# Patient Record
Sex: FEMALE | Race: WHITE | Employment: OTHER | URBAN - METROPOLITAN AREA
[De-identification: names, ages, dates, MRNs, and addresses within clinical notes are randomized per-mention and may not be internally consistent; named-entity substitution may affect disease eponyms.]

---

## 2017-01-10 ENCOUNTER — OFFICE VISIT (OUTPATIENT)
Dept: INTERNAL MEDICINE CLINIC | Age: 77
End: 2017-01-10

## 2017-01-10 VITALS
HEART RATE: 76 BPM | DIASTOLIC BLOOD PRESSURE: 82 MMHG | RESPIRATION RATE: 16 BRPM | SYSTOLIC BLOOD PRESSURE: 162 MMHG | HEIGHT: 62 IN | BODY MASS INDEX: 29.66 KG/M2 | WEIGHT: 161.2 LBS | TEMPERATURE: 98.1 F

## 2017-01-10 DIAGNOSIS — R10.32 LEFT LOWER QUADRANT PAIN: ICD-10-CM

## 2017-01-10 DIAGNOSIS — H60.502 ACUTE OTITIS EXTERNA OF LEFT EAR, UNSPECIFIED TYPE: ICD-10-CM

## 2017-01-10 DIAGNOSIS — M50.90 CERVICAL DISC DISEASE: ICD-10-CM

## 2017-01-10 DIAGNOSIS — I82.5Z3 CHRONIC DEEP VEIN THROMBOSIS (DVT) OF DISTAL VEIN OF BOTH LOWER EXTREMITIES (HCC): ICD-10-CM

## 2017-01-10 DIAGNOSIS — M48.061 LUMBAR SPINAL STENOSIS: Primary | ICD-10-CM

## 2017-01-10 DIAGNOSIS — I10 ESSENTIAL HYPERTENSION: ICD-10-CM

## 2017-01-10 DIAGNOSIS — R07.9 CHEST PAIN, UNSPECIFIED TYPE: ICD-10-CM

## 2017-01-10 DIAGNOSIS — E55.9 VITAMIN D DEFICIENCY: ICD-10-CM

## 2017-01-10 DIAGNOSIS — F32.A ANXIETY AND DEPRESSION: ICD-10-CM

## 2017-01-10 DIAGNOSIS — F41.9 ANXIETY AND DEPRESSION: ICD-10-CM

## 2017-01-10 DIAGNOSIS — E78.00 HYPERCHOLESTEREMIA: ICD-10-CM

## 2017-01-10 PROCEDURE — 99214 OFFICE O/P EST MOD 30 MIN: CPT | Performed by: INTERNAL MEDICINE

## 2017-01-10 RX ORDER — OFLOXACIN 3 MG/ML
10 SOLUTION AURICULAR (OTIC) DAILY
Qty: 1 BOTTLE | Refills: 0 | Status: SHIPPED | OUTPATIENT
Start: 2017-01-10 | End: 2017-01-17

## 2017-01-10 RX ORDER — GABAPENTIN 600 MG/1
600 TABLET ORAL 3 TIMES DAILY
COMMUNITY
End: 2017-05-12 | Stop reason: SDUPTHER

## 2017-01-10 RX ORDER — BIOTIN 2500 MCG
1 CAPSULE ORAL 2 TIMES DAILY
COMMUNITY
End: 2019-11-18 | Stop reason: ALTCHOICE

## 2017-01-10 RX ORDER — CHOLECALCIFEROL (VITAMIN D3) 1250 MCG
CAPSULE ORAL DAILY
COMMUNITY
End: 2018-02-01

## 2017-01-10 RX ORDER — LISINOPRIL 10 MG/1
10 TABLET ORAL DAILY
Qty: 30 TABLET | Refills: 3 | Status: SHIPPED | OUTPATIENT
Start: 2017-01-10 | End: 2017-04-12 | Stop reason: SDUPTHER

## 2017-01-10 RX ORDER — OXYBUTYNIN CHLORIDE 10 MG/1
10 TABLET, EXTENDED RELEASE ORAL 2 TIMES DAILY
COMMUNITY
End: 2017-10-09 | Stop reason: SDUPTHER

## 2017-01-10 RX ORDER — HYDROCODONE BITARTRATE AND ACETAMINOPHEN 5; 325 MG/1; MG/1
1 TABLET ORAL EVERY 6 HOURS PRN
COMMUNITY
End: 2017-04-11 | Stop reason: SDUPTHER

## 2017-01-10 RX ORDER — MELOXICAM 15 MG/1
15 TABLET ORAL DAILY
COMMUNITY
End: 2017-10-09 | Stop reason: SDUPTHER

## 2017-01-10 RX ORDER — ZINC OXIDE 13 %
1 CREAM (GRAM) TOPICAL DAILY
COMMUNITY
End: 2019-11-18 | Stop reason: ALTCHOICE

## 2017-01-10 ASSESSMENT — ENCOUNTER SYMPTOMS
CONSTIPATION: 0
BACK PAIN: 1
SORE THROAT: 0
VOMITING: 0
BLOOD IN STOOL: 0
ABDOMINAL PAIN: 1
SINUS PRESSURE: 0
DIARRHEA: 0
NAUSEA: 0

## 2017-01-17 ENCOUNTER — TELEPHONE (OUTPATIENT)
Dept: INTERNAL MEDICINE CLINIC | Age: 77
End: 2017-01-17

## 2017-02-06 ENCOUNTER — TELEPHONE (OUTPATIENT)
Dept: INTERNAL MEDICINE CLINIC | Age: 77
End: 2017-02-06

## 2017-02-07 ENCOUNTER — TELEPHONE (OUTPATIENT)
Dept: INTERNAL MEDICINE CLINIC | Age: 77
End: 2017-02-07

## 2017-02-08 ENCOUNTER — TELEPHONE (OUTPATIENT)
Dept: INTERNAL MEDICINE CLINIC | Age: 77
End: 2017-02-08

## 2017-02-20 ENCOUNTER — TELEPHONE (OUTPATIENT)
Dept: INTERNAL MEDICINE CLINIC | Age: 77
End: 2017-02-20

## 2017-02-21 ENCOUNTER — TELEPHONE (OUTPATIENT)
Dept: INTERNAL MEDICINE CLINIC | Age: 77
End: 2017-02-21

## 2017-03-03 ENCOUNTER — OFFICE VISIT (OUTPATIENT)
Dept: ENT CLINIC | Age: 77
End: 2017-03-03

## 2017-03-03 VITALS
SYSTOLIC BLOOD PRESSURE: 123 MMHG | DIASTOLIC BLOOD PRESSURE: 64 MMHG | BODY MASS INDEX: 29.04 KG/M2 | TEMPERATURE: 98.6 F | HEIGHT: 62 IN | WEIGHT: 157.8 LBS | HEART RATE: 56 BPM

## 2017-03-03 DIAGNOSIS — H90.2 EXTERNAL EAR CONDUCTIVE HEARING LOSS: ICD-10-CM

## 2017-03-03 DIAGNOSIS — H61.21 IMPACTED CERUMEN OF RIGHT EAR: ICD-10-CM

## 2017-03-03 DIAGNOSIS — H93.8X1 FULLNESS IN EAR, RIGHT: ICD-10-CM

## 2017-03-03 DIAGNOSIS — M26.623 BILATERAL TEMPOROMANDIBULAR JOINT PAIN: ICD-10-CM

## 2017-03-03 DIAGNOSIS — H92.03 REFERRED OTALGIA OF BOTH EARS: Primary | ICD-10-CM

## 2017-03-03 PROCEDURE — 99214 OFFICE O/P EST MOD 30 MIN: CPT | Performed by: OTOLARYNGOLOGY

## 2017-03-09 ENCOUNTER — TELEPHONE (OUTPATIENT)
Dept: INTERNAL MEDICINE CLINIC | Age: 77
End: 2017-03-09

## 2017-04-11 ENCOUNTER — OFFICE VISIT (OUTPATIENT)
Dept: INTERNAL MEDICINE CLINIC | Age: 77
End: 2017-04-11

## 2017-04-11 VITALS
DIASTOLIC BLOOD PRESSURE: 66 MMHG | RESPIRATION RATE: 16 BRPM | HEART RATE: 56 BPM | WEIGHT: 157 LBS | BODY MASS INDEX: 28.72 KG/M2 | TEMPERATURE: 98.1 F | SYSTOLIC BLOOD PRESSURE: 138 MMHG

## 2017-04-11 DIAGNOSIS — E78.00 HYPERCHOLESTEREMIA: ICD-10-CM

## 2017-04-11 DIAGNOSIS — M48.062 LUMBAR STENOSIS WITH NEUROGENIC CLAUDICATION: Primary | ICD-10-CM

## 2017-04-11 DIAGNOSIS — F32.9 REACTIVE DEPRESSION: ICD-10-CM

## 2017-04-11 DIAGNOSIS — M48.061 LUMBAR STENOSIS: ICD-10-CM

## 2017-04-11 DIAGNOSIS — I82.5Y3 CHRONIC DEEP VEIN THROMBOSIS (DVT) OF PROXIMAL VEIN OF BOTH LOWER EXTREMITIES (HCC): ICD-10-CM

## 2017-04-11 DIAGNOSIS — I10 ESSENTIAL HYPERTENSION: ICD-10-CM

## 2017-04-11 DIAGNOSIS — H60.93 OTITIS EXTERNA OF BOTH EARS, UNSPECIFIED CHRONICITY, UNSPECIFIED TYPE: ICD-10-CM

## 2017-04-11 PROBLEM — I82.4Y3 DEEP VEIN THROMBOSIS (DVT) OF PROXIMAL VEIN OF BOTH LOWER EXTREMITIES (HCC): Status: ACTIVE | Noted: 2017-04-11

## 2017-04-11 PROCEDURE — 99214 OFFICE O/P EST MOD 30 MIN: CPT | Performed by: INTERNAL MEDICINE

## 2017-04-11 RX ORDER — HYDROCODONE BITARTRATE AND ACETAMINOPHEN 5; 325 MG/1; MG/1
1 TABLET ORAL EVERY 6 HOURS PRN
Qty: 120 TABLET | Refills: 0 | Status: ON HOLD | OUTPATIENT
Start: 2017-04-11 | End: 2018-02-08 | Stop reason: HOSPADM

## 2017-04-11 RX ORDER — OFLOXACIN 3 MG/ML
5 SOLUTION AURICULAR (OTIC) 2 TIMES DAILY
Qty: 10 ML | Refills: 0 | Status: SHIPPED | OUTPATIENT
Start: 2017-04-11 | End: 2017-04-21

## 2017-04-11 RX ORDER — HYDROCHLOROTHIAZIDE 12.5 MG/1
CAPSULE, GELATIN COATED ORAL
COMMUNITY
Start: 2017-03-07 | End: 2017-04-12 | Stop reason: SDUPTHER

## 2017-04-11 RX ORDER — LIDOCAINE 50 MG/G
OINTMENT TOPICAL
Qty: 50 G | Refills: 1 | Status: SHIPPED | OUTPATIENT
Start: 2017-04-11 | End: 2017-07-28 | Stop reason: SDUPTHER

## 2017-04-11 ASSESSMENT — ENCOUNTER SYMPTOMS
NAUSEA: 0
BACK PAIN: 1
ABDOMINAL PAIN: 0
VOMITING: 0
CONSTIPATION: 0
SORE THROAT: 0
DIARRHEA: 0
SINUS PRESSURE: 0
BLOOD IN STOOL: 0

## 2017-04-12 DIAGNOSIS — I10 ESSENTIAL HYPERTENSION: ICD-10-CM

## 2017-04-12 DIAGNOSIS — M48.062 LUMBAR STENOSIS WITH NEUROGENIC CLAUDICATION: ICD-10-CM

## 2017-04-12 RX ORDER — LISINOPRIL 10 MG/1
10 TABLET ORAL DAILY
Qty: 90 TABLET | Refills: 1 | Status: SHIPPED | OUTPATIENT
Start: 2017-04-12 | End: 2017-05-17 | Stop reason: SDUPTHER

## 2017-04-12 RX ORDER — HYDROCHLOROTHIAZIDE 12.5 MG/1
12.5 CAPSULE, GELATIN COATED ORAL EVERY MORNING
Qty: 90 CAPSULE | Refills: 1 | Status: SHIPPED | OUTPATIENT
Start: 2017-04-12 | End: 2017-09-13 | Stop reason: DRUGHIGH

## 2017-05-12 RX ORDER — GABAPENTIN 600 MG/1
600 TABLET ORAL 3 TIMES DAILY
Qty: 270 TABLET | Refills: 1 | Status: SHIPPED | OUTPATIENT
Start: 2017-05-12 | End: 2018-02-02 | Stop reason: SDUPTHER

## 2017-05-17 DIAGNOSIS — I10 ESSENTIAL HYPERTENSION: ICD-10-CM

## 2017-05-17 RX ORDER — LISINOPRIL 10 MG/1
TABLET ORAL
Qty: 90 TABLET | Refills: 1 | Status: SHIPPED | OUTPATIENT
Start: 2017-05-17 | End: 2017-06-13 | Stop reason: DRUGHIGH

## 2017-06-13 ENCOUNTER — OFFICE VISIT (OUTPATIENT)
Dept: INTERNAL MEDICINE CLINIC | Age: 77
End: 2017-06-13

## 2017-06-13 VITALS
RESPIRATION RATE: 16 BRPM | DIASTOLIC BLOOD PRESSURE: 82 MMHG | TEMPERATURE: 98 F | HEART RATE: 64 BPM | BODY MASS INDEX: 28.24 KG/M2 | WEIGHT: 154.4 LBS | SYSTOLIC BLOOD PRESSURE: 142 MMHG

## 2017-06-13 DIAGNOSIS — F32.9 REACTIVE DEPRESSION: ICD-10-CM

## 2017-06-13 DIAGNOSIS — M50.90 CERVICAL DISC DISEASE: ICD-10-CM

## 2017-06-13 DIAGNOSIS — E78.00 HYPERCHOLESTEREMIA: ICD-10-CM

## 2017-06-13 DIAGNOSIS — I10 ESSENTIAL HYPERTENSION: ICD-10-CM

## 2017-06-13 DIAGNOSIS — Z23 NEED FOR PROPHYLACTIC VACCINATION WITH STREPTOCOCCUS PNEUMONIAE (PNEUMOCOCCUS) AND INFLUENZA VACCINES: ICD-10-CM

## 2017-06-13 DIAGNOSIS — I82.5Y3 CHRONIC DEEP VEIN THROMBOSIS (DVT) OF PROXIMAL VEIN OF BOTH LOWER EXTREMITIES (HCC): Primary | ICD-10-CM

## 2017-06-13 DIAGNOSIS — M48.061 LUMBAR STENOSIS: ICD-10-CM

## 2017-06-13 PROCEDURE — 90670 PCV13 VACCINE IM: CPT | Performed by: INTERNAL MEDICINE

## 2017-06-13 PROCEDURE — G0009 ADMIN PNEUMOCOCCAL VACCINE: HCPCS | Performed by: INTERNAL MEDICINE

## 2017-06-13 PROCEDURE — 99214 OFFICE O/P EST MOD 30 MIN: CPT | Performed by: INTERNAL MEDICINE

## 2017-06-13 RX ORDER — LISINOPRIL 20 MG/1
20 TABLET ORAL DAILY
Qty: 90 TABLET | Refills: 1 | Status: SHIPPED | OUTPATIENT
Start: 2017-06-13 | End: 2017-09-13 | Stop reason: DRUGHIGH

## 2017-06-13 RX ORDER — WARFARIN SODIUM 5 MG/1
TABLET ORAL
Qty: 30 TABLET | Refills: 3
Start: 2017-06-13 | End: 2017-06-15 | Stop reason: SDUPTHER

## 2017-06-13 ASSESSMENT — ENCOUNTER SYMPTOMS
ABDOMINAL PAIN: 0
CONSTIPATION: 0
COUGH: 0
BACK PAIN: 1
DIARRHEA: 0
VOMITING: 0
NAUSEA: 0
BLOOD IN STOOL: 0
SHORTNESS OF BREATH: 0

## 2017-06-14 ENCOUNTER — TELEPHONE (OUTPATIENT)
Dept: INTERNAL MEDICINE CLINIC | Age: 77
End: 2017-06-14

## 2017-06-14 DIAGNOSIS — I82.5Y3 CHRONIC DEEP VEIN THROMBOSIS (DVT) OF PROXIMAL VEIN OF BOTH LOWER EXTREMITIES (HCC): ICD-10-CM

## 2017-06-14 LAB
ALBUMIN SERPL-MCNC: 3.8 G/DL (ref 3.5–5)
ALP BLD-CCNC: 59 IU/L (ref 35–135)
ALT SERPL-CCNC: 14 IU/L (ref 10–60)
AST SERPL-CCNC: 21 IU/L (ref 10–40)
BILIRUB SERPL-MCNC: 0.5 MG/DL (ref 0–1.2)
BUN BLDV-MCNC: 23 MG/DL (ref 8–26)
CALCIUM SERPL-MCNC: 9.8 MG/DL (ref 8.5–10.5)
CHLORIDE BLD-SCNC: 108 MEQ/L (ref 101–111)
CHOLESTEROL, TOTAL: 175 MG/DL
CHOLESTEROL/HDL RATIO: 2.6 (ref 1.9–4.2)
CO2: 29 MEQ/L (ref 24–36)
CREAT SERPL-MCNC: 0.6 MG/DL (ref 0.44–1.03)
GFR AFRICAN AMERICAN: >60 ML/MIN/1.73 SQ METER
GFR NON-AFRICAN AMERICAN: >60 ML/MIN/1.73 SQ METER
GLUCOSE BLD-MCNC: 92 MG/DL (ref 70–99)
HCT VFR BLD CALC: 37 % (ref 36–46)
HDLC SERPL-MCNC: 67 MG/DL
HEMOGLOBIN: 12.2 G/DL (ref 12–15.2)
INR BLD: 3 (ref 0.8–1.2)
LDL CHOLESTEROL CALCULATED: 94 MG/DL
LDL/HDL RATIO: 1.4 (ref 1–4)
MCH RBC QN AUTO: 31 PG (ref 27–33)
MCHC RBC AUTO-ENTMCNC: 33 G/DL (ref 32–36)
MCV RBC AUTO: 95 FL (ref 82–97)
PDW BLD-RTO: 15.3 %
PLATELET # BLD: 178 THOU/MCL (ref 140–375)
POTASSIUM SERPL-SCNC: 3.8 MEQ/L (ref 3.6–5.1)
PROTHROMBIN TIME: 31.3 SECONDS (ref 11.7–14.2)
RBC # BLD: 3.89 MIL/MCL (ref 3.8–5.2)
SODIUM BLD-SCNC: 143 MEQ/L (ref 135–145)
TOTAL PROTEIN: 6.9 G/DL (ref 6–8)
TRIGL SERPL-MCNC: 70 MG/DL
WBC # BLD: 4.5 THOU/MCL (ref 3.6–10.5)

## 2017-06-15 ENCOUNTER — TELEPHONE (OUTPATIENT)
Dept: INTERNAL MEDICINE CLINIC | Age: 77
End: 2017-06-15

## 2017-06-15 RX ORDER — WARFARIN SODIUM 5 MG/1
TABLET ORAL
Qty: 180 TABLET | Refills: 1 | Status: SHIPPED | OUTPATIENT
Start: 2017-06-15 | End: 2017-12-11 | Stop reason: DRUGHIGH

## 2017-07-26 LAB
INR BLD: 3.5 (ref 0.8–1.2)
PROTHROMBIN TIME: 35.8 SECONDS (ref 11.7–14.2)

## 2017-07-28 DIAGNOSIS — M48.062 LUMBAR STENOSIS WITH NEUROGENIC CLAUDICATION: ICD-10-CM

## 2017-07-31 ENCOUNTER — TELEPHONE (OUTPATIENT)
Dept: INTERNAL MEDICINE CLINIC | Age: 77
End: 2017-07-31

## 2017-07-31 DIAGNOSIS — M48.062 LUMBAR STENOSIS WITH NEUROGENIC CLAUDICATION: ICD-10-CM

## 2017-07-31 RX ORDER — LIDOCAINE 50 MG/G
OINTMENT TOPICAL
Qty: 35.44 G | Refills: 1 | Status: SHIPPED | OUTPATIENT
Start: 2017-07-31 | End: 2018-04-03 | Stop reason: SDUPTHER

## 2017-07-31 RX ORDER — LIDOCAINE 50 MG/G
OINTMENT TOPICAL
Qty: 50 G | Refills: 1 | Status: SHIPPED | OUTPATIENT
Start: 2017-07-31 | End: 2017-07-31 | Stop reason: SDUPTHER

## 2017-08-16 ENCOUNTER — TELEPHONE (OUTPATIENT)
Dept: INTERNAL MEDICINE CLINIC | Age: 77
End: 2017-08-16

## 2017-08-18 ENCOUNTER — TELEPHONE (OUTPATIENT)
Dept: INTERNAL MEDICINE CLINIC | Age: 77
End: 2017-08-18

## 2017-08-21 RX ORDER — SIMVASTATIN 40 MG
40 TABLET ORAL NIGHTLY
Qty: 90 TABLET | Refills: 1 | Status: SHIPPED | OUTPATIENT
Start: 2017-08-21 | End: 2017-12-13 | Stop reason: SDUPTHER

## 2017-08-21 RX ORDER — PANTOPRAZOLE SODIUM 40 MG/1
40 TABLET, DELAYED RELEASE ORAL DAILY
Qty: 90 TABLET | Refills: 1 | Status: SHIPPED | OUTPATIENT
Start: 2017-08-21 | End: 2018-01-22 | Stop reason: SDUPTHER

## 2017-08-24 DIAGNOSIS — I82.5Y3 CHRONIC DEEP VEIN THROMBOSIS (DVT) OF PROXIMAL VEIN OF BOTH LOWER EXTREMITIES (HCC): Primary | ICD-10-CM

## 2017-09-13 ENCOUNTER — TELEPHONE (OUTPATIENT)
Dept: INTERNAL MEDICINE CLINIC | Age: 77
End: 2017-09-13

## 2017-09-13 ENCOUNTER — OFFICE VISIT (OUTPATIENT)
Dept: INTERNAL MEDICINE CLINIC | Age: 77
End: 2017-09-13

## 2017-09-13 VITALS
HEART RATE: 76 BPM | WEIGHT: 156.4 LBS | SYSTOLIC BLOOD PRESSURE: 152 MMHG | TEMPERATURE: 98 F | BODY MASS INDEX: 28.61 KG/M2 | RESPIRATION RATE: 16 BRPM | DIASTOLIC BLOOD PRESSURE: 78 MMHG

## 2017-09-13 DIAGNOSIS — I82.5Y3 CHRONIC DEEP VEIN THROMBOSIS (DVT) OF PROXIMAL VEIN OF BOTH LOWER EXTREMITIES (HCC): ICD-10-CM

## 2017-09-13 DIAGNOSIS — J01.00 SUBACUTE MAXILLARY SINUSITIS: ICD-10-CM

## 2017-09-13 DIAGNOSIS — F33.41 RECURRENT MAJOR DEPRESSIVE DISORDER, IN PARTIAL REMISSION (HCC): ICD-10-CM

## 2017-09-13 DIAGNOSIS — M48.061 LUMBAR STENOSIS: ICD-10-CM

## 2017-09-13 DIAGNOSIS — I10 ESSENTIAL HYPERTENSION: Primary | ICD-10-CM

## 2017-09-13 DIAGNOSIS — R42 VERTIGO: ICD-10-CM

## 2017-09-13 LAB
INR BLD: 2.1 (ref 0.8–1.2)
PROTHROMBIN TIME: 23.6 SECONDS (ref 11.7–14.2)

## 2017-09-13 PROCEDURE — 99214 OFFICE O/P EST MOD 30 MIN: CPT | Performed by: INTERNAL MEDICINE

## 2017-09-13 RX ORDER — FLUTICASONE PROPIONATE 50 MCG
1 SPRAY, SUSPENSION (ML) NASAL DAILY
Qty: 1 BOTTLE | Refills: 3 | Status: SHIPPED | OUTPATIENT
Start: 2017-09-13 | End: 2018-01-22 | Stop reason: ALTCHOICE

## 2017-09-13 RX ORDER — HYDROCHLOROTHIAZIDE 25 MG/1
25 TABLET ORAL DAILY
Qty: 90 TABLET | Refills: 1 | Status: SHIPPED | OUTPATIENT
Start: 2017-09-13 | End: 2018-01-22 | Stop reason: SDUPTHER

## 2017-09-13 RX ORDER — AMOXICILLIN 875 MG/1
875 TABLET, COATED ORAL 2 TIMES DAILY
Qty: 20 TABLET | Refills: 0 | Status: SHIPPED | OUTPATIENT
Start: 2017-09-13 | End: 2017-12-13

## 2017-09-13 RX ORDER — LISINOPRIL 40 MG/1
40 TABLET ORAL DAILY
Qty: 90 TABLET | Refills: 1 | Status: SHIPPED | OUTPATIENT
Start: 2017-09-13 | End: 2017-12-13 | Stop reason: SDUPTHER

## 2017-09-15 ASSESSMENT — ENCOUNTER SYMPTOMS
SHORTNESS OF BREATH: 1
DIARRHEA: 0
ABDOMINAL PAIN: 0
SORE THROAT: 1
COUGH: 1
BACK PAIN: 1
SINUS PRESSURE: 1
CONSTIPATION: 0
VOMITING: 0
RHINORRHEA: 1

## 2017-10-09 ENCOUNTER — TELEPHONE (OUTPATIENT)
Dept: INTERNAL MEDICINE CLINIC | Age: 77
End: 2017-10-09

## 2017-10-09 RX ORDER — OXYBUTYNIN CHLORIDE 10 MG/1
10 TABLET, EXTENDED RELEASE ORAL 2 TIMES DAILY
Qty: 180 TABLET | Refills: 1 | Status: SHIPPED | OUTPATIENT
Start: 2017-10-09 | End: 2017-12-04 | Stop reason: DRUGHIGH

## 2017-10-09 RX ORDER — MELOXICAM 15 MG/1
15 TABLET ORAL DAILY
Qty: 90 TABLET | Refills: 1 | Status: SHIPPED | OUTPATIENT
Start: 2017-10-09 | End: 2017-12-13 | Stop reason: SDUPTHER

## 2017-10-09 RX ORDER — SERTRALINE HYDROCHLORIDE 100 MG/1
100 TABLET, FILM COATED ORAL 2 TIMES DAILY
Qty: 180 TABLET | Refills: 1 | Status: SHIPPED | OUTPATIENT
Start: 2017-10-09 | End: 2018-01-22 | Stop reason: SDUPTHER

## 2017-10-09 NOTE — TELEPHONE ENCOUNTER
Pt needs a rx on meloxicam (MOBIC) 15 MG tablet to be sent to [facility] ([phone]).

## 2017-12-04 ENCOUNTER — TELEPHONE (OUTPATIENT)
Dept: INTERNAL MEDICINE CLINIC | Age: 77
End: 2017-12-04

## 2017-12-04 RX ORDER — OXYBUTYNIN CHLORIDE 5 MG/1
10 TABLET, EXTENDED RELEASE ORAL 2 TIMES DAILY
Qty: 360 TABLET | Refills: 1 | Status: SHIPPED | OUTPATIENT
Start: 2017-12-04 | End: 2017-12-11 | Stop reason: CLARIF

## 2017-12-07 ENCOUNTER — TELEPHONE (OUTPATIENT)
Dept: INTERNAL MEDICINE CLINIC | Age: 77
End: 2017-12-07

## 2017-12-07 DIAGNOSIS — I82.5Y3 CHRONIC DEEP VEIN THROMBOSIS (DVT) OF PROXIMAL VEIN OF BOTH LOWER EXTREMITIES (HCC): ICD-10-CM

## 2017-12-07 NOTE — TELEPHONE ENCOUNTER
[facility] pharmacy calling b/c the pt's insurance won't cover the oxybutynin (DITROPAN-XL) 5 MG extended release tablet two tablets twice daily unless there is a PA. The pt's insurance will cover either one 5 MG or one 10 MG both twice a daily. [facility] wants to know what Dr Belén Allen wants to do?

## 2017-12-07 NOTE — TELEPHONE ENCOUNTER
So what they are saying is that they will cover one pill of each daily ? We could try one of each and see if it goes through or we could change to mybetriq once a day and see if this helps?

## 2017-12-07 NOTE — TELEPHONE ENCOUNTER
Spoke with the pharmacy and patient has always got Ditropan XL 10 mg bid cost was $80 for 90 day supply. Insurance will cover Ditropan XL 5 or 10 mg one tablet BID but not for 2 tablets BID(4)    Mybetriq 25 mg would be $47 for a 30 day supply. Spoke with the patient she will just continue on the Ditropan XL 10 mg one bid. Patient informed of new directions for warfrin.  Per Dr Nicolas Paris take 5 mg on Tue, Thurs and 7.5 mg on Mon, Wed, Fri, Sat & Sun. Updated on med list.

## 2017-12-11 RX ORDER — WARFARIN SODIUM 5 MG/1
TABLET ORAL
Qty: 180 TABLET | Refills: 1 | Status: SHIPPED
Start: 2017-12-11 | End: 2018-01-22 | Stop reason: SDUPTHER

## 2017-12-11 RX ORDER — OXYBUTYNIN CHLORIDE 10 MG/1
10 TABLET, EXTENDED RELEASE ORAL 2 TIMES DAILY
Qty: 180 TABLET | Refills: 1 | Status: SHIPPED | OUTPATIENT
Start: 2017-12-11 | End: 2018-01-22 | Stop reason: SDUPTHER

## 2017-12-13 ENCOUNTER — OFFICE VISIT (OUTPATIENT)
Dept: INTERNAL MEDICINE CLINIC | Age: 77
End: 2017-12-13

## 2017-12-13 ENCOUNTER — TELEPHONE (OUTPATIENT)
Dept: INTERNAL MEDICINE CLINIC | Age: 77
End: 2017-12-13

## 2017-12-13 VITALS
SYSTOLIC BLOOD PRESSURE: 80 MMHG | BODY MASS INDEX: 28.82 KG/M2 | OXYGEN SATURATION: 94 % | HEIGHT: 62 IN | HEART RATE: 75 BPM | DIASTOLIC BLOOD PRESSURE: 60 MMHG | TEMPERATURE: 98 F | WEIGHT: 156.6 LBS

## 2017-12-13 DIAGNOSIS — M81.0 AGE-RELATED OSTEOPOROSIS WITHOUT CURRENT PATHOLOGICAL FRACTURE: ICD-10-CM

## 2017-12-13 DIAGNOSIS — R63.4 WEIGHT LOSS: ICD-10-CM

## 2017-12-13 DIAGNOSIS — I10 ESSENTIAL HYPERTENSION: ICD-10-CM

## 2017-12-13 DIAGNOSIS — Z91.81 AT HIGH RISK FOR FALLS: ICD-10-CM

## 2017-12-13 DIAGNOSIS — R10.10 PAIN OF UPPER ABDOMEN: ICD-10-CM

## 2017-12-13 DIAGNOSIS — E78.00 HYPERCHOLESTEREMIA: ICD-10-CM

## 2017-12-13 DIAGNOSIS — M48.062 SPINAL STENOSIS OF LUMBAR REGION WITH NEUROGENIC CLAUDICATION: Primary | ICD-10-CM

## 2017-12-13 DIAGNOSIS — I82.5Y3 CHRONIC DEEP VEIN THROMBOSIS (DVT) OF PROXIMAL VEIN OF BOTH LOWER EXTREMITIES (HCC): ICD-10-CM

## 2017-12-13 LAB
A/G RATIO: 1.3 (ref 1.1–2.2)
ALBUMIN SERPL-MCNC: 4.1 G/DL (ref 3.4–5)
ALP BLD-CCNC: 61 U/L (ref 40–129)
ALT SERPL-CCNC: 12 U/L (ref 10–40)
AMYLASE: 39 U/L (ref 25–115)
ANION GAP SERPL CALCULATED.3IONS-SCNC: 14 MMOL/L (ref 3–16)
AST SERPL-CCNC: 18 U/L (ref 15–37)
BASOPHILS ABSOLUTE: 0.1 K/UL (ref 0–0.2)
BASOPHILS RELATIVE PERCENT: 1 %
BILIRUB SERPL-MCNC: 0.3 MG/DL (ref 0–1)
BUN BLDV-MCNC: 32 MG/DL (ref 7–20)
CALCIUM SERPL-MCNC: 10.1 MG/DL (ref 8.3–10.6)
CHLORIDE BLD-SCNC: 101 MMOL/L (ref 99–110)
CO2: 32 MMOL/L (ref 21–32)
CREAT SERPL-MCNC: 1 MG/DL (ref 0.6–1.2)
EOSINOPHILS ABSOLUTE: 0.1 K/UL (ref 0–0.6)
EOSINOPHILS RELATIVE PERCENT: 2.7 %
GFR AFRICAN AMERICAN: >60
GFR NON-AFRICAN AMERICAN: 54
GLOBULIN: 3.2 G/DL
GLUCOSE BLD-MCNC: 93 MG/DL (ref 70–99)
HCT VFR BLD CALC: 40.8 % (ref 36–48)
HEMOGLOBIN: 13.6 G/DL (ref 12–16)
LIPASE: 21 U/L (ref 13–60)
LYMPHOCYTES ABSOLUTE: 1.6 K/UL (ref 1–5.1)
LYMPHOCYTES RELATIVE PERCENT: 29 %
MCH RBC QN AUTO: 32.2 PG (ref 26–34)
MCHC RBC AUTO-ENTMCNC: 33.3 G/DL (ref 31–36)
MCV RBC AUTO: 97 FL (ref 80–100)
MONOCYTES ABSOLUTE: 0.7 K/UL (ref 0–1.3)
MONOCYTES RELATIVE PERCENT: 11.9 %
NEUTROPHILS ABSOLUTE: 3.1 K/UL (ref 1.7–7.7)
NEUTROPHILS RELATIVE PERCENT: 55.4 %
PDW BLD-RTO: 14.9 % (ref 12.4–15.4)
PLATELET # BLD: 160 K/UL (ref 135–450)
PMV BLD AUTO: 10.9 FL (ref 5–10.5)
POTASSIUM SERPL-SCNC: 4 MMOL/L (ref 3.5–5.1)
RBC # BLD: 4.2 M/UL (ref 4–5.2)
SODIUM BLD-SCNC: 147 MMOL/L (ref 136–145)
TOTAL PROTEIN: 7.3 G/DL (ref 6.4–8.2)
WBC # BLD: 5.5 K/UL (ref 4–11)

## 2017-12-13 PROCEDURE — G8484 FLU IMMUNIZE NO ADMIN: HCPCS | Performed by: INTERNAL MEDICINE

## 2017-12-13 PROCEDURE — 1123F ACP DISCUSS/DSCN MKR DOCD: CPT | Performed by: INTERNAL MEDICINE

## 2017-12-13 PROCEDURE — 4040F PNEUMOC VAC/ADMIN/RCVD: CPT | Performed by: INTERNAL MEDICINE

## 2017-12-13 PROCEDURE — 4005F PHARM THX FOR OP RXD: CPT | Performed by: INTERNAL MEDICINE

## 2017-12-13 PROCEDURE — G8400 PT W/DXA NO RESULTS DOC: HCPCS | Performed by: INTERNAL MEDICINE

## 2017-12-13 PROCEDURE — 3017F COLORECTAL CA SCREEN DOC REV: CPT | Performed by: INTERNAL MEDICINE

## 2017-12-13 PROCEDURE — 99214 OFFICE O/P EST MOD 30 MIN: CPT | Performed by: INTERNAL MEDICINE

## 2017-12-13 PROCEDURE — 1036F TOBACCO NON-USER: CPT | Performed by: INTERNAL MEDICINE

## 2017-12-13 PROCEDURE — 36415 COLL VENOUS BLD VENIPUNCTURE: CPT | Performed by: INTERNAL MEDICINE

## 2017-12-13 PROCEDURE — G8427 DOCREV CUR MEDS BY ELIG CLIN: HCPCS | Performed by: INTERNAL MEDICINE

## 2017-12-13 PROCEDURE — G8417 CALC BMI ABV UP PARAM F/U: HCPCS | Performed by: INTERNAL MEDICINE

## 2017-12-13 PROCEDURE — 1090F PRES/ABSN URINE INCON ASSESS: CPT | Performed by: INTERNAL MEDICINE

## 2017-12-13 RX ORDER — LISINOPRIL 20 MG/1
20 TABLET ORAL DAILY
Qty: 90 TABLET | Refills: 1 | Status: SHIPPED | OUTPATIENT
Start: 2017-12-13 | End: 2018-01-22 | Stop reason: SDUPTHER

## 2017-12-13 RX ORDER — SIMVASTATIN 40 MG
40 TABLET ORAL NIGHTLY
Qty: 90 TABLET | Refills: 1 | Status: SHIPPED | OUTPATIENT
Start: 2017-12-13 | End: 2018-01-22 | Stop reason: SDUPTHER

## 2017-12-13 RX ORDER — LISINOPRIL 40 MG/1
20 TABLET ORAL DAILY
Qty: 90 TABLET | Refills: 1
Start: 2017-12-13 | End: 2018-01-22 | Stop reason: DRUGHIGH

## 2017-12-13 RX ORDER — MELOXICAM 15 MG/1
15 TABLET ORAL DAILY
Qty: 90 TABLET | Refills: 1 | Status: SHIPPED | OUTPATIENT
Start: 2017-12-13 | End: 2018-01-22 | Stop reason: SDUPTHER

## 2017-12-13 RX ORDER — AZITHROMYCIN 250 MG/1
TABLET, FILM COATED ORAL
Qty: 1 PACKET | Refills: 0 | Status: SHIPPED | OUTPATIENT
Start: 2017-12-13 | End: 2017-12-23

## 2017-12-13 ASSESSMENT — ENCOUNTER SYMPTOMS
NAUSEA: 1
BACK PAIN: 1
SHORTNESS OF BREATH: 0
COUGH: 0
ABDOMINAL PAIN: 1
VOMITING: 1

## 2017-12-13 NOTE — PATIENT INSTRUCTIONS
Please call your pharmacy if you need any refills of your medication(s). Please call our office at [phone] if you don't hear from us about your test results. Bring an accurate list of your medications with you at every appointment to ensure that we have the correct information.     Our office hours are: Monday - Friday 8:30 am- 5 pm    Phone lines turn on at 8:30 am

## 2017-12-13 NOTE — PROGRESS NOTES
180 tablet 1    hydrochlorothiazide (HYDRODIURIL) 25 MG tablet Take 1 tablet by mouth daily 90 tablet 1    fluticasone (FLONASE) 50 MCG/ACT nasal spray 1 spray by Nasal route daily 1 Bottle 3    pantoprazole (PROTONIX) 40 MG tablet Take 1 tablet by mouth daily 90 tablet 1    lidocaine (XYLOCAINE) 5 % ointment Apply topically every 6 hours as needed to affected area 35.44 g 1    gabapentin (NEURONTIN) 600 MG tablet Take 1 tablet by mouth 3 times daily 270 tablet 1    diclofenac sodium 1 % GEL Apply 2 g topically 2 times daily 1 Tube 3    HYDROcodone-acetaminophen (NORCO) 5-325 MG per tablet Take 1 tablet by mouth every 6 hours as needed for Pain . 120 tablet 0    Misc. Devices MISC George panty hose  Open toe Petite plus beige  2 pairs 2 each 1    Probiotic Product (PROBIOTIC DAILY) CAPS Take by mouth daily      Biotin 2500 MCG CAPS Take by mouth daily      Cholecalciferol (VITAMIN D3) 38023 UNITS CAPS Take by mouth daily      Coenzyme Q10 (COQ10) 100 MG CAPS Take  by mouth.  calcium carbonate (OSCAL) 500 MG TABS tablet Take 500 mg by mouth daily. No current facility-administered medications for this visit. Social History     Social History    Marital status:      Spouse name: N/A    Number of children: N/A    Years of education: N/A     Occupational History    Not on file.      Social History Main Topics    Smoking status: Never Smoker    Smokeless tobacco: Never Used    Alcohol use No    Drug use: No    Sexual activity: Not on file     Other Topics Concern    Not on file     Social History Narrative    No narrative on file      Past Medical History:   Diagnosis Date    Arthritis     Cancer ([facility] [address]     squamous cell on nose     Chronic back pain     Depression     H/O blood clots     Hyperlipidemia     Hypertension     Lumbar stenosis with neurogenic claudication     Osteoarthritis of neck      Past Surgical History:   Procedure Laterality Date     SECTION      EMBOLECTOMY      HYSTERECTOMY      LITHOTRIPSY      MOHS SURGERY      nose for squamous cell carcinoma    OTHER SURGICAL HISTORY      blood clots- 1963       Review of Systems   Constitutional: Positive for activity change, appetite change, fatigue and unexpected weight change (Has lost weight in the last year). HENT: Negative for nosebleeds. Eyes: Negative for visual disturbance. Respiratory: Negative for cough and shortness of breath. Cardiovascular: Positive for leg swelling (Has been doing well with her DESI hose). Negative for chest pain and palpitations. Gastrointestinal: Positive for abdominal pain, nausea and vomiting. Genitourinary: Negative for dysuria and frequency (ok with her Ditropan). Vaginal numbness   Musculoskeletal: Positive for arthralgias (Hands), back pain and gait problem. Falls   Neurological: Positive for light-headedness and numbness. Negative for dizziness, syncope, weakness and headaches. Paresthesias down left leg   Psychiatric/Behavioral: Positive for decreased concentration and dysphoric mood (Doesn't want to change her medicine at this time). Negative for suicidal ideas. OBJECTIVE:  BP 80/60 (Site: Right Arm, Position: Standing)   Pulse 75   Temp 98 °F (36.7 °C) (Oral)   Ht 5' 2\" (1.575 m)   Wt 156 lb 9.6 oz (71 kg)   SpO2 94%   BMI 28.64 kg/m²      Body mass index is 28.64 kg/m². Physical Exam   Constitutional: She is oriented to person, place, and time. She appears well-developed and well-nourished. She appears distressed. Frail and unsteady   HENT:   Head: Normocephalic and atraumatic. Eyes: Conjunctivae are normal.   Neck: Neck supple. No JVD present. No thyromegaly present. Cardiovascular: Normal rate, regular rhythm, normal heart sounds and intact distal pulses. Exam reveals no gallop and no friction rub. No murmur heard.   Pulmonary/Chest: Effort normal and breath sounds normal. She exhibits no

## 2017-12-18 ENCOUNTER — TELEPHONE (OUTPATIENT)
Dept: INTERNAL MEDICINE CLINIC | Age: 77
End: 2017-12-18

## 2017-12-18 DIAGNOSIS — M48.062 SPINAL STENOSIS OF LUMBAR REGION WITH NEUROGENIC CLAUDICATION: Primary | ICD-10-CM

## 2017-12-22 ENCOUNTER — TELEPHONE (OUTPATIENT)
Dept: INTERNAL MEDICINE CLINIC | Age: 77
End: 2017-12-22

## 2017-12-22 ENCOUNTER — HOSPITAL ENCOUNTER (OUTPATIENT)
Dept: MAMMOGRAPHY | Age: 77
Discharge: HOME | End: 2017-12-22
Admitting: INTERNAL MEDICINE

## 2017-12-22 DIAGNOSIS — R94.4 DECREASED GFR: Primary | ICD-10-CM

## 2017-12-22 DIAGNOSIS — M81.0 AGE-RELATED OSTEOPOROSIS WITHOUT CURRENT PATHOLOGICAL FRACTURE: ICD-10-CM

## 2017-12-26 ENCOUNTER — HOSPITAL ENCOUNTER (OUTPATIENT)
Dept: CT IMAGING | Age: 77
Discharge: HOME | End: 2017-12-26
Attending: INTERNAL MEDICINE | Admitting: INTERNAL MEDICINE

## 2017-12-26 ENCOUNTER — HOSPITAL ENCOUNTER (OUTPATIENT)
Dept: MRI IMAGING | Age: 77
Discharge: HOME | End: 2017-12-26
Attending: INTERNAL MEDICINE | Admitting: INTERNAL MEDICINE

## 2017-12-26 DIAGNOSIS — R10.10 PAIN OF UPPER ABDOMEN: ICD-10-CM

## 2017-12-26 DIAGNOSIS — R63.4 WEIGHT LOSS: ICD-10-CM

## 2017-12-26 DIAGNOSIS — M48.062 SPINAL STENOSIS OF LUMBAR REGION WITH NEUROGENIC CLAUDICATION: ICD-10-CM

## 2017-12-26 DIAGNOSIS — R94.4 DECREASED GFR: ICD-10-CM

## 2017-12-26 LAB
CREAT SERPL-MCNC: 0.7 MG/DL (ref 0.6–1.2)
GFR AFRICAN AMERICAN: >60
GFR NON-AFRICAN AMERICAN: >60

## 2017-12-28 ENCOUNTER — TELEPHONE (OUTPATIENT)
Dept: ORTHOPEDIC SURGERY | Age: 77
End: 2017-12-28

## 2017-12-28 ENCOUNTER — OFFICE VISIT (OUTPATIENT)
Dept: ORTHOPEDIC SURGERY | Age: 77
End: 2017-12-28

## 2017-12-28 VITALS
HEART RATE: 75 BPM | HEIGHT: 62 IN | WEIGHT: 156.53 LBS | SYSTOLIC BLOOD PRESSURE: 110 MMHG | BODY MASS INDEX: 28.8 KG/M2 | DIASTOLIC BLOOD PRESSURE: 65 MMHG

## 2017-12-28 DIAGNOSIS — R26.81 GAIT INSTABILITY: ICD-10-CM

## 2017-12-28 DIAGNOSIS — M48.062 SPINAL STENOSIS OF LUMBAR REGION WITH NEUROGENIC CLAUDICATION: Primary | ICD-10-CM

## 2017-12-28 PROCEDURE — 99203 OFFICE O/P NEW LOW 30 MIN: CPT | Performed by: PHYSICIAN ASSISTANT

## 2017-12-28 PROCEDURE — 4040F PNEUMOC VAC/ADMIN/RCVD: CPT | Performed by: PHYSICIAN ASSISTANT

## 2017-12-28 PROCEDURE — 3017F COLORECTAL CA SCREEN DOC REV: CPT | Performed by: PHYSICIAN ASSISTANT

## 2017-12-28 PROCEDURE — G8427 DOCREV CUR MEDS BY ELIG CLIN: HCPCS | Performed by: PHYSICIAN ASSISTANT

## 2017-12-28 PROCEDURE — 1090F PRES/ABSN URINE INCON ASSESS: CPT | Performed by: PHYSICIAN ASSISTANT

## 2017-12-28 PROCEDURE — G8417 CALC BMI ABV UP PARAM F/U: HCPCS | Performed by: PHYSICIAN ASSISTANT

## 2017-12-28 PROCEDURE — G8484 FLU IMMUNIZE NO ADMIN: HCPCS | Performed by: PHYSICIAN ASSISTANT

## 2017-12-28 NOTE — PROGRESS NOTES
History of present illness:   Ms. Lizabeth Wynn  is a pleasant 78 y.o. female with a PMH of osteoarthritis, HTN, depression, cancer and history of blood clots, kindly referred by Dr. Olivia Tan for consultation regarding her LBP and bilateral leg pain. She states her pain began insidiously about 5 years ago. Her pain has steadily increased since then. She rates her back pain 8/10 and leg pain 8/10. She describes the pain as aching and burning. Her back pain is more severe and constant than her leg pain. The leg pain radiates down the lateral aspect of her leg to her left foot. She notes intermittent numbness and tingling in her legs. She notes weakness of her legs. She denies bowel incontinence but does note occasional urinary incontinence if she is unable to make it to the restroom in time and intermittent vaginal numbness (only lasting a few minutes at a time). These symptoms have been present for several months. She states she can sit for a maximum of 30 minutes and stand for a maximum of 30 minutes. Her only relief is with lying down. The pain moderately disrupts her sleep. She also notes new balance changes and has started using a cane. She has tried epidural injections, chiropractic care and physical therapy without relief. She takes Mobic. Past medical history:  Her past medical history has been reviewed and is significant for osteoarthritis, HTN, depression, cancer and history of blood clots. Past surgical history:  Her past surgical history has been reviewed and is non-contributory to her present illness. Her medications and allergies were reviewed. Social history:  Her social history has been reviewed and she has never smoked. Review of symptoms:  Patient's review of symptoms was reviewed and is significant for back pain and negative for recent weight loss, fatigue, chills, visual disturbances, blood in stool or urine, recent infection, chest pain, or shortness of breath.  All other ROS were negative. Physical examination:  Ms. Shaggy Norman most recent vitals: Body mass index is 28.62 kg/m². General exam:  She is well-developed and well-nourished, is in obvious pain and alert and oriented to person, place, and time. She demonstrates appropriate mood and affect. Her skin is warm and dry. Her gait is slow but normal. She has decreased ability to tandem walk. Back:  She stands with slight lumbar flexion. Her lumbar flexion, extension and lateral bending are moderately reduced with pain. She has mild tenderness over her lumbar spine without obvious muscle spasm. The skin over her lumbar spine is normal without a surgical scar. Lower extremities:  She has 5/5 motor strength of bilateral lower extremities. She has a negative straight leg raise, bilaterally. Deep tendon reflexes at knees and achilles are 2+. Sensation is intact to light touch L3 to S1 bilaterally. She has no clonus. Hip range of motion painless. Abdomen:  Non-tender and non-distended. Abdomen is not obese. No rash. Imaging:  I reviewed MRI images of her lumbar spine from 12/26/17. They note degenerative scoliosis with grade 1 spondylolisthesis at L45 with severe central stenosis. Assessment:  Lumbar stenosis  Gait instability    Plan:  We discussed treatment options. With her balance disruption and occasional incontinence, I recommend we obtain an MRI of her thoracic spine. She will follow up with Dr. Emma Martin to discuss surgical options for her lumbar stenosis.      Danielle Sherman PA-C

## 2018-01-02 DIAGNOSIS — K44.9 HIATAL HERNIA: Primary | ICD-10-CM

## 2018-01-03 DIAGNOSIS — K44.9 HIATAL HERNIA: Primary | ICD-10-CM

## 2018-01-03 DIAGNOSIS — R11.0 NAUSEA: ICD-10-CM

## 2018-01-04 ENCOUNTER — TELEPHONE (OUTPATIENT)
Dept: INTERNAL MEDICINE CLINIC | Age: 78
End: 2018-01-04

## 2018-01-04 DIAGNOSIS — R11.0 NAUSEA: ICD-10-CM

## 2018-01-04 DIAGNOSIS — K44.9 HIATAL HERNIA: Primary | ICD-10-CM

## 2018-01-05 ENCOUNTER — HOSPITAL ENCOUNTER (OUTPATIENT)
Dept: MRI IMAGING | Age: 78
Discharge: HOME | End: 2018-01-05
Admitting: PHYSICIAN ASSISTANT

## 2018-01-05 DIAGNOSIS — M48.062 SPINAL STENOSIS OF LUMBAR REGION WITH NEUROGENIC CLAUDICATION: ICD-10-CM

## 2018-01-05 DIAGNOSIS — R26.81 GAIT INSTABILITY: ICD-10-CM

## 2018-01-09 ENCOUNTER — OFFICE VISIT (OUTPATIENT)
Dept: ORTHOPEDIC SURGERY | Age: 78
End: 2018-01-09

## 2018-01-09 VITALS
WEIGHT: 156.53 LBS | BODY MASS INDEX: 28.8 KG/M2 | HEART RATE: 72 BPM | DIASTOLIC BLOOD PRESSURE: 71 MMHG | SYSTOLIC BLOOD PRESSURE: 133 MMHG | HEIGHT: 62 IN

## 2018-01-09 DIAGNOSIS — M48.062 LUMBAR STENOSIS WITH NEUROGENIC CLAUDICATION: Primary | ICD-10-CM

## 2018-01-09 PROCEDURE — G8427 DOCREV CUR MEDS BY ELIG CLIN: HCPCS | Performed by: ORTHOPAEDIC SURGERY

## 2018-01-09 PROCEDURE — G8417 CALC BMI ABV UP PARAM F/U: HCPCS | Performed by: ORTHOPAEDIC SURGERY

## 2018-01-09 PROCEDURE — 4040F PNEUMOC VAC/ADMIN/RCVD: CPT | Performed by: ORTHOPAEDIC SURGERY

## 2018-01-09 PROCEDURE — G8484 FLU IMMUNIZE NO ADMIN: HCPCS | Performed by: ORTHOPAEDIC SURGERY

## 2018-01-09 PROCEDURE — 99213 OFFICE O/P EST LOW 20 MIN: CPT | Performed by: ORTHOPAEDIC SURGERY

## 2018-01-09 PROCEDURE — 1123F ACP DISCUSS/DSCN MKR DOCD: CPT | Performed by: ORTHOPAEDIC SURGERY

## 2018-01-09 PROCEDURE — G8399 PT W/DXA RESULTS DOCUMENT: HCPCS | Performed by: ORTHOPAEDIC SURGERY

## 2018-01-09 PROCEDURE — 1036F TOBACCO NON-USER: CPT | Performed by: ORTHOPAEDIC SURGERY

## 2018-01-09 PROCEDURE — 1090F PRES/ABSN URINE INCON ASSESS: CPT | Performed by: ORTHOPAEDIC SURGERY

## 2018-01-09 PROCEDURE — 3017F COLORECTAL CA SCREEN DOC REV: CPT | Performed by: ORTHOPAEDIC SURGERY

## 2018-01-09 NOTE — PROGRESS NOTES
spondylolisthesis at that level and moderate stenosis L3-L4. MRI of her thoracic spine shows no evidence of significant cord or nerve compression    Impression:  1. Lumbar stenosis with neurogenic claudication       We discussed treatment options going observation, additional injections, microlumbar decompression and decompression with fusion. We discussed the risks, benefits, and alternatives to surgery including the risks of nerve or vessel injury, paralysis, spinal blood clot, spinal fluid leak, death, infection, need for additional spinal surgery, failure of surgery to alleviate her symptoms and worse symptoms following surgery. She understands these risks and wishes to proceed with surgery. Her questions were answered. Bret A. Wendel Crigler, M.D.  Esmer Cornejo: Mikael Brown MD

## 2018-01-10 ENCOUNTER — INITIAL CONSULT (OUTPATIENT)
Dept: GASTROENTEROLOGY | Age: 78
End: 2018-01-10

## 2018-01-10 VITALS
SYSTOLIC BLOOD PRESSURE: 120 MMHG | BODY MASS INDEX: 30.63 KG/M2 | WEIGHT: 156 LBS | HEIGHT: 60 IN | DIASTOLIC BLOOD PRESSURE: 80 MMHG

## 2018-01-10 DIAGNOSIS — K44.9 HIATAL HERNIA: Primary | ICD-10-CM

## 2018-01-10 DIAGNOSIS — R12 HEARTBURN: ICD-10-CM

## 2018-01-10 PROCEDURE — 99204 OFFICE O/P NEW MOD 45 MIN: CPT | Performed by: INTERNAL MEDICINE

## 2018-01-10 NOTE — LETTER
[address]  [place], [facility]  Madhuri Watson [phone]   [phone]    01/10/18  Patient: Fercho Schulte   MR Number: [id number]   YOB: 1940   Date of Visit: 1/10/18     Dear Dr. Enrique Mcallister MD    Thank you for the request for consultation for Fercho Schulte to me for the evaluation of   Chief Complaint   Patient presents with   Mook Salas Doctor     NP- previous Gianna pt, hiatal hernia, stomah pain   . Below are the relevant portions of my assessment and plan of care. FINAL DIAGNOSIS/Assessment  1. Hiatal hernia  FL UGI    Jannie Madison MD   2. Heartburn  FL UGI    Jannie Madison MD       VISIT ORDERS/Plan  Orders Placed This Encounter   Procedures    FL UGI     Standing Status:   Future     Standing Expiration Date:   1/10/2019    Jannie Madison MD     Referral Priority:   Routine     Referral Type:   Consult for Advice and Opinion     Referral Reason:   Specialty Services Required     Referred to Provider:   Alexandre Mcghee MD     Requested Specialty:   General Surgery     Number of Visits Requested:   1       If you have questions, please do not hesitate to call me. I look forward to following Fercho Schulte along with you.     Sincerely,        Asa Lindsay 21 1/10/18 2:22 PM

## 2018-01-10 NOTE — PATIENT INSTRUCTIONS
treatments of GERD are designed to prevent one or all of these symptoms from occurring. Hiatus hernia - The diaphragm is a large flat muscle at the base of the lungs that contracts and relaxes as a person breathes in and out. The esophagus passes through an opening in the diaphragm called the diaphragmatic hiatus before it joins with the stomach. Normally, the diaphragm contracts, which improves the strength of the LES, especially during bending, coughing, or straining. If there is a weakening in the diaphragm muscle at the hiatus, the stomach may be able to partially slip through the diaphragm into the chest, forming a sliding hiatus hernia. The presence of a hiatus hernia makes acid reflux more likely. A hiatus hernia is more common in people over age 48. Obesity and pregnancy are also contributing factors. The exact cause is unknown but may be related to the loosening of the tissues around the diaphragm that occurs with advancing age. There is no way to prevent a hiatus hernia. ACID REFLUX SYMPTOMS  People who experience heartburn at least two to three times a week may have gastroesophageal reflux disease, or GERD. The most common symptom of GERD, heartburn, is estimated to affect 10 million adults in the [place] on a daily basis. Heartburn is experienced as a burning sensation in the center of the chest, which sometimes spreads to the throat; there also may be an acid taste in the throat.  Less common symptoms include:  Stomach pain (pain in the upper abdomen)   Non-burning chest pain   Difficulty swallowing (called dysphagia), or food getting stuck   Painful swallowing (called odynophagia)   Persistent laryngitis/hoarseness   Persistent sore throat   Chronic cough, new onset asthma, or asthma only at night   Regurgitation of foods/fluids; taste of acid in the throat   Sense of a lump in the throat   Worsening dental disease   Recurrent pneumonia   Chronic sinusitis   Waking up with a choking very effective. Examples of antacids include Tums®, Maalox®, and Mylanta®. Histamine antagonists - The histamine antagonists reduce production of acid in the stomach. However, they are somewhat less effective than proton pump inhibitors  Examples of histamine antagonists available in the [facility] include ranitidine (Zantac®), famotidine (Pepcid®), cimetidine (Tagamet®), and nizatidine (Axid®). These medications are usually taken by mouth once or twice per day. Cimetidine, ranitidine, and famotidine are available in prescription and non-prescription strengths. Lifestyle changes - Changes to the diet or lifestyle have been recommended for many years, although their effectiveness has not been extensively evaluated in well-designed clinical trials. A review of the literature concluded that weight loss and elevating the head of your bed may be helpful, but other dietary changes were not found helpful in all patients [1]. Thus, these recommendations may be helpful in some, but not all people with mild symptoms of acid reflux. For people with mild acid reflux, these treatments can be tried before seeking medical attention. However, anyone with more serious symptoms should speak to their healthcare provider before using any treatment. Weight loss - Losing weight may help people who are overweight to reduce acid reflux. In addition, weight loss has a number of other health benefits, including a decreased risk of type 2 diabetes and heart disease. Raise the head of the bed six to eight inches - Although most people only have heartburn for the two- to three-hour period after meals, some wake up at night with heartburn. People with nighttime heartburn can elevate the head of their bed, which raises the head and shoulders higher than the stomach, allowing gravity to prevent acid from refluxing. ? ? Raising the head of the bed can be done with blocks of wood under the legs of the bed or a foam wedge under the mattress. dexlansoprazole (Elba Pert), pantoprazole (Protonix®), and rabeprazole (AcipHex®), which are stronger and more effective than the H2 antagonists. Once the optimal dose and type of PPI is found, you will probably be kept on the PPI for approximately eight weeks. Depending upon your symptoms after eight weeks, the medication dose may be decreased or discontinued. If symptoms return within three months, long-term treatment is usually recommended. If symptoms do not return within three months, treatment may be needed only intermittently. The goal of treatment for GERD is to take the lowest possible dose of medication that controls symptoms and prevents complications. Proton pump inhibitors are safe, although they may be expensive, especially if taken for a long period of time. Long-term risks of PPIs may include an increased risk of gut infections, such as Clostridium (C. diff), or reduced absorption of nutrients. In general, these risks are small to nonexistent. However, even a small risk emphasizes the need to take the lowest possible dose for the shortest possible time. If symptoms are not controlled - If your symptoms of gastroesophageal reflux disease are not adequately controlled with one PPI, one or more of the following may be recommended: An alternate PPI may be prescribed or the dose of the PPI may be increased   The PPI may be given twice per day instead of once   Further testing may be recommended to confirm the diagnosis and/or determine if another problem is causing symptoms   Surgical treatment may be considered  Surgical treatment - Prior to the development of the potent acid-reducing medications described above, surgery was used for severe cases of GERD that did not resolve with medical treatment. Because of the effectiveness of medical therapy, the role of surgery has become more complex.  In general, anti-reflux surgery involves repairing the hiatus hernia and strengthening the lower esophageal

## 2018-01-10 NOTE — PROGRESS NOTES
[place] [address] ,  [address] [facility]  Phone: [phone]   KTF:[phone]    CHIEF COMPLAINT     Chief Complaint   Patient presents with   Mitzy Ramires Doctor     NP- previous Gianna pt, hiatal hernia, stomah pain       HPI     Thank you Bruna Oglesby MD for asking me to see Stevenson Meehan in consultation. She is a  [2] White [1] 78 y.o. Runell Sprinkles female seen with her  who presents with the following GI complaints:  . Stevenson Meehan  Complains of worsening post prandial abdominal pain despite protonix 40mg daily. She has not tried it bid. She rarely vomits. She denies anette heartburn. She can often only tolerated a few bites of food. A recent CT shows most of her stomach in her chest.  This was not new and was noted on an EGD in 2015. She is on chronic coumadin for h/o multiple blood clots in the past and frequently is bridged for procedures. She is due to have back surgery soon. Last Encounter Reviewed:   Pertinent PMH, FH, SH is reviewed below.   Last EGD: 2015 large hiatal hernia  Last Colonoscopy: 4/9/2009 5mm sigmoid adenoma, 2015 normal    No components found for: HGBA1C  BP Readings from Last 3 Encounters:   01/10/18 120/80   01/09/18 133/71   12/28/17 110/65     Health Maintenance   Topic Date Due    DTaP/Tdap/Td vaccine (1 - Tdap) 12/05/1961    Colon cancer screen colonoscopy  12/05/1992    Zostavax vaccine  12/05/2002    Pneumococcal low/med risk (2 of 2 - PPSV23) 10/25/2018    Potassium monitoring  12/13/2018    Creatinine monitoring  12/26/2018    Lipid screen  06/14/2022    DEXA (modify frequency per FRAX score)  Completed    Flu vaccine  Completed       No components found for: [facility]     PAST MEDICAL HISTORY     Past Medical History:   Diagnosis Date    Arthritis     Cancer ([address]     squamous cell on nose     Chronic back pain     Depression     H/O blood clots     Hyperlipidemia     Hypertension     extended release tablet Take 1 tablet by mouth 2 times daily 180 tablet 1    sertraline (ZOLOFT) 100 MG tablet Take 1 tablet by mouth 2 times daily 180 tablet 1    hydrochlorothiazide (HYDRODIURIL) 25 MG tablet Take 1 tablet by mouth daily 90 tablet 1    fluticasone (FLONASE) 50 MCG/ACT nasal spray 1 spray by Nasal route daily 1 Bottle 3    pantoprazole (PROTONIX) 40 MG tablet Take 1 tablet by mouth daily 90 tablet 1    lidocaine (XYLOCAINE) 5 % ointment Apply topically every 6 hours as needed to affected area 35.44 g 1    gabapentin (NEURONTIN) 600 MG tablet Take 1 tablet by mouth 3 times daily 270 tablet 1    diclofenac sodium 1 % GEL Apply 2 g topically 2 times daily 1 Tube 3    HYDROcodone-acetaminophen (NORCO) 5-325 MG per tablet Take 1 tablet by mouth every 6 hours as needed for Pain . 120 tablet 0    Misc. Devices MISC George panty hose  Open toe Petite plus beige  2 pairs 2 each 1    Probiotic Product (PROBIOTIC DAILY) CAPS Take by mouth daily      Biotin 2500 MCG CAPS Take by mouth daily      Cholecalciferol (VITAMIN D3) 13937 UNITS CAPS Take by mouth daily      Coenzyme Q10 (COQ10) 100 MG CAPS Take  by mouth.  calcium carbonate (OSCAL) 500 MG TABS tablet Take 500 mg by mouth daily. ALLERGIES     Allergies   Allergen Reactions    Imitrex [Sumatriptan] Anaphylaxis     IMMUNIZATIONS     Immunization History   Administered Date(s) Administered    Influenza, High Dose 10/25/2017    Pneumococcal 13-valent Conjugate (Svetlana Gina) 06/13/2017, 10/25/2017     REVIEW OF SYSTEMS   See HPI for further details and pertinent postiives. Negative for the following:  Constitutional: Negative for weight change. Negative for appetite change and fatigue. HENT: Negative for nosebleeds, sore throat, mouth sores, and voice change. Respiratory: Negative for cough, choking and chest tightness. Cardiovascular: Negative for chest pain   Gastrointestinal: See HPI  Musculoskeletal: Negative for arthralgias.

## 2018-01-19 ENCOUNTER — TELEPHONE (OUTPATIENT)
Dept: INTERNAL MEDICINE CLINIC | Age: 78
End: 2018-01-19

## 2018-01-22 ENCOUNTER — OFFICE VISIT (OUTPATIENT)
Dept: INTERNAL MEDICINE CLINIC | Age: 78
End: 2018-01-22

## 2018-01-22 VITALS
BODY MASS INDEX: 30.02 KG/M2 | TEMPERATURE: 98.1 F | SYSTOLIC BLOOD PRESSURE: 102 MMHG | HEART RATE: 66 BPM | HEIGHT: 61 IN | DIASTOLIC BLOOD PRESSURE: 56 MMHG | RESPIRATION RATE: 16 BRPM | WEIGHT: 159 LBS

## 2018-01-22 DIAGNOSIS — F33.42 RECURRENT MAJOR DEPRESSIVE DISORDER, IN FULL REMISSION (HCC): ICD-10-CM

## 2018-01-22 DIAGNOSIS — I82.5Y3 CHRONIC DEEP VEIN THROMBOSIS (DVT) OF PROXIMAL VEIN OF BOTH LOWER EXTREMITIES (HCC): ICD-10-CM

## 2018-01-22 DIAGNOSIS — N39.46 MIXED STRESS AND URGE URINARY INCONTINENCE: ICD-10-CM

## 2018-01-22 DIAGNOSIS — K44.9 LARGE HIATAL HERNIA: ICD-10-CM

## 2018-01-22 DIAGNOSIS — E78.00 HYPERCHOLESTEREMIA: ICD-10-CM

## 2018-01-22 DIAGNOSIS — M48.062 SPINAL STENOSIS OF LUMBAR REGION WITH NEUROGENIC CLAUDICATION: ICD-10-CM

## 2018-01-22 DIAGNOSIS — Z01.818 PRE-OP EXAMINATION: Primary | ICD-10-CM

## 2018-01-22 DIAGNOSIS — I10 ESSENTIAL HYPERTENSION: ICD-10-CM

## 2018-01-22 PROCEDURE — G8484 FLU IMMUNIZE NO ADMIN: HCPCS | Performed by: INTERNAL MEDICINE

## 2018-01-22 PROCEDURE — 1090F PRES/ABSN URINE INCON ASSESS: CPT | Performed by: INTERNAL MEDICINE

## 2018-01-22 PROCEDURE — 93000 ELECTROCARDIOGRAM COMPLETE: CPT | Performed by: INTERNAL MEDICINE

## 2018-01-22 PROCEDURE — G8417 CALC BMI ABV UP PARAM F/U: HCPCS | Performed by: INTERNAL MEDICINE

## 2018-01-22 PROCEDURE — 99213 OFFICE O/P EST LOW 20 MIN: CPT | Performed by: INTERNAL MEDICINE

## 2018-01-22 PROCEDURE — 3017F COLORECTAL CA SCREEN DOC REV: CPT | Performed by: INTERNAL MEDICINE

## 2018-01-22 PROCEDURE — 4040F PNEUMOC VAC/ADMIN/RCVD: CPT | Performed by: INTERNAL MEDICINE

## 2018-01-22 PROCEDURE — G8427 DOCREV CUR MEDS BY ELIG CLIN: HCPCS | Performed by: INTERNAL MEDICINE

## 2018-01-22 PROCEDURE — 0509F URINE INCON PLAN DOCD: CPT | Performed by: INTERNAL MEDICINE

## 2018-01-22 RX ORDER — MELOXICAM 15 MG/1
15 TABLET ORAL DAILY
Qty: 90 TABLET | Refills: 1 | Status: SHIPPED | OUTPATIENT
Start: 2018-01-22 | End: 2018-02-20 | Stop reason: SDUPTHER

## 2018-01-22 RX ORDER — LISINOPRIL 20 MG/1
20 TABLET ORAL DAILY
Qty: 90 TABLET | Refills: 1 | Status: SHIPPED | OUTPATIENT
Start: 2018-01-22 | End: 2018-02-20 | Stop reason: SDUPTHER

## 2018-01-22 RX ORDER — SIMVASTATIN 40 MG
40 TABLET ORAL NIGHTLY
Qty: 90 TABLET | Refills: 1 | Status: SHIPPED | OUTPATIENT
Start: 2018-01-22 | End: 2018-02-21 | Stop reason: SDUPTHER

## 2018-01-22 RX ORDER — SERTRALINE HYDROCHLORIDE 100 MG/1
100 TABLET, FILM COATED ORAL 2 TIMES DAILY
Qty: 180 TABLET | Refills: 1 | Status: SHIPPED | OUTPATIENT
Start: 2018-01-22 | End: 2018-02-21 | Stop reason: SDUPTHER

## 2018-01-22 RX ORDER — PANTOPRAZOLE SODIUM 40 MG/1
40 TABLET, DELAYED RELEASE ORAL 2 TIMES DAILY
Qty: 180 TABLET | Refills: 1 | Status: SHIPPED | OUTPATIENT
Start: 2018-01-22 | End: 2018-02-20 | Stop reason: SDUPTHER

## 2018-01-22 RX ORDER — HYDROCHLOROTHIAZIDE 25 MG/1
25 TABLET ORAL DAILY
Qty: 90 TABLET | Refills: 1 | Status: SHIPPED | OUTPATIENT
Start: 2018-01-22 | End: 2018-02-22 | Stop reason: SDUPTHER

## 2018-01-22 RX ORDER — WARFARIN SODIUM 5 MG/1
TABLET ORAL
Qty: 180 TABLET | Refills: 1 | Status: SHIPPED | OUTPATIENT
Start: 2018-01-22 | End: 2018-02-20 | Stop reason: SDUPTHER

## 2018-01-22 RX ORDER — OXYBUTYNIN CHLORIDE 10 MG/1
10 TABLET, EXTENDED RELEASE ORAL 2 TIMES DAILY
Qty: 180 TABLET | Refills: 1 | Status: SHIPPED | OUTPATIENT
Start: 2018-01-22 | End: 2018-02-20 | Stop reason: SDUPTHER

## 2018-01-22 ASSESSMENT — ENCOUNTER SYMPTOMS
BLOOD IN STOOL: 0
COUGH: 0
NAUSEA: 1
CHEST TIGHTNESS: 0
VOMITING: 1
CONSTIPATION: 0
DIARRHEA: 0
APNEA: 0
ABDOMINAL PAIN: 1
SHORTNESS OF BREATH: 0
BACK PAIN: 1

## 2018-01-22 NOTE — PROGRESS NOTES
SUBJECTIVE:  Karlene Mars is a 78 y.o. female being evaluated for:    Chief Complaint   Patient presents with    Pre-op Exam     Patient here for a Pre OP Exam and to get Clearence for Surgery. Patient having Back Surgery under General Anesthesia by Dr Annie Massey @ [place] [facility] on 2/8/18. Fax # [phone]       HPI   Patient with low back pain and bilateral leg pain  Pain worsening over the past 5 years or so. Pain runs down her left leg  Weakness and more trouble walking. Having vaginal numbness and balance issues  for the last couple of months Pain increasing and decreasing mobility. MRI with severe spinal stenosis at L4/L5 and moderate stenosis at L3/L4. Has been treated with PT, epidurals and chiropractic care in the past and symptoms continue to progress. Has seen Dr Ruth Pena and is scheduled for surgical repair on 2/8    Allergies   Allergen Reactions    Imitrex [Sumatriptan] Anaphylaxis     Current Outpatient Prescriptions   Medication Sig Dispense Refill    meloxicam (MOBIC) 15 MG tablet Take 1 tablet by mouth daily 90 tablet 1    oxybutynin (DITROPAN-XL) 10 MG extended release tablet Take 1 tablet by mouth 2 times daily 180 tablet 1    lisinopril (PRINIVIL;ZESTRIL) 20 MG tablet Take 1 tablet by mouth daily Please cancel refill for 40 mg and replace with this lower dose.  90 tablet 1    pantoprazole (PROTONIX) 40 MG tablet Take 1 tablet by mouth 2 times daily 180 tablet 1    warfarin (COUMADIN) 5 MG tablet Take 7.5 mg on M, W, F, S, S and 5 mg on T,  tablet 1    simvastatin (ZOCOR) 40 MG tablet Take 1 tablet by mouth nightly 90 tablet 1    sertraline (ZOLOFT) 100 MG tablet Take 1 tablet by mouth 2 times daily 180 tablet 1    hydrochlorothiazide (HYDRODIURIL) 25 MG tablet Take 1 tablet by mouth daily 90 tablet 1    lidocaine (XYLOCAINE) 5 % ointment Apply topically every 6 hours as needed to affected area 35.44 g 1    gabapentin (NEURONTIN) 600 MG tablet Take 1 tablet by mouth 3 times daily 270 tablet 1    diclofenac sodium 1 % GEL Apply 2 g topically 2 times daily 1 Tube 3    HYDROcodone-acetaminophen (NORCO) 5-325 MG per tablet Take 1 tablet by mouth every 6 hours as needed for Pain . 120 tablet 0    Misc. Devices MISC George panty hose  Open toe Petite plus beige  2 pairs 2 each 1    Probiotic Product (PROBIOTIC DAILY) CAPS Take by mouth daily      Biotin 2500 MCG CAPS Take by mouth daily      Cholecalciferol (VITAMIN D3) 76351 UNITS CAPS Take by mouth daily      Coenzyme Q10 (COQ10) 100 MG CAPS Take  by mouth.  calcium carbonate (OSCAL) 500 MG TABS tablet Take 500 mg by mouth daily. No current facility-administered medications for this visit. Social History     Social History    Marital status:      Spouse name: N/A    Number of children: N/A    Years of education: N/A     Occupational History    Not on file. Social History Main Topics    Smoking status: Never Smoker    Smokeless tobacco: Never Used    Alcohol use No    Drug use: No    Sexual activity: Not on file     Other Topics Concern    Not on file     Social History Narrative    No narrative on file      Past Medical History:   Diagnosis Date    Arthritis     Cancer ([facility] [address]     squamous cell on nose     Chronic back pain     Depression     H/O blood clots     Hyperlipidemia     Hypertension     Lumbar stenosis with neurogenic claudication     Osteoarthritis of neck      Past Surgical History:   Procedure Laterality Date     SECTION      EMBOLECTOMY      HYSTERECTOMY      LITHOTRIPSY      MOHS SURGERY      nose for squamous cell carcinoma    OTHER SURGICAL HISTORY      blood clots- 1963       Review of Systems   Constitutional: Positive for activity change. Negative for chills and fever. No problems with anesthesia in patient or family  No known exposure to contagious or infectious diseases   HENT: Negative for congestion and nosebleeds.     Eyes: Negative for

## 2018-01-22 NOTE — PATIENT INSTRUCTIONS
Please call your pharmacy if you need any refills of your medication(s). Please call our office at [phone] if you don't hear from us about your test results. Bring an accurate list of your medications with you at every appointment to ensure that we have the correct information.     Our office hours are: Monday - Friday 8:30 am- 5 pm    Phone lines turn on at 8:30 am

## 2018-01-24 NOTE — LETTER
[address] [facility]  Surgery Scheduling Form:      DEMOGRAPHICS:                                                                                                             Patient Name:  Stevenson Meehan  Patient :  1940   Patient SS#:      Patient Phone:  [phone] (home) [phone] (work)    Patient Address:  [place] [address]    Insurance:  Payor: Mat Terry / Plan: Emory Many / Product Type: *No Product type* /   Insurance ID [id number]     VITALS AND ALLERGIES                                                                                                Height:  5'2\"   Weight: 71 kg   Allergies: Allergies   Allergen Reactions    Imitrex [Sumatriptan] Anaphylaxis       DIAGNOSIS & PROCEDURE:                                                                                           Diagnosis: Lumbar stenosis with neurogenic claudication M48.062   Operation: Microlumbar Laminectomy L4-5 possible L3-4  [facility]  Surgeon:  Heather Olvera MD    SCHEDULING INFORMATION:                                                                                        Requested Date:  18    OR Time:  2:30 pm           Patient Arrival Time:  12:30 pm  OR Time Required:  120  Minutes  Anesthesia:  General  Equipment:  Kate Alvarado table, Lige Bears. Standard C-Arm:  Yes  Status: admit  PAT Required:   Yes                             Heather Olvera MD 18 12:56 PM  BILLING INFORMATION:                                                                                                   Procedure:       CPT Code Modifier            Pre-certification information:

## 2018-01-29 ENCOUNTER — HOSPITAL ENCOUNTER (OUTPATIENT)
Dept: GENERAL RADIOLOGY | Age: 78
Discharge: HOME | End: 2018-01-29

## 2018-01-29 LAB
ANION GAP SERPL CALCULATED.3IONS-SCNC: 11 MMOL/L (ref 3–16)
BUN BLDV-MCNC: 25 MG/DL (ref 7–20)
CALCIUM SERPL-MCNC: 9.8 MG/DL (ref 8.3–10.6)
CHLORIDE BLD-SCNC: 103 MMOL/L (ref 99–110)
CO2: 33 MMOL/L (ref 21–32)
CREAT SERPL-MCNC: 0.6 MG/DL (ref 0.6–1.2)
GFR AFRICAN AMERICAN: >60
GFR NON-AFRICAN AMERICAN: >60
GLUCOSE BLD-MCNC: 94 MG/DL (ref 70–99)
HCT VFR BLD CALC: 38.5 % (ref 36–48)
HEMOGLOBIN: 12.8 G/DL (ref 12–16)
INR BLD: 1.98 (ref 0.85–1.15)
MCH RBC QN AUTO: 32.2 PG (ref 26–34)
MCHC RBC AUTO-ENTMCNC: 33.3 G/DL (ref 31–36)
MCV RBC AUTO: 96.6 FL (ref 80–100)
PDW BLD-RTO: 14.6 % (ref 12.4–15.4)
PLATELET # BLD: 162 K/UL (ref 135–450)
PMV BLD AUTO: 10.3 FL (ref 5–10.5)
POTASSIUM SERPL-SCNC: 4.2 MMOL/L (ref 3.5–5.1)
PROTHROMBIN TIME: 22.4 SEC (ref 9.6–13)
RBC # BLD: 3.99 M/UL (ref 4–5.2)
SODIUM BLD-SCNC: 147 MMOL/L (ref 136–145)
WBC # BLD: 4.7 K/UL (ref 4–11)

## 2018-01-31 ENCOUNTER — TELEPHONE (OUTPATIENT)
Dept: INTERNAL MEDICINE CLINIC | Age: 78
End: 2018-01-31

## 2018-02-03 RX ORDER — GABAPENTIN 600 MG/1
600 TABLET ORAL 3 TIMES DAILY
Qty: 270 TABLET | Refills: 0 | Status: SHIPPED | OUTPATIENT
Start: 2018-02-03 | End: 2018-02-20 | Stop reason: SDUPTHER

## 2018-02-06 ENCOUNTER — TELEPHONE (OUTPATIENT)
Dept: ORTHOPEDIC SURGERY | Age: 78
End: 2018-02-06

## 2018-02-06 NOTE — TELEPHONE ENCOUNTER
DOS   2/8/18  CPT CODES  03592   DX CODES   M48.062  OP SX  AUTHORIZATION   [id number]  VALID  23 HOUR HOLD  PER  WEBSITE  MICROLUMBAR LAMINECTOMY  L4 - L5  POSSIBLE L3 - L4  [facility]   DR. BELLA  INSURANCE:  [employer] MEDICARE COMPLETE

## 2018-02-08 PROBLEM — M43.10 DEGENERATIVE SPONDYLOLISTHESIS: Status: ACTIVE | Noted: 2018-02-08

## 2018-02-15 ENCOUNTER — TELEPHONE (OUTPATIENT)
Dept: ORTHOPEDIC SURGERY | Age: 78
End: 2018-02-15

## 2018-02-20 ENCOUNTER — OFFICE VISIT (OUTPATIENT)
Dept: ORTHOPEDIC SURGERY | Age: 78
End: 2018-02-20

## 2018-02-20 VITALS
HEIGHT: 61 IN | SYSTOLIC BLOOD PRESSURE: 122 MMHG | HEART RATE: 101 BPM | WEIGHT: 158.95 LBS | DIASTOLIC BLOOD PRESSURE: 81 MMHG | BODY MASS INDEX: 30.01 KG/M2

## 2018-02-20 DIAGNOSIS — I10 ESSENTIAL HYPERTENSION: ICD-10-CM

## 2018-02-20 DIAGNOSIS — M48.062 SPINAL STENOSIS OF LUMBAR REGION WITH NEUROGENIC CLAUDICATION: ICD-10-CM

## 2018-02-20 DIAGNOSIS — M48.062 SPINAL STENOSIS OF LUMBAR REGION WITH NEUROGENIC CLAUDICATION: Primary | ICD-10-CM

## 2018-02-20 DIAGNOSIS — I82.5Y3 CHRONIC DEEP VEIN THROMBOSIS (DVT) OF PROXIMAL VEIN OF BOTH LOWER EXTREMITIES (HCC): ICD-10-CM

## 2018-02-20 DIAGNOSIS — K44.9 LARGE HIATAL HERNIA: ICD-10-CM

## 2018-02-20 DIAGNOSIS — N39.46 MIXED STRESS AND URGE URINARY INCONTINENCE: ICD-10-CM

## 2018-02-20 PROCEDURE — 99024 POSTOP FOLLOW-UP VISIT: CPT | Performed by: ORTHOPAEDIC SURGERY

## 2018-02-20 RX ORDER — MELOXICAM 15 MG/1
15 TABLET ORAL DAILY
Qty: 90 TABLET | Refills: 1 | Status: SHIPPED | OUTPATIENT
Start: 2018-02-20 | End: 2018-07-02 | Stop reason: SDUPTHER

## 2018-02-20 RX ORDER — WARFARIN SODIUM 5 MG/1
TABLET ORAL
Qty: 180 TABLET | Refills: 1 | Status: SHIPPED | OUTPATIENT
Start: 2018-02-20 | End: 2018-05-02 | Stop reason: SDUPTHER

## 2018-02-20 RX ORDER — GABAPENTIN 600 MG/1
600 TABLET ORAL 3 TIMES DAILY
Qty: 270 TABLET | Refills: 1 | Status: SHIPPED | OUTPATIENT
Start: 2018-02-20 | End: 2018-07-02 | Stop reason: SDUPTHER

## 2018-02-20 RX ORDER — OXYBUTYNIN CHLORIDE 10 MG/1
10 TABLET, EXTENDED RELEASE ORAL 2 TIMES DAILY
Qty: 180 TABLET | Refills: 1 | Status: SHIPPED | OUTPATIENT
Start: 2018-02-20 | End: 2018-07-02 | Stop reason: SDUPTHER

## 2018-02-20 RX ORDER — LISINOPRIL 20 MG/1
20 TABLET ORAL DAILY
Qty: 90 TABLET | Refills: 1 | Status: SHIPPED | OUTPATIENT
Start: 2018-02-20 | End: 2018-07-02 | Stop reason: SDUPTHER

## 2018-02-20 RX ORDER — PANTOPRAZOLE SODIUM 40 MG/1
40 TABLET, DELAYED RELEASE ORAL 2 TIMES DAILY
Qty: 180 TABLET | Refills: 1 | Status: SHIPPED | OUTPATIENT
Start: 2018-02-20 | End: 2018-07-02 | Stop reason: SDUPTHER

## 2018-02-21 DIAGNOSIS — E78.00 HYPERCHOLESTEREMIA: ICD-10-CM

## 2018-02-21 DIAGNOSIS — F33.42 RECURRENT MAJOR DEPRESSIVE DISORDER, IN FULL REMISSION (HCC): ICD-10-CM

## 2018-02-22 ENCOUNTER — OFFICE VISIT (OUTPATIENT)
Dept: INTERNAL MEDICINE CLINIC | Age: 78
End: 2018-02-22

## 2018-02-22 VITALS
SYSTOLIC BLOOD PRESSURE: 122 MMHG | HEIGHT: 61 IN | DIASTOLIC BLOOD PRESSURE: 80 MMHG | BODY MASS INDEX: 30.58 KG/M2 | OXYGEN SATURATION: 98 % | TEMPERATURE: 97.9 F | HEART RATE: 62 BPM | WEIGHT: 162 LBS

## 2018-02-22 DIAGNOSIS — R19.7 DIARRHEA, UNSPECIFIED TYPE: Primary | ICD-10-CM

## 2018-02-22 DIAGNOSIS — Z12.11 COLON CANCER SCREENING: ICD-10-CM

## 2018-02-22 DIAGNOSIS — I82.593 CHRONIC DEEP VEIN THROMBOSIS (DVT) OF OTHER VEIN OF BOTH LOWER EXTREMITIES (HCC): ICD-10-CM

## 2018-02-22 DIAGNOSIS — I10 ESSENTIAL HYPERTENSION: ICD-10-CM

## 2018-02-22 LAB
HCT VFR BLD CALC: 33.8 % (ref 36–48)
HEMOGLOBIN: 11.7 G/DL (ref 12–16)
INR BLD: 2 (ref 0.85–1.15)
MCH RBC QN AUTO: 33.2 PG (ref 26–34)
MCHC RBC AUTO-ENTMCNC: 34.7 G/DL (ref 31–36)
MCV RBC AUTO: 95.7 FL (ref 80–100)
PDW BLD-RTO: 13.7 % (ref 12.4–15.4)
PLATELET # BLD: 184 K/UL (ref 135–450)
PMV BLD AUTO: 11 FL (ref 5–10.5)
PROTHROMBIN TIME: 22.6 SEC (ref 9.6–13)
RBC # BLD: 3.53 M/UL (ref 4–5.2)
WBC # BLD: 4.7 K/UL (ref 4–11)

## 2018-02-22 PROCEDURE — 1090F PRES/ABSN URINE INCON ASSESS: CPT | Performed by: INTERNAL MEDICINE

## 2018-02-22 PROCEDURE — G8484 FLU IMMUNIZE NO ADMIN: HCPCS | Performed by: INTERNAL MEDICINE

## 2018-02-22 PROCEDURE — G8399 PT W/DXA RESULTS DOCUMENT: HCPCS | Performed by: INTERNAL MEDICINE

## 2018-02-22 PROCEDURE — 36415 COLL VENOUS BLD VENIPUNCTURE: CPT | Performed by: INTERNAL MEDICINE

## 2018-02-22 PROCEDURE — 99214 OFFICE O/P EST MOD 30 MIN: CPT | Performed by: INTERNAL MEDICINE

## 2018-02-22 PROCEDURE — G8427 DOCREV CUR MEDS BY ELIG CLIN: HCPCS | Performed by: INTERNAL MEDICINE

## 2018-02-22 PROCEDURE — 4040F PNEUMOC VAC/ADMIN/RCVD: CPT | Performed by: INTERNAL MEDICINE

## 2018-02-22 PROCEDURE — G8417 CALC BMI ABV UP PARAM F/U: HCPCS | Performed by: INTERNAL MEDICINE

## 2018-02-22 PROCEDURE — 1036F TOBACCO NON-USER: CPT | Performed by: INTERNAL MEDICINE

## 2018-02-22 PROCEDURE — 3017F COLORECTAL CA SCREEN DOC REV: CPT | Performed by: INTERNAL MEDICINE

## 2018-02-22 PROCEDURE — 1123F ACP DISCUSS/DSCN MKR DOCD: CPT | Performed by: INTERNAL MEDICINE

## 2018-02-22 RX ORDER — SIMVASTATIN 40 MG
40 TABLET ORAL NIGHTLY
Qty: 90 TABLET | Refills: 1 | Status: SHIPPED | OUTPATIENT
Start: 2018-02-22 | End: 2018-07-02 | Stop reason: SDUPTHER

## 2018-02-22 RX ORDER — HYDROCHLOROTHIAZIDE 25 MG/1
25 TABLET ORAL DAILY
Qty: 90 TABLET | Refills: 1 | Status: SHIPPED | OUTPATIENT
Start: 2018-02-22 | End: 2018-07-02 | Stop reason: SDUPTHER

## 2018-02-22 RX ORDER — SERTRALINE HYDROCHLORIDE 100 MG/1
100 TABLET, FILM COATED ORAL 2 TIMES DAILY
Qty: 180 TABLET | Refills: 1 | Status: SHIPPED | OUTPATIENT
Start: 2018-02-22 | End: 2018-07-02 | Stop reason: SDUPTHER

## 2018-02-22 ASSESSMENT — PATIENT HEALTH QUESTIONNAIRE - PHQ9
2. FEELING DOWN, DEPRESSED OR HOPELESS: 1
SUM OF ALL RESPONSES TO PHQ QUESTIONS 1-9: 2
SUM OF ALL RESPONSES TO PHQ9 QUESTIONS 1 & 2: 2
1. LITTLE INTEREST OR PLEASURE IN DOING THINGS: 1

## 2018-02-23 ASSESSMENT — ENCOUNTER SYMPTOMS
NAUSEA: 0
DIARRHEA: 1
SHORTNESS OF BREATH: 0
ABDOMINAL PAIN: 0
COUGH: 0
BACK PAIN: 0
VOMITING: 0
BLOOD IN STOOL: 0

## 2018-04-03 DIAGNOSIS — M48.062 LUMBAR STENOSIS WITH NEUROGENIC CLAUDICATION: ICD-10-CM

## 2018-04-03 RX ORDER — LIDOCAINE 50 MG/G
OINTMENT TOPICAL
Qty: 50 G | Refills: 1 | Status: SHIPPED | OUTPATIENT
Start: 2018-04-03 | End: 2018-08-27 | Stop reason: ALTCHOICE

## 2018-04-18 ENCOUNTER — TELEPHONE (OUTPATIENT)
Dept: INTERNAL MEDICINE CLINIC | Age: 78
End: 2018-04-18

## 2018-04-21 LAB — PARATHYROID HORMONE INTACT: 17.2 PG/ML (ref 12–88)

## 2018-04-23 ENCOUNTER — TELEPHONE (OUTPATIENT)
Dept: INTERNAL MEDICINE CLINIC | Age: 78
End: 2018-04-23

## 2018-04-23 DIAGNOSIS — N39.42 URINARY INCONTINENCE WITHOUT SENSORY AWARENESS: ICD-10-CM

## 2018-04-23 DIAGNOSIS — Z51.81 MEDICATION MONITORING ENCOUNTER: ICD-10-CM

## 2018-04-23 DIAGNOSIS — I82.5Y3 CHRONIC DEEP VEIN THROMBOSIS (DVT) OF PROXIMAL VEIN OF BOTH LOWER EXTREMITIES (HCC): Primary | ICD-10-CM

## 2018-04-23 DIAGNOSIS — M48.062 SPINAL STENOSIS OF LUMBAR REGION WITH NEUROGENIC CLAUDICATION: ICD-10-CM

## 2018-04-23 DIAGNOSIS — M50.90 CERVICAL DISC DISEASE: ICD-10-CM

## 2018-05-02 DIAGNOSIS — I82.5Y3 CHRONIC DEEP VEIN THROMBOSIS (DVT) OF PROXIMAL VEIN OF BOTH LOWER EXTREMITIES (HCC): ICD-10-CM

## 2018-05-02 RX ORDER — WARFARIN SODIUM 5 MG/1
TABLET ORAL
Qty: 180 TABLET | Refills: 1
Start: 2018-05-02 | End: 2018-07-02 | Stop reason: SDUPTHER

## 2018-05-03 ENCOUNTER — TELEPHONE (OUTPATIENT)
Dept: ORTHOPEDIC SURGERY | Age: 78
End: 2018-05-03

## 2018-05-09 ENCOUNTER — OFFICE VISIT (OUTPATIENT)
Dept: ORTHOPEDIC SURGERY | Age: 78
End: 2018-05-09

## 2018-05-09 VITALS
HEART RATE: 75 BPM | WEIGHT: 162.04 LBS | BODY MASS INDEX: 30.59 KG/M2 | SYSTOLIC BLOOD PRESSURE: 110 MMHG | HEIGHT: 61 IN | DIASTOLIC BLOOD PRESSURE: 65 MMHG

## 2018-05-09 DIAGNOSIS — M48.062 LUMBAR STENOSIS WITH NEUROGENIC CLAUDICATION: Primary | ICD-10-CM

## 2018-05-09 PROCEDURE — 99024 POSTOP FOLLOW-UP VISIT: CPT | Performed by: ORTHOPAEDIC SURGERY

## 2018-06-01 ENCOUNTER — TELEPHONE (OUTPATIENT)
Dept: INTERNAL MEDICINE CLINIC | Age: 78
End: 2018-06-01

## 2018-06-20 ENCOUNTER — TELEPHONE (OUTPATIENT)
Dept: INTERNAL MEDICINE CLINIC | Age: 78
End: 2018-06-20

## 2018-06-20 DIAGNOSIS — I82.5Y3 CHRONIC DEEP VEIN THROMBOSIS (DVT) OF PROXIMAL VEIN OF BOTH LOWER EXTREMITIES (HCC): Primary | ICD-10-CM

## 2018-06-27 ENCOUNTER — OFFICE VISIT (OUTPATIENT)
Dept: INTERNAL MEDICINE CLINIC | Age: 78
End: 2018-06-27

## 2018-06-27 VITALS
WEIGHT: 162 LBS | SYSTOLIC BLOOD PRESSURE: 130 MMHG | BODY MASS INDEX: 31.8 KG/M2 | TEMPERATURE: 98.4 F | HEIGHT: 60 IN | DIASTOLIC BLOOD PRESSURE: 80 MMHG

## 2018-06-27 DIAGNOSIS — M79.604 PAIN IN BOTH LOWER EXTREMITIES: ICD-10-CM

## 2018-06-27 DIAGNOSIS — R01.1 SYSTOLIC MURMUR: ICD-10-CM

## 2018-06-27 DIAGNOSIS — M54.41 MIDLINE LOW BACK PAIN WITH BILATERAL SCIATICA, UNSPECIFIED CHRONICITY: Primary | ICD-10-CM

## 2018-06-27 DIAGNOSIS — M79.605 PAIN IN BOTH LOWER EXTREMITIES: ICD-10-CM

## 2018-06-27 DIAGNOSIS — M51.9 LUMBAR DISC DISEASE: ICD-10-CM

## 2018-06-27 DIAGNOSIS — I82.593 CHRONIC DEEP VEIN THROMBOSIS (DVT) OF OTHER VEIN OF BOTH LOWER EXTREMITIES (HCC): ICD-10-CM

## 2018-06-27 DIAGNOSIS — M54.42 MIDLINE LOW BACK PAIN WITH BILATERAL SCIATICA, UNSPECIFIED CHRONICITY: Primary | ICD-10-CM

## 2018-06-27 LAB
BILIRUBIN, POC: NORMAL
BLOOD URINE, POC: NORMAL
CLARITY, POC: CLEAR
COLOR, POC: YELLOW
GLUCOSE URINE, POC: NORMAL
KETONES, POC: NORMAL
LEUKOCYTE EST, POC: NORMAL
NITRITE, POC: NORMAL
PH, POC: 7.5
PROTEIN, POC: NORMAL
SPECIFIC GRAVITY, POC: 1
UROBILINOGEN, POC: 0.2

## 2018-06-27 PROCEDURE — 4040F PNEUMOC VAC/ADMIN/RCVD: CPT | Performed by: INTERNAL MEDICINE

## 2018-06-27 PROCEDURE — G8399 PT W/DXA RESULTS DOCUMENT: HCPCS | Performed by: INTERNAL MEDICINE

## 2018-06-27 PROCEDURE — 1090F PRES/ABSN URINE INCON ASSESS: CPT | Performed by: INTERNAL MEDICINE

## 2018-06-27 PROCEDURE — 1123F ACP DISCUSS/DSCN MKR DOCD: CPT | Performed by: INTERNAL MEDICINE

## 2018-06-27 PROCEDURE — 3017F COLORECTAL CA SCREEN DOC REV: CPT | Performed by: INTERNAL MEDICINE

## 2018-06-27 PROCEDURE — 81002 URINALYSIS NONAUTO W/O SCOPE: CPT | Performed by: INTERNAL MEDICINE

## 2018-06-27 PROCEDURE — G8427 DOCREV CUR MEDS BY ELIG CLIN: HCPCS | Performed by: INTERNAL MEDICINE

## 2018-06-27 PROCEDURE — G8417 CALC BMI ABV UP PARAM F/U: HCPCS | Performed by: INTERNAL MEDICINE

## 2018-06-27 PROCEDURE — 1036F TOBACCO NON-USER: CPT | Performed by: INTERNAL MEDICINE

## 2018-06-27 PROCEDURE — 99214 OFFICE O/P EST MOD 30 MIN: CPT | Performed by: INTERNAL MEDICINE

## 2018-06-27 RX ORDER — HYDROCODONE BITARTRATE AND ACETAMINOPHEN 5; 325 MG/1; MG/1
1 TABLET ORAL EVERY 8 HOURS PRN
Qty: 21 TABLET | Refills: 0 | Status: SHIPPED | OUTPATIENT
Start: 2018-06-27 | End: 2018-07-04

## 2018-06-27 RX ORDER — HYDROCODONE BITARTRATE AND ACETAMINOPHEN 5; 325 MG/1; MG/1
1 TABLET ORAL EVERY 6 HOURS PRN
COMMUNITY
End: 2018-06-27 | Stop reason: DRUGHIGH

## 2018-06-27 NOTE — PROGRESS NOTES
SUBJECTIVE:  Kim Ziegler is a 78 y.o. female being evaluated for:    Chief Complaint   Patient presents with    Leg Pain     both legs are painful    Back Pain     whos is good for physical therapy    Heart Problem     possible heart murmur       HPI   Back is hurting again really bad  Saw Dr Sarah Yeung and said it was arthritis  Swelling in left thigh  HUrts to walk and put stockings on Pain is nearly the same as before surgery. Nurse from insurance company heard murmur  Patient with sob, middle chest pain, palpitations occasionally Fatigued and falling asleep again     Allergies   Allergen Reactions    Imitrex [Sumatriptan] Anaphylaxis    Adhesive Tape Other (See Comments)     No longer allergy     Current Outpatient Prescriptions   Medication Sig Dispense Refill    Misc. Devices MISC Compression stockings/open toe pantyhose with compression 40/50 2 each 2    Calcium Carb-Cholecalciferol (CALCIUM 600/VITAMIN D3 PO) Take by mouth      Misc. Devices MISC George panty hose  Open toe Petite plus beige  2 pairs 2 each 1    Probiotic Product (PROBIOTIC DAILY) CAPS Take 1 capsule by mouth daily       Biotin 2500 MCG CAPS Take 1 capsule by mouth 2 times daily       Coenzyme Q10 (COQ10) 100 MG CAPS Take  by mouth.  calcium carbonate (OSCAL) 500 MG TABS tablet Take 500 mg by mouth daily.  sertraline (ZOLOFT) 100 MG tablet TAKE 1 TABLET BY MOUTH TWO  TIMES DAILY 180 tablet 1    lisinopril (PRINIVIL;ZESTRIL) 20 MG tablet TAKE 1 TABLET BY MOUTH  DAILY 90 tablet 1    gabapentin (NEURONTIN) 600 MG tablet Take 0.5 tablets by mouth 3 times daily for 180 days. . 135 tablet 1    hydrochlorothiazide (HYDRODIURIL) 25 MG tablet TAKE 1 TABLET BY MOUTH  DAILY 90 tablet 1    oxybutynin (DITROPAN-XL) 10 MG extended release tablet TAKE 1 TABLET BY MOUTH TWO  TIMES DAILY 180 tablet 1    meloxicam (MOBIC) 15 MG tablet TAKE 1 TABLET BY MOUTH  DAILY 90 tablet 1    warfarin (COUMADIN) 5 MG tablet 1 and 1/2 tablet daily 135 tablet 1    pantoprazole (PROTONIX) 40 MG tablet TAKE 1 TABLET BY MOUTH TWO  TIMES DAILY 180 tablet 1    simvastatin (ZOCOR) 40 MG tablet Take 1 tablet by mouth nightly 90 tablet 1    lidocaine (XYLOCAINE) 5 % ointment APPLY TOPICALLY EVERY 6 HOURS AS NEEDED TO AFFECTED AREA 50 g 1    docusate sodium (COLACE) 100 MG capsule Take 1 capsule by mouth 2 times daily as needed for Constipation 30 capsule 1    diclofenac sodium 1 % GEL Apply 2 g topically 2 times daily 1 Tube 3     No current facility-administered medications for this visit. Social History     Social History    Marital status:      Spouse name: N/A    Number of children: N/A    Years of education: N/A     Occupational History    Not on file. Social History Main Topics    Smoking status: Never Smoker    Smokeless tobacco: Never Used    Alcohol use No    Drug use: No    Sexual activity: Not on file     Other Topics Concern    Not on file     Social History Narrative    No narrative on file      Past Medical History:   Diagnosis Date    Arthritis     Cancer ([address]     squamous cell on nose     Chronic back pain     Depression     H/O blood clots     Hx of blood clots     Hyperlipidemia     Hypertension     Lumbar stenosis with neurogenic claudication     Osteoarthritis of neck      Past Surgical History:   Procedure Laterality Date     SECTION      x1    EMBOLECTOMY      HYSTERECTOMY      LAMINECTOMY  2018    MICROLUMBAR LAMINECTOMY L4-5    LITHOTRIPSY      MOHS SURGERY      nose for squamous cell carcinoma    OTHER SURGICAL HISTORY      blood clots- 1963       Review of Systems   Constitutional: Positive for fatigue. Negative for unexpected weight change. HENT: Negative for nosebleeds. Respiratory: Positive for shortness of breath. Negative for cough. Cardiovascular: Positive for chest pain, palpitations and leg swelling.    Gastrointestinal: Negative for abdominal pain, diarrhea, nausea and vomiting. Genitourinary: Negative for dysuria. Musculoskeletal: Positive for back pain and gait problem. Neurological: Negative for dizziness, light-headedness and headaches. Psychiatric/Behavioral: Positive for dysphoric mood and sleep disturbance. Negative for suicidal ideas. OBJECTIVE:  /80 (Site: Left Arm, Position: Sitting, Cuff Size: Medium Adult)   Temp 98.4 °F (36.9 °C) (Oral)   Ht 5' (1.524 m)   Wt 162 lb (73.5 kg)   BMI 31.64 kg/m²      Body mass index is 31.64 kg/m². Physical Exam   Constitutional: She is oriented to person, place, and time. She appears well-developed and well-nourished. She appears distressed. HENT:   Head: Normocephalic and atraumatic. Mouth/Throat: Oropharynx is clear and moist.   Eyes: Conjunctivae are normal.   Neck: Neck supple. No JVD present. No thyromegaly present. Cardiovascular: Normal rate and regular rhythm. Exam reveals no gallop. Murmur (new pansystolic murmur) heard. Pulmonary/Chest: Effort normal and breath sounds normal. She has no wheezes. She has no rales. She exhibits no tenderness. Abdominal: Soft. She exhibits no distension. There is no tenderness. No hepatosplenomegaly   Musculoskeletal: She exhibits edema (Swelling and left anterior thigh. Chronic venous stasis with DESI hose) and tenderness (Low back. ). Lymphadenopathy:     She has no cervical adenopathy. Neurological: She is alert and oriented to person, place, and time. Coordination abnormal.   Skin: Skin is warm and dry. ASSESSMENT/PLAN:    Quinton Dugan was seen today for leg pain, back pain and heart problem. Diagnoses and all orders for this visit:    Midline low back pain with bilateral sciatica, unspecified chronicity reviewed [place]  -     HYDROcodone-acetaminophen (LORCET) 5-325 MG per tablet; Take 1 tablet by mouth every 8 hours as needed for Pain for up to 7 days. Intended supply: 3 days. Take lowest dose possible to manage pain.   -     POCT

## 2018-06-27 NOTE — PATIENT INSTRUCTIONS
Please call your pharmacy if you need any refills of your medication(s). Please call our office at [phone] if you don't hear from us about your test results. Bring an accurate list of your medications with you at every appointment to ensure that we have the correct information.     Our office hours are: Monday - Friday 8:30 am- 5 pm    Phone lines turn on at 8:30 am

## 2018-07-02 DIAGNOSIS — K44.9 LARGE HIATAL HERNIA: ICD-10-CM

## 2018-07-02 DIAGNOSIS — F33.42 RECURRENT MAJOR DEPRESSIVE DISORDER, IN FULL REMISSION (HCC): ICD-10-CM

## 2018-07-02 DIAGNOSIS — N39.46 MIXED STRESS AND URGE URINARY INCONTINENCE: ICD-10-CM

## 2018-07-02 DIAGNOSIS — I10 ESSENTIAL HYPERTENSION: ICD-10-CM

## 2018-07-02 DIAGNOSIS — I82.5Y3 CHRONIC DEEP VEIN THROMBOSIS (DVT) OF PROXIMAL VEIN OF BOTH LOWER EXTREMITIES (HCC): ICD-10-CM

## 2018-07-02 DIAGNOSIS — E78.00 HYPERCHOLESTEREMIA: ICD-10-CM

## 2018-07-02 DIAGNOSIS — M48.062 SPINAL STENOSIS OF LUMBAR REGION WITH NEUROGENIC CLAUDICATION: ICD-10-CM

## 2018-07-02 RX ORDER — MELOXICAM 15 MG/1
15 TABLET ORAL DAILY
Qty: 90 TABLET | Refills: 1 | Status: SHIPPED | OUTPATIENT
Start: 2018-07-02 | End: 2019-03-14 | Stop reason: SDUPTHER

## 2018-07-02 RX ORDER — PANTOPRAZOLE SODIUM 40 MG/1
TABLET, DELAYED RELEASE ORAL
Qty: 180 TABLET | Refills: 1 | Status: SHIPPED | OUTPATIENT
Start: 2018-07-02 | End: 2019-03-14 | Stop reason: SDUPTHER

## 2018-07-02 RX ORDER — OXYBUTYNIN CHLORIDE 10 MG/1
TABLET, EXTENDED RELEASE ORAL
Qty: 180 TABLET | Refills: 1 | Status: SHIPPED | OUTPATIENT
Start: 2018-07-02 | End: 2019-03-14 | Stop reason: SDUPTHER

## 2018-07-02 RX ORDER — HYDROCHLOROTHIAZIDE 25 MG/1
25 TABLET ORAL DAILY
Qty: 90 TABLET | Refills: 1 | Status: SHIPPED | OUTPATIENT
Start: 2018-07-02 | End: 2019-03-14 | Stop reason: SDUPTHER

## 2018-07-02 RX ORDER — SERTRALINE HYDROCHLORIDE 100 MG/1
TABLET, FILM COATED ORAL
Qty: 180 TABLET | Refills: 1 | Status: SHIPPED | OUTPATIENT
Start: 2018-07-02 | End: 2019-03-14 | Stop reason: SDUPTHER

## 2018-07-02 RX ORDER — GABAPENTIN 600 MG/1
300 TABLET ORAL 3 TIMES DAILY
Qty: 135 TABLET | Refills: 1 | Status: SHIPPED | OUTPATIENT
Start: 2018-07-02 | End: 2019-08-14

## 2018-07-02 RX ORDER — WARFARIN SODIUM 5 MG/1
TABLET ORAL
Qty: 135 TABLET | Refills: 1 | Status: SHIPPED | OUTPATIENT
Start: 2018-07-02 | End: 2018-10-25 | Stop reason: DRUGHIGH

## 2018-07-02 RX ORDER — SIMVASTATIN 40 MG
40 TABLET ORAL NIGHTLY
Qty: 90 TABLET | Refills: 1 | Status: SHIPPED | OUTPATIENT
Start: 2018-07-02 | End: 2019-03-14 | Stop reason: SDUPTHER

## 2018-07-02 RX ORDER — LISINOPRIL 20 MG/1
20 TABLET ORAL DAILY
Qty: 90 TABLET | Refills: 1 | Status: SHIPPED | OUTPATIENT
Start: 2018-07-02 | End: 2019-03-14 | Stop reason: SDUPTHER

## 2018-07-06 ASSESSMENT — ENCOUNTER SYMPTOMS
COUGH: 0
SHORTNESS OF BREATH: 1
NAUSEA: 0
ABDOMINAL PAIN: 0
BACK PAIN: 1
VOMITING: 0
DIARRHEA: 0

## 2018-07-10 ENCOUNTER — TELEPHONE (OUTPATIENT)
Dept: INTERNAL MEDICINE CLINIC | Age: 78
End: 2018-07-10

## 2018-07-16 ENCOUNTER — OFFICE VISIT (OUTPATIENT)
Dept: ENT CLINIC | Age: 78
End: 2018-07-16

## 2018-07-16 VITALS
HEART RATE: 51 BPM | SYSTOLIC BLOOD PRESSURE: 132 MMHG | DIASTOLIC BLOOD PRESSURE: 80 MMHG | BODY MASS INDEX: 28.81 KG/M2 | WEIGHT: 152.6 LBS | HEIGHT: 61 IN

## 2018-07-16 DIAGNOSIS — H61.23 BILATERAL IMPACTED CERUMEN: ICD-10-CM

## 2018-07-16 DIAGNOSIS — H90.2 EXTERNAL EAR CONDUCTIVE HEARING LOSS: ICD-10-CM

## 2018-07-16 LAB
INR BLD: 2.1 (ref 0.8–1.2)
PROTHROMBIN TIME: 24.3 SECONDS (ref 11.7–14.2)

## 2018-07-16 PROCEDURE — 99999 PR OFFICE/OUTPT VISIT,PROCEDURE ONLY: CPT | Performed by: OTOLARYNGOLOGY

## 2018-07-16 PROCEDURE — 69210 REMOVE IMPACTED EAR WAX UNI: CPT | Performed by: OTOLARYNGOLOGY

## 2018-07-19 ENCOUNTER — TELEPHONE (OUTPATIENT)
Dept: INTERNAL MEDICINE CLINIC | Age: 78
End: 2018-07-19

## 2018-07-19 DIAGNOSIS — M48.061 SPINAL STENOSIS OF LUMBAR REGION, UNSPECIFIED WHETHER NEUROGENIC CLAUDICATION PRESENT: Primary | ICD-10-CM

## 2018-07-25 ENCOUNTER — TELEPHONE (OUTPATIENT)
Dept: INTERNAL MEDICINE CLINIC | Age: 78
End: 2018-07-25

## 2018-08-08 DIAGNOSIS — R01.1 MURMUR, CARDIAC: Primary | ICD-10-CM

## 2018-08-21 LAB
INR BLD: 2.8 (ref 0.8–1.2)
PROTHROMBIN TIME: 29.6 SECONDS (ref 11.7–14.2)

## 2018-08-27 ENCOUNTER — OFFICE VISIT (OUTPATIENT)
Dept: INTERNAL MEDICINE CLINIC | Age: 78
End: 2018-08-27

## 2018-08-27 ENCOUNTER — TELEPHONE (OUTPATIENT)
Dept: INTERNAL MEDICINE CLINIC | Age: 78
End: 2018-08-27

## 2018-08-27 VITALS
SYSTOLIC BLOOD PRESSURE: 112 MMHG | WEIGHT: 151.4 LBS | HEART RATE: 67 BPM | RESPIRATION RATE: 16 BRPM | BODY MASS INDEX: 28.42 KG/M2 | DIASTOLIC BLOOD PRESSURE: 62 MMHG | TEMPERATURE: 97.9 F

## 2018-08-27 DIAGNOSIS — M48.061 SPINAL STENOSIS OF LUMBAR REGION, UNSPECIFIED WHETHER NEUROGENIC CLAUDICATION PRESENT: Primary | ICD-10-CM

## 2018-08-27 DIAGNOSIS — R01.1 MURMUR, CARDIAC: ICD-10-CM

## 2018-08-27 DIAGNOSIS — I82.5Y3 CHRONIC DEEP VEIN THROMBOSIS (DVT) OF PROXIMAL VEIN OF BOTH LOWER EXTREMITIES (HCC): ICD-10-CM

## 2018-08-27 DIAGNOSIS — I10 ESSENTIAL HYPERTENSION: ICD-10-CM

## 2018-08-27 PROCEDURE — 3017F COLORECTAL CA SCREEN DOC REV: CPT | Performed by: INTERNAL MEDICINE

## 2018-08-27 PROCEDURE — 1123F ACP DISCUSS/DSCN MKR DOCD: CPT | Performed by: INTERNAL MEDICINE

## 2018-08-27 PROCEDURE — G8399 PT W/DXA RESULTS DOCUMENT: HCPCS | Performed by: INTERNAL MEDICINE

## 2018-08-27 PROCEDURE — G8427 DOCREV CUR MEDS BY ELIG CLIN: HCPCS | Performed by: INTERNAL MEDICINE

## 2018-08-27 PROCEDURE — 4040F PNEUMOC VAC/ADMIN/RCVD: CPT | Performed by: INTERNAL MEDICINE

## 2018-08-27 PROCEDURE — 1090F PRES/ABSN URINE INCON ASSESS: CPT | Performed by: INTERNAL MEDICINE

## 2018-08-27 PROCEDURE — 1101F PT FALLS ASSESS-DOCD LE1/YR: CPT | Performed by: INTERNAL MEDICINE

## 2018-08-27 PROCEDURE — 1036F TOBACCO NON-USER: CPT | Performed by: INTERNAL MEDICINE

## 2018-08-27 PROCEDURE — G8417 CALC BMI ABV UP PARAM F/U: HCPCS | Performed by: INTERNAL MEDICINE

## 2018-08-27 PROCEDURE — 99214 OFFICE O/P EST MOD 30 MIN: CPT | Performed by: INTERNAL MEDICINE

## 2018-08-27 NOTE — TELEPHONE ENCOUNTER
Rosaura diaz/ Dr Muñiz's office calling to get approval to stop the pt's coumadin. Pt is going to have a procedure and she'll need to stop the coumadin 5 days prior.

## 2018-08-27 NOTE — PATIENT INSTRUCTIONS
Please call your pharmacy if you need any refills of your medication(s). Please call our office at [phone] if you don't hear from us about your test results. Bring an accurate list of your medications with you at every appointment to ensure that we have the correct information.     Our office hours are: Monday - Friday 8:30 am- 5 pm    Phone lines turn on at 8:30 am

## 2018-08-27 NOTE — TELEPHONE ENCOUNTER
okay to stop coumadin for 5 for procedure.  As long as it's safe would stop the evening of procedure and have a pro time checked in 7-10 days after resuming her Coumadin

## 2018-08-27 NOTE — PROGRESS NOTES
nightly 90 tablet 1    Misc. Devices MISC Compression stockings/open toe pantyhose with compression 40/50 2 each 2    Calcium Carb-Cholecalciferol (CALCIUM 600/VITAMIN D3 PO) Take by mouth      Misc. Devices MISC George panty hose  Open toe Petite plus beige  2 pairs 2 each 1    Probiotic Product (PROBIOTIC DAILY) CAPS Take 1 capsule by mouth daily       Biotin 2500 MCG CAPS Take 1 capsule by mouth 2 times daily       Coenzyme Q10 (COQ10) 100 MG CAPS Take  by mouth. No current facility-administered medications for this visit. Social History     Social History    Marital status:      Spouse name: N/A    Number of children: N/A    Years of education: N/A     Occupational History    Not on file. Social History Main Topics    Smoking status: Never Smoker    Smokeless tobacco: Never Used    Alcohol use No    Drug use: No    Sexual activity: Not on file     Other Topics Concern    Not on file     Social History Narrative    No narrative on file      Past Medical History:   Diagnosis Date    Arthritis     Cancer ([address]     squamous cell on nose     Chronic back pain     Depression     H/O blood clots     Hx of blood clots     Hyperlipidemia     Hypertension     Lumbar stenosis with neurogenic claudication     Osteoarthritis of neck      Past Surgical History:   Procedure Laterality Date     SECTION      x1    EMBOLECTOMY      HYSTERECTOMY      LAMINECTOMY  2018    MICROLUMBAR LAMINECTOMY L4-5    LITHOTRIPSY      MOHS SURGERY      nose for squamous cell carcinoma    OTHER SURGICAL HISTORY      blood clots- 1963       Review of Systems   Constitutional: Positive for fatigue. Negative for unexpected weight change. HENT: Negative for nosebleeds. Respiratory: Positive for shortness of breath. Negative for cough. Cardiovascular: Positive for chest pain. Negative for palpitations and leg swelling.    Gastrointestinal: Negative for abdominal pain, diarrhea, nausea and vomiting. Genitourinary: Negative for dysuria and vaginal bleeding. Vaginal numbness    Musculoskeletal: Positive for back pain. Negative for gait problem. Neurological: Negative for dizziness, light-headedness and headaches. Paresthesias down both legs   Psychiatric/Behavioral: Positive for dysphoric mood. Negative for suicidal ideas. OBJECTIVE:  /62 (Site: Right Arm, Position: Sitting, Cuff Size: Large Adult)   Pulse 67   Temp 97.9 °F (36.6 °C) (Oral)   Resp 16   Wt 151 lb 6.4 oz (68.7 kg)   BMI 28.42 kg/m²      Body mass index is 28.42 kg/m². Physical Exam   Constitutional: She is oriented to person, place, and time. She appears well-developed and well-nourished. No distress. HENT:   Head: Normocephalic and atraumatic. Eyes: Conjunctivae are normal.   Neck: Neck supple. No JVD present. No thyromegaly present. Cardiovascular: Normal rate and regular rhythm. Exam reveals no gallop. Murmur (systolic ) heard. Pulmonary/Chest: Effort normal and breath sounds normal. She has no wheezes. She has no rales. She exhibits no tenderness. Abdominal: Soft. She exhibits no distension. There is no tenderness. No hepatosplenomegaly   Musculoskeletal: She exhibits tenderness (Low back. ). She exhibits no edema. Lymphadenopathy:     She has no cervical adenopathy. Neurological: She is alert and oriented to person, place, and time. Coordination abnormal.   Skin: Skin is warm and dry. ASSESSMENT/PLAN:    Alicia Jarrett was seen today for hypertension and referral - general.    Diagnoses and all orders for this visit:    Spinal stenosis of lumbar region, unspecified whether neurogenic claudication present  Seeing Dr Tasha Alonso hypertension controlled     Murmur, cardiac thickened mitral valves. Mild aortic regurg and calcification.  Will recheck echo every year to     Chronic deep vein thrombosis (DVT) of proximal vein of both lower extremities ([address] on chronic coumadin and wears compression hose         No orders of the defined types were placed in this encounter. Return in about 4 months (around 12/27/2018). Patient Instructions   Please call your pharmacy if you need any refills of your medication(s). Please call our office at [phone] if you don't hear from us about your test results. Bring an accurate list of your medications with you at every appointment to ensure that we have the correct information.     Our office hours are: Monday - Friday 8:30 am- 5 pm    Phone lines turn on at 8:30 am

## 2018-08-28 ENCOUNTER — TELEPHONE (OUTPATIENT)
Dept: INTERNAL MEDICINE CLINIC | Age: 78
End: 2018-08-28

## 2018-08-28 DIAGNOSIS — I82.5Y3 CHRONIC DEEP VEIN THROMBOSIS (DVT) OF PROXIMAL VEIN OF BOTH LOWER EXTREMITIES (HCC): Primary | ICD-10-CM

## 2018-08-28 DIAGNOSIS — Z51.81 MEDICATION MONITORING ENCOUNTER: ICD-10-CM

## 2018-08-28 NOTE — TELEPHONE ENCOUNTER
Pt calling to let Dr. Carmita Pelayo know she will be having a procedure on her back on 8/4/18 by Dr. Grzegorz Cee. She must stop coumadin on 8/29/18 and have INR done 7 days after surgery. She also states she can not take any pain medication the night before.

## 2018-08-28 NOTE — TELEPHONE ENCOUNTER
Spoke with Ottoniel Núñez informed ok to hold coumadin 5 days prior to surgery and if no bleeding problems following surgery patient can resume her coumadin that evening and recheck pt/inr in 7-10 days after resuming coumadin.

## 2018-09-02 ASSESSMENT — ENCOUNTER SYMPTOMS
COUGH: 0
SHORTNESS OF BREATH: 1
BACK PAIN: 1
NAUSEA: 0
VOMITING: 0
DIARRHEA: 0
ABDOMINAL PAIN: 0

## 2018-09-11 LAB
INR BLD: 1.7 (ref 0.8–1.2)
PROTHROMBIN TIME: 20.6 SECONDS (ref 11.7–14.2)

## 2018-09-12 ENCOUNTER — TELEPHONE (OUTPATIENT)
Dept: INTERNAL MEDICINE CLINIC | Age: 78
End: 2018-09-12

## 2018-09-12 DIAGNOSIS — Z51.81 ENCOUNTER FOR THERAPEUTIC DRUG MONITORING: ICD-10-CM

## 2018-09-12 DIAGNOSIS — I82.5Y3 CHRONIC DEEP VEIN THROMBOSIS (DVT) OF PROXIMAL VEIN OF BOTH LOWER EXTREMITIES (HCC): Primary | ICD-10-CM

## 2018-09-28 LAB
INR BLD: 2.7 (ref 0.8–1.2)
PROTHROMBIN TIME: 29.2 SECONDS (ref 11.7–14.2)

## 2018-10-17 ENCOUNTER — TELEPHONE (OUTPATIENT)
Dept: INTERNAL MEDICINE CLINIC | Age: 78
End: 2018-10-17

## 2018-10-17 DIAGNOSIS — I10 ESSENTIAL HYPERTENSION: ICD-10-CM

## 2018-10-17 DIAGNOSIS — K92.1 BLACK TARRY STOOLS: Primary | ICD-10-CM

## 2018-10-17 DIAGNOSIS — I82.5Y3 CHRONIC DEEP VEIN THROMBOSIS (DVT) OF PROXIMAL VEIN OF BOTH LOWER EXTREMITIES (HCC): ICD-10-CM

## 2018-10-17 NOTE — TELEPHONE ENCOUNTER
I would have her on whether to her renal and a CBC. Have them guaiac her stools. I would also send cultures for general culture and C. difficile.

## 2018-10-24 LAB
ALBUMIN SERPL-MCNC: 3.9 G/DL (ref 3.5–5)
ANION GAP SERPL CALCULATED.3IONS-SCNC: 8 MMOL/L (ref 6–18)
BUN BLDV-MCNC: 29 MG/DL (ref 8–26)
CALCIUM SERPL-MCNC: 9.7 MG/DL (ref 8.5–10.5)
CHLORIDE BLD-SCNC: 101 MEQ/L (ref 101–111)
CO2: 30 MMOL/L (ref 24–36)
CREAT SERPL-MCNC: 0.84 MG/DL (ref 0.44–1.03)
GFR AFRICAN AMERICAN: 77 ML/MIN/1.73 M2
GFR NON-AFRICAN AMERICAN: 67 ML/MIN/1.73 M2
GLUCOSE BLD-MCNC: 90 MG/DL (ref 70–99)
HCT VFR BLD CALC: 35.7 % (ref 36–46)
HEMOGLOBIN: 11.8 G/DL (ref 12–15.2)
INR BLD: 2.1 (ref 0.8–1.2)
MCH RBC QN AUTO: 32.2 PG (ref 27–33)
MCHC RBC AUTO-ENTMCNC: 33.2 G/DL (ref 32–36)
MCV RBC AUTO: 97.2 FL (ref 82–97)
PDW BLD-RTO: 14.8 %
PHOSPHORUS: 3.1 MG/DL (ref 2.5–4.5)
PLATELET # BLD: 192 THOU/MCL (ref 140–375)
PMV BLD AUTO: 10.2 FL (ref 7.4–11.5)
POTASSIUM SERPL-SCNC: 3.8 MEQ/L (ref 3.6–5.1)
PROTHROMBIN TIME: 24 SECONDS (ref 11.7–14.2)
RBC # BLD: 3.67 MIL/MCL (ref 3.8–5.2)
SODIUM BLD-SCNC: 139 MEQ/L (ref 135–145)
WBC # BLD: 5.2 THOU/MCL (ref 3.6–10.5)

## 2018-10-25 DIAGNOSIS — I82.5Y3 CHRONIC DEEP VEIN THROMBOSIS (DVT) OF PROXIMAL VEIN OF BOTH LOWER EXTREMITIES (HCC): ICD-10-CM

## 2018-10-25 RX ORDER — WARFARIN SODIUM 5 MG/1
TABLET ORAL
Qty: 135 TABLET | Refills: 1 | Status: SHIPPED
Start: 2018-10-25 | End: 2019-05-17 | Stop reason: SDUPTHER

## 2018-11-12 ENCOUNTER — TELEPHONE (OUTPATIENT)
Dept: INTERNAL MEDICINE CLINIC | Age: 78
End: 2018-11-12

## 2018-11-21 PROBLEM — M47.816 LUMBAR SPONDYLOSIS: Chronic | Status: ACTIVE | Noted: 2018-11-21

## 2018-11-21 PROBLEM — M47.816 LUMBAR SPONDYLOSIS: Status: ACTIVE | Noted: 2018-11-21

## 2018-11-26 ENCOUNTER — TELEPHONE (OUTPATIENT)
Dept: INTERNAL MEDICINE CLINIC | Age: 78
End: 2018-11-26

## 2018-11-26 RX ORDER — ONDANSETRON 4 MG/1
4 TABLET, ORALLY DISINTEGRATING ORAL 3 TIMES DAILY PRN
Qty: 21 TABLET | Refills: 0 | Status: SHIPPED | OUTPATIENT
Start: 2018-11-26 | End: 2018-12-12 | Stop reason: ALTCHOICE

## 2018-11-27 ENCOUNTER — OFFICE VISIT (OUTPATIENT)
Dept: FAMILY MEDICINE CLINIC | Age: 78
End: 2018-11-27
Payer: MEDICARE

## 2018-11-27 ENCOUNTER — TELEPHONE (OUTPATIENT)
Dept: FAMILY MEDICINE CLINIC | Age: 78
End: 2018-11-27

## 2018-11-27 VITALS
DIASTOLIC BLOOD PRESSURE: 84 MMHG | SYSTOLIC BLOOD PRESSURE: 136 MMHG | WEIGHT: 142 LBS | HEART RATE: 76 BPM | RESPIRATION RATE: 18 BRPM | BODY MASS INDEX: 26.83 KG/M2 | TEMPERATURE: 98.4 F

## 2018-11-27 DIAGNOSIS — R31.9 HEMATURIA, UNSPECIFIED TYPE: ICD-10-CM

## 2018-11-27 DIAGNOSIS — Z79.01 ANTICOAGULATED ON COUMADIN: ICD-10-CM

## 2018-11-27 DIAGNOSIS — R68.89 FLU-LIKE SYMPTOMS: ICD-10-CM

## 2018-11-27 DIAGNOSIS — R82.90 FOUL SMELLING URINE: ICD-10-CM

## 2018-11-27 DIAGNOSIS — R21 RASH AND NONSPECIFIC SKIN ERUPTION: ICD-10-CM

## 2018-11-27 DIAGNOSIS — R19.4 RECENT CHANGE IN FREQUENCY OF BOWEL MOVEMENTS: ICD-10-CM

## 2018-11-27 DIAGNOSIS — G89.29 CHRONIC BILATERAL LOW BACK PAIN, WITH SCIATICA PRESENCE UNSPECIFIED: ICD-10-CM

## 2018-11-27 DIAGNOSIS — M54.5 CHRONIC BILATERAL LOW BACK PAIN, WITH SCIATICA PRESENCE UNSPECIFIED: ICD-10-CM

## 2018-11-27 DIAGNOSIS — R11.0 NAUSEA: ICD-10-CM

## 2018-11-27 LAB
A/G RATIO: 1.6 (ref 1.1–2.2)
ALBUMIN SERPL-MCNC: 4.4 G/DL (ref 3.4–5)
ALP BLD-CCNC: 70 U/L (ref 40–129)
ALT SERPL-CCNC: 11 U/L (ref 10–40)
ANION GAP SERPL CALCULATED.3IONS-SCNC: 14 MMOL/L (ref 3–16)
AST SERPL-CCNC: 17 U/L (ref 15–37)
BASOPHILS ABSOLUTE: 0.1 K/UL (ref 0–0.2)
BASOPHILS RELATIVE PERCENT: 1 %
BILIRUB SERPL-MCNC: 0.3 MG/DL (ref 0–1)
BILIRUBIN, POC: ABNORMAL
BLOOD URINE, POC: ABNORMAL
BUN BLDV-MCNC: 16 MG/DL (ref 7–20)
CALCIUM SERPL-MCNC: 9.7 MG/DL (ref 8.3–10.6)
CHLORIDE BLD-SCNC: 101 MMOL/L (ref 99–110)
CLARITY, POC: CLEAR
CO2: 29 MMOL/L (ref 21–32)
COLOR, POC: YELLOW
CREAT SERPL-MCNC: 0.5 MG/DL (ref 0.6–1.2)
EOSINOPHILS ABSOLUTE: 0.1 K/UL (ref 0–0.6)
EOSINOPHILS RELATIVE PERCENT: 1.5 %
GFR AFRICAN AMERICAN: >60
GFR NON-AFRICAN AMERICAN: >60
GLOBULIN: 2.7 G/DL
GLUCOSE BLD-MCNC: 89 MG/DL (ref 70–99)
GLUCOSE URINE, POC: ABNORMAL
HCT VFR BLD CALC: 38.9 % (ref 36–48)
HEMOGLOBIN: 13 G/DL (ref 12–16)
INFLUENZA A ANTIBODY: NORMAL
INFLUENZA B ANTIBODY: NORMAL
INR BLD: 5.77 (ref 0.86–1.14)
KETONES, POC: ABNORMAL
LEUKOCYTE EST, POC: ABNORMAL
LIPASE: 15 U/L (ref 13–60)
LYMPHOCYTES ABSOLUTE: 1.1 K/UL (ref 1–5.1)
LYMPHOCYTES RELATIVE PERCENT: 20.9 %
MCH RBC QN AUTO: 33 PG (ref 26–34)
MCHC RBC AUTO-ENTMCNC: 33.6 G/DL (ref 31–36)
MCV RBC AUTO: 98.2 FL (ref 80–100)
MONOCYTES ABSOLUTE: 0.7 K/UL (ref 0–1.3)
MONOCYTES RELATIVE PERCENT: 13.5 %
NEUTROPHILS ABSOLUTE: 3.4 K/UL (ref 1.7–7.7)
NEUTROPHILS RELATIVE PERCENT: 63.1 %
NITRITE, POC: ABNORMAL
PDW BLD-RTO: 13.2 % (ref 12.4–15.4)
PH, POC: 5.5
PLATELET # BLD: 153 K/UL (ref 135–450)
PMV BLD AUTO: 11 FL (ref 5–10.5)
POTASSIUM SERPL-SCNC: 3.3 MMOL/L (ref 3.5–5.1)
PROTEIN, POC: 30
PROTHROMBIN TIME: 65.8 SEC (ref 9.8–13)
RBC # BLD: 3.96 M/UL (ref 4–5.2)
SODIUM BLD-SCNC: 144 MMOL/L (ref 136–145)
SPECIFIC GRAVITY, POC: 1.02
TOTAL PROTEIN: 7.1 G/DL (ref 6.4–8.2)
UROBILINOGEN, POC: 0.2
WBC # BLD: 5.4 K/UL (ref 4–11)

## 2018-11-27 PROCEDURE — 1123F ACP DISCUSS/DSCN MKR DOCD: CPT | Performed by: NURSE PRACTITIONER

## 2018-11-27 PROCEDURE — 99214 OFFICE O/P EST MOD 30 MIN: CPT | Performed by: NURSE PRACTITIONER

## 2018-11-27 PROCEDURE — 4040F PNEUMOC VAC/ADMIN/RCVD: CPT | Performed by: NURSE PRACTITIONER

## 2018-11-27 PROCEDURE — 1101F PT FALLS ASSESS-DOCD LE1/YR: CPT | Performed by: NURSE PRACTITIONER

## 2018-11-27 PROCEDURE — 87804 INFLUENZA ASSAY W/OPTIC: CPT | Performed by: NURSE PRACTITIONER

## 2018-11-27 PROCEDURE — G8399 PT W/DXA RESULTS DOCUMENT: HCPCS | Performed by: NURSE PRACTITIONER

## 2018-11-27 PROCEDURE — 1090F PRES/ABSN URINE INCON ASSESS: CPT | Performed by: NURSE PRACTITIONER

## 2018-11-27 PROCEDURE — G8484 FLU IMMUNIZE NO ADMIN: HCPCS | Performed by: NURSE PRACTITIONER

## 2018-11-27 PROCEDURE — G8427 DOCREV CUR MEDS BY ELIG CLIN: HCPCS | Performed by: NURSE PRACTITIONER

## 2018-11-27 PROCEDURE — G8417 CALC BMI ABV UP PARAM F/U: HCPCS | Performed by: NURSE PRACTITIONER

## 2018-11-27 PROCEDURE — 81002 URINALYSIS NONAUTO W/O SCOPE: CPT | Performed by: NURSE PRACTITIONER

## 2018-11-27 PROCEDURE — 3017F COLORECTAL CA SCREEN DOC REV: CPT | Performed by: NURSE PRACTITIONER

## 2018-11-27 PROCEDURE — 1036F TOBACCO NON-USER: CPT | Performed by: NURSE PRACTITIONER

## 2018-11-27 NOTE — PROGRESS NOTES
1    gabapentin (NEURONTIN) 600 MG tablet, Take 0.5 tablets by mouth 3 times daily for 180 days. . (Patient taking differently: Take 600 mg by mouth 3 times daily. Carla Eng ), Disp: 135 tablet, Rfl: 1    hydrochlorothiazide (HYDRODIURIL) 25 MG tablet, TAKE 1 TABLET BY MOUTH  DAILY, Disp: 90 tablet, Rfl: 1    oxybutynin (DITROPAN-XL) 10 MG extended release tablet, TAKE 1 TABLET BY MOUTH TWO  TIMES DAILY, Disp: 180 tablet, Rfl: 1    meloxicam (MOBIC) 15 MG tablet, TAKE 1 TABLET BY MOUTH  DAILY, Disp: 90 tablet, Rfl: 1    simvastatin (ZOCOR) 40 MG tablet, Take 1 tablet by mouth nightly, Disp: 90 tablet, Rfl: 1    Misc. Devices MISC, Compression stockings/open toe pantyhose with compression 40/50, Disp: 2 each, Rfl: 2    Calcium Carb-Cholecalciferol (CALCIUM 600/VITAMIN D3 PO), Take by mouth, Disp: , Rfl:     Misc. Devices MISC, George panty hose  Open toe Petite plus beige  2 pairs, Disp: 2 each, Rfl: 1    Biotin 2500 MCG CAPS, Take 1 capsule by mouth 2 times daily , Disp: , Rfl:     Coenzyme Q10 (COQ10) 100 MG CAPS, Take  by mouth., Disp: , Rfl:     Review of Systems   Constitutional: Negative for fever. Respiratory: Negative for cough, chest tightness and shortness of breath. Cardiovascular: Negative for chest pain and leg swelling. Gastrointestinal: Positive for constipation (?), nausea and vomiting (once- 4 days ago, resolved). Negative for abdominal pain and blood in stool. Genitourinary: Negative for difficulty urinating, dysuria, flank pain, frequency, hematuria and urgency. Musculoskeletal: Positive for back pain. Skin: Positive for rash. Neurological: Negative for dizziness and headaches. Physical Exam   Constitutional: She is oriented to person, place, and time. She appears well-developed and well-nourished. No distress. Eyes: No scleral icterus. Neck: Neck supple. No JVD present. No thyromegaly present. Cardiovascular: Normal rate and regular rhythm.     Murmur

## 2018-11-28 ENCOUNTER — HOSPITAL ENCOUNTER (OUTPATIENT)
Dept: GENERAL RADIOLOGY | Age: 78
Discharge: HOME | End: 2018-11-28
Payer: MEDICARE

## 2018-11-28 DIAGNOSIS — R11.0 NAUSEA: ICD-10-CM

## 2018-11-28 DIAGNOSIS — Z79.01 WARFARIN ANTICOAGULATION: Primary | ICD-10-CM

## 2018-11-28 PROCEDURE — 74019 RADEX ABDOMEN 2 VIEWS: CPT

## 2018-11-28 RX ORDER — POTASSIUM CHLORIDE 750 MG/1
20 TABLET, EXTENDED RELEASE ORAL DAILY
Qty: 4 TABLET | Refills: 0 | Status: SHIPPED | OUTPATIENT
Start: 2018-11-28 | End: 2018-12-12 | Stop reason: ALTCHOICE

## 2018-11-28 ASSESSMENT — ENCOUNTER SYMPTOMS
CHEST TIGHTNESS: 0
SHORTNESS OF BREATH: 0
ABDOMINAL PAIN: 0
BLOOD IN STOOL: 0
BACK PAIN: 1
NAUSEA: 1
COUGH: 0
CONSTIPATION: 1
VOMITING: 1

## 2018-11-29 DIAGNOSIS — Z79.01 WARFARIN ANTICOAGULATION: ICD-10-CM

## 2018-11-29 LAB
INR BLD: 2.96 (ref 0.86–1.14)
PROTHROMBIN TIME: 33.7 SEC (ref 9.8–13)
URINE CULTURE, ROUTINE: NORMAL

## 2018-11-30 ENCOUNTER — TELEPHONE (OUTPATIENT)
Dept: FAMILY MEDICINE CLINIC | Age: 78
End: 2018-11-30

## 2018-11-30 NOTE — TELEPHONE ENCOUNTER
Spoke to pt, has been taking 10mg on Monday and other days 7.5mg. Suspect her INR level went high due to not eating when fell sick, eating ok now. Instructed to take daily 7.5mg and recheck INR next Friday.

## 2018-12-10 ENCOUNTER — TELEPHONE (OUTPATIENT)
Dept: FAMILY MEDICINE CLINIC | Age: 78
End: 2018-12-10

## 2018-12-10 ENCOUNTER — PATIENT MESSAGE (OUTPATIENT)
Dept: FAMILY MEDICINE CLINIC | Age: 78
End: 2018-12-10

## 2018-12-10 ENCOUNTER — TELEPHONE (OUTPATIENT)
Dept: INTERNAL MEDICINE CLINIC | Age: 78
End: 2018-12-10

## 2018-12-10 LAB
INR BLD: 3.6 (ref 0.8–1.2)
PROTHROMBIN TIME: 36.2 SECONDS (ref 11.7–14.2)

## 2018-12-10 NOTE — TELEPHONE ENCOUNTER
From: Faina Gonzalez  To: LIZZY Mathis - CNP  Sent: 12/10/2018 1:26 PM EST  Subject: Visit Follow-Up Question    I want to switch to you as my pcp since I felt comfortable with you and you are only 16 minutes from my house. I've told Dr. Kaylie Pickett this. She is an hour each way. I need a standing order for my protime to be placed at  our office. Can you set that up? In the meantime I'll see of Dr. Kaylie Pickett will cover it until you do.   Thank you

## 2018-12-12 ENCOUNTER — OFFICE VISIT (OUTPATIENT)
Dept: FAMILY MEDICINE CLINIC | Age: 78
End: 2018-12-12
Payer: MEDICARE

## 2018-12-12 VITALS
DIASTOLIC BLOOD PRESSURE: 78 MMHG | BODY MASS INDEX: 27.96 KG/M2 | SYSTOLIC BLOOD PRESSURE: 120 MMHG | HEART RATE: 63 BPM | OXYGEN SATURATION: 97 % | WEIGHT: 148 LBS

## 2018-12-12 DIAGNOSIS — F32.9 REACTIVE DEPRESSION: ICD-10-CM

## 2018-12-12 DIAGNOSIS — R26.89 BALANCE PROBLEM: ICD-10-CM

## 2018-12-12 DIAGNOSIS — I82.5Y3 CHRONIC DEEP VEIN THROMBOSIS (DVT) OF PROXIMAL VEIN OF BOTH LOWER EXTREMITIES (HCC): ICD-10-CM

## 2018-12-12 DIAGNOSIS — I10 ESSENTIAL HYPERTENSION: ICD-10-CM

## 2018-12-12 DIAGNOSIS — M43.10 DEGENERATIVE SPONDYLOLISTHESIS: ICD-10-CM

## 2018-12-12 DIAGNOSIS — H61.21 IMPACTED CERUMEN OF RIGHT EAR: ICD-10-CM

## 2018-12-12 DIAGNOSIS — E78.00 HYPERCHOLESTEREMIA: ICD-10-CM

## 2018-12-12 DIAGNOSIS — Z76.89 ENCOUNTER TO ESTABLISH CARE: Primary | ICD-10-CM

## 2018-12-12 DIAGNOSIS — Z00.00 HEALTHCARE MAINTENANCE: ICD-10-CM

## 2018-12-12 DIAGNOSIS — M48.061 SPINAL STENOSIS OF LUMBAR REGION WITHOUT NEUROGENIC CLAUDICATION: ICD-10-CM

## 2018-12-12 PROCEDURE — 1036F TOBACCO NON-USER: CPT | Performed by: FAMILY MEDICINE

## 2018-12-12 PROCEDURE — G8417 CALC BMI ABV UP PARAM F/U: HCPCS | Performed by: FAMILY MEDICINE

## 2018-12-12 PROCEDURE — 90471 IMMUNIZATION ADMIN: CPT | Performed by: FAMILY MEDICINE

## 2018-12-12 PROCEDURE — G0009 ADMIN PNEUMOCOCCAL VACCINE: HCPCS | Performed by: FAMILY MEDICINE

## 2018-12-12 PROCEDURE — G8484 FLU IMMUNIZE NO ADMIN: HCPCS | Performed by: FAMILY MEDICINE

## 2018-12-12 PROCEDURE — 4040F PNEUMOC VAC/ADMIN/RCVD: CPT | Performed by: FAMILY MEDICINE

## 2018-12-12 PROCEDURE — 1123F ACP DISCUSS/DSCN MKR DOCD: CPT | Performed by: FAMILY MEDICINE

## 2018-12-12 PROCEDURE — G8427 DOCREV CUR MEDS BY ELIG CLIN: HCPCS | Performed by: FAMILY MEDICINE

## 2018-12-12 PROCEDURE — 1090F PRES/ABSN URINE INCON ASSESS: CPT | Performed by: FAMILY MEDICINE

## 2018-12-12 PROCEDURE — 90715 TDAP VACCINE 7 YRS/> IM: CPT | Performed by: FAMILY MEDICINE

## 2018-12-12 PROCEDURE — 99215 OFFICE O/P EST HI 40 MIN: CPT | Performed by: FAMILY MEDICINE

## 2018-12-12 PROCEDURE — 1101F PT FALLS ASSESS-DOCD LE1/YR: CPT | Performed by: FAMILY MEDICINE

## 2018-12-12 PROCEDURE — 90732 PPSV23 VACC 2 YRS+ SUBQ/IM: CPT | Performed by: FAMILY MEDICINE

## 2018-12-12 PROCEDURE — G8399 PT W/DXA RESULTS DOCUMENT: HCPCS | Performed by: FAMILY MEDICINE

## 2018-12-17 DIAGNOSIS — I82.5Y3 CHRONIC DEEP VEIN THROMBOSIS (DVT) OF PROXIMAL VEIN OF BOTH LOWER EXTREMITIES (HCC): ICD-10-CM

## 2018-12-17 PROBLEM — Z00.00 HEALTHCARE MAINTENANCE: Status: ACTIVE | Noted: 2018-12-17

## 2018-12-17 PROBLEM — R26.89 BALANCE PROBLEM: Status: ACTIVE | Noted: 2018-12-17

## 2018-12-17 PROBLEM — H61.21 IMPACTED CERUMEN OF RIGHT EAR: Status: ACTIVE | Noted: 2018-12-17

## 2018-12-17 LAB
INR BLD: 2.35 (ref 0.86–1.14)
PROTHROMBIN TIME: 26.8 SEC (ref 9.8–13)

## 2018-12-17 ASSESSMENT — ENCOUNTER SYMPTOMS
NAUSEA: 0
BACK PAIN: 0
CONSTIPATION: 0
SHORTNESS OF BREATH: 0
ABDOMINAL PAIN: 0
BLOOD IN STOOL: 0
DIARRHEA: 0
COUGH: 0
VOMITING: 0
ANAL BLEEDING: 0
COLOR CHANGE: 0
TROUBLE SWALLOWING: 0

## 2019-01-10 ENCOUNTER — ANTI-COAG VISIT (OUTPATIENT)
Dept: FAMILY MEDICINE CLINIC | Age: 79
End: 2019-01-10

## 2019-01-10 ENCOUNTER — TELEPHONE (OUTPATIENT)
Dept: FAMILY MEDICINE CLINIC | Age: 79
End: 2019-01-10

## 2019-01-10 DIAGNOSIS — I82.5Y3 CHRONIC DEEP VEIN THROMBOSIS (DVT) OF PROXIMAL VEIN OF BOTH LOWER EXTREMITIES (HCC): Primary | ICD-10-CM

## 2019-01-10 DIAGNOSIS — I82.5Y3 CHRONIC DEEP VEIN THROMBOSIS (DVT) OF PROXIMAL VEIN OF BOTH LOWER EXTREMITIES (HCC): ICD-10-CM

## 2019-01-10 LAB
INR BLD: 3.63 (ref 0.86–1.14)
PROTHROMBIN TIME: 41.4 SEC (ref 9.8–13)

## 2019-01-16 ENCOUNTER — TELEPHONE (OUTPATIENT)
Dept: FAMILY MEDICINE CLINIC | Age: 79
End: 2019-01-16

## 2019-01-16 PROBLEM — Z00.00 HEALTHCARE MAINTENANCE: Status: RESOLVED | Noted: 2018-12-17 | Resolved: 2019-01-16

## 2019-01-17 DIAGNOSIS — Z79.01 ANTICOAGULATED ON COUMADIN: Primary | ICD-10-CM

## 2019-01-18 DIAGNOSIS — Z79.01 ANTICOAGULATED ON COUMADIN: ICD-10-CM

## 2019-01-18 LAB
INR BLD: 3.49 (ref 0.86–1.14)
PROTHROMBIN TIME: 39.8 SEC (ref 9.8–13)

## 2019-01-18 PROCEDURE — 36415 COLL VENOUS BLD VENIPUNCTURE: CPT | Performed by: FAMILY MEDICINE

## 2019-01-21 ENCOUNTER — ANTI-COAG VISIT (OUTPATIENT)
Dept: FAMILY MEDICINE CLINIC | Age: 79
End: 2019-01-21

## 2019-01-29 ENCOUNTER — ANTI-COAG VISIT (OUTPATIENT)
Dept: FAMILY MEDICINE CLINIC | Age: 79
End: 2019-01-29

## 2019-01-29 DIAGNOSIS — I82.5Y3 CHRONIC DEEP VEIN THROMBOSIS (DVT) OF PROXIMAL VEIN OF BOTH LOWER EXTREMITIES (HCC): Primary | ICD-10-CM

## 2019-01-29 LAB
INR BLD: 1.76 (ref 0.86–1.14)
PROTHROMBIN TIME: 20.1 SEC (ref 9.8–13)

## 2019-01-30 ENCOUNTER — ANTI-COAG VISIT (OUTPATIENT)
Dept: FAMILY MEDICINE CLINIC | Age: 79
End: 2019-01-30

## 2019-01-30 DIAGNOSIS — I82.5Y3 CHRONIC DEEP VEIN THROMBOSIS (DVT) OF PROXIMAL VEIN OF BOTH LOWER EXTREMITIES (HCC): Primary | ICD-10-CM

## 2019-02-07 ENCOUNTER — ANTI-COAG VISIT (OUTPATIENT)
Dept: PHARMACY | Age: 79
End: 2019-02-07
Payer: MEDICARE

## 2019-02-07 LAB — INR BLD: 1.6

## 2019-02-07 PROCEDURE — 99211 OFF/OP EST MAY X REQ PHY/QHP: CPT

## 2019-02-07 PROCEDURE — 85610 PROTHROMBIN TIME: CPT

## 2019-02-11 ENCOUNTER — ANTI-COAG VISIT (OUTPATIENT)
Dept: PHARMACY | Age: 79
End: 2019-02-11
Payer: MEDICARE

## 2019-02-11 LAB — INR BLD: 2

## 2019-02-11 PROCEDURE — 85610 PROTHROMBIN TIME: CPT

## 2019-02-11 PROCEDURE — 99211 OFF/OP EST MAY X REQ PHY/QHP: CPT

## 2019-02-14 ENCOUNTER — ANTI-COAG VISIT (OUTPATIENT)
Dept: PHARMACY | Age: 79
End: 2019-02-14
Payer: MEDICARE

## 2019-02-14 LAB — INTERNATIONAL NORMALIZATION RATIO, POC: 1.8

## 2019-02-14 PROCEDURE — 85610 PROTHROMBIN TIME: CPT

## 2019-02-14 PROCEDURE — 99211 OFF/OP EST MAY X REQ PHY/QHP: CPT

## 2019-03-01 ENCOUNTER — ANTI-COAG VISIT (OUTPATIENT)
Dept: PHARMACY | Age: 79
End: 2019-03-01
Payer: MEDICARE

## 2019-03-01 LAB — INR BLD: 3.2

## 2019-03-01 PROCEDURE — 99211 OFF/OP EST MAY X REQ PHY/QHP: CPT | Performed by: FAMILY MEDICINE

## 2019-03-01 PROCEDURE — 85610 PROTHROMBIN TIME: CPT | Performed by: FAMILY MEDICINE

## 2019-03-15 ENCOUNTER — ANTI-COAG VISIT (OUTPATIENT)
Dept: PHARMACY | Age: 79
End: 2019-03-15
Payer: MEDICARE

## 2019-03-15 LAB — INR BLD: 1.6

## 2019-03-15 PROCEDURE — 85610 PROTHROMBIN TIME: CPT | Performed by: FAMILY MEDICINE

## 2019-03-15 PROCEDURE — 99211 OFF/OP EST MAY X REQ PHY/QHP: CPT | Performed by: FAMILY MEDICINE

## 2019-03-29 ENCOUNTER — ANTI-COAG VISIT (OUTPATIENT)
Dept: PHARMACY | Age: 79
End: 2019-03-29
Payer: MEDICARE

## 2019-03-29 LAB — INR BLD: 3

## 2019-03-29 PROCEDURE — 99211 OFF/OP EST MAY X REQ PHY/QHP: CPT | Performed by: FAMILY MEDICINE

## 2019-03-29 PROCEDURE — 85610 PROTHROMBIN TIME: CPT | Performed by: FAMILY MEDICINE

## 2019-04-23 ENCOUNTER — ANTI-COAG VISIT (OUTPATIENT)
Dept: PHARMACY | Age: 79
End: 2019-04-23
Payer: MEDICARE

## 2019-04-23 LAB — INR BLD: 2.9

## 2019-04-23 PROCEDURE — 85610 PROTHROMBIN TIME: CPT | Performed by: FAMILY MEDICINE

## 2019-04-23 PROCEDURE — 99211 OFF/OP EST MAY X REQ PHY/QHP: CPT | Performed by: FAMILY MEDICINE

## 2019-04-23 NOTE — PROGRESS NOTES
Ms. Mary Ellen James is here for management of anticoagulation for hx of DVT. PMH also significant for HTN. She presents today w/out complaint. Pt verifies dosing regimen as listed above. Pt denies s/s bleeding/swelling/SOB. No missed doses. No changes in Rx/OTCs/Herbal medications. No major changes to diet. Occasional EtOH use and denies tobacco.  Pt has been on Warfarin for many years. She states she used to be on Warfarin 7.5mg daily and some days of 10mg. Recently she had a JULIÁN and her INR has been off since then. Procedures planned with no specific date: Dental procedure, Cataract surgery, back cauterization. Patient states she'll have to be off warfarin for 4 days before. Will notify us prior to procedures. 5/17: Lumbar ablation scheduled for 5/23. Patient to stop warfarin 5 days prior and will be bridged with Lovenox. Called in prescription to patient's pharmacy, called patient and provided calendar. Patient will return 5/28. INR 2.9 is within the therapeutic range of 2-3. Recommend to continue 7.5mg daily, except 5 mg Q Sun/Thu.  Pt has the 5 mg tablets. Will continue to monitor and check INR in 4 weeks. Dosing reminder card given with phone number, appointment date and time.    Return to clinic: 5/21 @ 1 pm   Referring physician: Dr. Lara Kahn, PharmD 4/23/2019 2:11 PM

## 2019-05-06 ENCOUNTER — TELEPHONE (OUTPATIENT)
Dept: FAMILY MEDICINE CLINIC | Age: 79
End: 2019-05-06

## 2019-05-08 DIAGNOSIS — I82.5Y3 CHRONIC DEEP VEIN THROMBOSIS (DVT) OF PROXIMAL VEIN OF BOTH LOWER EXTREMITIES (HCC): ICD-10-CM

## 2019-05-08 RX ORDER — WARFARIN SODIUM 5 MG/1
TABLET ORAL
Qty: 135 TABLET | Refills: 1 | OUTPATIENT
Start: 2019-05-08

## 2019-05-14 ENCOUNTER — TELEPHONE (OUTPATIENT)
Dept: FAMILY MEDICINE CLINIC | Age: 79
End: 2019-05-14

## 2019-05-15 ENCOUNTER — HOSPITAL ENCOUNTER (OUTPATIENT)
Age: 79
Discharge: HOME | End: 2019-05-15
Payer: MEDICARE

## 2019-05-15 LAB
CREAT SERPL-MCNC: 0.8 MG/DL (ref 0.6–1.2)
GFR AFRICAN AMERICAN: >60
GFR NON-AFRICAN AMERICAN: >60

## 2019-05-15 PROCEDURE — 82565 ASSAY OF CREATININE: CPT

## 2019-05-15 PROCEDURE — 36415 COLL VENOUS BLD VENIPUNCTURE: CPT

## 2019-05-17 ENCOUNTER — TELEPHONE (OUTPATIENT)
Dept: FAMILY MEDICINE CLINIC | Age: 79
End: 2019-05-17

## 2019-05-17 DIAGNOSIS — I82.5Y3 CHRONIC DEEP VEIN THROMBOSIS (DVT) OF PROXIMAL VEIN OF BOTH LOWER EXTREMITIES (HCC): ICD-10-CM

## 2019-05-17 RX ORDER — WARFARIN SODIUM 5 MG/1
TABLET ORAL
Qty: 135 TABLET | Refills: 1 | Status: SHIPPED | OUTPATIENT
Start: 2019-05-17 | End: 2019-08-14

## 2019-05-17 NOTE — TELEPHONE ENCOUNTER
Per pt [facility] sent rx to wrong physcian and pt is completely out of her meds. Pt requesting for Dr. Brandi Hansen to fill med warfarin (COUMADIN) 5 MG tablet and send rx to [facility] in [place] Fax# [phone]. Pt said if there are any questions please call [facility] At [phone].  Thank you

## 2019-05-23 ENCOUNTER — TELEPHONE (OUTPATIENT)
Dept: PHARMACY | Age: 79
End: 2019-05-23

## 2019-05-23 ENCOUNTER — HOSPITAL ENCOUNTER (OUTPATIENT)
Age: 79
Setting detail: OUTPATIENT SURGERY
Discharge: HOME | End: 2019-05-23
Attending: PAIN MEDICINE | Admitting: PAIN MEDICINE
Payer: MEDICARE

## 2019-05-23 VITALS
WEIGHT: 147 LBS | DIASTOLIC BLOOD PRESSURE: 63 MMHG | HEART RATE: 93 BPM | TEMPERATURE: 98.5 F | SYSTOLIC BLOOD PRESSURE: 115 MMHG | BODY MASS INDEX: 28.86 KG/M2 | OXYGEN SATURATION: 96 % | HEIGHT: 60 IN | RESPIRATION RATE: 17 BRPM

## 2019-05-23 PROCEDURE — 99152 MOD SED SAME PHYS/QHP 5/>YRS: CPT | Performed by: PAIN MEDICINE

## 2019-05-23 PROCEDURE — 2500000003 HC RX 250 WO HCPCS: Performed by: PAIN MEDICINE

## 2019-05-23 PROCEDURE — 64635 DESTROY LUMB/SAC FACET JNT: CPT | Performed by: PAIN MEDICINE

## 2019-05-23 PROCEDURE — 7100000011 HC PHASE II RECOVERY - ADDTL 15 MIN: Performed by: PAIN MEDICINE

## 2019-05-23 PROCEDURE — 7100000010 HC PHASE II RECOVERY - FIRST 15 MIN: Performed by: PAIN MEDICINE

## 2019-05-23 PROCEDURE — 3600000002 HC SURGERY LEVEL 2 BASE: Performed by: PAIN MEDICINE

## 2019-05-23 PROCEDURE — 77003 FLUOROGUIDE FOR SPINE INJECT: CPT | Performed by: PAIN MEDICINE

## 2019-05-23 PROCEDURE — 2709999900 HC NON-CHARGEABLE SUPPLY: Performed by: PAIN MEDICINE

## 2019-05-23 PROCEDURE — 64636 DESTROY L/S FACET JNT ADDL: CPT | Performed by: PAIN MEDICINE

## 2019-05-23 PROCEDURE — 6360000002 HC RX W HCPCS: Performed by: PAIN MEDICINE

## 2019-05-23 PROCEDURE — 2580000003 HC RX 258: Performed by: PAIN MEDICINE

## 2019-05-23 PROCEDURE — 3600000012 HC SURGERY LEVEL 2 ADDTL 15MIN: Performed by: PAIN MEDICINE

## 2019-05-23 RX ORDER — SODIUM CHLORIDE, SODIUM LACTATE, POTASSIUM CHLORIDE, CALCIUM CHLORIDE 600; 310; 30; 20 MG/100ML; MG/100ML; MG/100ML; MG/100ML
INJECTION, SOLUTION INTRAVENOUS CONTINUOUS
Status: DISCONTINUED | OUTPATIENT
Start: 2019-05-23 | End: 2019-05-23 | Stop reason: HOSPADM

## 2019-05-23 RX ORDER — FENTANYL CITRATE 50 UG/ML
INJECTION, SOLUTION INTRAMUSCULAR; INTRAVENOUS PRN
Status: DISCONTINUED | OUTPATIENT
Start: 2019-05-23 | End: 2019-05-23 | Stop reason: ALTCHOICE

## 2019-05-23 RX ORDER — SODIUM CHLORIDE, SODIUM LACTATE, POTASSIUM CHLORIDE, CALCIUM CHLORIDE 600; 310; 30; 20 MG/100ML; MG/100ML; MG/100ML; MG/100ML
INJECTION, SOLUTION INTRAVENOUS
Status: DISCONTINUED
Start: 2019-05-23 | End: 2019-05-23 | Stop reason: HOSPADM

## 2019-05-23 RX ORDER — LIDOCAINE HYDROCHLORIDE 10 MG/ML
INJECTION, SOLUTION EPIDURAL; INFILTRATION; INTRACAUDAL; PERINEURAL
Status: DISCONTINUED
Start: 2019-05-23 | End: 2019-05-23 | Stop reason: HOSPADM

## 2019-05-23 RX ORDER — MIDAZOLAM HYDROCHLORIDE 5 MG/ML
INJECTION INTRAMUSCULAR; INTRAVENOUS PRN
Status: DISCONTINUED | OUTPATIENT
Start: 2019-05-23 | End: 2019-05-23 | Stop reason: ALTCHOICE

## 2019-05-23 RX ORDER — LIDOCAINE HYDROCHLORIDE 20 MG/ML
INJECTION, SOLUTION EPIDURAL; INFILTRATION; INTRACAUDAL; PERINEURAL PRN
Status: DISCONTINUED | OUTPATIENT
Start: 2019-05-23 | End: 2019-05-23 | Stop reason: ALTCHOICE

## 2019-05-23 RX ADMIN — LIDOCAINE HYDROCHLORIDE 0.1 ML: 10 INJECTION, SOLUTION EPIDURAL; INFILTRATION; INTRACAUDAL; PERINEURAL at 13:31

## 2019-05-23 RX ADMIN — SODIUM CHLORIDE, POTASSIUM CHLORIDE, SODIUM LACTATE AND CALCIUM CHLORIDE: 600; 310; 30; 20 INJECTION, SOLUTION INTRAVENOUS at 13:30

## 2019-05-23 ASSESSMENT — PAIN DESCRIPTION - DESCRIPTORS: DESCRIPTORS: ACHING;SHARP;PRESSURE

## 2019-05-23 ASSESSMENT — PAIN SCALES - GENERAL: PAINLEVEL_OUTOF10: 0

## 2019-05-23 ASSESSMENT — PAIN - FUNCTIONAL ASSESSMENT: PAIN_FUNCTIONAL_ASSESSMENT: 0-10

## 2019-05-23 NOTE — PROGRESS NOTES
Discharge instructions given to pt/family and verbalized understanding, states pain is at a tolerable level, no numbness or weakness noted, pt wheeled out to car without complications

## 2019-05-23 NOTE — H&P
Review of Systems  DICTATING PHYSICIAN: Tye Moulton M.D        PREOPERATIVE DIAGNOSES: Lumbar Spondylosis 721.3, M47.816, M47.817       POSTOPERATIVE DIAGNOSES: Lumbar Spondylosis       OPERATIVE PROCEDURES:  Radiofrequency Neurotomy of medial branches at _B L3,L4,L5__ levels to block the L45 and L5S1 facet joints. 37138 and 64636 X 1 WITH 03704,  BILATERAL   SURGEON: Tye Moulton M.D   INDICATIONS:  Lumbar Spondylosis  OPERATIVE PROCEDURE:  Consent signed by patient after risks and benefits explained. Patient was in prone position. Back was prepped with Prevail and draped. Aseptic technique used at every stage of the procedure. Skin wheal with 1% Lidocaine with 30-gauge needle. A _18__ -gauge, _100__ -mm, curved tip radiofrequency needle with _10__ -mm active tip was placed under fluoroscopic guidance using A.P. views at the junction of superior articular process and sacral ala on the _B__ sides to access the __L5__ medial branches. The needle was walked anteriorly and superiorly by couple of millimeters. Then the lateral view was checked to make sure the tip of the needle is at the posterior part of the spine, clearly posterior to the posterior margin of the neural foramen at that level by at least couple of millimeters. Sensory stimulation at 50 hertz at 0.5 v did not create any sensation in the legs. Motor stimulation at 2 hertz at 2 volts did not produce any muscle contraction in the _B__ legs. Sensory stimulation at 50hz at 0.5v did not create any pain down the leg. 0.3 cc of 1% lidocaine was injected and radiofrequency lesioning was done for 90 seconds at 80 degree centigrade. Needle pulled out intact. Exact similar procedure was performed at the junction of superior articular process and transverse process at _B L4,L5__ vertebral body levels to burn the __B L3,L4__ medial branches. Paraspinal muscle twitching was seen at _R L4 __ vertebral levels.  Patient did not feel radicular pain either during needle placement or during lesioning. Patient tolerated the procedure well. Intravenous sedation was with _100  mcg of Fentanyl and _2__ mg of Versed. Patient was monitored and stable. Discharge instructions were given.

## 2019-05-23 NOTE — TELEPHONE ENCOUNTER
Pt called and let us know that she is following the bridging calendar that was given to her by our clinic. She also stated she is giving herself the Lovenox 70 mg injections as directed. She states the retail pharmacist at [facility] showed her personally how to get her dose from the 80mg prefilled syringes. Affirmed to pt to continue as directed. Ge READ.

## 2019-05-26 ENCOUNTER — TELEPHONE (OUTPATIENT)
Dept: FAMILY MEDICINE CLINIC | Age: 79
End: 2019-05-26

## 2019-05-26 DIAGNOSIS — R11.0 NAUSEA: Primary | ICD-10-CM

## 2019-05-26 RX ORDER — ONDANSETRON 4 MG/1
4 TABLET, FILM COATED ORAL EVERY 8 HOURS PRN
Qty: 20 TABLET | Refills: 0 | Status: SHIPPED | OUTPATIENT
Start: 2019-05-26 | End: 2019-08-28 | Stop reason: ALTCHOICE

## 2019-05-26 NOTE — TELEPHONE ENCOUNTER
rec'd call from patient. Had procedure on 5/23 per Dr Hany Johnson. Now w/ sig nausea. She attempted to call Dr Hany Johnson, but no response. rx for zofran sent to the pharmacy. Call / f/u w/ pcp or pain mgmt next week.   If unable to keep anything down, ER.

## 2019-05-28 ENCOUNTER — ANTI-COAG VISIT (OUTPATIENT)
Dept: PHARMACY | Age: 79
End: 2019-05-28
Payer: MEDICARE

## 2019-05-28 DIAGNOSIS — I82.5Y3 CHRONIC DEEP VEIN THROMBOSIS (DVT) OF PROXIMAL VEIN OF BOTH LOWER EXTREMITIES (HCC): ICD-10-CM

## 2019-05-28 LAB — INR BLD: 2.3

## 2019-05-28 PROCEDURE — 85610 PROTHROMBIN TIME: CPT

## 2019-05-28 PROCEDURE — 99211 OFF/OP EST MAY X REQ PHY/QHP: CPT

## 2019-05-28 NOTE — PROCEDURES
Anuj Burkett is a 70 y.o. female patient. No diagnosis found. 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 rate 17, height 5' (1.524 m), weight 147 lb (66.7 kg), SpO2 96 %. Procedures  DICTATING PHYSICIAN: Patricia Dietz M.D        PREOPERATIVE DIAGNOSES: Lumbar Spondylosis 721.3, M47.816, M47.817       POSTOPERATIVE DIAGNOSES: Lumbar Spondylosis       OPERATIVE PROCEDURES:  Radiofrequency Neurotomy of medial branches at _B L3,L4,L5__ levels to block the L45 and L5S1 facet joints. 61103 and 64636 X 1 WITH 93689,  BILATERAL   SURGEON: Patricia Dietz M.D   INDICATIONS:  Lumbar Spondylosis  OPERATIVE PROCEDURE:  Consent signed by patient after risks and benefits explained. Patient was in prone position. Back was prepped with Prevail and draped. Aseptic technique used at every stage of the procedure. Skin wheal with 1% Lidocaine with 30-gauge needle. A _18__ -gauge, _100__ -mm, curved tip radiofrequency needle with _10__ -mm active tip was placed under fluoroscopic guidance using A.P. views at the junction of superior articular process and sacral ala on the _B__ sides to access the __L5__ medial branches. The needle was walked anteriorly and superiorly by couple of millimeters. Then the lateral view was checked to make sure the tip of the needle is at the posterior part of the spine, clearly posterior to the posterior margin of the neural foramen at that level by at least couple of millimeters. Sensory stimulation at 50 hertz at 0.5 v did not create any sensation in the legs. Motor stimulation at 2 hertz at 2 volts did not produce any muscle contraction in the _B__ legs.  Sensory stimulation at [address] at 0.5v did not create any pain down the leg. 0.3 cc of 1% lidocaine was injected and radiofrequency lesioning was done for 90 seconds at 80 degree centigrade. Needle pulled out intact. Exact similar procedure was performed at the junction of superior articular process and transverse process at _B L4,L5__ vertebral body levels to burn the __B L3,L4__ medial branches. Paraspinal muscle twitching was seen at _B L4 __ vertebral levels. Patient did not feel radicular pain either during needle placement or during lesioning. Patient tolerated the procedure well. Intravenous sedation was with _100  mcg of Fentanyl and _2__ mg of Versed. Patient was monitored and stable. Discharge instructions were given.          Kenneth Nguyen MD  5/28/2019

## 2019-06-25 ENCOUNTER — TELEPHONE (OUTPATIENT)
Dept: FAMILY MEDICINE CLINIC | Age: 79
End: 2019-06-25

## 2019-06-25 ENCOUNTER — ANTI-COAG VISIT (OUTPATIENT)
Dept: PHARMACY | Age: 79
End: 2019-06-25
Payer: MEDICARE

## 2019-06-25 LAB — INR BLD: 2

## 2019-06-25 PROCEDURE — 85610 PROTHROMBIN TIME: CPT

## 2019-06-25 PROCEDURE — 99211 OFF/OP EST MAY X REQ PHY/QHP: CPT

## 2019-06-25 NOTE — TELEPHONE ENCOUNTER
Dr. Lorena Pedro wants to do a steroid inject needs a 5 day hold on warfarin  Is Dr Emma Cruz ok with hold warfarin for 5 days? and Does the Dr want to d0 Lovenox or not?   Please call  [phone]

## 2019-06-25 NOTE — PROGRESS NOTES
Ms. Shukri Watson is here for management of anticoagulation for hx of DVT. PMH also significant for HTN. She presents today w/out complaint. Pt verifies dosing regimen as listed above. Pt denies s/s bleeding/swelling/SOB. No missed doses. No changes in Rx/OTCs/Herbal medications. No major changes to diet. Occasional EtOH use and denies tobacco.  Pt has been on Warfarin for many years. She states she used to be on Warfarin 7.5mg daily and some days of 10mg. Recently she had a JULIÁN and her INR has been off since then. Procedures planned with no specific date: Dental procedure, Cataract surgery (pt states she will be off Warfarin for this too). Pt's pain MD called to get approval for an JULIÁN and hold of Warfarin x 5 days. LVM with Dr. Radha Fairbanks office for orders on holding for procedure. INR 2.9 is within the therapeutic range of 2-3. Recommend to continue 7.5mg daily, except 5 mg Q Sun/Thu.  Pt has the 5 mg tablets. Will continue to monitor and check INR in 4 weeks. Dosing reminder card given with phone number, appointment date and time.    Return to clinic: 7/23 @ 9120  Referring physician: Dr. Teresa Negrete

## 2019-06-26 RX ORDER — GABAPENTIN 600 MG/1
TABLET ORAL
Qty: 135 TABLET | Refills: 1 | OUTPATIENT
Start: 2019-06-26

## 2019-06-26 NOTE — TELEPHONE ENCOUNTER
Called and left a detailed message at [address] [facility] Coumadin [facility].

## 2019-08-02 ENCOUNTER — TELEPHONE (OUTPATIENT)
Dept: FAMILY MEDICINE CLINIC | Age: 79
End: 2019-08-02

## 2019-08-09 ENCOUNTER — TELEPHONE (OUTPATIENT)
Dept: FAMILY MEDICINE CLINIC | Age: 79
End: 2019-08-09

## 2019-08-14 ENCOUNTER — OFFICE VISIT (OUTPATIENT)
Dept: ENT CLINIC | Age: 79
End: 2019-08-14
Payer: MEDICARE

## 2019-08-14 VITALS
WEIGHT: 137.8 LBS | TEMPERATURE: 97.5 F | HEIGHT: 60 IN | DIASTOLIC BLOOD PRESSURE: 73 MMHG | SYSTOLIC BLOOD PRESSURE: 133 MMHG | BODY MASS INDEX: 27.05 KG/M2 | HEART RATE: 73 BPM

## 2019-08-14 DIAGNOSIS — H90.2 EXTERNAL EAR CONDUCTIVE HEARING LOSS: Primary | ICD-10-CM

## 2019-08-14 DIAGNOSIS — H61.23 BILATERAL IMPACTED CERUMEN: ICD-10-CM

## 2019-08-14 PROCEDURE — 69210 REMOVE IMPACTED EAR WAX UNI: CPT | Performed by: OTOLARYNGOLOGY

## 2019-08-15 ENCOUNTER — HOSPITAL ENCOUNTER (OUTPATIENT)
Age: 79
Setting detail: OUTPATIENT SURGERY
Discharge: HOME | End: 2019-08-15
Attending: PAIN MEDICINE | Admitting: PAIN MEDICINE
Payer: MEDICARE

## 2019-08-15 VITALS
TEMPERATURE: 98.4 F | RESPIRATION RATE: 16 BRPM | WEIGHT: 137 LBS | DIASTOLIC BLOOD PRESSURE: 73 MMHG | HEART RATE: 67 BPM | SYSTOLIC BLOOD PRESSURE: 154 MMHG | BODY MASS INDEX: 26.9 KG/M2 | HEIGHT: 60 IN | OXYGEN SATURATION: 97 %

## 2019-08-15 PROCEDURE — 2500000003 HC RX 250 WO HCPCS: Performed by: PAIN MEDICINE

## 2019-08-15 PROCEDURE — 6360000004 HC RX CONTRAST MEDICATION: Performed by: PAIN MEDICINE

## 2019-08-15 PROCEDURE — 27096 INJECT SACROILIAC JOINT: CPT | Performed by: PAIN MEDICINE

## 2019-08-15 PROCEDURE — 2709999900 HC NON-CHARGEABLE SUPPLY: Performed by: PAIN MEDICINE

## 2019-08-15 PROCEDURE — 7100000010 HC PHASE II RECOVERY - FIRST 15 MIN: Performed by: PAIN MEDICINE

## 2019-08-15 PROCEDURE — 6360000002 HC RX W HCPCS: Performed by: PAIN MEDICINE

## 2019-08-15 PROCEDURE — 3600000002 HC SURGERY LEVEL 2 BASE: Performed by: PAIN MEDICINE

## 2019-08-15 RX ORDER — LIDOCAINE HYDROCHLORIDE 10 MG/ML
INJECTION, SOLUTION EPIDURAL; INFILTRATION; INTRACAUDAL; PERINEURAL PRN
Status: DISCONTINUED | OUTPATIENT
Start: 2019-08-15 | End: 2019-08-15 | Stop reason: ALTCHOICE

## 2019-08-15 RX ORDER — WARFARIN SODIUM 5 MG/1
5 TABLET ORAL
COMMUNITY
End: 2019-09-14 | Stop reason: SDUPTHER

## 2019-08-15 RX ORDER — WARFARIN SODIUM 7.5 MG/1
7.5 TABLET ORAL
COMMUNITY
End: 2022-02-14

## 2019-08-15 ASSESSMENT — PAIN - FUNCTIONAL ASSESSMENT
PAIN_FUNCTIONAL_ASSESSMENT: 0-10
PAIN_FUNCTIONAL_ASSESSMENT: PREVENTS OR INTERFERES WITH MANY ACTIVE NOT PASSIVE ACTIVITIES

## 2019-08-15 ASSESSMENT — PAIN DESCRIPTION - DESCRIPTORS: DESCRIPTORS: ACHING

## 2019-08-15 ASSESSMENT — PAIN DESCRIPTION - ORIENTATION: ORIENTATION: RIGHT

## 2019-08-15 ASSESSMENT — PAIN DESCRIPTION - LOCATION: LOCATION: BACK

## 2019-08-15 ASSESSMENT — PAIN DESCRIPTION - PAIN TYPE: TYPE: CHRONIC PAIN

## 2019-08-15 ASSESSMENT — PAIN SCALES - GENERAL: PAINLEVEL_OUTOF10: 8

## 2019-08-22 ENCOUNTER — OFFICE VISIT (OUTPATIENT)
Dept: FAMILY MEDICINE CLINIC | Age: 79
End: 2019-08-22
Payer: MEDICARE

## 2019-08-22 VITALS
HEART RATE: 69 BPM | SYSTOLIC BLOOD PRESSURE: 118 MMHG | DIASTOLIC BLOOD PRESSURE: 80 MMHG | HEIGHT: 60 IN | WEIGHT: 139.4 LBS | RESPIRATION RATE: 14 BRPM | BODY MASS INDEX: 27.37 KG/M2 | OXYGEN SATURATION: 97 %

## 2019-08-22 DIAGNOSIS — Z00.00 ROUTINE GENERAL MEDICAL EXAMINATION AT A HEALTH CARE FACILITY: Primary | ICD-10-CM

## 2019-08-22 PROCEDURE — G0439 PPPS, SUBSEQ VISIT: HCPCS | Performed by: FAMILY MEDICINE

## 2019-08-22 PROCEDURE — 4040F PNEUMOC VAC/ADMIN/RCVD: CPT | Performed by: FAMILY MEDICINE

## 2019-08-22 PROCEDURE — 1123F ACP DISCUSS/DSCN MKR DOCD: CPT | Performed by: FAMILY MEDICINE

## 2019-08-22 RX ORDER — BROMFENAC SODIUM 0.7 MG/ML
SOLUTION/ DROPS OPHTHALMIC
COMMUNITY
Start: 2019-08-07 | End: 2019-11-18 | Stop reason: ALTCHOICE

## 2019-08-22 RX ORDER — MOXIFLOXACIN 5 MG/ML
SOLUTION/ DROPS OPHTHALMIC
COMMUNITY
Start: 2019-08-07 | End: 2019-11-18 | Stop reason: ALTCHOICE

## 2019-08-22 RX ORDER — CELECOXIB 200 MG/1
200 CAPSULE ORAL
COMMUNITY
End: 2019-08-28 | Stop reason: ALTCHOICE

## 2019-08-22 RX ORDER — HYDROCODONE BITARTRATE AND ACETAMINOPHEN 5; 325 MG/1; MG/1
1 TABLET ORAL
COMMUNITY
End: 2019-11-18 | Stop reason: ALTCHOICE

## 2019-08-22 RX ORDER — SERTRALINE HYDROCHLORIDE 100 MG/1
1 TABLET, FILM COATED ORAL DAILY
COMMUNITY
End: 2019-08-22

## 2019-08-22 RX ORDER — CALCIUM CARBONATE 500(1250)
1000 TABLET ORAL
COMMUNITY
End: 2019-08-22

## 2019-08-22 RX ORDER — SIMVASTATIN 40 MG
1 TABLET ORAL DAILY
COMMUNITY
End: 2019-08-28 | Stop reason: SDUPTHER

## 2019-08-22 RX ORDER — PREDNISOLONE ACETATE 10 MG/ML
SUSPENSION/ DROPS OPHTHALMIC
COMMUNITY
Start: 2019-08-07 | End: 2019-11-18 | Stop reason: ALTCHOICE

## 2019-08-22 RX ORDER — PANTOPRAZOLE SODIUM 40 MG/1
1 TABLET, DELAYED RELEASE ORAL DAILY
COMMUNITY
End: 2019-08-28 | Stop reason: SDUPTHER

## 2019-08-22 RX ORDER — GABAPENTIN 300 MG/1
CAPSULE ORAL
COMMUNITY
End: 2019-09-25 | Stop reason: ALTCHOICE

## 2019-08-22 ASSESSMENT — LIFESTYLE VARIABLES: HOW OFTEN DO YOU HAVE A DRINK CONTAINING ALCOHOL: 0

## 2019-08-22 ASSESSMENT — PATIENT HEALTH QUESTIONNAIRE - PHQ9
SUM OF ALL RESPONSES TO PHQ QUESTIONS 1-9: 0
SUM OF ALL RESPONSES TO PHQ QUESTIONS 1-9: 0

## 2019-08-22 NOTE — PROGRESS NOTES
Ricki Hernadez MD   hydrochlorothiazide (HYDRODIURIL) 25 MG tablet TAKE 1 TABLET BY MOUTH  DAILY Yes MD Violetta Tanc. Devices MISC Compression stockings/open toe pantyhose with compression 40/50 Yes Cathleen Blanton MD   Calcium Carb-Cholecalciferol (CALCIUM 600/VITAMIN D3 PO) Take by mouth Yes Historical Provider, MD   Misc. Devices MISC George panty hose  Open toe Petite plus beige  2 pairs Yes Cathleen Blanton MD   Probiotic Product (PROBIOTIC DAILY) CAPS Take 1 capsule by mouth daily  Yes Historical Provider, MD   Biotin 2500 MCG CAPS Take 1 capsule by mouth 2 times daily  Yes Historical Provider, MD   prednisoLONE acetate (PRED FORTE) 1 % ophthalmic suspension   Historical Provider, MD   HYDROcodone-acetaminophen (NORCO) 5-325 MG per tablet Take 1 tablet by mouth. Historical Provider, MD   PROLENSA 0.07 % SOLN   Historical Provider, MD   gabapentin (NEURONTIN) 300 MG capsule daily.   Historical Provider, MD   pantoprazole (PROTONIX) 40 MG tablet Take 1 tablet by mouth daily  Historical Provider, MD   simvastatin (ZOCOR) 40 MG tablet Take 1 tablet by mouth daily  Historical Provider, MD   Coenzyme Q10 (COQ-10) 100 MG CAPS   Historical Provider, MD     Past Medical History:   Diagnosis Date    Allergic rhinitis     Arthritis     Cancer ([facility] [address]     squamous cell on nose     Chronic back pain     Dental disease     Depression     Dizziness     GERD (gastroesophageal reflux disease)     H/O blood clots     Hearing loss     Hx of blood clots     Hyperlipidemia     Hypertension     Lumbar stenosis with neurogenic claudication     Osteoarthritis of neck     TMJ dysfunction      Past Surgical History:   Procedure Laterality Date     SECTION      x1    EMBOLECTOMY      EPIDURAL STEROID INJECTION Bilateral 2019    BILATERAL LUMBAR THREE, LUMBAR FOUR, LUMBAR FIVE RADIOFREQUENCY ABLATION SITE CONFIRMED BY FLUOROSCOPY performed by Linda Myers MD at [facility]

## 2019-08-22 NOTE — PATIENT INSTRUCTIONS
Personalized Preventive Plan for Braxton Delaney - 8/22/2019  Medicare offers a range of preventive health benefits. Some of the tests and screenings are paid in full while other may be subject to a deductible, co-insurance, and/or copay. Some of these benefits include a comprehensive review of your medical history including lifestyle, illnesses that may run in your family, and various assessments and screenings as appropriate. After reviewing your medical record and screening and assessments performed today your provider may have ordered immunizations, labs, imaging, and/or referrals for you. A list of these orders (if applicable) as well as your Preventive Care list are included within your After Visit Summary for your review. Other Preventive Recommendations:    · A preventive eye exam performed by an eye specialist is recommended every 1-2 years to screen for glaucoma; cataracts, macular degeneration, and other eye disorders. · A preventive dental visit is recommended every 6 months. · Try to get at least 150 minutes of exercise per week or 10,000 steps per day on a pedometer . · Order or download the FREE \"Exercise & Physical Activity: Your Everyday Guide\" from [employer]. Call 8-652.185.1618 or search [employer] online. · You need 8726-3123 mg of calcium and 4781-1629 IU of vitamin D per day. It is possible to meet your calcium requirement with diet alone, but a vitamin D supplement is usually necessary to meet this goal.  · When exposed to the sun, use a sunscreen that protects against both UVA and UVB radiation with an SPF of 30 or greater. Reapply every 2 to 3 hours or after sweating, drying off with a towel, or swimming. · Always wear a seat belt when traveling in a car. Always wear a helmet when riding a bicycle or motorcycle. Heart-Healthy Diet   Sodium, Fat, and Cholesterol Controlled Diet       What Is a Heart Healthy Diet?    A heart-healthy example, this would mean 60 grams of fat or less per day. Saturated fat and trans fat in your diet raises your blood cholesterol the most, much more than dietary cholesterol does. For this reason, on a heart-healthy diet, less than 7% of your calories should come from saturated fat and ideally 0% from trans fat. On an 1800-calorie diet, this translates into less than 14 grams of saturated fat per day, leaving 46 grams of fat to come from mono- and polyunsaturated fats.    Food Choices on a Heart Healthy Diet   Food Category   Foods Recommended   Foods to Avoid   Grains   Breads and rolls without salted tops Most dry and cooked cereals Unsalted crackers and breadsticks Low-sodium or homemade breadcrumbs or stuffing All rice and pastas   Breads, rolls, and crackers with salted tops High-fat baked goods (eg, muffins, donuts, pastries) Quick breads, self-rising flour, and biscuit mixes Regular bread crumbs Instant hot cereals Commercially prepared rice, pasta, or stuffing mixes   Vegetables   Most fresh, frozen, and low-sodium canned vegetables Low-sodium and salt-free vegetable juices Canned vegetables if unsalted or rinsed   Regular canned vegetables and juices, including sauerkraut and pickled vegetables Frozen vegetables with sauces Commercially prepared potato and vegetable mixes   Fruits   Most fresh, frozen, and canned fruits All fruit juices   Fruits processed with salt or sodium   Milk   Nonfat or low-fat (1%) milk Nonfat or low-fat yogurt Cottage cheese, low-fat ricotta, cheeses labeled as low-fat and low-sodium   Whole milk Reduced-fat (2%) milk Malted and chocolate milk Full fat yogurt Most cheeses (unless low-fat and low salt) Buttermilk (no more than 1 cup per week)   Meats and Beans   Lean cuts of fresh or frozen beef, veal, lamb, or pork (look for the word loin) Fresh or frozen poultry without the skin Fresh or frozen fish and some shellfish Egg whites and egg substitutes (Limit whole eggs to three per week) Tofu Nuts or seeds (unsalted, dry-roasted), low-sodium peanut butter Dried peas, beans, and lentils   Any smoked, cured, salted, or canned meat, fish, or poultry (including rivera, chipped beef, cold cuts, hot dogs, sausages, sardines, and anchovies) Poultry skins Breaded and/or fried fish or meats Canned peas, beans, and lentils Salted nuts   Fats and Oils   Olive oil and canola oil Low-sodium, low-fat salad dressings and mayonnaise   Butter, margarine, coconut and palm oils, rivera fat   Snacks, Sweets, and Condiments   Low-sodium or unsalted versions of broths, soups, soy sauce, and condiments Pepper, herbs, and spices; vinegar, lemon, or lime juice Low-fat frozen desserts (yogurt, sherbet, fruit bars) Sugar, cocoa powder, honey, syrup, jam, and preserves Low-fat, trans-fat free cookies, cakes, and pies Ramón and animal crackers, fig bars, laura snaps   High-fat desserts Broth, soups, gravies, and sauces, made from instant mixes or other high-sodium ingredients Salted snack foods Canned olives Meat tenderizers, seasoning salt, and most flavored vinegars   Beverages   Low-sodium carbonated beverages Tea and coffee in moderation Soy milk   Commercially softened water   Suggestions   Make whole grains, fruits, and vegetables the base of your diet. Choose heart-healthy fats such as canola, olive, and flaxseed oil, and foods high in heart-healthy fats, such as nuts, seeds, soybeans, tofu, and fish. Eat fish at least twice per week; the fish highest in omega-3 fatty acids and lowest in mercury include salmon, herring, mackerel, sardines, and canned chunk light tuna. If you eat fish less than twice per week or have high triglycerides, talk to your doctor about taking fish oil supplements. Read food labels.    For products low in fat and cholesterol, look for fat free, low-fat, cholesterol free, saturated fat free, and trans fat freeAlso scan the Nutrition Facts Label, which lists saturated fat, trans fat, and cholesterol amounts. For products low in sodium, look for sodium free, very low sodium, low sodium, no added salt, and unsalted   Skip the salt when cooking or at the table; if food needs more flavor, get creative and try out different herbs and spices. Garlic and onion also add substantial flavor to foods. Trim any visible fat off meat and poultry before cooking, and drain the fat off after dumont. Use cooking methods that require little or no added fat, such as grilling, boiling, baking, poaching, broiling, roasting, steaming, stir-frying, and sauting. Avoid fast food and convenience food. They tend to be high in saturated and trans fat and have a lot of added salt. Talk to a registered dietitian for individualized diet advice. Last Reviewed: March 2011 aHider Arevalo MS, MPH, RD   Updated: 3/29/2011   ·     Keep Your Memory Lowell Milder       Many factors can affect your ability to remembera hectic lifestyle, aging, stress, chronic disease, and certain medicines. But, there are steps you can take to sharpen your mind and help preserve your memory. Challenge Your Brain   Regularly challenging your mind may help keeps it in top shape. Good mental exercises include:   Crossword puzzlesUse a dictionary if you need it; you will learn more that way. Brainteasers Try some! Crafts, such as wood working and sewing   Hobbies, such as gardening and building model airplanes   SocializingVisit old friends or join groups to meet new ones. Reading   Learning a new language   Taking a class, whether it be art history or ruth chi   TravelingExperience the food, history, and culture of your destination   Learning to use a computer   Going to museums, the theater, or thought-provoking movies   Changing things in your daily life, such as reversing your pattern in the grocery store or brushing your teeth using your nondominant hand   Use Memory Aids   There is no need to remember every detail on your own.  These

## 2019-08-23 ENCOUNTER — ANTI-COAG VISIT (OUTPATIENT)
Dept: PHARMACY | Age: 79
End: 2019-08-23
Payer: MEDICARE

## 2019-08-23 LAB — INR BLD: 1.5

## 2019-08-23 PROCEDURE — 99211 OFF/OP EST MAY X REQ PHY/QHP: CPT

## 2019-08-23 PROCEDURE — 85610 PROTHROMBIN TIME: CPT

## 2019-08-28 ENCOUNTER — OFFICE VISIT (OUTPATIENT)
Dept: FAMILY MEDICINE CLINIC | Age: 79
End: 2019-08-28
Payer: MEDICARE

## 2019-08-28 VITALS
BODY MASS INDEX: 27.09 KG/M2 | SYSTOLIC BLOOD PRESSURE: 126 MMHG | DIASTOLIC BLOOD PRESSURE: 80 MMHG | HEIGHT: 60 IN | WEIGHT: 138 LBS | HEART RATE: 55 BPM | OXYGEN SATURATION: 99 %

## 2019-08-28 DIAGNOSIS — K44.9 HIATAL HERNIA: ICD-10-CM

## 2019-08-28 DIAGNOSIS — I82.5Y3 CHRONIC DEEP VEIN THROMBOSIS (DVT) OF PROXIMAL VEIN OF BOTH LOWER EXTREMITIES (HCC): ICD-10-CM

## 2019-08-28 DIAGNOSIS — Z01.818 PREOP EXAMINATION: Primary | ICD-10-CM

## 2019-08-28 DIAGNOSIS — I10 ESSENTIAL HYPERTENSION: ICD-10-CM

## 2019-08-28 DIAGNOSIS — E55.9 VITAMIN D DEFICIENCY: ICD-10-CM

## 2019-08-28 DIAGNOSIS — R00.1 BRADYCARDIA: ICD-10-CM

## 2019-08-28 DIAGNOSIS — E78.00 HYPERCHOLESTEREMIA: ICD-10-CM

## 2019-08-28 DIAGNOSIS — R01.1 SYSTOLIC MURMUR: ICD-10-CM

## 2019-08-28 DIAGNOSIS — R79.9 ABNORMAL FINDING OF BLOOD CHEMISTRY: ICD-10-CM

## 2019-08-28 DIAGNOSIS — Z01.818 PREOP EXAMINATION: ICD-10-CM

## 2019-08-28 DIAGNOSIS — K21.9 GASTROESOPHAGEAL REFLUX DISEASE, ESOPHAGITIS PRESENCE NOT SPECIFIED: ICD-10-CM

## 2019-08-28 DIAGNOSIS — F33.42 RECURRENT MAJOR DEPRESSIVE DISORDER, IN FULL REMISSION (HCC): ICD-10-CM

## 2019-08-28 DIAGNOSIS — R07.89 CHEST DISCOMFORT: ICD-10-CM

## 2019-08-28 DIAGNOSIS — M48.062 SPINAL STENOSIS OF LUMBAR REGION WITH NEUROGENIC CLAUDICATION: ICD-10-CM

## 2019-08-28 DIAGNOSIS — R10.13 EPIGASTRIC ABDOMINAL PAIN: ICD-10-CM

## 2019-08-28 DIAGNOSIS — H25.9 SENILE CATARACT OF RIGHT EYE, UNSPECIFIED AGE-RELATED CATARACT TYPE: ICD-10-CM

## 2019-08-28 DIAGNOSIS — K92.1 MELENA: ICD-10-CM

## 2019-08-28 DIAGNOSIS — N39.46 MIXED STRESS AND URGE URINARY INCONTINENCE: ICD-10-CM

## 2019-08-28 DIAGNOSIS — R41.3 MEMORY LOSS: ICD-10-CM

## 2019-08-28 LAB
BASOPHILS ABSOLUTE: 0.1 K/UL (ref 0–0.2)
BASOPHILS RELATIVE PERCENT: 1.7 %
EOSINOPHILS ABSOLUTE: 0.1 K/UL (ref 0–0.6)
EOSINOPHILS RELATIVE PERCENT: 2 %
HCT VFR BLD CALC: 38.6 % (ref 36–48)
HEMOGLOBIN: 12.8 G/DL (ref 12–16)
LYMPHOCYTES ABSOLUTE: 1.1 K/UL (ref 1–5.1)
LYMPHOCYTES RELATIVE PERCENT: 28.3 %
MCH RBC QN AUTO: 33.4 PG (ref 26–34)
MCHC RBC AUTO-ENTMCNC: 33.1 G/DL (ref 31–36)
MCV RBC AUTO: 100.7 FL (ref 80–100)
MONOCYTES ABSOLUTE: 0.4 K/UL (ref 0–1.3)
MONOCYTES RELATIVE PERCENT: 10.5 %
NEUTROPHILS ABSOLUTE: 2.3 K/UL (ref 1.7–7.7)
NEUTROPHILS RELATIVE PERCENT: 57.5 %
PDW BLD-RTO: 14.1 % (ref 12.4–15.4)
PLATELET # BLD: 167 K/UL (ref 135–450)
PMV BLD AUTO: 10.6 FL (ref 5–10.5)
RBC # BLD: 3.83 M/UL (ref 4–5.2)
WBC # BLD: 4 K/UL (ref 4–11)

## 2019-08-28 PROCEDURE — 0509F URINE INCON PLAN DOCD: CPT | Performed by: FAMILY MEDICINE

## 2019-08-28 PROCEDURE — G8417 CALC BMI ABV UP PARAM F/U: HCPCS | Performed by: FAMILY MEDICINE

## 2019-08-28 PROCEDURE — 1090F PRES/ABSN URINE INCON ASSESS: CPT | Performed by: FAMILY MEDICINE

## 2019-08-28 PROCEDURE — G8399 PT W/DXA RESULTS DOCUMENT: HCPCS | Performed by: FAMILY MEDICINE

## 2019-08-28 PROCEDURE — 1123F ACP DISCUSS/DSCN MKR DOCD: CPT | Performed by: FAMILY MEDICINE

## 2019-08-28 PROCEDURE — G8427 DOCREV CUR MEDS BY ELIG CLIN: HCPCS | Performed by: FAMILY MEDICINE

## 2019-08-28 PROCEDURE — 4040F PNEUMOC VAC/ADMIN/RCVD: CPT | Performed by: FAMILY MEDICINE

## 2019-08-28 PROCEDURE — 99212 OFFICE O/P EST SF 10 MIN: CPT | Performed by: FAMILY MEDICINE

## 2019-08-28 PROCEDURE — 93000 ELECTROCARDIOGRAM COMPLETE: CPT | Performed by: FAMILY MEDICINE

## 2019-08-28 PROCEDURE — 1036F TOBACCO NON-USER: CPT | Performed by: FAMILY MEDICINE

## 2019-08-28 RX ORDER — MELOXICAM 15 MG/1
15 TABLET ORAL DAILY
Qty: 90 TABLET | Refills: 1 | Status: SHIPPED | OUTPATIENT
Start: 2019-08-28 | End: 2019-09-12 | Stop reason: SDUPTHER

## 2019-08-28 RX ORDER — OXYBUTYNIN CHLORIDE 10 MG/1
TABLET, EXTENDED RELEASE ORAL
Qty: 180 TABLET | Refills: 1 | Status: SHIPPED | OUTPATIENT
Start: 2019-08-28 | End: 2019-09-12 | Stop reason: SDUPTHER

## 2019-08-28 RX ORDER — SIMVASTATIN 40 MG
40 TABLET ORAL NIGHTLY
Qty: 90 TABLET | Refills: 1 | Status: SHIPPED | OUTPATIENT
Start: 2019-08-28 | End: 2019-09-12 | Stop reason: SDUPTHER

## 2019-08-28 RX ORDER — SERTRALINE HYDROCHLORIDE 100 MG/1
TABLET, FILM COATED ORAL
Qty: 180 TABLET | Refills: 1 | Status: SHIPPED | OUTPATIENT
Start: 2019-08-28 | End: 2019-09-12 | Stop reason: SDUPTHER

## 2019-08-28 RX ORDER — LISINOPRIL AND HYDROCHLOROTHIAZIDE 25; 20 MG/1; MG/1
1 TABLET ORAL DAILY
Qty: 90 TABLET | Refills: 1 | Status: SHIPPED | OUTPATIENT
Start: 2019-08-28 | End: 2019-09-12 | Stop reason: SDUPTHER

## 2019-08-28 RX ORDER — PANTOPRAZOLE SODIUM 40 MG/1
40 TABLET, DELAYED RELEASE ORAL DAILY
Qty: 90 TABLET | Refills: 1 | Status: SHIPPED | OUTPATIENT
Start: 2019-08-28 | End: 2019-09-12 | Stop reason: SDUPTHER

## 2019-08-28 NOTE — PROGRESS NOTES
EMBOLECTOMY      EPIDURAL STEROID INJECTION Bilateral 5/23/2019    BILATERAL LUMBAR THREE, LUMBAR FOUR, LUMBAR FIVE RADIOFREQUENCY ABLATION SITE CONFIRMED BY FLUOROSCOPY [facility]    RIGHT SACROILIAC JOINT INJECTION SITE CONFIRMED BY FLUOROSCOPY performed by Faby Trotter MD at [address] [place] [address], TOTAL ABDOMINAL      LAMINECTOMY  02/08/2018    MICROLUMBAR LAMINECTOMY L4-5    LITHOTRIPSY      MOHS SURGERY      nose for squamous cell carcinoma    OTHER SURGICAL HISTORY      blood clots- 1963       Social History     Tobacco History     Smoking Status  Never Smoker    Smokeless Tobacco Use  Never Used          Alcohol History     Alcohol Use Status  No          Drug Use     Drug Use Status  No          Sexual Activity     Sexually Active  Not Asked                Family History   Problem Relation Age of Onset    Cancer Other     Hypertension Other     Seizures Other     Heart Disease Mother     Other Father         murdered     Colon Cancer Brother     Cancer Maternal Aunt         breast    Colon Cancer Maternal Grandmother        Current Outpatient Medications   Medication Sig Dispense Refill    lisinopril-hydrochlorothiazide (PRINZIDE;ZESTORETIC) 20-25 MG per tablet Take 1 tablet by mouth daily 90 tablet 1    sertraline (ZOLOFT) 100 MG tablet TAKE 1 TABLET BY MOUTH TWO  TIMES DAILY 180 tablet 1    meloxicam (MOBIC) 15 MG tablet Take 1 tablet by mouth daily 90 tablet 1    oxybutynin (DITROPAN-XL) 10 MG extended release tablet TAKE 1 TABLET BY MOUTH TWO  TIMES DAILY 180 tablet 1    simvastatin (ZOCOR) 40 MG tablet Take 1 tablet by mouth nightly 90 tablet 1    pantoprazole (PROTONIX) 40 MG tablet Take 1 tablet by mouth daily 90 tablet 1    HYDROcodone-acetaminophen (NORCO) 5-325 MG per tablet Take 1 tablet by mouth.  gabapentin (NEURONTIN) 300 MG capsule daily.       7/19/19:   Multilevel mild to moderate spondylosis with mild canal stenosis at L4-L5, L5-S1 and moderate    left foraminal stenosis at L4. Given her memory loss, we may consider decreasing this medication at her next visit. - oxybutynin (DITROPAN-XL) 10 MG extended release tablet; TAKE 1 TABLET BY MOUTH TWO  TIMES DAILY  Dispense: 180 tablet; Refill: 1    16. Hypercholesteremia  - simvastatin (ZOCOR) 40 MG tablet; Take 1 tablet by mouth nightly  Dispense: 90 tablet; Refill: 1  - Lipid Panel; Future  - CARYN; Future  - Vitamin D 25 Hydroxy; Future    17. Vitamin D deficiency   - Vitamin D 25 Hydroxy; Future      I have spent 60 minutes of face to face time with the patient with more than 50% spent counseling and coordinating care for diagnoses and plans listed above. While assessing care for this patient, I have reviewed all pertinent lab work/imaging/ specialist notes and care in reference to those problems addressed above in detail. Appropriate medical decision making was based on this. Please note that portions of this note may have been completed with a voice recognition program. Efforts were made to edit the dictations but occasionally words are mis-transcribed. Pt is at an not an acceptable risk for any planned procedure and I would defer this procedure until she has had further work up with Cardiology and Gastroenterology. This was discussed with Chepe Schmidt in detail today. She is in agreement with this plan.          Please call with any questions  Reese Huff MD/MS

## 2019-08-28 NOTE — PATIENT INSTRUCTIONS
click on the \"Sign Up Now\" link. Current as of: July 17, 2018  Content Version: 12.1  © 5607-2984 [employer]. Care instructions adapted under license by [facility] ([facility]). If you have questions about a medical condition or this instruction, always ask your healthcare professional. [address] any warranty or liability for your use of this information.

## 2019-08-29 LAB
A/G RATIO: 1.5 (ref 1.1–2.2)
ALBUMIN SERPL-MCNC: 4.2 G/DL (ref 3.4–5)
ALP BLD-CCNC: 68 U/L (ref 40–129)
ALT SERPL-CCNC: 18 U/L (ref 10–40)
ANION GAP SERPL CALCULATED.3IONS-SCNC: 12 MMOL/L (ref 3–16)
AST SERPL-CCNC: 20 U/L (ref 15–37)
BILIRUB SERPL-MCNC: 0.3 MG/DL (ref 0–1)
BUN BLDV-MCNC: 27 MG/DL (ref 7–20)
CALCIUM SERPL-MCNC: 10.1 MG/DL (ref 8.3–10.6)
CHLORIDE BLD-SCNC: 104 MMOL/L (ref 99–110)
CHOLESTEROL, TOTAL: 177 MG/DL (ref 0–199)
CO2: 29 MMOL/L (ref 21–32)
CREAT SERPL-MCNC: 0.8 MG/DL (ref 0.6–1.2)
ESTIMATED AVERAGE GLUCOSE: 116.9 MG/DL
FERRITIN: 36.2 NG/ML (ref 15–150)
FOLATE: >20 NG/ML (ref 4.78–24.2)
GFR AFRICAN AMERICAN: >60
GFR NON-AFRICAN AMERICAN: >60
GLOBULIN: 2.8 G/DL
GLUCOSE BLD-MCNC: 88 MG/DL (ref 70–99)
HBA1C MFR BLD: 5.7 %
HDLC SERPL-MCNC: 87 MG/DL (ref 40–60)
IRON SATURATION: 33 % (ref 15–50)
IRON: 126 UG/DL (ref 37–145)
LDL CHOLESTEROL CALCULATED: 77 MG/DL
POTASSIUM SERPL-SCNC: 4.4 MMOL/L (ref 3.5–5.1)
SODIUM BLD-SCNC: 145 MMOL/L (ref 136–145)
TOTAL IRON BINDING CAPACITY: 385 UG/DL (ref 260–445)
TOTAL PROTEIN: 7 G/DL (ref 6.4–8.2)
TRIGL SERPL-MCNC: 63 MG/DL (ref 0–150)
TSH REFLEX: 1.14 UIU/ML (ref 0.27–4.2)
VITAMIN B-12: 1202 PG/ML (ref 211–911)
VITAMIN D 25-HYDROXY: 55.8 NG/ML
VLDLC SERPL CALC-MCNC: 13 MG/DL

## 2019-08-30 ENCOUNTER — ANTI-COAG VISIT (OUTPATIENT)
Dept: PHARMACY | Age: 79
End: 2019-08-30
Payer: MEDICARE

## 2019-08-30 DIAGNOSIS — I82.5Y3 CHRONIC DEEP VEIN THROMBOSIS (DVT) OF PROXIMAL VEIN OF BOTH LOWER EXTREMITIES (HCC): ICD-10-CM

## 2019-08-30 LAB
ANA INTERPRETATION: ABNORMAL
ANA PATTERN: ABNORMAL
ANA TITER: ABNORMAL
ANTI-NUCLEAR ANTIBODY (ANA): POSITIVE
INR BLD: 2

## 2019-08-30 PROCEDURE — 85610 PROTHROMBIN TIME: CPT | Performed by: FAMILY MEDICINE

## 2019-08-30 PROCEDURE — 99211 OFF/OP EST MAY X REQ PHY/QHP: CPT | Performed by: FAMILY MEDICINE

## 2019-09-03 ENCOUNTER — HOSPITAL ENCOUNTER (OUTPATIENT)
Dept: NON INVASIVE DIAGNOSTICS | Age: 79
Discharge: HOME | End: 2019-09-03
Payer: MEDICARE

## 2019-09-03 DIAGNOSIS — R01.1 SYSTOLIC MURMUR: ICD-10-CM

## 2019-09-03 DIAGNOSIS — R07.89 CHEST DISCOMFORT: ICD-10-CM

## 2019-09-03 LAB
LV EF: 55 %
LVEF MODALITY: NORMAL

## 2019-09-03 PROCEDURE — 93306 TTE W/DOPPLER COMPLETE: CPT

## 2019-09-06 ENCOUNTER — TELEPHONE (OUTPATIENT)
Dept: CARDIOLOGY CLINIC | Age: 79
End: 2019-09-06

## 2019-09-06 DIAGNOSIS — I51.89 CARDIAC MASS: Primary | ICD-10-CM

## 2019-09-06 NOTE — TELEPHONE ENCOUNTER
----- Message from Clare Kolb MD sent at 9/6/2019  8:05 AM EDT -----  Pt is scheduled to see me in office, let her know I would like to get some testing before OV. Lets order esr, crp, blood cultures x2. Let her know she will likely need LUIS for cardiac mass and we can discuss that at [address] She will also need cardiac MRI and to facilitate things, lets go ahead and order that now. Thank you.

## 2019-09-11 ENCOUNTER — TELEPHONE (OUTPATIENT)
Dept: FAMILY MEDICINE CLINIC | Age: 79
End: 2019-09-11

## 2019-09-11 DIAGNOSIS — N39.46 MIXED STRESS AND URGE URINARY INCONTINENCE: ICD-10-CM

## 2019-09-11 DIAGNOSIS — R10.13 EPIGASTRIC ABDOMINAL PAIN: ICD-10-CM

## 2019-09-11 DIAGNOSIS — F33.42 RECURRENT MAJOR DEPRESSIVE DISORDER, IN FULL REMISSION (HCC): ICD-10-CM

## 2019-09-11 DIAGNOSIS — M48.062 SPINAL STENOSIS OF LUMBAR REGION WITH NEUROGENIC CLAUDICATION: ICD-10-CM

## 2019-09-11 DIAGNOSIS — K21.9 GASTROESOPHAGEAL REFLUX DISEASE, ESOPHAGITIS PRESENCE NOT SPECIFIED: ICD-10-CM

## 2019-09-11 DIAGNOSIS — I10 ESSENTIAL HYPERTENSION: ICD-10-CM

## 2019-09-11 DIAGNOSIS — E78.00 HYPERCHOLESTEREMIA: ICD-10-CM

## 2019-09-11 DIAGNOSIS — M48.061 SPINAL STENOSIS, LUMBAR REGION, WITHOUT NEUROGENIC CLAUDICATION: ICD-10-CM

## 2019-09-11 RX ORDER — GABAPENTIN 300 MG/1
CAPSULE ORAL
Qty: 90 CAPSULE | Status: CANCELLED | OUTPATIENT
Start: 2019-09-11

## 2019-09-11 RX ORDER — OXYCODONE HYDROCHLORIDE AND ACETAMINOPHEN 5; 325 MG/1; MG/1
1 TABLET ORAL DAILY
Qty: 30 TABLET | Refills: 0 | Status: CANCELLED | OUTPATIENT
Start: 2019-09-11 | End: 2019-10-11

## 2019-09-12 RX ORDER — SERTRALINE HYDROCHLORIDE 100 MG/1
TABLET, FILM COATED ORAL
Qty: 180 TABLET | Refills: 1 | Status: SHIPPED | OUTPATIENT
Start: 2019-09-12 | End: 2020-02-10

## 2019-09-12 RX ORDER — OXYBUTYNIN CHLORIDE 10 MG/1
TABLET, EXTENDED RELEASE ORAL
Qty: 180 TABLET | Refills: 1 | Status: SHIPPED | OUTPATIENT
Start: 2019-09-12 | End: 2019-10-10 | Stop reason: ALTCHOICE

## 2019-09-12 RX ORDER — GABAPENTIN 300 MG/1
600 CAPSULE ORAL 2 TIMES DAILY
Qty: 120 CAPSULE | Refills: 2 | Status: SHIPPED | OUTPATIENT
Start: 2019-09-12 | End: 2019-09-14 | Stop reason: SDUPTHER

## 2019-09-12 RX ORDER — SIMVASTATIN 40 MG
40 TABLET ORAL NIGHTLY
Qty: 90 TABLET | Refills: 1 | Status: SHIPPED | OUTPATIENT
Start: 2019-09-12 | End: 2020-02-10

## 2019-09-12 RX ORDER — MELOXICAM 15 MG/1
15 TABLET ORAL DAILY
Qty: 90 TABLET | Refills: 1 | Status: SHIPPED | OUTPATIENT
Start: 2019-09-12 | End: 2019-10-10 | Stop reason: ALTCHOICE

## 2019-09-12 RX ORDER — LISINOPRIL AND HYDROCHLOROTHIAZIDE 25; 20 MG/1; MG/1
1 TABLET ORAL DAILY
Qty: 90 TABLET | Refills: 1 | Status: SHIPPED | OUTPATIENT
Start: 2019-09-12 | End: 2019-11-18

## 2019-09-12 RX ORDER — PANTOPRAZOLE SODIUM 40 MG/1
40 TABLET, DELAYED RELEASE ORAL DAILY
Qty: 90 TABLET | Refills: 1 | Status: SHIPPED | OUTPATIENT
Start: 2019-09-12 | End: 2020-02-10

## 2019-09-13 ENCOUNTER — ANTI-COAG VISIT (OUTPATIENT)
Dept: PHARMACY | Age: 79
End: 2019-09-13
Payer: MEDICARE

## 2019-09-13 DIAGNOSIS — I82.5Y3 CHRONIC DEEP VEIN THROMBOSIS (DVT) OF PROXIMAL VEIN OF BOTH LOWER EXTREMITIES (HCC): ICD-10-CM

## 2019-09-13 LAB — INTERNATIONAL NORMALIZATION RATIO, POC: 1.4

## 2019-09-13 PROCEDURE — 99211 OFF/OP EST MAY X REQ PHY/QHP: CPT | Performed by: PHARMACIST

## 2019-09-13 PROCEDURE — 85610 PROTHROMBIN TIME: CPT | Performed by: PHARMACIST

## 2019-09-13 NOTE — PROGRESS NOTES
MsAmie Cantrell is here for management of anticoagulation for hx of DVT. PMH also significant for HTN. She presents today w/out complaint. Pt verifies dosing regimen as listed above. Pt denies s/s bleeding/swelling/SOB. No missed doses. No changes in Rx/OTCs/Herbal medications. No major changes to diet. Occasional EtOH use and denies tobacco.  Pt has been on Warfarin for many years. She states she used to be on Warfarin 7.5mg daily and some days of 10mg. Recently she had a JULIÁN and her INR has been off since then. Procedures planned with no specific date: Dental procedure, Cataract surgery (pt states she will not be off Warfarin for this too). Pt has been eating a lot of veggies recently. Denies any missed doses or medication changes. INR 1.4 is below the therapeutic range of 2-3  Recommend to take 12.5 mg tonight, then increase dose to 7.5mg daily, except 5 mg Q Sun. Pt has the 5 mg tablets. Will continue to monitor and check INR in 2 weeks. Dosing reminder card given with phone number, appointment date and time.    Return to clinic: 9/27 @ 1 pm   Referring physician: Dr. Jess Murphy, PharmD 1:13 PM 9/13/19

## 2019-09-16 ENCOUNTER — INITIAL CONSULT (OUTPATIENT)
Dept: GASTROENTEROLOGY | Age: 79
End: 2019-09-16
Payer: MEDICARE

## 2019-09-16 ENCOUNTER — HOSPITAL ENCOUNTER (OUTPATIENT)
Age: 79
Discharge: HOME | End: 2019-09-16
Payer: MEDICARE

## 2019-09-16 ENCOUNTER — HOSPITAL ENCOUNTER (OUTPATIENT)
Dept: GENERAL RADIOLOGY | Age: 79
Discharge: HOME | End: 2019-09-16
Payer: MEDICARE

## 2019-09-16 VITALS
SYSTOLIC BLOOD PRESSURE: 124 MMHG | WEIGHT: 133 LBS | HEIGHT: 60 IN | DIASTOLIC BLOOD PRESSURE: 68 MMHG | BODY MASS INDEX: 26.11 KG/M2

## 2019-09-16 DIAGNOSIS — R10.84 GENERALIZED ABDOMINAL PAIN: ICD-10-CM

## 2019-09-16 DIAGNOSIS — K59.00 CONSTIPATION, UNSPECIFIED CONSTIPATION TYPE: ICD-10-CM

## 2019-09-16 DIAGNOSIS — K59.00 CONSTIPATION, UNSPECIFIED CONSTIPATION TYPE: Primary | ICD-10-CM

## 2019-09-16 DIAGNOSIS — K44.9 HIATAL HERNIA: ICD-10-CM

## 2019-09-16 PROCEDURE — G8417 CALC BMI ABV UP PARAM F/U: HCPCS | Performed by: INTERNAL MEDICINE

## 2019-09-16 PROCEDURE — 1090F PRES/ABSN URINE INCON ASSESS: CPT | Performed by: INTERNAL MEDICINE

## 2019-09-16 PROCEDURE — G8427 DOCREV CUR MEDS BY ELIG CLIN: HCPCS | Performed by: INTERNAL MEDICINE

## 2019-09-16 PROCEDURE — G8399 PT W/DXA RESULTS DOCUMENT: HCPCS | Performed by: INTERNAL MEDICINE

## 2019-09-16 PROCEDURE — 1036F TOBACCO NON-USER: CPT | Performed by: INTERNAL MEDICINE

## 2019-09-16 PROCEDURE — 4040F PNEUMOC VAC/ADMIN/RCVD: CPT | Performed by: INTERNAL MEDICINE

## 2019-09-16 PROCEDURE — 74018 RADEX ABDOMEN 1 VIEW: CPT

## 2019-09-16 PROCEDURE — 1123F ACP DISCUSS/DSCN MKR DOCD: CPT | Performed by: INTERNAL MEDICINE

## 2019-09-16 PROCEDURE — 99214 OFFICE O/P EST MOD 30 MIN: CPT | Performed by: INTERNAL MEDICINE

## 2019-09-16 RX ORDER — GABAPENTIN 300 MG/1
CAPSULE ORAL
Qty: 360 CAPSULE | Refills: 1 | Status: SHIPPED | OUTPATIENT
Start: 2019-09-16 | End: 2019-10-10 | Stop reason: ALTCHOICE

## 2019-09-16 RX ORDER — WARFARIN SODIUM 5 MG/1
TABLET ORAL
Qty: 124 TABLET | Refills: 0 | Status: SHIPPED | OUTPATIENT
Start: 2019-09-16 | End: 2019-12-10 | Stop reason: SDUPTHER

## 2019-09-16 NOTE — PROGRESS NOTES
Physically abused: Not on file     Forced sexual activity: Not on file   Other Topics Concern    Not on file   Social History Narrative    Not on file     SURGICAL HISTORY     Past Surgical History:   Procedure Laterality Date     SECTION      x1   [address] Bilateral 2019    BILATERAL LUMBAR THREE, LUMBAR FOUR, LUMBAR FIVE RADIOFREQUENCY ABLATION SITE CONFIRMED BY FLUOROSCOPY [facility]    RIGHT SACROILIAC JOINT INJECTION SITE CONFIRMED BY FLUOROSCOPY performed by Shari Thomson MD at [address] [place] [address], TOTAL ABDOMINAL      LAMINECTOMY  2018    MICROLUMBAR LAMINECTOMY L4-5    LITHOTRIPSY      MOHS SURGERY      nose for squamous cell carcinoma    OTHER SURGICAL HISTORY      blood clots- 1963     CURRENT MEDICATIONS   (This list may include medications prescribed during this encounter as epic can not insert only the list prior to this encounter.)  Current Outpatient Rx   Medication Sig Dispense Refill    warfarin (COUMADIN) 5 MG tablet TAKE 1 AND 1/2 TABLETS BY  MOUTH DAILY EXCEPT ON   AND THURSDAY TAKE 1  TABLET OR AS DIRECTED BY  THE COUMADIN CLINIC 124 tablet 0    gabapentin (NEURONTIN) 300 MG capsule TAKE 2 CAPSULES BY MOUTH 2  TIMES DAILY 360 capsule 1    lisinopril-hydrochlorothiazide (PRINZIDE;ZESTORETIC) 20-25 MG per tablet Take 1 tablet by mouth daily 90 tablet 1    meloxicam (MOBIC) 15 MG tablet Take 1 tablet by mouth daily 90 tablet 1    pantoprazole (PROTONIX) 40 MG tablet Take 1 tablet by mouth daily 90 tablet 1    sertraline (ZOLOFT) 100 MG tablet TAKE 1 TABLET BY MOUTH TWO  TIMES DAILY 180 tablet 1    oxybutynin (DITROPAN-XL) 10 MG extended release tablet TAKE 1 TABLET BY MOUTH TWO  TIMES DAILY 180 tablet 1    simvastatin (ZOCOR) 40 MG tablet Take 1 tablet by mouth nightly 90 tablet 1   

## 2019-09-18 ENCOUNTER — TELEPHONE (OUTPATIENT)
Dept: FAMILY MEDICINE CLINIC | Age: 79
End: 2019-09-18

## 2019-09-19 ENCOUNTER — TELEPHONE (OUTPATIENT)
Dept: FAMILY MEDICINE CLINIC | Age: 79
End: 2019-09-19

## 2019-09-20 ENCOUNTER — TELEPHONE (OUTPATIENT)
Dept: FAMILY MEDICINE CLINIC | Age: 79
End: 2019-09-20

## 2019-09-20 DIAGNOSIS — I51.89 CARDIAC MASS: Primary | ICD-10-CM

## 2019-09-23 ENCOUNTER — HOSPITAL ENCOUNTER (OUTPATIENT)
Dept: MRI IMAGING | Age: 79
Discharge: HOME | End: 2019-09-23
Payer: MEDICARE

## 2019-09-23 DIAGNOSIS — I51.89 CARDIAC MASS: ICD-10-CM

## 2019-09-23 PROCEDURE — 6360000004 HC RX CONTRAST MEDICATION: Performed by: INTERNAL MEDICINE

## 2019-09-23 PROCEDURE — 75561 CARDIAC MRI FOR MORPH W/DYE: CPT

## 2019-09-23 PROCEDURE — A9579 GAD-BASE MR CONTRAST NOS,1ML: HCPCS | Performed by: INTERNAL MEDICINE

## 2019-09-23 RX ADMIN — GADOTERIDOL 15 ML: 279.3 INJECTION, SOLUTION INTRAVENOUS at 14:30

## 2019-09-24 LAB
LV EF: 62 %
LVEF MODALITY: NORMAL

## 2019-09-24 NOTE — PROGRESS NOTES
[facility]   CARDIAC EVALUATION NOTE  [phone]      PCP:  Tomy Deleon MD    Reason for Consultation/Chief Complaint: New patient - chest pain and shortness of breath. Subjective   History of Present Illness:  Marco Garcia is a 70 y.o. patient who presents referred by Dr. Davon Malin for bradycardia, chest discomfort and systolic murmur. Echo 9/3/19 demonstrates EF 55%, prominent echogenic mass on atrial side of anterior tricuspid valve leaflet. Moderate-severe calcification of posterior mitral annulus, bubble study fails to show evidence of shunting. Frequent ectopy during exam.  Today she reports she feels pressure in her chest and SOB. She states this has been occurring x 3 months, everyday and worse with exertion. She states she has been on coumadin x 56 years for blood clots in her legs. She states she has never had any cardiac issues that she is aware of. Past Medical History:   has a past medical history of Allergic rhinitis, Arthritis, Cancer ([address] Chronic back pain, Dental disease, Depression, Dizziness, GERD (gastroesophageal reflux disease), H/O blood clots, Hearing loss, Hx of blood clots, Hyperlipidemia, Hypertension, Lumbar stenosis with neurogenic claudication, Osteoarthritis of neck, and TMJ dysfunction. Surgical History:   has a past surgical history that includes  section; Hysterectomy; Lithotripsy; other surgical history; Mohs surgery; embolectomy; laminectomy (2018); epidural steroid injection (Bilateral, 2019); Hysterectomy, total abdominal; and epidural steroid injection (Right, 8/15/2019). Social History:   reports that she has never smoked. She has never used smokeless tobacco. She reports that she does not drink alcohol or use drugs.      Family History:  family history includes Cancer in her maternal aunt and another family member; Neftali Blood in her brother and maternal grandmother; Heart Disease and pig valve in her mother; by the above signed scribe in my presence. It is both accurate and complete to my knowledge. I agree with the details independently gathered by the clinical support staff and the scribed note accurately describes my personal service to the patient.

## 2019-09-25 ENCOUNTER — OFFICE VISIT (OUTPATIENT)
Dept: FAMILY MEDICINE CLINIC | Age: 79
End: 2019-09-25
Payer: MEDICARE

## 2019-09-25 ENCOUNTER — OFFICE VISIT (OUTPATIENT)
Dept: CARDIOLOGY CLINIC | Age: 79
End: 2019-09-25
Payer: MEDICARE

## 2019-09-25 ENCOUNTER — TELEPHONE (OUTPATIENT)
Dept: CARDIOLOGY | Age: 79
End: 2019-09-25

## 2019-09-25 VITALS
OXYGEN SATURATION: 95 % | HEART RATE: 84 BPM | BODY MASS INDEX: 26.11 KG/M2 | SYSTOLIC BLOOD PRESSURE: 110 MMHG | HEIGHT: 60 IN | WEIGHT: 133 LBS | DIASTOLIC BLOOD PRESSURE: 64 MMHG

## 2019-09-25 VITALS — HEART RATE: 56 BPM | DIASTOLIC BLOOD PRESSURE: 80 MMHG | SYSTOLIC BLOOD PRESSURE: 130 MMHG | OXYGEN SATURATION: 96 %

## 2019-09-25 DIAGNOSIS — R06.02 SOB (SHORTNESS OF BREATH): ICD-10-CM

## 2019-09-25 DIAGNOSIS — R07.89 OTHER CHEST PAIN: ICD-10-CM

## 2019-09-25 DIAGNOSIS — R41.3 MEMORY LOSS: Primary | ICD-10-CM

## 2019-09-25 DIAGNOSIS — I10 ESSENTIAL HYPERTENSION: ICD-10-CM

## 2019-09-25 DIAGNOSIS — I82.5Y3 CHRONIC DEEP VEIN THROMBOSIS (DVT) OF PROXIMAL VEIN OF BOTH LOWER EXTREMITIES (HCC): Primary | ICD-10-CM

## 2019-09-25 PROCEDURE — 1090F PRES/ABSN URINE INCON ASSESS: CPT | Performed by: FAMILY MEDICINE

## 2019-09-25 PROCEDURE — G8428 CUR MEDS NOT DOCUMENT: HCPCS | Performed by: FAMILY MEDICINE

## 2019-09-25 PROCEDURE — G8399 PT W/DXA RESULTS DOCUMENT: HCPCS | Performed by: INTERNAL MEDICINE

## 2019-09-25 PROCEDURE — 1036F TOBACCO NON-USER: CPT | Performed by: FAMILY MEDICINE

## 2019-09-25 PROCEDURE — 1123F ACP DISCUSS/DSCN MKR DOCD: CPT | Performed by: FAMILY MEDICINE

## 2019-09-25 PROCEDURE — 4040F PNEUMOC VAC/ADMIN/RCVD: CPT | Performed by: FAMILY MEDICINE

## 2019-09-25 PROCEDURE — G8399 PT W/DXA RESULTS DOCUMENT: HCPCS | Performed by: FAMILY MEDICINE

## 2019-09-25 PROCEDURE — G8417 CALC BMI ABV UP PARAM F/U: HCPCS | Performed by: FAMILY MEDICINE

## 2019-09-25 PROCEDURE — 4040F PNEUMOC VAC/ADMIN/RCVD: CPT | Performed by: INTERNAL MEDICINE

## 2019-09-25 PROCEDURE — 1036F TOBACCO NON-USER: CPT | Performed by: INTERNAL MEDICINE

## 2019-09-25 PROCEDURE — 1090F PRES/ABSN URINE INCON ASSESS: CPT | Performed by: INTERNAL MEDICINE

## 2019-09-25 PROCEDURE — 1123F ACP DISCUSS/DSCN MKR DOCD: CPT | Performed by: INTERNAL MEDICINE

## 2019-09-25 PROCEDURE — 99214 OFFICE O/P EST MOD 30 MIN: CPT | Performed by: FAMILY MEDICINE

## 2019-09-25 PROCEDURE — 99204 OFFICE O/P NEW MOD 45 MIN: CPT | Performed by: INTERNAL MEDICINE

## 2019-09-25 PROCEDURE — G8417 CALC BMI ABV UP PARAM F/U: HCPCS | Performed by: INTERNAL MEDICINE

## 2019-09-25 PROCEDURE — G8427 DOCREV CUR MEDS BY ELIG CLIN: HCPCS | Performed by: INTERNAL MEDICINE

## 2019-09-25 NOTE — PROGRESS NOTES
Subjective:       Rochelle Gee is a 70 y.o. female here for evaluation of memory problems. She is accompanied by spouse and daughter. Primary caregiver is patient. Patient lives with their spouse. The family and the patient identify problems with changes in short and long term memory. Family and patient report problems with memory, mainly. Family and patient are concerned about  Forgetfulness and driving, however, they are not concerned about wandering, cooking and inability to maintain adequate nutrition. Medication administration: patient self medicates. \"the blind leading the blind at your house\"   If she has a list or something to follow, then she's fine   Gets sleepy and will sometimes cross the kiet while driving     Dr. Lofton President: next visit in December  Needs Depends     Her  attributes all of this to old age and states \"she's fine. \"  He states on multiple occasions that he can't drive and that he needs her to drive him. She's been focused on his health over the years and now that things are settled down, her stress level and responsibilities have decreased, and as such this has caused her to be more forgetful. Patient's medications, allergies, past medical, surgical, social and family histories were reviewed and updated as appropriate. Review of Systems  A comprehensive review of systems was negative. Objective:      /80 (Site: Right Upper Arm, Position: Sitting, Cuff Size: Medium Adult)   Pulse 56   SpO2 96%   General appearance: alert, appears stated age and cooperative  Lungs: clear to auscultation bilaterally  Heart: regular rate and rhythm, S1, S2 normal, no murmur, click, rub or gallop  Neurologic: Grossly normal    Mini Mental State Examination (MM SE): 26      Assessment:      Mild MCI     Plan:      All questions answered fully. MRI brain. Follow up with me in 3 months.     Neurology consult pending imaging or further discussion with Charlette Clark    I have spent 25 minutes of face to face time with the patient with more than 50% spent counseling and coordinating care for diagnoses and plans listed above.

## 2019-09-25 NOTE — TELEPHONE ENCOUNTER
Pt was seen in office today,  I would like to add one more order, lets do a two week event monitor for SUSANA campos, Thank you.

## 2019-09-26 ENCOUNTER — TELEPHONE (OUTPATIENT)
Dept: CARDIOLOGY CLINIC | Age: 79
End: 2019-09-26

## 2019-09-26 DIAGNOSIS — I45.10 RBBB: Primary | ICD-10-CM

## 2019-09-27 ENCOUNTER — ANTI-COAG VISIT (OUTPATIENT)
Dept: PHARMACY | Age: 79
End: 2019-09-27
Payer: MEDICARE

## 2019-09-27 LAB — INTERNATIONAL NORMALIZATION RATIO, POC: 2.3

## 2019-09-27 PROCEDURE — 85610 PROTHROMBIN TIME: CPT

## 2019-09-27 PROCEDURE — 99211 OFF/OP EST MAY X REQ PHY/QHP: CPT

## 2019-09-30 ENCOUNTER — TELEPHONE (OUTPATIENT)
Dept: FAMILY MEDICINE CLINIC | Age: 79
End: 2019-09-30

## 2019-09-30 DIAGNOSIS — R93.89 IMAGING ABNORMALITIES: Primary | ICD-10-CM

## 2019-10-03 ENCOUNTER — TELEPHONE (OUTPATIENT)
Dept: FAMILY MEDICINE CLINIC | Age: 79
End: 2019-10-03

## 2019-10-03 ENCOUNTER — HOSPITAL ENCOUNTER (OUTPATIENT)
Dept: MRI IMAGING | Age: 79
Discharge: HOME | End: 2019-10-03
Payer: MEDICARE

## 2019-10-03 ENCOUNTER — HOSPITAL ENCOUNTER (OUTPATIENT)
Age: 79
Discharge: HOME | End: 2019-10-03
Payer: MEDICARE

## 2019-10-03 DIAGNOSIS — R41.3 MEMORY LOSS: ICD-10-CM

## 2019-10-03 PROCEDURE — 70553 MRI BRAIN STEM W/O & W/DYE: CPT

## 2019-10-03 PROCEDURE — A9579 GAD-BASE MR CONTRAST NOS,1ML: HCPCS | Performed by: FAMILY MEDICINE

## 2019-10-03 PROCEDURE — 6360000004 HC RX CONTRAST MEDICATION: Performed by: FAMILY MEDICINE

## 2019-10-03 RX ADMIN — GADOTERIDOL 11 ML: 279.3 INJECTION, SOLUTION INTRAVENOUS at 11:44

## 2019-10-10 ENCOUNTER — OFFICE VISIT (OUTPATIENT)
Dept: FAMILY MEDICINE CLINIC | Age: 79
End: 2019-10-10
Payer: MEDICARE

## 2019-10-10 VITALS
HEART RATE: 52 BPM | DIASTOLIC BLOOD PRESSURE: 80 MMHG | OXYGEN SATURATION: 96 % | BODY MASS INDEX: 26.95 KG/M2 | SYSTOLIC BLOOD PRESSURE: 120 MMHG | WEIGHT: 138 LBS

## 2019-10-10 DIAGNOSIS — G31.84 MCI (MILD COGNITIVE IMPAIRMENT) WITH MEMORY LOSS: Primary | ICD-10-CM

## 2019-10-10 DIAGNOSIS — I63.81 MULTIPLE LACUNAR INFARCTS (HCC): ICD-10-CM

## 2019-10-10 PROCEDURE — 1090F PRES/ABSN URINE INCON ASSESS: CPT | Performed by: FAMILY MEDICINE

## 2019-10-10 PROCEDURE — G8427 DOCREV CUR MEDS BY ELIG CLIN: HCPCS | Performed by: FAMILY MEDICINE

## 2019-10-10 PROCEDURE — G8417 CALC BMI ABV UP PARAM F/U: HCPCS | Performed by: FAMILY MEDICINE

## 2019-10-10 PROCEDURE — 90653 IIV ADJUVANT VACCINE IM: CPT | Performed by: FAMILY MEDICINE

## 2019-10-10 PROCEDURE — 4040F PNEUMOC VAC/ADMIN/RCVD: CPT | Performed by: FAMILY MEDICINE

## 2019-10-10 PROCEDURE — G8482 FLU IMMUNIZE ORDER/ADMIN: HCPCS | Performed by: FAMILY MEDICINE

## 2019-10-10 PROCEDURE — 1036F TOBACCO NON-USER: CPT | Performed by: FAMILY MEDICINE

## 2019-10-10 PROCEDURE — G8399 PT W/DXA RESULTS DOCUMENT: HCPCS | Performed by: FAMILY MEDICINE

## 2019-10-10 PROCEDURE — G0008 ADMIN INFLUENZA VIRUS VAC: HCPCS | Performed by: FAMILY MEDICINE

## 2019-10-10 PROCEDURE — 99214 OFFICE O/P EST MOD 30 MIN: CPT | Performed by: FAMILY MEDICINE

## 2019-10-10 PROCEDURE — 1123F ACP DISCUSS/DSCN MKR DOCD: CPT | Performed by: FAMILY MEDICINE

## 2019-10-10 ASSESSMENT — ENCOUNTER SYMPTOMS
SHORTNESS OF BREATH: 0
NAUSEA: 0
VOMITING: 0
ABDOMINAL PAIN: 0
COLOR CHANGE: 0
CHEST TIGHTNESS: 0

## 2019-10-11 ENCOUNTER — TELEPHONE (OUTPATIENT)
Dept: FAMILY MEDICINE CLINIC | Age: 79
End: 2019-10-11

## 2019-10-11 DIAGNOSIS — G31.84 MCI (MILD COGNITIVE IMPAIRMENT) WITH MEMORY LOSS: Primary | ICD-10-CM

## 2019-10-30 ENCOUNTER — TELEPHONE (OUTPATIENT)
Dept: CARDIOLOGY CLINIC | Age: 79
End: 2019-10-30

## 2019-10-30 DIAGNOSIS — I45.10 RBBB: ICD-10-CM

## 2019-10-30 PROCEDURE — 93228 REMOTE 30 DAY ECG REV/REPORT: CPT | Performed by: INTERNAL MEDICINE

## 2019-10-31 ENCOUNTER — ANTI-COAG VISIT (OUTPATIENT)
Dept: PHARMACY | Age: 79
End: 2019-10-31
Payer: MEDICARE

## 2019-10-31 DIAGNOSIS — I82.5Y3 CHRONIC DEEP VEIN THROMBOSIS (DVT) OF PROXIMAL VEIN OF BOTH LOWER EXTREMITIES (HCC): ICD-10-CM

## 2019-10-31 PROBLEM — M47.816 LUMBAR FACET ARTHROPATHY: Status: ACTIVE | Noted: 2019-10-31

## 2019-10-31 PROBLEM — M48.061 SPINAL STENOSIS OF LUMBAR REGION: Status: ACTIVE | Noted: 2019-10-31

## 2019-10-31 PROBLEM — M51.26 DISC DISPLACEMENT, LUMBAR: Status: ACTIVE | Noted: 2019-10-31

## 2019-10-31 LAB — INTERNATIONAL NORMALIZATION RATIO, POC: 3

## 2019-10-31 PROCEDURE — 85610 PROTHROMBIN TIME: CPT

## 2019-10-31 PROCEDURE — 99211 OFF/OP EST MAY X REQ PHY/QHP: CPT

## 2019-11-07 ENCOUNTER — TELEPHONE (OUTPATIENT)
Dept: CARDIOLOGY | Age: 79
End: 2019-11-07

## 2019-11-07 ENCOUNTER — TELEPHONE (OUTPATIENT)
Dept: FAMILY MEDICINE CLINIC | Age: 79
End: 2019-11-07

## 2019-11-08 PROBLEM — R00.2 PALPITATIONS: Status: ACTIVE | Noted: 2019-11-08

## 2019-11-11 ENCOUNTER — OFFICE VISIT (OUTPATIENT)
Dept: CARDIOLOGY CLINIC | Age: 79
End: 2019-11-11
Payer: MEDICARE

## 2019-11-11 VITALS
OXYGEN SATURATION: 97 % | HEART RATE: 56 BPM | HEIGHT: 60 IN | WEIGHT: 127.8 LBS | SYSTOLIC BLOOD PRESSURE: 116 MMHG | DIASTOLIC BLOOD PRESSURE: 60 MMHG | BODY MASS INDEX: 25.09 KG/M2

## 2019-11-11 DIAGNOSIS — R00.2 PALPITATIONS: ICD-10-CM

## 2019-11-11 DIAGNOSIS — R06.02 SOB (SHORTNESS OF BREATH): Primary | ICD-10-CM

## 2019-11-11 DIAGNOSIS — E78.00 HYPERCHOLESTEREMIA: ICD-10-CM

## 2019-11-11 PROCEDURE — G8482 FLU IMMUNIZE ORDER/ADMIN: HCPCS | Performed by: INTERNAL MEDICINE

## 2019-11-11 PROCEDURE — G8399 PT W/DXA RESULTS DOCUMENT: HCPCS | Performed by: INTERNAL MEDICINE

## 2019-11-11 PROCEDURE — 1036F TOBACCO NON-USER: CPT | Performed by: INTERNAL MEDICINE

## 2019-11-11 PROCEDURE — G8420 CALC BMI NORM PARAMETERS: HCPCS | Performed by: INTERNAL MEDICINE

## 2019-11-11 PROCEDURE — 1090F PRES/ABSN URINE INCON ASSESS: CPT | Performed by: INTERNAL MEDICINE

## 2019-11-11 PROCEDURE — 99214 OFFICE O/P EST MOD 30 MIN: CPT | Performed by: INTERNAL MEDICINE

## 2019-11-11 PROCEDURE — 1123F ACP DISCUSS/DSCN MKR DOCD: CPT | Performed by: INTERNAL MEDICINE

## 2019-11-11 PROCEDURE — G8427 DOCREV CUR MEDS BY ELIG CLIN: HCPCS | Performed by: INTERNAL MEDICINE

## 2019-11-11 PROCEDURE — 4040F PNEUMOC VAC/ADMIN/RCVD: CPT | Performed by: INTERNAL MEDICINE

## 2019-11-18 ENCOUNTER — HOSPITAL ENCOUNTER (OUTPATIENT)
Dept: GENERAL RADIOLOGY | Age: 79
Discharge: HOME | End: 2019-11-18
Payer: MEDICARE

## 2019-11-18 ENCOUNTER — HOSPITAL ENCOUNTER (OUTPATIENT)
Age: 79
Discharge: HOME | End: 2019-11-18
Payer: MEDICARE

## 2019-11-18 ENCOUNTER — OFFICE VISIT (OUTPATIENT)
Dept: FAMILY MEDICINE CLINIC | Age: 79
End: 2019-11-18
Payer: MEDICARE

## 2019-11-18 VITALS
BODY MASS INDEX: 23.44 KG/M2 | OXYGEN SATURATION: 97 % | SYSTOLIC BLOOD PRESSURE: 110 MMHG | HEART RATE: 60 BPM | WEIGHT: 120 LBS | DIASTOLIC BLOOD PRESSURE: 80 MMHG

## 2019-11-18 DIAGNOSIS — M79.641 PAIN IN BOTH HANDS: ICD-10-CM

## 2019-11-18 DIAGNOSIS — M79.642 PAIN IN BOTH HANDS: ICD-10-CM

## 2019-11-18 DIAGNOSIS — I10 ESSENTIAL HYPERTENSION: Primary | ICD-10-CM

## 2019-11-18 DIAGNOSIS — I95.2 HYPOTENSION DUE TO DRUGS: ICD-10-CM

## 2019-11-18 DIAGNOSIS — R32 URINARY INCONTINENCE, UNSPECIFIED TYPE: ICD-10-CM

## 2019-11-18 PROBLEM — H25.813 COMBINED FORMS OF AGE-RELATED CATARACT OF BOTH EYES: Status: ACTIVE | Noted: 2019-11-18

## 2019-11-18 PROCEDURE — 0509F URINE INCON PLAN DOCD: CPT | Performed by: FAMILY MEDICINE

## 2019-11-18 PROCEDURE — 1090F PRES/ABSN URINE INCON ASSESS: CPT | Performed by: FAMILY MEDICINE

## 2019-11-18 PROCEDURE — 99214 OFFICE O/P EST MOD 30 MIN: CPT | Performed by: FAMILY MEDICINE

## 2019-11-18 PROCEDURE — 73130 X-RAY EXAM OF HAND: CPT

## 2019-11-18 PROCEDURE — G8482 FLU IMMUNIZE ORDER/ADMIN: HCPCS | Performed by: FAMILY MEDICINE

## 2019-11-18 PROCEDURE — 1036F TOBACCO NON-USER: CPT | Performed by: FAMILY MEDICINE

## 2019-11-18 PROCEDURE — G8420 CALC BMI NORM PARAMETERS: HCPCS | Performed by: FAMILY MEDICINE

## 2019-11-18 PROCEDURE — 4040F PNEUMOC VAC/ADMIN/RCVD: CPT | Performed by: FAMILY MEDICINE

## 2019-11-18 PROCEDURE — 1123F ACP DISCUSS/DSCN MKR DOCD: CPT | Performed by: FAMILY MEDICINE

## 2019-11-18 PROCEDURE — G8427 DOCREV CUR MEDS BY ELIG CLIN: HCPCS | Performed by: FAMILY MEDICINE

## 2019-11-18 PROCEDURE — G8399 PT W/DXA RESULTS DOCUMENT: HCPCS | Performed by: FAMILY MEDICINE

## 2019-11-18 RX ORDER — LISINOPRIL AND HYDROCHLOROTHIAZIDE 12.5; 1 MG/1; MG/1
1 TABLET ORAL DAILY
Qty: 30 TABLET | Refills: 5 | Status: SHIPPED | OUTPATIENT
Start: 2019-11-18 | End: 2019-12-26 | Stop reason: SDUPTHER

## 2019-11-18 ASSESSMENT — ENCOUNTER SYMPTOMS
CHEST TIGHTNESS: 0
ABDOMINAL PAIN: 0
VOMITING: 0
SHORTNESS OF BREATH: 0
BACK PAIN: 0
COLOR CHANGE: 0
NAUSEA: 0

## 2019-11-20 ENCOUNTER — ANESTHESIA (OUTPATIENT)
Dept: CARDIAC CATH/INVASIVE PROCEDURES | Age: 79
End: 2019-11-20
Payer: MEDICARE

## 2019-11-20 ENCOUNTER — ANESTHESIA EVENT (OUTPATIENT)
Dept: CARDIAC CATH/INVASIVE PROCEDURES | Age: 79
End: 2019-11-20
Payer: MEDICARE

## 2019-11-20 ENCOUNTER — HOSPITAL ENCOUNTER (OUTPATIENT)
Dept: NON INVASIVE DIAGNOSTICS | Age: 79
Discharge: HOME | End: 2019-11-20
Attending: INTERNAL MEDICINE
Payer: MEDICARE

## 2019-11-20 ENCOUNTER — HOSPITAL ENCOUNTER (OUTPATIENT)
Dept: CARDIAC CATH/INVASIVE PROCEDURES | Age: 79
Discharge: HOME | End: 2019-11-20
Attending: INTERNAL MEDICINE | Admitting: INTERNAL MEDICINE
Payer: MEDICARE

## 2019-11-20 VITALS — DIASTOLIC BLOOD PRESSURE: 70 MMHG | SYSTOLIC BLOOD PRESSURE: 127 MMHG | OXYGEN SATURATION: 97 %

## 2019-11-20 VITALS — HEIGHT: 60 IN | BODY MASS INDEX: 25.42 KG/M2 | WEIGHT: 129.5 LBS

## 2019-11-20 LAB
A/G RATIO: 1.2 (ref 1.1–2.2)
ALBUMIN SERPL-MCNC: 4 G/DL (ref 3.4–5)
ALP BLD-CCNC: 75 U/L (ref 40–129)
ALT SERPL-CCNC: 10 U/L (ref 10–40)
ANION GAP SERPL CALCULATED.3IONS-SCNC: 10 MMOL/L (ref 3–16)
AST SERPL-CCNC: 19 U/L (ref 15–37)
BILIRUB SERPL-MCNC: 0.3 MG/DL (ref 0–1)
BUN BLDV-MCNC: 17 MG/DL (ref 7–20)
CALCIUM SERPL-MCNC: 9.6 MG/DL (ref 8.3–10.6)
CHLORIDE BLD-SCNC: 101 MMOL/L (ref 99–110)
CHOLESTEROL, FASTING: 165 MG/DL (ref 0–199)
CO2: 31 MMOL/L (ref 21–32)
CREAT SERPL-MCNC: 0.7 MG/DL (ref 0.6–1.2)
GFR AFRICAN AMERICAN: >60
GFR NON-AFRICAN AMERICAN: >60
GLOBULIN: 3.4 G/DL
GLUCOSE BLD-MCNC: 94 MG/DL (ref 70–99)
HCT VFR BLD CALC: 38.5 % (ref 36–48)
HDLC SERPL-MCNC: 77 MG/DL (ref 40–60)
HEMOGLOBIN: 12.7 G/DL (ref 12–16)
INR BLD: 2.35 (ref 0.86–1.14)
LDL CHOLESTEROL CALCULATED: 71 MG/DL
LV EF: 55 %
LVEF MODALITY: NORMAL
MCH RBC QN AUTO: 32.3 PG (ref 26–34)
MCHC RBC AUTO-ENTMCNC: 32.9 G/DL (ref 31–36)
MCV RBC AUTO: 97.9 FL (ref 80–100)
PDW BLD-RTO: 14 % (ref 12.4–15.4)
PLATELET # BLD: 169 K/UL (ref 135–450)
PMV BLD AUTO: 9.7 FL (ref 5–10.5)
POTASSIUM SERPL-SCNC: 4.2 MMOL/L (ref 3.5–5.1)
PROTHROMBIN TIME: 27.5 SEC (ref 10–13.2)
RBC # BLD: 3.94 M/UL (ref 4–5.2)
SODIUM BLD-SCNC: 142 MMOL/L (ref 136–145)
TOTAL PROTEIN: 7.4 G/DL (ref 6.4–8.2)
TRIGLYCERIDE, FASTING: 83 MG/DL (ref 0–150)
VLDLC SERPL CALC-MCNC: 17 MG/DL
WBC # BLD: 4.2 K/UL (ref 4–11)

## 2019-11-20 PROCEDURE — 80053 COMPREHEN METABOLIC PANEL: CPT

## 2019-11-20 PROCEDURE — 2580000003 HC RX 258

## 2019-11-20 PROCEDURE — 93325 DOPPLER ECHO COLOR FLOW MAPG: CPT

## 2019-11-20 PROCEDURE — 6360000002 HC RX W HCPCS: Performed by: NURSE ANESTHETIST, CERTIFIED REGISTERED

## 2019-11-20 PROCEDURE — 3700000000 HC ANESTHESIA ATTENDED CARE

## 2019-11-20 PROCEDURE — 2500000003 HC RX 250 WO HCPCS: Performed by: NURSE ANESTHETIST, CERTIFIED REGISTERED

## 2019-11-20 PROCEDURE — 85610 PROTHROMBIN TIME: CPT

## 2019-11-20 PROCEDURE — 93320 DOPPLER ECHO COMPLETE: CPT

## 2019-11-20 PROCEDURE — 80061 LIPID PANEL: CPT

## 2019-11-20 PROCEDURE — 93315 ECHO TRANSESOPHAGEAL: CPT

## 2019-11-20 PROCEDURE — 3700000001 HC ADD 15 MINUTES (ANESTHESIA)

## 2019-11-20 PROCEDURE — 93005 ELECTROCARDIOGRAM TRACING: CPT | Performed by: INTERNAL MEDICINE

## 2019-11-20 PROCEDURE — 85027 COMPLETE CBC AUTOMATED: CPT

## 2019-11-20 RX ORDER — LIDOCAINE HYDROCHLORIDE 20 MG/ML
INJECTION, SOLUTION EPIDURAL; INFILTRATION; INTRACAUDAL; PERINEURAL PRN
Status: DISCONTINUED | OUTPATIENT
Start: 2019-11-20 | End: 2019-11-20 | Stop reason: SDUPTHER

## 2019-11-20 RX ORDER — SODIUM CHLORIDE 9 MG/ML
INJECTION, SOLUTION INTRAVENOUS ONCE
Status: COMPLETED | OUTPATIENT
Start: 2019-11-20 | End: 2019-11-20

## 2019-11-20 RX ORDER — ACETAMINOPHEN 325 MG/1
650 TABLET ORAL EVERY 4 HOURS PRN
Status: DISCONTINUED | OUTPATIENT
Start: 2019-11-20 | End: 2019-11-20 | Stop reason: HOSPADM

## 2019-11-20 RX ORDER — PROPOFOL 10 MG/ML
INJECTION, EMULSION INTRAVENOUS PRN
Status: DISCONTINUED | OUTPATIENT
Start: 2019-11-20 | End: 2019-11-20 | Stop reason: SDUPTHER

## 2019-11-20 RX ORDER — SODIUM CHLORIDE 0.9 % (FLUSH) 0.9 %
10 SYRINGE (ML) INJECTION EVERY 12 HOURS SCHEDULED
Status: DISCONTINUED | OUTPATIENT
Start: 2019-11-20 | End: 2019-11-20 | Stop reason: HOSPADM

## 2019-11-20 RX ORDER — SODIUM CHLORIDE 0.9 % (FLUSH) 0.9 %
10 SYRINGE (ML) INJECTION PRN
Status: DISCONTINUED | OUTPATIENT
Start: 2019-11-20 | End: 2019-11-20 | Stop reason: HOSPADM

## 2019-11-20 RX ADMIN — SODIUM CHLORIDE: 9 INJECTION, SOLUTION INTRAVENOUS at 10:59

## 2019-11-20 RX ADMIN — PROPOFOL 20 MG: 10 INJECTION, EMULSION INTRAVENOUS at 11:05

## 2019-11-20 RX ADMIN — LIDOCAINE HYDROCHLORIDE 60 MG: 20 INJECTION, SOLUTION EPIDURAL; INFILTRATION; INTRACAUDAL; PERINEURAL at 10:59

## 2019-11-20 RX ADMIN — PROPOFOL 30 MG: 10 INJECTION, EMULSION INTRAVENOUS at 11:01

## 2019-11-20 RX ADMIN — PROPOFOL 20 MG: 10 INJECTION, EMULSION INTRAVENOUS at 11:03

## 2019-11-20 RX ADMIN — PROPOFOL 50 MG: 10 INJECTION, EMULSION INTRAVENOUS at 10:59

## 2019-11-20 ASSESSMENT — ENCOUNTER SYMPTOMS: SHORTNESS OF BREATH: 1

## 2019-11-21 ENCOUNTER — TELEPHONE (OUTPATIENT)
Dept: FAMILY MEDICINE CLINIC | Age: 79
End: 2019-11-21

## 2019-11-21 DIAGNOSIS — M19.041 LOCALIZED OSTEOARTHRITIS OF HANDS, BILATERAL: ICD-10-CM

## 2019-11-21 DIAGNOSIS — M79.673 CHRONIC FOOT PAIN, UNSPECIFIED LATERALITY: Primary | ICD-10-CM

## 2019-11-21 DIAGNOSIS — G89.29 CHRONIC FOOT PAIN, UNSPECIFIED LATERALITY: Primary | ICD-10-CM

## 2019-11-21 DIAGNOSIS — M19.042 LOCALIZED OSTEOARTHRITIS OF HANDS, BILATERAL: ICD-10-CM

## 2019-11-21 LAB
EKG ATRIAL RATE: 50 BPM
EKG DIAGNOSIS: NORMAL
EKG P AXIS: 65 DEGREES
EKG P-R INTERVAL: 174 MS
EKG Q-T INTERVAL: 454 MS
EKG QRS DURATION: 140 MS
EKG QTC CALCULATION (BAZETT): 413 MS
EKG R AXIS: -71 DEGREES
EKG T AXIS: 54 DEGREES
EKG VENTRICULAR RATE: 50 BPM

## 2019-11-21 PROCEDURE — 93010 ELECTROCARDIOGRAM REPORT: CPT | Performed by: INTERNAL MEDICINE

## 2019-11-22 ENCOUNTER — TELEPHONE (OUTPATIENT)
Dept: CARDIOLOGY CLINIC | Age: 79
End: 2019-11-22

## 2019-12-05 ENCOUNTER — TELEPHONE (OUTPATIENT)
Dept: CARDIOLOGY CLINIC | Age: 79
End: 2019-12-05

## 2019-12-05 ENCOUNTER — HOSPITAL ENCOUNTER (OUTPATIENT)
Dept: GENERAL RADIOLOGY | Age: 79
Discharge: HOME | End: 2019-12-05
Payer: MEDICARE

## 2019-12-05 ENCOUNTER — HOSPITAL ENCOUNTER (OUTPATIENT)
Age: 79
Discharge: HOME | End: 2019-12-05
Payer: MEDICARE

## 2019-12-05 ENCOUNTER — ANTI-COAG VISIT (OUTPATIENT)
Dept: PHARMACY | Age: 79
End: 2019-12-05
Payer: MEDICARE

## 2019-12-05 DIAGNOSIS — M79.673 CHRONIC FOOT PAIN, UNSPECIFIED LATERALITY: ICD-10-CM

## 2019-12-05 DIAGNOSIS — G89.29 CHRONIC FOOT PAIN, UNSPECIFIED LATERALITY: ICD-10-CM

## 2019-12-05 DIAGNOSIS — G89.29 CHRONIC FOOT PAIN, UNSPECIFIED LATERALITY: Primary | ICD-10-CM

## 2019-12-05 DIAGNOSIS — M79.673 CHRONIC FOOT PAIN, UNSPECIFIED LATERALITY: Primary | ICD-10-CM

## 2019-12-05 DIAGNOSIS — I82.5Y3 CHRONIC DEEP VEIN THROMBOSIS (DVT) OF PROXIMAL VEIN OF BOTH LOWER EXTREMITIES (HCC): ICD-10-CM

## 2019-12-05 LAB — INR BLD: 2.9

## 2019-12-05 PROCEDURE — 73630 X-RAY EXAM OF FOOT: CPT

## 2019-12-05 PROCEDURE — 85610 PROTHROMBIN TIME: CPT | Performed by: FAMILY MEDICINE

## 2019-12-05 PROCEDURE — 99211 OFF/OP EST MAY X REQ PHY/QHP: CPT | Performed by: FAMILY MEDICINE

## 2019-12-06 ENCOUNTER — TELEPHONE (OUTPATIENT)
Dept: FAMILY MEDICINE CLINIC | Age: 79
End: 2019-12-06

## 2019-12-10 RX ORDER — WARFARIN SODIUM 5 MG/1
TABLET ORAL
Qty: 124 TABLET | Refills: 0 | Status: SHIPPED | OUTPATIENT
Start: 2019-12-10 | End: 2020-03-04 | Stop reason: SDUPTHER

## 2019-12-12 ENCOUNTER — HOSPITAL ENCOUNTER (OUTPATIENT)
Dept: MRI IMAGING | Age: 79
Discharge: HOME | End: 2019-12-12
Payer: MEDICARE

## 2019-12-12 DIAGNOSIS — M48.061 SPINAL STENOSIS OF LUMBAR REGION, UNSPECIFIED WHETHER NEUROGENIC CLAUDICATION PRESENT: ICD-10-CM

## 2019-12-12 PROCEDURE — 72148 MRI LUMBAR SPINE W/O DYE: CPT

## 2019-12-23 ENCOUNTER — ANTI-COAG VISIT (OUTPATIENT)
Dept: PHARMACY | Age: 79
End: 2019-12-23
Payer: MEDICARE

## 2019-12-23 DIAGNOSIS — I82.5Y3 CHRONIC DEEP VEIN THROMBOSIS (DVT) OF PROXIMAL VEIN OF BOTH LOWER EXTREMITIES (HCC): ICD-10-CM

## 2019-12-23 LAB — INR BLD: 1.6

## 2019-12-23 PROCEDURE — 85610 PROTHROMBIN TIME: CPT | Performed by: INTERNAL MEDICINE

## 2019-12-23 PROCEDURE — 99211 OFF/OP EST MAY X REQ PHY/QHP: CPT | Performed by: INTERNAL MEDICINE

## 2019-12-26 ENCOUNTER — TELEPHONE (OUTPATIENT)
Dept: PHARMACY | Facility: CLINIC | Age: 79
End: 2019-12-26

## 2020-01-14 ENCOUNTER — OFFICE VISIT (OUTPATIENT)
Dept: CARDIOLOGY CLINIC | Age: 80
End: 2020-01-14
Payer: MEDICARE

## 2020-01-14 VITALS
WEIGHT: 127.5 LBS | DIASTOLIC BLOOD PRESSURE: 64 MMHG | HEIGHT: 60 IN | OXYGEN SATURATION: 98 % | SYSTOLIC BLOOD PRESSURE: 138 MMHG | BODY MASS INDEX: 25.03 KG/M2 | HEART RATE: 52 BPM

## 2020-01-14 PROBLEM — I51.7 ATRIAL SEPTAL HYPERTROPHY: Status: ACTIVE | Noted: 2020-01-14

## 2020-01-14 PROCEDURE — 1090F PRES/ABSN URINE INCON ASSESS: CPT | Performed by: NURSE PRACTITIONER

## 2020-01-14 PROCEDURE — G8482 FLU IMMUNIZE ORDER/ADMIN: HCPCS | Performed by: NURSE PRACTITIONER

## 2020-01-14 PROCEDURE — 99213 OFFICE O/P EST LOW 20 MIN: CPT | Performed by: NURSE PRACTITIONER

## 2020-01-14 PROCEDURE — 1123F ACP DISCUSS/DSCN MKR DOCD: CPT | Performed by: NURSE PRACTITIONER

## 2020-01-14 PROCEDURE — G8399 PT W/DXA RESULTS DOCUMENT: HCPCS | Performed by: NURSE PRACTITIONER

## 2020-01-14 PROCEDURE — 1036F TOBACCO NON-USER: CPT | Performed by: NURSE PRACTITIONER

## 2020-01-14 PROCEDURE — 4040F PNEUMOC VAC/ADMIN/RCVD: CPT | Performed by: NURSE PRACTITIONER

## 2020-01-14 PROCEDURE — G8420 CALC BMI NORM PARAMETERS: HCPCS | Performed by: NURSE PRACTITIONER

## 2020-01-14 PROCEDURE — G8427 DOCREV CUR MEDS BY ELIG CLIN: HCPCS | Performed by: NURSE PRACTITIONER

## 2020-01-14 ASSESSMENT — ENCOUNTER SYMPTOMS
VOICE CHANGE: 1
BACK PAIN: 1
SHORTNESS OF BREATH: 0
COUGH: 0

## 2020-01-14 NOTE — PROGRESS NOTES
Patient is at acceptable (low) cv risk for planned procedure/surgery (cataract and hernia).
(LOVENOX) 60 MG/0.6ML injection Inject 0.6 mLs into the skin 2 times daily As directed  Patient not taking: Reported on 1/14/2020 12/6/19   Kristian Musa MD        Allergies:  Imitrex [sumatriptan]     Review of Systems:   Review of Systems   Constitutional: Positive for fatigue. Negative for activity change and appetite change. HENT: Positive for voice change. Respiratory: Negative for cough and shortness of breath. Cardiovascular: Positive for leg swelling. Negative for chest pain and palpitations. Musculoskeletal: Positive for arthralgias and back pain. Neurological: Positive for dizziness and weakness. Negative for syncope. Hematological: Bruises/bleeds easily. Psychiatric/Behavioral: Positive for sleep disturbance. Physical Examination:    Vitals:    01/14/20 1135   BP: 138/64   Pulse: 52   SpO2: 98%   Weight: 127 lb 8 oz (57.8 kg)   Height: 5' (1.524 m)        Constitutional and General Appearance: Warm and dry, no apparent distress, normal coloration  HEENT:  Normocephalic, atraumatic  Respiratory:  · Normal excursion and expansion without use of accessory muscles  · Resp Auscultation: Clear to auscultation   Cardiovascular:  · The apical impulses not displaced  · Heart tones are crisp and normal  · JVP 8 cm H2O  · Regular rate and rhythm, normal S1S2, 2/6 NASREEN, no g/r  · Peripheral pulses are symmetrical and full  · There is no clubbing, cyanosis of the extremities.   · 1+ BLE non-pitting edema, R >L  · Pedal Pulses: 2+ and equal   Abdomen:  · No masses or tenderness  · Liver/Spleen: No Abnormalities Noted  Neurological/Psychiatric:  · Alert and oriented in all spheres  · Moves all extremities well  · Exhibits normal gait balance and coordination  · No abnormalities of mood, affect, memory, mentation, or behavior are noted    Lab Data:  Most recent lab results below reviewed in office    CBC:   Lab Results   Component Value Date    WBC 4.2 11/20/2019    WBC 4.0 08/28/2019    WBC 5.4

## 2020-01-14 NOTE — LETTER
[facility]   Cardiac Evaluation    Primary Care Doctor:  Sangeetha Iqbal MD    Chief Complaint   Patient presents with    Follow-up     2 mo     Fatigue    Dizziness        History of Present Illness:   I had the pleasure of seeing Keenan Greco in follow up for lipomatous hypertrophy of the atrial septum, HTN, HLD as well as DVT on warfarin. She underwent LUIS to rule out mass, this showed lipomatous hypertrophy but no mass. She was cleared to undergo stem cell injection per pain management. She continues to have neck/ back pain but getting better since injection. She remains on warfarin for hx of DVT's, managed by PCP and coumadin clinic. She needs cataract surgery and hiatal hernia surgery. She denies any complications post LUIS. No chest pain or shortness of breath. No recent bleeding problems while on anticoagulation with warfarin. She wears bilateral lower extremity vascular compression stockings due to hx of DVT's causing chronic swelling. Keenan Greco describes symptoms including fatigue, edema, dizziness but denies chest pain, dyspnea, palpitations, orthopnea, PND, early saiety, syncope. NYHA:   II  ACC/ AHA Stage:    C    Past Medical History:   has a past medical history of Allergic rhinitis, Arthritis, Cancer ([address] Chronic back pain, Dementia ([address] Dental disease, Depression, Dizziness, GERD (gastroesophageal reflux disease), H/O blood clots, Hearing loss, Hx of blood clots, Hyperlipidemia, Hypertension, Lumbar stenosis with neurogenic claudication, Osteoarthritis of neck, and TMJ dysfunction. Surgical History:   has a past surgical history that includes  section; Hysterectomy; Lithotripsy; other surgical history; Mohs surgery; embolectomy; laminectomy (2018); epidural steroid injection (Bilateral, 2019); Hysterectomy, total abdominal; and epidural steroid injection (Right, 8/15/2019).    Social History: reports that she has never smoked. She has never used smokeless tobacco. She reports that she does not drink alcohol or use drugs. Family History:   Family History   Problem Relation Age of Onset    Cancer Other     Hypertension Other     Seizures Other     Heart Disease Mother     Other Father         murdered     Colon Cancer Brother     Cancer Maternal Aunt         breast    Colon Cancer Maternal Grandmother        Home Medications:  Prior to Admission medications    Medication Sig Start Date End Date Taking? Authorizing Provider   oxyCODONE-acetaminophen (PERCOCET) 5-325 MG per tablet Take 1 tablet by mouth 2 times daily for 30 days. 12/30/19 1/29/20 Yes Shonda Silvestre MD   lisinopril-hydrochlorothiazide (PRINZIDE;ZESTORETIC) 10-12.5 MG per tablet Take 1 tablet by mouth daily 12/26/19  Yes LIZZY Hutson - CNP   cephALEXin (KEFLEX) 250 MG capsule Take 3 capsules po TID, Start the day before procedure 12/11/19  Yes Shonda Silvestre MD   warfarin (COUMADIN) 5 MG tablet TAKE 1 AND 1/2 TABLETS BY  MOUTH DAILY EXCEPT ON  SUNDAY AND THURSDAY TAKE 1  TABLET OR AS DIRECTED BY  THE COUMADIN CLINIC 12/10/19  Yes Juan Delgado MD   Diapers & Supplies MISC 1 each by Does not apply route daily as needed (incontinence) 11/18/19  Yes Juan Delgado MD   pantoprazole (PROTONIX) 40 MG tablet Take 1 tablet by mouth daily 9/12/19  Yes Juan Delgado MD   sertraline (ZOLOFT) 100 MG tablet TAKE 1 TABLET BY MOUTH TWO  TIMES DAILY 9/12/19  Yes Juan Delgado MD   simvastatin (ZOCOR) 40 MG tablet Take 1 tablet by mouth nightly 9/12/19  Yes Juan Delgado MD   warfarin (COUMADIN) 7.5 MG tablet Take 7.5 mg by mouth Indications: Monday, Tuesday, Wednesday, Thursday, Friday, Saturday    Yes Historical Provider, MD   Misc. Devices MISC Compression stockings/open toe pantyhose with compression 40/50 6/21/18  Yes MD Jennifer Lopez.  Devices MISC George panty hose  Open toe Petite plus beige  2 pairs 4/11/17  Yes Gorge Velasquez MD   enoxaparin (LOVENOX) 60 MG/0.6ML injection Inject 0.6 mLs into the skin 2 times daily As directed  Patient not taking: Reported on 1/14/2020 12/6/19   Jackson Aguiar MD        Allergies:  Imitrex [sumatriptan]     Review of Systems:   Review of Systems   Constitutional: Positive for fatigue. Negative for activity change and appetite change. HENT: Positive for voice change. Respiratory: Negative for cough and shortness of breath. Cardiovascular: Positive for leg swelling. Negative for chest pain and palpitations. Musculoskeletal: Positive for arthralgias and back pain. Neurological: Positive for dizziness and weakness. Negative for syncope. Hematological: Bruises/bleeds easily. Psychiatric/Behavioral: Positive for sleep disturbance. Physical Examination:    Vitals:    01/14/20 1135   BP: 138/64   Pulse: 52   SpO2: 98%   Weight: 127 lb 8 oz (57.8 kg)   Height: 5' (1.524 m)        Constitutional and General Appearance: Warm and dry, no apparent distress, normal coloration  HEENT:  Normocephalic, atraumatic  Respiratory:  · Normal excursion and expansion without use of accessory muscles  · Resp Auscultation: Clear to auscultation   Cardiovascular:  · The apical impulses not displaced  · Heart tones are crisp and normal  · JVP 8 cm H2O  · Regular rate and rhythm, normal S1S2, 2/6 NASREEN, no g/r  · Peripheral pulses are symmetrical and full  · There is no clubbing, cyanosis of the extremities.   · 1+ BLE non-pitting edema, R >L  · Pedal Pulses: 2+ and equal   Abdomen:  · No masses or tenderness  · Liver/Spleen: No Abnormalities Noted  Neurological/Psychiatric:  · Alert and oriented in all spheres  · Moves all extremities well  · Exhibits normal gait balance and coordination  · No abnormalities of mood, affect, memory, mentation, or behavior are noted    Lab Data:  Most recent lab results below reviewed in office    CBC:   Lab Results   Component Value Date WBC 4.2 11/20/2019    WBC 4.0 08/28/2019    WBC 5.4 11/27/2018    RBC 3.94 11/20/2019    RBC 3.83 08/28/2019    RBC 3.96 11/27/2018    HGB 12.7 11/20/2019    HGB 12.8 08/28/2019    HGB 13.0 11/27/2018    HCT 38.5 11/20/2019    HCT 38.6 08/28/2019    HCT 38.9 11/27/2018    MCV 97.9 11/20/2019    .7 08/28/2019    MCV 98.2 11/27/2018    RDW 14.0 11/20/2019    RDW 14.1 08/28/2019    RDW 13.2 11/27/2018     11/20/2019     08/28/2019     11/27/2018     BMP:  Lab Results   Component Value Date     11/20/2019     08/28/2019     11/27/2018    K 4.2 11/20/2019    K 4.4 08/28/2019    K 3.3 11/27/2018     11/20/2019     08/28/2019     11/27/2018    CO2 31 11/20/2019    CO2 29 08/28/2019    CO2 29 11/27/2018    PHOS 3.1 10/24/2018    BUN 17 11/20/2019    BUN 27 08/28/2019    BUN 16 11/27/2018    CREATININE 0.7 11/20/2019    CREATININE 0.8 08/28/2019    CREATININE 0.8 05/15/2019     BNP: No results found for: PROBNP  LIPID:   Lab Results   Component Value Date    TRIG 63 08/28/2019    TRIG 70 06/14/2017    HDL 77 11/20/2019    HDL 87 08/28/2019    LDLCALC 71 11/20/2019    LDLCALC 77 08/28/2019       Cardiac Imaging:  LUIS 11/20/19:   Summary   Ejection fraction is visually estimated at 55%. Mild left atrial enlargement. A bubble study was performed and showed no evidence of shunting. There is lipomatous hypertrophy of the atrial septum. There is a prominent Eustachian valve. There is no evidence of a mass. Mild tricuspid regurgitation. Pt is at acceptable, low cv risk, for stem cell injection in back as per pain mgtment svc. Cardiac MRI 9/23/19  Suspected lipomatous hypertrophy of the inter atrial septum   Normal appearance of tricuspid, mitral and aortic valves   Small rounded masslike lesion contiguous with the endocardial surface of the right atrial base. Differential diagnosis is given above.    Echo: 9/3/19  Summary --There is a prominent echogenic mass on the atrial side of the anterior tricuspid valve leaflet. , suggest LUIS for further evaluation. -- Left ventricular systolic function is normal with a visually estimated ejection fraction of 55%. Mild concentric left ventricular hypertrophy. No regional wall motion abnormalities are noted. Indeterminate diastolic function. -- Moderate to severe calcification of the posterior mitral annulus. -- The left atrium appears mildly enlarged. -- A bubble study was performed and fails to show evidence of shunting.   -- Lipomatous hypertrophy of the interatrial septum. Mild aortic stenosis. Systolic pulmonary artery pressure (SPAP) is normal and estimated at 28 mmHg (right atrial pressure 3 mmHg). Frequent ectopy during the exam.       Assessment:    1. Atrial septal hypertrophy    2. Hypercholesteremia    3. Essential hypertension    4. Chronic deep vein thrombosis (DVT) of proximal vein of both lower extremities (HCC)          Plan:   1. No change in heart/ BP medicines  2. Will review with Dr. Christie Allison about upcoming surgeries (cataract, hiatal hernia)  3. Follow up with me in 3-4 months       I appreciate the opportunity of cooperating in the care of this individual.    LIZZY Coe - CNP, 1/14/2020, 11:40 AM       QUALITY MEASURES  1. Tobacco Cessation Counseling: NA  2. Retake of BP if >140/90:   NA  3. Documentation to PCP/referring for new patient:  Sent to PCP at close of office visit  4. CAD patient on anti-platelet: NA  5. CAD patient on STATIN therapy:  NA  6. Patient with CHF and aFib on anticoagulation:  NA (yes, for DVT)     Patient is at acceptable (low) cv risk for planned procedure/surgery (cataract and hernia).

## 2020-01-21 ENCOUNTER — ANTI-COAG VISIT (OUTPATIENT)
Dept: PHARMACY | Age: 80
End: 2020-01-21
Payer: MEDICARE

## 2020-01-21 PROBLEM — Z86.718 HISTORY OF DVT (DEEP VEIN THROMBOSIS): Status: ACTIVE | Noted: 2020-01-21

## 2020-01-21 LAB — INR BLD: 2.5

## 2020-01-21 PROCEDURE — 85610 PROTHROMBIN TIME: CPT

## 2020-01-21 PROCEDURE — 99211 OFF/OP EST MAY X REQ PHY/QHP: CPT

## 2020-01-21 NOTE — PROGRESS NOTES
Ms. Cony Reynoso is here for management of anticoagulation for hx of DVT. PMH also significant for HTN. She presents today w/out complaint. Pt verifies dosing regimen as listed above. Pt denies s/s bleeding/swelling/SOB. No missed doses. No changes in Rx/OTCs/Herbal medications. No major changes to diet. Occasional EtOH use and denies tobacco.  Procedures planned with no specific date: Dental procedure, Cataract surgery (pt states she will not be off Warfarin for this too). Pt stoped Meloxicam, off Gabapentin and Oxybutin. Pt had stem cells placed in her spine in Dec. She states her pain is dull not severe any more. INR 2.5 is within therapeutic range of 2-3. Recommend to continue 7.5mg daily, except 5 mg Q Sun. Pt has the 5 mg tablets. Will continue to monitor and check INR in 4 weeks. Dosing reminder card given with phone number, appointment date and time.    Return to clinic: 2/11 at 1130  Referring physician: Dr. Micaela Londono

## 2020-01-22 ENCOUNTER — TELEPHONE (OUTPATIENT)
Dept: FAMILY MEDICINE CLINIC | Age: 80
End: 2020-01-22

## 2020-02-10 RX ORDER — PANTOPRAZOLE SODIUM 40 MG/1
40 TABLET, DELAYED RELEASE ORAL DAILY
Qty: 90 TABLET | Refills: 1 | Status: SHIPPED | OUTPATIENT
Start: 2020-02-10 | End: 2020-07-15

## 2020-02-10 RX ORDER — SERTRALINE HYDROCHLORIDE 100 MG/1
TABLET, FILM COATED ORAL
Qty: 180 TABLET | Refills: 1 | Status: SHIPPED | OUTPATIENT
Start: 2020-02-10 | End: 2020-07-15

## 2020-02-10 RX ORDER — SIMVASTATIN 40 MG
40 TABLET ORAL NIGHTLY
Qty: 90 TABLET | Refills: 1 | Status: SHIPPED | OUTPATIENT
Start: 2020-02-10 | End: 2020-07-15

## 2020-02-10 NOTE — TELEPHONE ENCOUNTER
Refill Request     Last Seen: 11/18/2019    Last Written: 9/12/19    Next Appointment:   Future Appointments   Date Time Provider Clare Bliss   2/11/2020 11:30 AM [id number] [facility]   3/4/2020  1:30 PM Ray Crespo MD [facility]   4/15/2020  1:30 PM Tammy Comer APRN - CNP [facility]   8/28/2020  1:00 PM SCHEDULE, [facility]  [facility]  M[facility]           Requested Prescriptions     Pending Prescriptions Disp Refills    sertraline (ZOLOFT) 100 MG tablet [Pharmacy Med Name: SERTRALINE HCL 100MG TABLET] 180 tablet 1     Sig: TAKE 1 TABLET BY MOUTH TWO  TIMES DAILY    simvastatin (ZOCOR) 40 MG tablet [Pharmacy Med Name: SIMVASTATIN  40MG  TAB] 90 tablet 1     Sig: TAKE 1 TABLET BY MOUTH  NIGHTLY    pantoprazole (PROTONIX) 40 MG tablet [Pharmacy Med Name: PANTOPRAZOLE SOD 40MG EC TABLET] 90 tablet 1     Sig: TAKE 1 TABLET BY MOUTH  DAILY

## 2020-02-11 ENCOUNTER — TELEPHONE (OUTPATIENT)
Dept: FAMILY MEDICINE CLINIC | Age: 80
End: 2020-02-11

## 2020-02-11 NOTE — TELEPHONE ENCOUNTER
Patient would like a bi-lateral ear cleaning. Is not able to hear out of right ear. Home health nurse told her she couldn't see into the ear canal because it is so clogged up with wax. It is starting to ring now. Please advise on how you would like her scheduled. With you or a CNP/PA? For 15 mins or 1/2 hour.    To schedule: [phone]

## 2020-02-12 ENCOUNTER — OFFICE VISIT (OUTPATIENT)
Dept: FAMILY MEDICINE CLINIC | Age: 80
End: 2020-02-12
Payer: MEDICARE

## 2020-02-12 VITALS
BODY MASS INDEX: 23.79 KG/M2 | WEIGHT: 126 LBS | HEART RATE: 69 BPM | SYSTOLIC BLOOD PRESSURE: 134 MMHG | OXYGEN SATURATION: 98 % | DIASTOLIC BLOOD PRESSURE: 64 MMHG | HEIGHT: 61 IN

## 2020-02-12 PROCEDURE — 1090F PRES/ABSN URINE INCON ASSESS: CPT | Performed by: FAMILY MEDICINE

## 2020-02-12 PROCEDURE — G8482 FLU IMMUNIZE ORDER/ADMIN: HCPCS | Performed by: FAMILY MEDICINE

## 2020-02-12 PROCEDURE — 1123F ACP DISCUSS/DSCN MKR DOCD: CPT | Performed by: FAMILY MEDICINE

## 2020-02-12 PROCEDURE — 99213 OFFICE O/P EST LOW 20 MIN: CPT | Performed by: FAMILY MEDICINE

## 2020-02-12 PROCEDURE — G8427 DOCREV CUR MEDS BY ELIG CLIN: HCPCS | Performed by: FAMILY MEDICINE

## 2020-02-12 PROCEDURE — G8399 PT W/DXA RESULTS DOCUMENT: HCPCS | Performed by: FAMILY MEDICINE

## 2020-02-12 PROCEDURE — 4040F PNEUMOC VAC/ADMIN/RCVD: CPT | Performed by: FAMILY MEDICINE

## 2020-02-12 PROCEDURE — 1036F TOBACCO NON-USER: CPT | Performed by: FAMILY MEDICINE

## 2020-02-12 PROCEDURE — G8420 CALC BMI NORM PARAMETERS: HCPCS | Performed by: FAMILY MEDICINE

## 2020-02-13 NOTE — PROGRESS NOTES
Chief Complaint   Patient presents with    Cerumen Impaction    Tinnitus       C/o R ear pressure, discomfort, decr hearing x 1 month  Otherwise well no associated complaints  Normally follows w/ ENT for this    /64 (Site: Left Upper Arm, Position: Sitting, Cuff Size: Small Adult)   Pulse 69   Ht 5' 1\" (1.549 m)   Wt 126 lb (57.2 kg)   SpO2 98%   BMI 23.81 kg/m²     A+O, NAD,   Conj clear,   R ext canal occluded dense/dark cerumen impaction  L wnl  OP clear  Neck supple, no LAD  Lungs CTA B  CV: RRR,  Skin: warm dry, no rash no obvious lesions      Assessment/plan:     Kely Beckman was seen today for cerumen impaction and tinnitus. Diagnoses and all orders for this visit:    Otalgia, right    Impacted cerumen of right ear        Ceruminosis/impaction was noted. Waxremoved by syringing and manual debridement. Instructions for home care to prevent wax buildup are given. Pt advised to call or follow up with any concerns or sx's.

## 2020-02-18 ENCOUNTER — TELEPHONE (OUTPATIENT)
Dept: FAMILY MEDICINE CLINIC | Age: 80
End: 2020-02-18

## 2020-03-04 RX ORDER — WARFARIN SODIUM 5 MG/1
TABLET ORAL
Qty: 124 TABLET | Refills: 0 | Status: SHIPPED | OUTPATIENT
Start: 2020-03-04 | End: 2020-05-13

## 2020-03-04 RX ORDER — LISINOPRIL AND HYDROCHLOROTHIAZIDE 12.5; 1 MG/1; MG/1
1 TABLET ORAL DAILY
Qty: 90 TABLET | Refills: 3 | Status: SHIPPED | OUTPATIENT
Start: 2020-03-04 | End: 2020-12-09 | Stop reason: SDUPTHER

## 2020-03-04 NOTE — TELEPHONE ENCOUNTER
Last Seen: 2/12/2020        Next Appointment: 8/28/2020    Requested Prescriptions     Pending Prescriptions Disp Refills    warfarin (COUMADIN) 5 MG tablet 124 tablet 0     Sig: TAKE 1 AND 1/2 TABLETS BY  MOUTH DAILY EXCEPT ON  SUNDAY AND THURSDAY TAKE 1  TABLET OR AS DIRECTED BY  THE COUMADIN CLINIC    lisinopril-hydroCHLOROthiazide (PRINZIDE;ZESTORETIC) 10-12.5 MG per tablet 90 tablet 0     Sig: Take 1 tablet by mouth daily

## 2020-04-08 ENCOUNTER — TELEPHONE (OUTPATIENT)
Dept: CARDIOLOGY CLINIC | Age: 80
End: 2020-04-08

## 2020-04-14 NOTE — PROGRESS NOTES
Prior to Visit Medications    Medication Sig Taking? Authorizing Provider   L-Methylfolate-[id number] (CEREFOLIN PO) Take by mouth Yes Historical Provider, MD   Nutritional Supplements (EQUATE PO) Take by mouth Yes Historical Provider, MD   oxyCODONE-acetaminophen (PERCOCET) 5-325 MG per tablet Take 1 tablet by mouth 2 times daily for 30 days. Patient taking differently: Take 1 tablet by mouth 2 times daily. 1/2 tab tid Yes Korina Inman MD   oxyCODONE-acetaminophen (PERCOCET) 5-325 MG per tablet Take 1 tablet by mouth 2 times daily for 30 days. Yes Korina Inman MD   warfarin (COUMADIN) 5 MG tablet TAKE 1 AND 1/2 TABLETS BY  MOUTH DAILY EXCEPT ON  SUNDAY AND THURSDAY TAKE 1  TABLET OR AS DIRECTED BY  THE COUMADIN CLINIC Yes Jenny Ludwig MD   lisinopril-hydroCHLOROthiazide (PRINZIDE;ZESTORETIC) 10-12.5 MG per tablet Take 1 tablet by mouth daily Yes Jenny Ludwig MD   sertraline (ZOLOFT) 100 MG tablet TAKE 1 TABLET BY MOUTH TWO  TIMES DAILY Yes Jenny Ludwig MD   simvastatin (ZOCOR) 40 MG tablet TAKE 1 TABLET BY MOUTH  NIGHTLY Yes Jenny Ludwig MD   pantoprazole (PROTONIX) 40 MG tablet TAKE 1 TABLET BY MOUTH  DAILY Yes Jenny Ludwig MD   Diapers & Supplies MISC 1 each by Does not apply route daily as needed (incontinence) Yes Jenny Ludwig MD   warfarin (COUMADIN) 7.5 MG tablet Take 7.5 mg by mouth Indications: Monday, Tuesday, Wednesday, Thursday, Friday, Saturday  Yes Historical Provider, MD [facility]. Devices MISC Compression stockings/open toe pantyhose with compression 40/50 Yes MD Violetta Tylerc.  Devices MISC George panty hose  Open toe Petite plus beige  2 pairs Yes Wilfredo Hui MD         Allergies   Allergen Reactions    Imitrex [Sumatriptan] Anaphylaxis and Shortness Of Breath     Swelling in arm of injection cite, and SOB   ,   Past Medical History:   Diagnosis Date    Allergic rhinitis     Arthritis     Cancer (HCC)     squamous cell on nose     Chronic back pain ectopy during the exam.         ASSESSMENT:  1. Atrial septal hypertrophy    2. Cardiac mass    3. Essential hypertension    4. Hypercholesteremia    5. RBBB         PLAN:  1. Okay to proceed with cataract and hernia surgery   2. No change from heart perspective  3. Follow up with Dr. Kris Bruno in 5-6 months      Jacquie Sosa is a 70 y.o. female being evaluated by a Virtual Visit (audio visit) encounter to address concerns as mentioned above. A caregiver was present when appropriate. Due to this being a TeleHealth encounter (During LCOFS-11 public health emergency), evaluation of the following organ systems was limited: Vitals/Constitutional/EENT/Resp/CV/GI//MS/Neuro/Skin/Heme-Lymph-Imm. Pursuant to the emergency declaration under the [address] [place] [address] authority and the [employer] and [employer] Act, this Virtual Visit was conducted with patient's (and/or legal guardian's) consent, to reduce the patient's risk of exposure to COVID-19 and provide necessary medical care. The patient (and/or legal guardian) has also been advised to contact this office for worsening conditions or problems, and seek emergency medical treatment and/or call 911 if deemed necessary. Services were provided through a audio discussion virtually to substitute for in-person clinic visit. Patient was located at their individual homes. --LIZZY Pruett CNP on 4/15/2020 at 1:33 PM    An electronic signature was used to authenticate this note.

## 2020-04-15 ENCOUNTER — VIRTUAL VISIT (OUTPATIENT)
Dept: CARDIOLOGY CLINIC | Age: 80
End: 2020-04-15
Payer: MEDICARE

## 2020-04-15 VITALS
OXYGEN SATURATION: 99 % | BODY MASS INDEX: 22.77 KG/M2 | HEART RATE: 74 BPM | SYSTOLIC BLOOD PRESSURE: 116 MMHG | DIASTOLIC BLOOD PRESSURE: 62 MMHG | WEIGHT: 120.6 LBS | HEIGHT: 61 IN

## 2020-04-15 PROCEDURE — 4040F PNEUMOC VAC/ADMIN/RCVD: CPT | Performed by: NURSE PRACTITIONER

## 2020-04-15 PROCEDURE — 1090F PRES/ABSN URINE INCON ASSESS: CPT | Performed by: NURSE PRACTITIONER

## 2020-04-15 PROCEDURE — G8427 DOCREV CUR MEDS BY ELIG CLIN: HCPCS | Performed by: NURSE PRACTITIONER

## 2020-04-15 PROCEDURE — 1123F ACP DISCUSS/DSCN MKR DOCD: CPT | Performed by: NURSE PRACTITIONER

## 2020-04-15 PROCEDURE — G8399 PT W/DXA RESULTS DOCUMENT: HCPCS | Performed by: NURSE PRACTITIONER

## 2020-04-15 PROCEDURE — 99213 OFFICE O/P EST LOW 20 MIN: CPT | Performed by: NURSE PRACTITIONER

## 2020-04-15 ASSESSMENT — ENCOUNTER SYMPTOMS
ABDOMINAL DISTENTION: 1
EYE ITCHING: 1
ABDOMINAL PAIN: 1
SHORTNESS OF BREATH: 1
RHINORRHEA: 1

## 2020-04-23 ENCOUNTER — TELEPHONE (OUTPATIENT)
Dept: FAMILY MEDICINE CLINIC | Age: 80
End: 2020-04-23

## 2020-05-13 RX ORDER — WARFARIN SODIUM 5 MG/1
TABLET ORAL
Qty: 124 TABLET | Refills: 0 | Status: SHIPPED | OUTPATIENT
Start: 2020-05-13 | End: 2020-08-19

## 2020-05-14 ENCOUNTER — ANTI-COAG VISIT (OUTPATIENT)
Dept: PHARMACY | Age: 80
End: 2020-05-14
Payer: MEDICARE

## 2020-05-14 LAB — INR BLD: 2.5

## 2020-05-14 PROCEDURE — 85610 PROTHROMBIN TIME: CPT

## 2020-05-14 PROCEDURE — 99211 OFF/OP EST MAY X REQ PHY/QHP: CPT

## 2020-05-14 NOTE — PROGRESS NOTES
MsAmie River is here for management of anticoagulation for hx of DVT. PMH also significant for HTN. She presents today w/out complaint. Pt verifies dosing regimen as listed above. Pt denies s/s bleeding/swelling/SOB. No missed doses. No changes in Rx/OTCs/Herbal medications. No major changes to diet. Occasional EtOH use and denies tobacco.  Procedures planned with no specific date: Dental procedure, Cataract surgery (pt states she will not be off Warfarin for this too). Pt stoped Meloxicam, off Gabapentin and Oxybutin. Pt had stem cells placed in her spine in Dec. She states her pain is dull not severe any more. INR 2.5 is within therapeutic range of 2-3. Recommend to continue 7.5mg daily, except 5 mg Q Sun. Pt has the 5 mg tablets. Will continue to monitor and check INR in 4 weeks. Dosing reminder card given with phone number, appointment date and time.    Return to clinic: 6/11 at [phone]  Referring physician: Dr. Kassi Beckman, PharmD 5/14/20  10:17 AM      CLINICAL PHARMACY CONSULT: MED RECONCILIATION/REVIEW ADDENDUM    For Pharmacy Admin Tracking Only    PHSO: Yes  Total # of Interventions Recommended: 0  - Maintenance Safety Lab Monitoring #: 1  Total Interventions Accepted: 0  Time Spent (min): 15

## 2020-06-11 ENCOUNTER — ANTI-COAG VISIT (OUTPATIENT)
Dept: PHARMACY | Age: 80
End: 2020-06-11
Payer: MEDICARE

## 2020-06-11 LAB — INR BLD: 1.8

## 2020-06-11 PROCEDURE — 99211 OFF/OP EST MAY X REQ PHY/QHP: CPT

## 2020-06-11 PROCEDURE — 85610 PROTHROMBIN TIME: CPT

## 2020-07-14 NOTE — TELEPHONE ENCOUNTER
.  Last office visit 2/12/2020     Last written 2- 90 with 1      Next office visit scheduled 8/28/2020 [facility]    Requested Prescriptions     Pending Prescriptions Disp Refills    pantoprazole (PROTONIX) 40 MG tablet [Pharmacy Med Name: PANTOPRAZOLE SOD 40MG EC TABLET] 90 tablet 1     Sig: TAKE 1 TABLET BY MOUTH  DAILY    simvastatin (ZOCOR) 40 MG tablet [Pharmacy Med Name: SIMVASTATIN  40MG  TAB] 90 tablet 1     Sig: TAKE 1 TABLET BY MOUTH  NIGHTLY    sertraline (ZOLOFT) 100 MG tablet [Pharmacy Med Name: SERTRALINE HCL 100MG TABLET] 180 tablet 1     Sig: TAKE 1 TABLET BY MOUTH TWO  TIMES DAILY

## 2020-07-15 RX ORDER — SIMVASTATIN 40 MG
40 TABLET ORAL NIGHTLY
Qty: 90 TABLET | Refills: 1 | Status: SHIPPED | OUTPATIENT
Start: 2020-07-15 | End: 2020-12-09 | Stop reason: SDUPTHER

## 2020-07-15 RX ORDER — SERTRALINE HYDROCHLORIDE 100 MG/1
TABLET, FILM COATED ORAL
Qty: 180 TABLET | Refills: 1 | Status: SHIPPED | OUTPATIENT
Start: 2020-07-15 | End: 2020-12-07 | Stop reason: SDUPTHER

## 2020-07-15 RX ORDER — PANTOPRAZOLE SODIUM 40 MG/1
40 TABLET, DELAYED RELEASE ORAL DAILY
Qty: 90 TABLET | Refills: 1 | Status: SHIPPED | OUTPATIENT
Start: 2020-07-15 | End: 2021-02-22

## 2020-07-24 ENCOUNTER — ANTI-COAG VISIT (OUTPATIENT)
Dept: PHARMACY | Age: 80
End: 2020-07-24
Payer: MEDICARE

## 2020-07-24 LAB — INR BLD: 2

## 2020-07-24 PROCEDURE — 85610 PROTHROMBIN TIME: CPT

## 2020-07-24 PROCEDURE — 99211 OFF/OP EST MAY X REQ PHY/QHP: CPT

## 2020-07-24 NOTE — PROGRESS NOTES
Ms. Marlin Hardy is here for management of anticoagulation for hx of DVT. PMH also significant for HTN. She presents today w/out complaint. Pt verifies dosing regimen as listed above. Pt denies s/s bleeding/swelling/SOB. No missed doses. No changes in Rx/OTCs/Herbal medications. No major changes to diet. Occasional EtOH use and denies tobacco.  Procedures planned with no specific date: Dental procedure, Cataract surgery (pt states she will not be off Warfarin for this too). Pt stoped Meloxicam, off Gabapentin and Oxybutin. Pt had stem cells placed in her spine in Dec. She states her pain is dull not severe any more. Reports a little more salad/spinach the last few weeks. Taking a vitamin for dementia (vitB) and will be starting to take a collagen+biotin supplement (does not contain any vitK) for memory issues      INR 2.0 is within therapeutic range of 2-3. Recommend to continue 7.5mg daily, except 5 mg Q Sun. Pt has the 5 mg tablets. Will continue to monitor and check INR in 4 weeks. Dosing reminder card given with phone number, appointment date and time.    Return to clinic: 8/21 @1018  Referring physician: Dr. Cole Lion

## 2020-08-19 RX ORDER — WARFARIN SODIUM 5 MG/1
TABLET ORAL
Qty: 124 TABLET | Refills: 0 | Status: SHIPPED | OUTPATIENT
Start: 2020-08-19 | End: 2020-12-07 | Stop reason: SDUPTHER

## 2020-08-19 NOTE — TELEPHONE ENCOUNTER
.  Last office visit 2/12/2020     Last written 5- 124 with 0      Next office visit scheduled 8/28/2020    Requested Prescriptions     Pending Prescriptions Disp Refills    warfarin (COUMADIN) 5 MG tablet [Pharmacy Med Name: WARFARIN  5MG  TAB] 124 tablet 0     Sig: TAKE 1 AND 1/2 TABLETS BY  MOUTH DAILY EXCEPT ON  SUNDAY AND THURSDAY TAKE 1  TABLET OR AS DIRECTED BY  THE COUMADIN CLINIC

## 2020-08-21 ENCOUNTER — ANTI-COAG VISIT (OUTPATIENT)
Dept: PHARMACY | Age: 80
End: 2020-08-21
Payer: MEDICARE

## 2020-08-21 LAB — INR BLD: 2.4

## 2020-08-21 PROCEDURE — 85610 PROTHROMBIN TIME: CPT

## 2020-08-21 PROCEDURE — 99211 OFF/OP EST MAY X REQ PHY/QHP: CPT

## 2020-09-01 ENCOUNTER — HOSPITAL ENCOUNTER (OUTPATIENT)
Dept: MRI IMAGING | Age: 80
Discharge: HOME | End: 2020-09-01
Payer: MEDICARE

## 2020-09-01 PROCEDURE — 72148 MRI LUMBAR SPINE W/O DYE: CPT

## 2020-09-03 ENCOUNTER — VIRTUAL VISIT (OUTPATIENT)
Dept: FAMILY MEDICINE CLINIC | Age: 80
End: 2020-09-03
Payer: MEDICARE

## 2020-09-03 ENCOUNTER — HOSPITAL ENCOUNTER (EMERGENCY)
Age: 80
Discharge: HOME | End: 2020-09-03
Attending: EMERGENCY MEDICINE
Payer: MEDICARE

## 2020-09-03 ENCOUNTER — APPOINTMENT (OUTPATIENT)
Dept: GENERAL RADIOLOGY | Age: 80
End: 2020-09-03
Payer: MEDICARE

## 2020-09-03 ENCOUNTER — APPOINTMENT (OUTPATIENT)
Dept: CT IMAGING | Age: 80
End: 2020-09-03
Payer: MEDICARE

## 2020-09-03 VITALS — BODY MASS INDEX: 22.66 KG/M2 | HEIGHT: 61 IN | TEMPERATURE: 98 F | WEIGHT: 120 LBS

## 2020-09-03 VITALS
BODY MASS INDEX: 23.98 KG/M2 | OXYGEN SATURATION: 95 % | WEIGHT: 127 LBS | HEART RATE: 61 BPM | DIASTOLIC BLOOD PRESSURE: 78 MMHG | TEMPERATURE: 98.3 F | RESPIRATION RATE: 18 BRPM | SYSTOLIC BLOOD PRESSURE: 139 MMHG | HEIGHT: 61 IN

## 2020-09-03 LAB
A/G RATIO: 1.2 (ref 1.1–2.2)
ALBUMIN SERPL-MCNC: 3.9 G/DL (ref 3.4–5)
ALP BLD-CCNC: 79 U/L (ref 40–129)
ALT SERPL-CCNC: 13 U/L (ref 10–40)
ANION GAP SERPL CALCULATED.3IONS-SCNC: 8 MMOL/L (ref 3–16)
AST SERPL-CCNC: 21 U/L (ref 15–37)
BASOPHILS ABSOLUTE: 0.1 K/UL (ref 0–0.2)
BASOPHILS RELATIVE PERCENT: 1.3 %
BILIRUB SERPL-MCNC: <0.2 MG/DL (ref 0–1)
BUN BLDV-MCNC: 23 MG/DL (ref 7–20)
CALCIUM SERPL-MCNC: 9.4 MG/DL (ref 8.3–10.6)
CHLORIDE BLD-SCNC: 102 MMOL/L (ref 99–110)
CO2: 33 MMOL/L (ref 21–32)
CREAT SERPL-MCNC: 0.7 MG/DL (ref 0.6–1.2)
EOSINOPHILS ABSOLUTE: 0.1 K/UL (ref 0–0.6)
EOSINOPHILS RELATIVE PERCENT: 2.7 %
GFR AFRICAN AMERICAN: >60
GFR NON-AFRICAN AMERICAN: >60
GLOBULIN: 3.3 G/DL
GLUCOSE BLD-MCNC: 90 MG/DL (ref 70–99)
HCT VFR BLD CALC: 37.2 % (ref 36–48)
HEMOGLOBIN: 12.3 G/DL (ref 12–16)
LYMPHOCYTES ABSOLUTE: 1.8 K/UL (ref 1–5.1)
LYMPHOCYTES RELATIVE PERCENT: 37.9 %
MCH RBC QN AUTO: 32 PG (ref 26–34)
MCHC RBC AUTO-ENTMCNC: 33.2 G/DL (ref 31–36)
MCV RBC AUTO: 96.4 FL (ref 80–100)
MONOCYTES ABSOLUTE: 0.7 K/UL (ref 0–1.3)
MONOCYTES RELATIVE PERCENT: 14 %
NEUTROPHILS ABSOLUTE: 2.1 K/UL (ref 1.7–7.7)
NEUTROPHILS RELATIVE PERCENT: 44.1 %
PDW BLD-RTO: 14.1 % (ref 12.4–15.4)
PLATELET # BLD: 185 K/UL (ref 135–450)
PMV BLD AUTO: 9.7 FL (ref 5–10.5)
POTASSIUM REFLEX MAGNESIUM: 4.1 MMOL/L (ref 3.5–5.1)
RBC # BLD: 3.86 M/UL (ref 4–5.2)
SODIUM BLD-SCNC: 143 MMOL/L (ref 136–145)
TOTAL PROTEIN: 7.2 G/DL (ref 6.4–8.2)
TROPONIN: <0.01 NG/ML
WBC # BLD: 4.8 K/UL (ref 4–11)

## 2020-09-03 PROCEDURE — 70450 CT HEAD/BRAIN W/O DYE: CPT

## 2020-09-03 PROCEDURE — 1123F ACP DISCUSS/DSCN MKR DOCD: CPT | Performed by: FAMILY MEDICINE

## 2020-09-03 PROCEDURE — 4040F PNEUMOC VAC/ADMIN/RCVD: CPT | Performed by: FAMILY MEDICINE

## 2020-09-03 PROCEDURE — G0439 PPPS, SUBSEQ VISIT: HCPCS | Performed by: FAMILY MEDICINE

## 2020-09-03 PROCEDURE — 93005 ELECTROCARDIOGRAM TRACING: CPT | Performed by: EMERGENCY MEDICINE

## 2020-09-03 PROCEDURE — 84484 ASSAY OF TROPONIN QUANT: CPT

## 2020-09-03 PROCEDURE — 99284 EMERGENCY DEPT VISIT MOD MDM: CPT

## 2020-09-03 PROCEDURE — 71045 X-RAY EXAM CHEST 1 VIEW: CPT

## 2020-09-03 PROCEDURE — 80053 COMPREHEN METABOLIC PANEL: CPT

## 2020-09-03 PROCEDURE — 73560 X-RAY EXAM OF KNEE 1 OR 2: CPT

## 2020-09-03 PROCEDURE — 72125 CT NECK SPINE W/O DYE: CPT

## 2020-09-03 PROCEDURE — 85025 COMPLETE CBC W/AUTO DIFF WBC: CPT

## 2020-09-03 ASSESSMENT — PATIENT HEALTH QUESTIONNAIRE - PHQ9
SUM OF ALL RESPONSES TO PHQ QUESTIONS 1-9: 0
SUM OF ALL RESPONSES TO PHQ QUESTIONS 1-9: 0

## 2020-09-03 ASSESSMENT — LIFESTYLE VARIABLES: HOW OFTEN DO YOU HAVE A DRINK CONTAINING ALCOHOL: 0

## 2020-09-03 NOTE — PATIENT INSTRUCTIONS
Personalized Preventive Plan for [facility] - 9/3/2020  Medicare offers a range of preventive health benefits. Some of the tests and screenings are paid in full while other may be subject to a deductible, co-insurance, and/or copay. Some of these benefits include a comprehensive review of your medical history including lifestyle, illnesses that may run in your family, and various assessments and screenings as appropriate. After reviewing your medical record and screening and assessments performed today your provider may have ordered immunizations, labs, imaging, and/or referrals for you. A list of these orders (if applicable) as well as your Preventive Care list are included within your After Visit Summary for your review. Other Preventive Recommendations:    · A preventive eye exam performed by an eye specialist is recommended every 1-2 years to screen for glaucoma; cataracts, macular degeneration, and other eye disorders. · A preventive dental visit is recommended every 6 months. · Try to get at least 150 minutes of exercise per week or 10,000 steps per day on a pedometer . · Order or download the FREE \"Exercise & Physical Activity: Your Everyday Guide\" from [employer]. Call 2-454.197.8118 or search [employer] online. · You need 4999-2483 mg of calcium and 0827-9814 IU of vitamin D per day. It is possible to meet your calcium requirement with diet alone, but a vitamin D supplement is usually necessary to meet this goal.  · When exposed to the sun, use a sunscreen that protects against both UVA and UVB radiation with an SPF of 30 or greater. Reapply every 2 to 3 hours or after sweating, drying off with a towel, or swimming. · Always wear a seat belt when traveling in a car. Always wear a helmet when riding a bicycle or motorcycle. Heart-Healthy Diet   Sodium, Fat, and Cholesterol Controlled Diet       What Is a Heart Healthy Diet?    A heart-healthy diet is one that limits sodium , certain types of fat , and cholesterol . This type of diet is recommended for:   People with any form of cardiovascular disease (eg, coronary heart disease , peripheral vascular disease , previous heart attack , previous stroke )   People with risk factors for cardiovascular disease, such as high blood pressure , high cholesterol , or diabetes   Anyone who wants to lower their risk of developing cardiovascular disease   Sodium    Sodium is a mineral found in many foods. In general, most people consume much more sodium than they need. Diets high in sodium can increase blood pressure and lead to edema (water retention). On a heart-healthy diet, you should consume no more than 2,300 mg (milligrams) of sodium per dayabout the amount in one teaspoon of table salt. The foods highest in sodium include table salt (about 50% sodium), processed foods, convenience foods, and preserved foods. Cholesterol    Cholesterol is a fat-like, waxy substance in your blood. Our bodies make some cholesterol. It is also found in animal products, with the highest amounts in fatty meat, egg yolks, whole milk, cheese, shellfish, and organ meats. On a heart-healthy diet, you should limit your cholesterol intake to less than 200 mg per day. It is normal and important to have some cholesterol in your bloodstream. But too much cholesterol can cause plaque to build up within your arteries, which can eventually lead to a heart attack or stroke. The two types of cholesterol that are most commonly referred to are:   Low-density lipoprotein (LDL) cholesterol  Also known as bad cholesterol, this is the cholesterol that tends to build up along your arteries. Bad cholesterol levels are increased by eating fats that are saturated or hydrogenated. Optimal level of this cholesterol is less than 100. Over 130 starts to get risky for heart disease.    High-density lipoprotein (HDL) cholesterol  Also known as good cholesterol, this type of cholesterol actually carries cholesterol away from your arteries and may, therefore, help lower your risk of having a heart attack. You want this level to be high (ideally greater than 60). It is a risk to have a level less than 40. You can raise this good cholesterol by eating olive oil, canola oil, avocados, or nuts. Exercise raises this level, too. Fat    Fat is calorie dense and packs a lot of calories into a small amount of food. Even though fats should be limited due to their high calorie content, not all fats are bad. In fact, some fats are quite healthful. Fat can be broken down into four main types. The good-for-you fats are:   Monounsaturated fat  found in oils such as olive and canola, avocados, and nuts and natural nut butters; can decrease cholesterol levels, while keeping levels of HDL cholesterol high   Polyunsaturated fat  found in oils such as safflower, sunflower, soybean, corn, and sesame; can decrease total cholesterol and LDL cholesterol   Omega-3 fatty acids  particularly those found in fatty fish (such as salmon, trout, tuna, mackerel, herring, and sardines); can decrease risk of arrhythmias, decrease triglyceride levels, and slightly lower blood pressure   The fats that you want to limit are:   Saturated fat  found in animal products, many fast foods, and a few vegetables; increases total blood cholesterol, including LDL levels   Animal fats that are saturated include: butter, lard, whole-milk dairy products, meat fat, and poultry skin   Vegetable fats that are saturated include: hydrogenated shortening, palm oil, coconut oil, cocoa butter   Hydrogenated or trans fat  found in margarine and vegetable shortening, most shelf stable snack foods, and fried foods; increases LDL and decreases HDL     It is generally recommended that you limit your total fat for the day to less than 30% of your total calories.  If you follow an 1800-calorie heart healthy diet, for example, this would mean 60 grams of fat or less per day. Saturated fat and trans fat in your diet raises your blood cholesterol the most, much more than dietary cholesterol does. For this reason, on a heart-healthy diet, less than 7% of your calories should come from saturated fat and ideally 0% from trans fat. On an 1800-calorie diet, this translates into less than 14 grams of saturated fat per day, leaving 46 grams of fat to come from mono- and polyunsaturated fats.    Food Choices on a Heart Healthy Diet   Food Category   Foods Recommended   Foods to Avoid   Grains   Breads and rolls without salted tops Most dry and cooked cereals Unsalted crackers and breadsticks Low-sodium or homemade breadcrumbs or stuffing All rice and pastas   Breads, rolls, and crackers with salted tops High-fat baked goods (eg, muffins, donuts, pastries) Quick breads, self-rising flour, and biscuit mixes Regular bread crumbs Instant hot cereals Commercially prepared rice, pasta, or stuffing mixes   Vegetables   Most fresh, frozen, and low-sodium canned vegetables Low-sodium and salt-free vegetable juices Canned vegetables if unsalted or rinsed   Regular canned vegetables and juices, including sauerkraut and pickled vegetables Frozen vegetables with sauces Commercially prepared potato and vegetable mixes   Fruits   Most fresh, frozen, and canned fruits All fruit juices   Fruits processed with salt or sodium   Milk   Nonfat or low-fat (1%) milk Nonfat or low-fat yogurt Cottage cheese, low-fat ricotta, cheeses labeled as low-fat and low-sodium   Whole milk Reduced-fat (2%) milk Malted and chocolate milk Full fat yogurt Most cheeses (unless low-fat and low salt) Buttermilk (no more than 1 cup per week)   Meats and Beans   Lean cuts of fresh or frozen beef, veal, lamb, or pork (look for the word loin) Fresh or frozen poultry without the skin Fresh or frozen fish and some shellfish Egg whites and egg substitutes (Limit whole eggs to three per week) Tofu Nuts or seeds (unsalted, dry-roasted), low-sodium peanut butter Dried peas, beans, and lentils   Any smoked, cured, salted, or canned meat, fish, or poultry (including rivera, chipped beef, cold cuts, hot dogs, sausages, sardines, and anchovies) Poultry skins Breaded and/or fried fish or meats Canned peas, beans, and lentils Salted nuts   Fats and Oils   Olive oil and canola oil Low-sodium, low-fat salad dressings and mayonnaise   Butter, margarine, coconut and palm oils, rivera fat   Snacks, Sweets, and Condiments   Low-sodium or unsalted versions of broths, soups, soy sauce, and condiments Pepper, herbs, and spices; vinegar, lemon, or lime juice Low-fat frozen desserts (yogurt, sherbet, fruit bars) Sugar, cocoa powder, honey, syrup, jam, and preserves Low-fat, trans-fat free cookies, cakes, and pies Ramón and animal crackers, fig bars, laura snaps   High-fat desserts Broth, soups, gravies, and sauces, made from instant mixes or other high-sodium ingredients Salted snack foods Canned olives Meat tenderizers, seasoning salt, and most flavored vinegars   Beverages   Low-sodium carbonated beverages Tea and coffee in moderation Soy milk   Commercially softened water   Suggestions   Make whole grains, fruits, and vegetables the base of your diet. Choose heart-healthy fats such as canola, olive, and flaxseed oil, and foods high in heart-healthy fats, such as nuts, seeds, soybeans, tofu, and fish. Eat fish at least twice per week; the fish highest in omega-3 fatty acids and lowest in mercury include salmon, herring, mackerel, sardines, and canned chunk light tuna. If you eat fish less than twice per week or have high triglycerides, talk to your doctor about taking fish oil supplements. Read food labels.    For products low in fat and cholesterol, look for fat free, low-fat, cholesterol free, saturated fat free, and trans fat freeAlso scan the Nutrition Facts Label, which lists saturated fat, trans fat, and cholesterol amounts. For products low in sodium, look for sodium free, very low sodium, low sodium, no added salt, and unsalted   Skip the salt when cooking or at the table; if food needs more flavor, get creative and try out different herbs and spices. Garlic and onion also add substantial flavor to foods. Trim any visible fat off meat and poultry before cooking, and drain the fat off after dumont. Use cooking methods that require little or no added fat, such as grilling, boiling, baking, poaching, broiling, roasting, steaming, stir-frying, and sauting. Avoid fast food and convenience food. They tend to be high in saturated and trans fat and have a lot of added salt. Talk to a registered dietitian for individualized diet advice. Last Reviewed: March 2011 Lori Wahl MS, MPH, RD   Updated: 3/29/2011   ·     Keep Your Memory Jacques Connell       Many factors can affect your ability to remembera hectic lifestyle, aging, stress, chronic disease, and certain medicines. But, there are steps you can take to sharpen your mind and help preserve your memory. Challenge Your Brain   Regularly challenging your mind may help keeps it in top shape. Good mental exercises include:   Crossword puzzlesUse a dictionary if you need it; you will learn more that way. Brainteasers Try some! Crafts, such as wood working and sewing   Hobbies, such as gardening and building model airplanes   SocializingVisit old friends or join groups to meet new ones. Reading   Learning a new language   Taking a class, whether it be art history or ruth chi   TravelingExperience the food, history, and culture of your destination   Learning to use a computer   Going to museums, the theater, or thought-provoking movies   Changing things in your daily life, such as reversing your pattern in the grocery store or brushing your teeth using your nondominant hand   Use Memory Aids   There is no need to remember every detail on your own.  These memory aids can help:   Calendars and day planners   Electronic organizers to store all sorts of helpful informationThese devices can \"beep\" to remind you of appointments. A book of days to record birthdays, anniversaries, and other occasions that occur on the same date every year   Detailed \"to-do\" lists and strategically placed sticky notes   Quick \"study\" sessionsBefore a gathering, review who will be there so their names will be fresh in your mind. Establish routinesFor example, keep your keys, wallet, and umbrella in the same place all the time or take medicine with your 8:00 AM glass of juice   Live a Healthy Life   Many actions that will keep your body strong will do the same for your mind. For example:   Talk to Your Doctor About Herbs and Supplements    Malnutrition and vitamin deficiencies can impair your mental function. For example, vitamin B12 deficiency can cause a range of symptoms, including confusion. But, what if your nutritional needs are being met? Can herbs and supplements still offer a benefit? Researchers have investigated a range of natural remedies, such as ginkgo , ginseng , and the supplement phosphatidylserine (PS). So far, though, the evidence is inconsistent as to whether these products can improve memory or thinking. If you are interested in taking herbs and supplements, talk to your doctor first because they may interact with other medicines that you are taking. Exercise Regularly    Among the many benefits of regular exercise are increased blood flow to the brain and decreased risk of certain diseases that can interfere with memory function. One study found that even moderate exercise has a beneficial effect. Examples of \"moderate\" exercise include:   Playing 18 holes of golf once a week, without a cart   Playing tennis twice a week   Walking one mile per day   Manage Stress    It can be tough to remember what is important when your mind is cluttered.  Make time for relaxation. Choose activities that calm you down, and make it routine. Manage Chronic Conditions    Side effects of high blood pressure , diabetes, and heart disease can interfere with mental function. Many of the lifestyle steps discussed here can help manage these conditions. Strive to eat a healthy diet, exercise regularly, get stress under control, and follow your doctor's advice for your condition. Minimize Medications    Talk to your doctor about the medicines that you take. Some may be unnecessary. Also, healthy lifestyle habits may lower the need for certain drugs. Last Reviewed: April 2010 Tacho Canales MD   Updated: 4/13/2010   ·        [address]  What is a living will? A living will, also called a declaration, is a legal form. It tells your family and your doctor your wishes when you can't speak for yourself. It's used by the health professionals who will treat you as you near the end of your life or if you get seriously hurt or ill. If you put your wishes in writing, your loved ones and others will know what kind of care you want. They won't need to guess. This can ease your mind and be helpful to others. And you can change or cancel your living will at any time. A living will is not the same as an estate or property will. An estate will explains what you want to happen with your money and property after you die. How do you use it? A living will is used to describe the kinds of treatment or life support you want as you near the end of your life or if you get seriously hurt or ill. Keep these facts in mind about living benitez. Your living will is used only if you can't speak or make decisions for yourself. Most often, one or more doctors must certify that you can't speak or decide for yourself before your living will takes effect. If you get better and can speak for yourself again, you can accept or refuse any treatment.  It doesn't matter what you said in your living will.  Some states may limit your right to refuse treatment in certain cases. For example, you may need to clearly state in your living will that you don't want artificial hydration and nutrition, such as being fed through a tube. Is a living will a legal document? A living will is a legal document. Each state has its own laws about living benitez. And a living will may be called something else in your state. Here are some things to know about living benitez. You don't need an  to complete a living will. But legal advice can be helpful if your state's laws are unclear. It can also help if your health history is complicated or your family can't agree on what should be in your living will. You can change your living will at any time. Some people find that their wishes about end-of-life care change as their health changes. If you make big changes to your living will, complete a new form. If you move to another state, make sure that your living will is legal in the state where you now live. In most cases, doctors will respect your wishes even if you have a form from a different state. You might use a universal form that has been approved by many states. This kind of form can sometimes be filled out and stored online. Your digital copy will then be available wherever you have a connection to the internet. The doctors and nurses who need to treat you can find it right away. Your state may offer an online registry. This is another place where you can store your living will online. It's a good idea to get your living will notarized. This means using a person called a  to watch two people sign, or witness, your living will. What should you know when you create a living will? Here are some questions to ask yourself as you make your living will:  Do you know enough about life support methods that might be used?  If not, talk to your doctor so you know what might be done if you can't breathe on your own, your heart stops, or you can't swallow. What things would you still want to be able to do after you receive life-support methods? Would you want to be able to walk? To speak? To eat on your own? To live without the help of machines? Do you want certain Restoration practices performed if you become very ill? If you have a choice, where do you want to be cared for? In your home? At a hospital or nursing home? If you have a choice at the end of your life, where would you prefer to die? At home? In a hospital or nursing home? Somewhere else? Would you prefer to be buried or cremated? Do you want your organs to be donated after you die? What should you do with your living will? Make sure that your family members and your health care agent have copies of your living will (also called a declaration). Give your doctor a copy of your living will. Ask him or her to keep it as part of your medical record. If you have more than one doctor, make sure that each one has a copy. Put a copy of your living will where it can be easily found. For example, some people may put a copy on their refrigerator door. If you are using a digital copy, be sure your doctor, family members, and health care agent know how to find and access it. Where can you learn more? Go to https://Meet [email or web address] org and sign in to your [employer] account. Enter H867 in the Jobyal box to learn more about \"Learning About Living [facility]. \"     If you do not have an account, please click on the \"Sign Up Now\" link. Current as of: December 9, 2019               Content Version: 12.5  © 8564-5851 [employer]. Care instructions adapted under license by [facility] [place] ([facility]). If you have questions about a medical condition or this instruction, always ask your healthcare professional. [address] any warranty or liability for your use of this information.     ·

## 2020-09-03 NOTE — PROGRESS NOTES
Medicare Annual Wellness Visit  Name: Ivy Galindo Date: 9/3/2020   MRN: <[id number]> Sex: Female   Age: 70 y.o. Ethnicity: Non-/Non    : 1940 Race: Nilsa Shah is here for Medicare AWV    Screenings for behavioral, psychosocial and functional/safety risks, and cognitive dysfunction are all negative except as indicated below. These results, as well as other patient data from the [address] form, are documented in Flowsheets linked to this Encounter. Allergies   Allergen Reactions    Imitrex [Sumatriptan] Anaphylaxis and Shortness Of Breath     Swelling in arm of injection cite, and SOB       Prior to Visit Medications    Medication Sig Taking? Authorizing Provider   oxyCODONE-acetaminophen (PERCOCET) 5-325 MG per tablet Take 1 tablet by mouth 2 times daily as needed for Pain for up to 30 days. Yes Tiff Monroe MD   oxyCODONE-acetaminophen (PERCOCET) 5-325 MG per tablet Take 1 tablet by mouth 2 times daily for 30 days.  Yes Tiff Monroe MD   warfarin (COUMADIN) 5 MG tablet TAKE 1 AND 1/2 TABLETS BY  MOUTH DAILY EXCEPT ON   AND THURSDAY TAKE 1  TABLET OR AS DIRECTED BY  THE COUMADIN CLINIC Yes Jonathan Eastman MD   pantoprazole (PROTONIX) 40 MG tablet TAKE 1 TABLET BY MOUTH  DAILY Yes Jonathan Eastman MD   simvastatin (ZOCOR) 40 MG tablet TAKE 1 TABLET BY MOUTH  NIGHTLY Yes Jonathan Eastman MD   sertraline (ZOLOFT) 100 MG tablet TAKE 1 TABLET BY MOUTH TWO  TIMES DAILY Yes Jonathan Eastman MD   [id number] (CEREFOLIN PO) Take by mouth Yes Historical Provider, MD   Nutritional Supplements (EQUATE PO) Take by mouth Yes Historical Provider, MD   lisinopril-hydroCHLOROthiazide (PRINZIDE;ZESTORETIC) 10-12.5 MG per tablet Take 1 tablet by mouth daily Yes Jonathan Eastman MD   Diapers & Supplies MISC 1 each by Does not apply route daily as needed (incontinence) Yes Jonathan Eastman MD   warfarin (COUMADIN) 7.5 MG tablet Take 7.5 mg by mouth Indications: Monday, Tuesday, Wednesday, Thursday, Friday, Saturday  Yes Historical Provider, MD [place]. Devices MISC Compression stockings/open toe pantyhose with compression 40/50 Yes MD Jennifer Martin.  Devices MISC George panty hose  Open toe Petite plus beige  2 pairs Yes Kelly Feliciano MD       Past Medical History:   Diagnosis Date    Allergic rhinitis     Arthritis     Cancer [facility] [address]     squamous cell on nose     Chronic back pain     Dementia ([facility] [address] 2019    Dental disease     Depression     Dizziness     GERD (gastroesophageal reflux disease)     H/O blood clots     Hearing loss     Hx of blood clots     Hyperlipidemia     Hypertension     Lumbar stenosis with neurogenic claudication     Osteoarthritis of neck     TMJ dysfunction        Past Surgical History:   Procedure Laterality Date     SECTION      x1    EMBOLECTOMY      EPIDURAL STEROID INJECTION Bilateral 2019    BILATERAL LUMBAR THREE, LUMBAR FOUR, LUMBAR FIVE RADIOFREQUENCY ABLATION SITE CONFIRMED BY FLUOROSCOPY performed by Marquez Long MD at [facility] Right 8/15/2019    RIGHT SACROILIAC JOINT INJECTION SITE CONFIRMED BY FLUOROSCOPY performed by Marquez Long MD at [address] [place] [address], TOTAL ABDOMINAL      LAMINECTOMY  2018    MICROLUMBAR LAMINECTOMY L4-5    LITHOTRIPSY      MOHS SURGERY      nose for squamous cell carcinoma    OTHER SURGICAL HISTORY      blood clots-        Family History   Problem Relation Age of Onset    Cancer Other     Hypertension Other     Seizures Other     Heart Disease Mother     Other Father         murdered     Colon Cancer Brother     Cancer Maternal Aunt         breast    Colon Cancer Maternal Grandmother        CareTeam (Including outside providers/suppliers regularly involved in providing care):   Patient Care Team:  Ro Nichols MD as PCP - General (Family was present when appropriate. Due to this being a TeleHealth encounter (During UDO-61 public health emergency), evaluation of the following organ systems was limited: Vitals/Constitutional/EENT/Resp/CV/GI//MS/Neuro/Skin/Heme-Lymph-Imm. Pursuant to the emergency declaration under the [address] [place] [address], [address] [place] [address] and the [employer] Act, this Virtual Visit was conducted with patient's (and/or legal guardian's) consent, to reduce the patient's risk of exposure to COVID-19 and provide necessary medical care. The patient (and/or legal guardian) has also been advised to contact this office for worsening conditions or problems, and seek emergency medical treatment and/or call 911 if deemed necessary. Patient identification was verified at the start of the visit: Yes    Total time spent for this encounter: 10 minutes    Services were provided through a video synchronous discussion virtually to substitute for in-person clinic visit. Patient and provider were located at their individual homes. --Asa Bautista LPN on 9/4/9436 at 5:15 PM    An electronic signature was used to authenticate this note. This encounter was performed under myLisa MDs, direct supervision, 9/3/2020.

## 2020-09-04 LAB
EKG ATRIAL RATE: 57 BPM
EKG DIAGNOSIS: NORMAL
EKG P AXIS: 0 DEGREES
EKG P-R INTERVAL: 128 MS
EKG Q-T INTERVAL: 430 MS
EKG QRS DURATION: 132 MS
EKG QTC CALCULATION (BAZETT): 418 MS
EKG R AXIS: -51 DEGREES
EKG T AXIS: 30 DEGREES
EKG VENTRICULAR RATE: 57 BPM

## 2020-09-04 PROCEDURE — 93010 ELECTROCARDIOGRAM REPORT: CPT | Performed by: INTERNAL MEDICINE

## 2020-09-04 NOTE — ED NOTES
Ambulated pt to assess pt's ability to walk by herself. Pt was able to walk with a steady gait. Pt's only complaint was a 3/10 knee pain that is new. Denies SOB, dizziness, etc...  Pt voices confidence that she can maneuver and walk around her home if released from hospital.      Felipa Buenrostro  09/03/20 7900

## 2020-09-04 NOTE — ED NOTES
Patient identified as a positive fall risk on the ED triage fall screening. Patient placed in fall precautions which includes:  yellow fall risk bracelet on wrist, yellow socks on bed, \"Be Safe\" sign placed on patient's door, and bed alarm placed under patient/alarm turned on. Patient instructed on importance of not getting out of bed or ambulating without assistance for safety.           Maria Esther Gautam RN  09/03/20 2013

## 2020-09-04 NOTE — ED PROVIDER NOTES
I personally interviewed and examined this patient, discussed the findings, diagnostic studies, interventions and treatment plan with AKIL. . I reviewed the clinical notes and test results. I personally supervised all pertinent procedures performed on the patient by the AKIL throughout the course and was available to manage complications as they arose, if applicable. I agree with the assessment, management and disposition as presented by the AKIL with exceptions/corrections as documented. Pt presents here with possible syncopal fall, has had numerous falls past 12 months. She appears well here and can ambulate well on her own, I do not think she needs admission at this time. EKG shows sinus andres with RBBB, , no st elevation or depression. For further details of this emergency department encounter, please see the AKIL's documentation.       ED Triage Vitals [09/03/20 2004]   BP Temp Temp Source Pulse Resp SpO2 Height Weight   (!) 118/93 98.3 °F (36.8 °C) Oral 111 16 100 % 5' 1\" (1.549 m) 127 lb (57.6 kg)        Results for orders placed or performed during the hospital encounter of 09/03/20   CBC Auto Differential   Result Value Ref Range    WBC 4.8 4.0 - 11.0 K/uL    RBC 3.86 (L) 4.00 - 5.20 M/uL    Hemoglobin 12.3 12.0 - 16.0 g/dL    Hematocrit 37.2 36.0 - 48.0 %    MCV 96.4 80.0 - 100.0 fL    MCH 32.0 26.0 - 34.0 pg    MCHC 33.2 31.0 - 36.0 g/dL    RDW 14.1 12.4 - 15.4 %    Platelets 146 716 - 508 K/uL    MPV 9.7 5.0 - 10.5 fL    Neutrophils % 44.1 %    Lymphocytes % 37.9 %    Monocytes % 14.0 %    Eosinophils % 2.7 %    Basophils % 1.3 %    Neutrophils Absolute 2.1 1.7 - 7.7 K/uL    Lymphocytes Absolute 1.8 1.0 - 5.1 K/uL    Monocytes Absolute 0.7 0.0 - 1.3 K/uL    Eosinophils Absolute 0.1 0.0 - 0.6 K/uL    Basophils Absolute 0.1 0.0 - 0.2 K/uL   Comprehensive Metabolic Panel w/ Reflex to MG   Result Value Ref Range    Sodium 143 136 - 145 mmol/L    Potassium reflex Magnesium 4.1 3.5 - 5.1 mmol/L Chloride 102 99 - 110 mmol/L    CO2 33 (H) 21 - 32 mmol/L    Anion Gap 8 3 - 16    Glucose 90 70 - 99 mg/dL    BUN 23 (H) 7 - 20 mg/dL    CREATININE 0.7 0.6 - 1.2 mg/dL    GFR Non-African American >60 >60    GFR African American >60 >60    Calcium 9.4 8.3 - 10.6 mg/dL    Total Protein 7.2 6.4 - 8.2 g/dL    Alb 3.9 3.4 - 5.0 g/dL    Albumin/Globulin Ratio 1.2 1.1 - 2.2    Total Bilirubin <0.2 0.0 - 1.0 mg/dL    Alkaline Phosphatase 79 40 - 129 U/L    ALT 13 10 - 40 U/L    AST 21 15 - 37 U/L    Globulin 3.3 g/dL   Troponin   Result Value Ref Range    Troponin <0.01 <0.01 ng/mL   EKG 12 Lead   Result Value Ref Range    Ventricular Rate 57 BPM    Atrial Rate 57 BPM    P-R Interval 128 ms    QRS Duration 132 ms    Q-T Interval 430 ms    QTc Calculation (Bazett) 418 ms    P Axis 0 degrees    R Axis -51 degrees    T Axis 30 degrees    Diagnosis       Sinus bradycardia with marked sinus arrhythmiaRight bundle branch blockLeft anterior fascicular block** Bifascicular block **Abnormal ECGWhen compared with ECG of 20-NOV-2019 10:25,Premature supraventricular complexes are no longer Present       XR KNEE LEFT (1-2 VIEWS)   Final Result   No acute bony abnormality. XR CHEST PORTABLE   Final Result   1. Interstitial prominence and perihilar opacities, concerning for edema. 2. Cardiomegaly. CT HEAD WO CONTRAST   Final Result   No acute intracranial abnormality. No acute abnormality of the cervical spine. CT CERVICAL SPINE WO CONTRAST   Final Result   No acute intracranial abnormality. No acute abnormality of the cervical spine. CLINICAL IMPRESSION  1. Syncope and collapse    2. Closed head injury, initial encounter        Blood pressure 138/74, pulse 64, temperature 98.3 °F (36.8 °C), temperature source Oral, resp. rate 27, height 5' 1\" (1.549 m), weight 127 lb (57.6 kg), SpO2 99 %. Fritz Barroso was discharged to home in stable condition.      This chart was

## 2020-09-04 NOTE — ED PROVIDER NOTES
EMERGENCY DEPARTMENT ENCOUNTER      This patient was not seen and evaluated by the attending physician. Pt Name: Shirley Helton  MRN: [id number]  [place] 1940  Date of evaluation: 9/3/2020  Provider: LIZZY Eduardo CNP-C  PCP: Teresa Montano MD  ED Attending: Dr. Luis Campos    History provided by the patient and family. CHIEF COMPLAINT:     Chief Complaint   Patient presents with    Loss of Consciousness     pt states last night was \"blacking out\" and fell into bathroom door hit head and left elbow. pt states she is on coumadin       HISTORY OF PRESENT ILLNESS:      Shirley Helton is a 70 y.o. female who presents   ED with complaints of a fall. Patient states that she had a mechanical fall last night, states that she falls about 7 times within a 12-month.,  States that she has trouble with her balance and that is why she falls although last night states that she thinks she may have passed out. She denied that she had any chest pain or difficulty breathing, no diaphoresis. Patient is on Coumadin. She is had no vomiting. Patient complains of some mild left knee discomfort. She is here for further evaluation    Location:-  Quality:-  Severity:-  Duration:-  Modifying factors:-    Nursing Notes were reviewed     REVIEW OF SYSTEMS:     Review of Systems  All systems, atotal of 10, are reviewed and negative except for those that were just noted in history present illness.         PAST MEDICAL HISTORY:     Past Medical History:   Diagnosis Date    Allergic rhinitis     Arthritis     Cancer (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 [address] 12/2019    Dental disease     Depression     Dizziness     GERD (gastroesophageal reflux disease)     H/O blood clots     Hearing loss     Hx of blood clots     Hyperlipidemia     Hypertension     Lumbar stenosis with neurogenic claudication     Osteoarthritis of neck     TMJ Disp-90 tablet, R-3Normal      Diapers & Supplies MISC DAILY PRN Starting Mon 11/18/2019, Disp-100 each, R-3, Normal      !! warfarin (COUMADIN) 7.5 MG tablet Take 7.5 mg by mouth Indications: Monday, Tuesday, Wednesday, Thursday, Friday, Saturday Historical Med      !! Misc. Devices MISC Disp-2 each, R-2, PrintCompression stockings/open toe pantyhose with compression 40/50      ! ! Misc. Devices MISC Disp-2 each, R-1, PrintTed panty hose  Open toe Petite plus beige  2 pairs       ! ! - Potential duplicate medications found. Please discuss with provider.             ALLERGIES:    Imitrex [sumatriptan]    FAMILY HISTORY:       Family History   Problem Relation Age of Onset    Cancer Other     Hypertension Other     Seizures Other     Heart Disease Mother     Other Father         murdered     Colon Cancer Brother     Cancer Maternal Aunt         breast    Colon Cancer Maternal Grandmother           SOCIAL HISTORY:       Social History     Socioeconomic History    Marital status:      Spouse name: None    Number of children: None    Years of education: None    Highest education level: None   Occupational History    None   Social Needs    Financial resource strain: None    Food insecurity     Worry: None     Inability: None    Transportation needs     Medical: None     Non-medical: None   Tobacco Use    Smoking status: Never Smoker    Smokeless tobacco: Never Used   Substance and Sexual Activity    Alcohol use: No    Drug use: No    Sexual activity: None   Lifestyle    Physical activity     Days per week: None     Minutes per session: None    Stress: None   Relationships    Social connections     Talks on phone: None     Gets together: None     Attends Spiritism service: None     Active member of club or organization: None     Attends meetings of clubs or organizations: None     Relationship status: None    Intimate partner violence     Fear of current or ex partner: None     Emotionally abused: None     Physically abused: None     Forced sexual activity: None   Other Topics Concern    None   Social History Narrative    None       SCREENINGS:   [place] Coma Scale  Eye Opening: Spontaneous  Best Verbal Response: Oriented  Best Motor Response: Obeys commands  [place] Coma Scale Score: 15        PHYSICAL EXAM:       ED Triage Vitals [09/03/20 2004]   BP Temp Temp Source Pulse Resp SpO2 Height Weight   (!) 118/93 98.3 °F (36.8 °C) Oral 111 16 100 % 5' 1\" (1.549 m) 127 lb (57.6 kg)       Physical Exam    CONSTITUTIONAL: Awake and alert. Cooperative. Well-developed. Well-nourished. Vitals:    09/03/20 2004 09/03/20 2107 09/03/20 2155 09/03/20 2236   BP: (!) 118/93 (!) 126/109 138/74 139/78   Pulse: 111 65 64 61   Resp: 16 27  18   Temp: 98.3 °F (36.8 °C)      TempSrc: Oral      SpO2: 100% 99%  95%   Weight: 127 lb (57.6 kg)      Height: 5' 1\" (1.549 m)        HENT: Normocephalic. Atraumatic. External ears normal, without discharge. TMs clear bilaterally. No nasal discharge. Oropharynx clear, no erythema. Mucous membranes moist.  EYES: Conjunctiva non-injected, no lid abnormalities noted. No scleral icterus. PERRL. EOM's grossly intact. Anterior chambers clear. NECK: Supple. Normal ROM. No meningismus. No thyroid tenderness or swelling noted. No significant neck tenderness on exam.  CARDIOVASCULAR: RRR. No Murmer. PULMONARY/CHEST WALL: Effort normal. No tachypnea. Lungs clear to ausculation. ABDOMEN: Normal BS. Soft. Nondistended. No tenderness to palpate. No guarding. No hernias noted. No splenomegaly. Back: Spine is midline. No ecchymosis. No crepitus on palpation. No obvious subluxation of vertebral column. No saddle anesthesia or evidence of cauda equina. /ANORECTAL: Not assessed  MUSKULOSKELETAL: Normal ROM. No acute deformities. No edema. No tenderness to palpate. SKIN: Warm and dry. NEUROLOGICAL:  GCS 15. CN II-XII grossly intact.  Strength is 5/5 in allextremities and sensation is intact. PSYCHIATRIC: Normal affect, normal insight and judgement. Alert andoriented x 3.         DIAGNOSTIC RESULTS:     LABS:    Results for orders placed or performed during the hospital encounter of 09/03/20   CBC Auto Differential   Result Value Ref Range    WBC 4.8 4.0 - 11.0 K/uL    RBC 3.86 (L) 4.00 - 5.20 M/uL    Hemoglobin 12.3 12.0 - 16.0 g/dL    Hematocrit 37.2 36.0 - 48.0 %    MCV 96.4 80.0 - 100.0 fL    MCH 32.0 26.0 - 34.0 pg    MCHC 33.2 31.0 - 36.0 g/dL    RDW 14.1 12.4 - 15.4 %    Platelets 628 592 - 444 K/uL    MPV 9.7 5.0 - 10.5 fL    Neutrophils % 44.1 %    Lymphocytes % 37.9 %    Monocytes % 14.0 %    Eosinophils % 2.7 %    Basophils % 1.3 %    Neutrophils Absolute 2.1 1.7 - 7.7 K/uL    Lymphocytes Absolute 1.8 1.0 - 5.1 K/uL    Monocytes Absolute 0.7 0.0 - 1.3 K/uL    Eosinophils Absolute 0.1 0.0 - 0.6 K/uL    Basophils Absolute 0.1 0.0 - 0.2 K/uL   Comprehensive Metabolic Panel w/ Reflex to MG   Result Value Ref Range    Sodium 143 136 - 145 mmol/L    Potassium reflex Magnesium 4.1 3.5 - 5.1 mmol/L    Chloride 102 99 - 110 mmol/L    CO2 33 (H) 21 - 32 mmol/L    Anion Gap 8 3 - 16    Glucose 90 70 - 99 mg/dL    BUN 23 (H) 7 - 20 mg/dL    CREATININE 0.7 0.6 - 1.2 mg/dL    GFR Non-African American >60 >60    GFR African American >60 >60    Calcium 9.4 8.3 - 10.6 mg/dL    Total Protein 7.2 6.4 - 8.2 g/dL    Alb 3.9 3.4 - 5.0 g/dL    Albumin/Globulin Ratio 1.2 1.1 - 2.2    Total Bilirubin <0.2 0.0 - 1.0 mg/dL    Alkaline Phosphatase 79 40 - 129 U/L    ALT 13 10 - 40 U/L    AST 21 15 - 37 U/L    Globulin 3.3 g/dL   Troponin   Result Value Ref Range    Troponin <0.01 <0.01 ng/mL   EKG 12 Lead   Result Value Ref Range    Ventricular Rate 57 BPM    Atrial Rate 57 BPM    P-R Interval 128 ms    QRS Duration 132 ms    Q-T Interval 430 ms    QTc Calculation (Bazett) 418 ms    P Axis 0 degrees    R Axis -51 degrees    T Axis 30 degrees    Diagnosis       Sinus bradycardia with marked sinus arrhythmiaRight bundle branch blockLeft anterior fascicular block Bifascicular block Abnormal ECGWhen compared with ECG of 20-NOV-2019 10:25,Premature supraventricular complexes are no longer Present         RADIOLOGY:  All x-ray studies are viewed/reviewedby me. Formal interpretations per the radiologist are as follows:      XR KNEE LEFT (1-2 VIEWS)   Final Result   No acute bony abnormality. XR CHEST PORTABLE   Final Result   1. Interstitial prominence and perihilar opacities, concerning for edema. 2. Cardiomegaly. CT HEAD WO CONTRAST   Final Result   No acute intracranial abnormality. No acute abnormality of the cervical spine. CT CERVICAL SPINE WO CONTRAST   Final Result   No acute intracranial abnormality. No acute abnormality of the cervical spine. EKG:  See EKG interpretation by an attending [id number]:   N/A    CRITICAL CARE TIME:   N/A    CONSULTS:  None      EMERGENCYDEPARTMENT COURSE and DIFFERENTIAL DIAGNOSIS/MDM:   Vitals:    Vitals:    09/03/20 2004 09/03/20 2107 09/03/20 2155 09/03/20 2236   BP: (!) 118/93 (!) 126/109 138/74 139/78   Pulse: 111 65 64 61   Resp: 16 27  18   Temp: 98.3 °F (36.8 °C)      TempSrc: Oral      SpO2: 100% 99%  95%   Weight: 127 lb (57.6 kg)      Height: 5' 1\" (1.549 m)          Patient was given the following medications:  Medications - No data to display      Patient was evaluated by both myself and Dr. Ajay Callahan.  Patient presented to the emergency room today after having a fall last night, she did suggest that she may have syncopized prior to the fall. I did do a cardiac work-up which was unremarkable, patient with no symptoms currently, she was ambulated without difficulty here in the ED. CT of the head and neck were unremarkable, knee x-ray unremarkable. Patient was categorized as low risk on [facility] syncope rule.   I did have the attending physician evaluate the patient, after discussion with the attending and family we difficulty discharging patient home, she was instructed to follow-up with primary care physician. She can return the ED if any worsening symptoms. Patient laboratory studies, radiographic imaging, andassessment were all discussed with the patient and/or patient family. There was shared decision-making between myself, the attending physician, as well as the patient and/or their surrogate and we are all in agreement with discharge home. There was an opportunity for questions and all questions were answered to the best of my ability and to the satisfaction of the patient and/or patient family. MDM  Considered penetrating head trauma, concussion, subdural hematoma, epidural hematoma, subarachnoid hemorrhage, cerebral contusion, intracerebral hemorrhage, diffuse axonal injury. Pt. with resolution of symptoms, no co-morbid factors (intoxication, additional distracting trauma). No LOC or amnesia, normal neurologic exam with reliable caregiver/significant other able to follow head injury instructions    FINAL IMPRESSION:      1. Syncope and collapse    2.  Closed head injury, initial encounter          DISPOSITION/PLAN:   DISPOSITIONDecision To Discharge      PATIENT REFERRED TO:  Cecelia Beckman MD  [address] [place] [address] [place] [address]  [phone]    Call   For follow up      DISCHARGE MEDICATIONS:  Discharge Medication List as of 9/3/2020 10:28 PM                     (Please note that portions of this note were completed with a voice recognition program.  Efforts were made to edit the dictations, but occasionally words are mis-transcribed.)    LIZZY Garcia CNP-C (electronically signed)        LIZZY Garcia CNP  09/03/20 3687

## 2020-09-18 ENCOUNTER — ANTI-COAG VISIT (OUTPATIENT)
Dept: PHARMACY | Age: 80
End: 2020-09-18
Payer: MEDICARE

## 2020-09-18 LAB — INR BLD: 2

## 2020-09-18 PROCEDURE — 99211 OFF/OP EST MAY X REQ PHY/QHP: CPT

## 2020-09-18 PROCEDURE — 85610 PROTHROMBIN TIME: CPT

## 2020-09-18 NOTE — PROGRESS NOTES
Ms. Benji Chavez is here for management of anticoagulation for hx of DVT. PMH also significant for HTN. She presents today w/out complaint. Pt verifies dosing regimen as listed above. Pt denies s/s bleeding/swelling/SOB. + missed doses. + changes in Rx/OTCs/Herbal medications. No major changes to diet. Occasional EtOH use and denies tobacco.    Pt indicated she may have missed a few doses since her last visit. Counsiled pt on missing doses. Started taking an OTC medication for her cholesterol, unknown name. INR is still in a good range. INR 2.0 is within therapeutic range of 2-3. Recommend to continue 7.5mg daily, except 5 mg Q Sun. Pt has the 5 mg tablets. Will continue to monitor and check INR in 4 weeks. Dosing reminder card given with phone number, appointment date and time. Return to clinic: 10/16 @6277  Referring physician: Dr. Cheko Childers PharmD Candidate [facility]    I have seen the patient and reviewed the progress note written by the PharmD Candidate. I agree with this assesment and plan.    Servando Moreau PharmD 9/18/20 10:28 AM

## 2020-09-20 ENCOUNTER — HOSPITAL ENCOUNTER (EMERGENCY)
Age: 80
Discharge: HOME | End: 2020-09-20
Payer: MEDICARE

## 2020-09-20 ENCOUNTER — APPOINTMENT (OUTPATIENT)
Dept: CT IMAGING | Age: 80
End: 2020-09-20
Payer: MEDICARE

## 2020-09-20 VITALS
WEIGHT: 127 LBS | BODY MASS INDEX: 23.98 KG/M2 | OXYGEN SATURATION: 97 % | HEIGHT: 61 IN | DIASTOLIC BLOOD PRESSURE: 75 MMHG | HEART RATE: 75 BPM | TEMPERATURE: 98.1 F | SYSTOLIC BLOOD PRESSURE: 118 MMHG | RESPIRATION RATE: 16 BRPM

## 2020-09-20 LAB
A/G RATIO: 1.2 (ref 1.1–2.2)
ALBUMIN SERPL-MCNC: 3.6 G/DL (ref 3.4–5)
ALP BLD-CCNC: 70 U/L (ref 40–129)
ALT SERPL-CCNC: 10 U/L (ref 10–40)
AMORPHOUS: NORMAL /HPF
ANION GAP SERPL CALCULATED.3IONS-SCNC: 8 MMOL/L (ref 3–16)
AST SERPL-CCNC: 18 U/L (ref 15–37)
BASOPHILS ABSOLUTE: 0.1 K/UL (ref 0–0.2)
BASOPHILS RELATIVE PERCENT: 1.2 %
BILIRUB SERPL-MCNC: <0.2 MG/DL (ref 0–1)
BILIRUBIN URINE: NEGATIVE
BLOOD, URINE: ABNORMAL
BUN BLDV-MCNC: 24 MG/DL (ref 7–20)
CALCIUM SERPL-MCNC: 8.9 MG/DL (ref 8.3–10.6)
CHLORIDE BLD-SCNC: 106 MMOL/L (ref 99–110)
CLARITY: CLEAR
CO2: 28 MMOL/L (ref 21–32)
COLOR: YELLOW
CREAT SERPL-MCNC: 1 MG/DL (ref 0.6–1.2)
EKG ATRIAL RATE: 78 BPM
EKG DIAGNOSIS: NORMAL
EKG P AXIS: 44 DEGREES
EKG P-R INTERVAL: 186 MS
EKG Q-T INTERVAL: 424 MS
EKG QRS DURATION: 140 MS
EKG QTC CALCULATION (BAZETT): 483 MS
EKG R AXIS: -62 DEGREES
EKG T AXIS: 29 DEGREES
EKG VENTRICULAR RATE: 78 BPM
EOSINOPHILS ABSOLUTE: 0.1 K/UL (ref 0–0.6)
EOSINOPHILS RELATIVE PERCENT: 1.4 %
EPITHELIAL CELLS, UA: NORMAL /HPF (ref 0–5)
GFR AFRICAN AMERICAN: >60
GFR NON-AFRICAN AMERICAN: 54
GLOBULIN: 3 G/DL
GLUCOSE BLD-MCNC: 113 MG/DL (ref 70–99)
GLUCOSE URINE: NEGATIVE MG/DL
HCT VFR BLD CALC: 36.5 % (ref 36–48)
HEMOGLOBIN: 12 G/DL (ref 12–16)
INR BLD: 2.63 (ref 0.86–1.14)
KETONES, URINE: NEGATIVE MG/DL
LEUKOCYTE ESTERASE, URINE: NEGATIVE
LYMPHOCYTES ABSOLUTE: 1.1 K/UL (ref 1–5.1)
LYMPHOCYTES RELATIVE PERCENT: 25.1 %
MCH RBC QN AUTO: 32.3 PG (ref 26–34)
MCHC RBC AUTO-ENTMCNC: 32.9 G/DL (ref 31–36)
MCV RBC AUTO: 98.3 FL (ref 80–100)
MICROSCOPIC EXAMINATION: YES
MONOCYTES ABSOLUTE: 0.5 K/UL (ref 0–1.3)
MONOCYTES RELATIVE PERCENT: 12.4 %
NEUTROPHILS ABSOLUTE: 2.6 K/UL (ref 1.7–7.7)
NEUTROPHILS RELATIVE PERCENT: 59.9 %
NITRITE, URINE: NEGATIVE
PDW BLD-RTO: 14.3 % (ref 12.4–15.4)
PH UA: 6.5 (ref 5–8)
PLATELET # BLD: 164 K/UL (ref 135–450)
PMV BLD AUTO: 9.4 FL (ref 5–10.5)
POTASSIUM REFLEX MAGNESIUM: 3.9 MMOL/L (ref 3.5–5.1)
PROTEIN UA: ABNORMAL MG/DL
PROTHROMBIN TIME: 30.8 SEC (ref 10–13.2)
RBC # BLD: 3.71 M/UL (ref 4–5.2)
RBC UA: NORMAL /HPF (ref 0–4)
SODIUM BLD-SCNC: 142 MMOL/L (ref 136–145)
SPECIFIC GRAVITY UA: 1.02 (ref 1–1.03)
SPECIMEN STATUS: NORMAL
TOTAL PROTEIN: 6.6 G/DL (ref 6.4–8.2)
TROPONIN: <0.01 NG/ML
URINE TYPE: ABNORMAL
UROBILINOGEN, URINE: 0.2 E.U./DL
WBC # BLD: 4.4 K/UL (ref 4–11)
WBC UA: NORMAL /HPF (ref 0–5)

## 2020-09-20 PROCEDURE — 2580000003 HC RX 258: Performed by: NURSE PRACTITIONER

## 2020-09-20 PROCEDURE — 93010 ELECTROCARDIOGRAM REPORT: CPT | Performed by: INTERNAL MEDICINE

## 2020-09-20 PROCEDURE — 81001 URINALYSIS AUTO W/SCOPE: CPT

## 2020-09-20 PROCEDURE — 6370000000 HC RX 637 (ALT 250 FOR IP): Performed by: NURSE PRACTITIONER

## 2020-09-20 PROCEDURE — 85610 PROTHROMBIN TIME: CPT

## 2020-09-20 PROCEDURE — 84484 ASSAY OF TROPONIN QUANT: CPT

## 2020-09-20 PROCEDURE — 96360 HYDRATION IV INFUSION INIT: CPT

## 2020-09-20 PROCEDURE — 80053 COMPREHEN METABOLIC PANEL: CPT

## 2020-09-20 PROCEDURE — 85025 COMPLETE CBC W/AUTO DIFF WBC: CPT

## 2020-09-20 PROCEDURE — 99285 EMERGENCY DEPT VISIT HI MDM: CPT

## 2020-09-20 PROCEDURE — 93005 ELECTROCARDIOGRAM TRACING: CPT | Performed by: EMERGENCY MEDICINE

## 2020-09-20 PROCEDURE — 70450 CT HEAD/BRAIN W/O DYE: CPT

## 2020-09-20 PROCEDURE — 36415 COLL VENOUS BLD VENIPUNCTURE: CPT

## 2020-09-20 RX ORDER — 0.9 % SODIUM CHLORIDE 0.9 %
1000 INTRAVENOUS SOLUTION INTRAVENOUS ONCE
Status: COMPLETED | OUTPATIENT
Start: 2020-09-20 | End: 2020-09-20

## 2020-09-20 RX ORDER — ONDANSETRON 4 MG/1
4 TABLET, ORALLY DISINTEGRATING ORAL EVERY 8 HOURS PRN
Qty: 20 TABLET | Refills: 0 | Status: SHIPPED | OUTPATIENT
Start: 2020-09-20 | End: 2021-09-13 | Stop reason: ALTCHOICE

## 2020-09-20 RX ORDER — MECLIZINE HCL 12.5 MG/1
25 TABLET ORAL ONCE
Status: COMPLETED | OUTPATIENT
Start: 2020-09-20 | End: 2020-09-20

## 2020-09-20 RX ORDER — MECLIZINE HYDROCHLORIDE 25 MG/1
25 TABLET ORAL 3 TIMES DAILY PRN
Qty: 30 TABLET | Refills: 0 | Status: SHIPPED | OUTPATIENT
Start: 2020-09-20 | End: 2020-09-30

## 2020-09-20 RX ADMIN — SODIUM CHLORIDE 1000 ML: 9 INJECTION, SOLUTION INTRAVENOUS at 17:43

## 2020-09-20 RX ADMIN — MECLIZINE 25 MG: 12.5 TABLET ORAL at 16:25

## 2020-09-20 NOTE — ED PROVIDER NOTES
Evaluated by Advanced Practice Provider    [address] [facility]  ED    CHIEF COMPLAINT  Dizziness (patient's  called squad for dizziness; patient given 1500 ml saline by squad d/t hypotension with systolic in the 21X, stable at time of triage)    [facility]  Shonda Santiago is a 70 y.o. female who presents to the ED complaining of light headedness. Reports sudden onset of light headedness that happened around 2 pm. Prior to this happening she felt OK. Family checked the patient's BP and it was 50s/30s. She was in the kitchen, she went to stand up and the room was spinning a little bit, the light/fan looked like it was moving away from her. Reports feeling better since the IVF in the ambulance, 1500 ml. Has not taken her morning meds yet. Had dizziness with trying to stand up, this is why she wanted the walker. Denies nausea, vomiting. Denies CP or SOB. Denies abdominal pain. The patient is currently rating their pain as 0/10. The patient denies other complaints, modifying factors or associated symptoms. The patient arrived to the ED via EMS transport. Patient is accompanied by family who is/are bedside for the visit. Nursing notes reviewed.    Past Medical History:   Diagnosis Date    Allergic rhinitis     Arthritis     Cancer ([address]     squamous cell on nose     Chronic back pain     Dementia ([address] 2019    Dental disease     Depression     Dizziness     GERD (gastroesophageal reflux disease)     H/O blood clots     Hearing loss     Hx of blood clots     Hyperlipidemia     Hypertension     Lumbar stenosis with neurogenic claudication     Osteoarthritis of neck     TMJ dysfunction      Past Surgical History:   Procedure Laterality Date     SECTION      x1    EMBOLECTOMY      EPIDURAL STEROID INJECTION Bilateral 2019    BILATERAL LUMBAR THREE, LUMBAR FOUR, LUMBAR FIVE RADIOFREQUENCY ABLATION SITE CONFIRMED BY FLUOROSCOPY performed by Ramya Gomez MD at [facility] Right 8/15/2019    RIGHT SACROILIAC JOINT INJECTION SITE CONFIRMED BY FLUOROSCOPY performed by Ramya Gomez MD at [facility], TOTAL ABDOMINAL      LAMINECTOMY  02/08/2018    MICROLUMBAR LAMINECTOMY L4-5    LITHOTRIPSY      MOHS SURGERY      nose for squamous cell carcinoma    OTHER SURGICAL HISTORY      blood clots- 1963     Family History   Problem Relation Age of Onset    Cancer Other     Hypertension Other     Seizures Other     Heart Disease Mother     Other Father         murdered     Colon Cancer Brother     Cancer Maternal Aunt         breast    Colon Cancer Maternal Grandmother      Social History     Socioeconomic History    Marital status:      Spouse name: Not on file    Number of children: Not on file    Years of education: Not on file    Highest education level: Not on file   Occupational History    Not on file   Social Needs    Financial resource strain: Not on file    Food insecurity     Worry: Not on file     Inability: Not on file    Transportation needs     Medical: Not on file     Non-medical: Not on file   Tobacco Use    Smoking status: Never Smoker    Smokeless tobacco: Never Used   Substance and Sexual Activity    Alcohol use: No    Drug use: No    Sexual activity: Not on file   Lifestyle    Physical activity     Days per week: Not on file     Minutes per session: Not on file    Stress: Not on file   Relationships    Social connections     Talks on phone: Not on file     Gets together: Not on file     Attends Advent service: Not on file     Active member of club or organization: Not on file     Attends meetings of clubs or organizations: Not on file     Relationship status: Not on file    Intimate partner violence     Fear of current or ex partner: Not on file     Emotionally abused: Not on file     Physically abused: Not on file     Forced sexual activity: Not on file   Other Topics Concern    Not on file   Social History Narrative    Not on file     No current facility-administered medications for this encounter. Current Outpatient Medications   Medication Sig Dispense Refill    meclizine (ANTIVERT) 25 MG tablet Take 1 tablet by mouth 3 times daily as needed for Dizziness 30 tablet 0    ondansetron (ZOFRAN ODT) 4 MG disintegrating tablet Take 1 tablet by mouth every 8 hours as needed for Nausea 20 tablet 0    oxyCODONE-acetaminophen (PERCOCET) 5-325 MG per tablet Take 1 tablet by mouth 2 times daily as needed for Pain for up to 30 days. 45 tablet 0    [START ON 9/30/2020] oxyCODONE-acetaminophen (PERCOCET) 5-325 MG per tablet Take 1 tablet by mouth 2 times daily for 30 days. 45 tablet 0    warfarin (COUMADIN) 5 MG tablet TAKE 1 AND 1/2 TABLETS BY  MOUTH DAILY EXCEPT ON  SUNDAY AND THURSDAY TAKE 1  TABLET OR AS DIRECTED BY  THE COUMADIN CLINIC 124 tablet 0    pantoprazole (PROTONIX) 40 MG tablet TAKE 1 TABLET BY MOUTH  DAILY 90 tablet 1    simvastatin (ZOCOR) 40 MG tablet TAKE 1 TABLET BY MOUTH  NIGHTLY 90 tablet 1    sertraline (ZOLOFT) 100 MG tablet TAKE 1 TABLET BY MOUTH TWO  TIMES DAILY 180 tablet 1    L-Methylfolate-[id number] (CEREFOLIN PO) Take by mouth      Nutritional Supplements (EQUATE PO) Take by mouth      lisinopril-hydroCHLOROthiazide (PRINZIDE;ZESTORETIC) 10-12.5 MG per tablet Take 1 tablet by mouth daily 90 tablet 3    Diapers & Supplies MISC 1 each by Does not apply route daily as needed (incontinence) 100 each 3    warfarin (COUMADIN) 7.5 MG tablet Take 7.5 mg by mouth Indications: Monday, Tuesday, Wednesday, Thursday, Friday, Saturday       Misc. Devices MISC Compression stockings/open toe pantyhose with compression 40/50 2 each 2    Misc.  Devices MISC George panty hose  Open toe Petite plus beige  2 pairs 2 each 1     Allergies   Allergen Reactions    Imitrex [Sumatriptan] Anaphylaxis and Shortness Of Breath Swelling in arm of injection cite, and SOB       REVIEW OF SYSTEMS  10 systems reviewed, pertinent positives per HPI otherwise noted to be negative    PHYSICAL EXAM  /66   Pulse 82   Temp 98.1 °F (36.7 °C) (Oral)   Resp 16   Ht 5' 1\" (1.549 m)   Wt 127 lb (57.6 kg)   SpO2 97%   BMI 24.00 kg/m²   GENERAL APPEARANCE: Well developed, well nourished. Awake and alert. Cooperative. Observed resting in bed. No acute distress. HEAD: Normocephalic. Atraumatic. No facial asymmetry upon exam. Sensation is intact to light touch to the face. Smile is symmetrical, tongue is midline. Uvula is midline and elevates appropriately. Posterior oropharynx is without erythema, edema, or exudate. EYES: Sclera is non-icteric. Conjunctiva normal. EOMI, PERRL. Horizontal nystagmus bilaterally that self extinguishes after 1-2 beats. ENT: External ears are normal. Mucous membranes are moist.   NECK: Supple. Normal ROM. Trachea mid-line. Cardiac: Regular rate and rhythm. Capillary refill is brisk in bilateral upper extremities. S1/S2 is normal. There are no murmurs, rubs, or gallops to auscultation. LUNGS: Breathing is unlabored. Speaking comfortably in full sentences. Equal and symmetric chest rise. Breath sounds are clear throughout. There is no wheezing, rales, or rhonchi to auscultation. Abdomen: Non-distended. Non-tender to palpation. Bowel sounds are positive in all 4 quadrants. There is no hepatosplenomegaly to palpation. Musculoskeletal: Normal ROM. No gross deformities or trauma noted. Moving all extremities equally and appropriately. NEUROLOGICAL: Alert and oriented x3. Strength is normal. No focal motor or sensory deficits. Gait observed and normal. Strength is 5/5, equal and symmetric. Romberg is negative. No pronator drift. Finger to nose is intact bilaterally. Heel to shin is intact bilaterally. SKIN: Warm and dry. Skin is with good color. Skin is intact. Psychiatric: Mood and affect appropriate.  Speech is clear and articulate. Orthostatic BP  Blood Pressure Lyin/76  --  100/65  --    Pulse Lyin PER MINUTE  --  83 PER MINUTE  --    Blood Pressure Sittin/84  --  99/67  --    Pulse Sittin PER MINUTE  --  80 PER MINUTE  --    Blood Pressure Standing:  --  --  84/63  --    Pulse Standin PER MINUTE  --  95 PER MINUTE         PROCEDURES  None    RADIOLOGY  Ct Head Wo Contrast    Result Date: 2020  EXAMINATION: CT OF THE HEAD WITHOUT CONTRAST  2020 5:08 pm TECHNIQUE: CT of the head was performed without the administration of intravenous contrast. Dose modulation, iterative reconstruction, and/or weight based adjustment of the mA/kV was utilized to reduce the radiation dose to as low as reasonably achievable. COMPARISON: 2020 HISTORY: ORDERING SYSTEM PROVIDED HISTORY: light headedness TECHNOLOGIST PROVIDED HISTORY: Reason for exam:->light headedelva Has a \"code stroke\" or \"stroke alert\" been called? ->No Reason for Exam: dizzy,hypotensive Acuity: Acute Type of Exam: Initial FINDINGS: BRAIN/VENTRICLES: The ventricles and sulci are diffusely enlarged. Low attenuation is seen in the periventricular and subcortical white matter. No acute intracranial hemorrhage or acute infarct is identified. ORBITS: The visualized portion of the orbits demonstrate no acute abnormality. SINUSES: The visualized paranasal sinuses and mastoid air cells demonstrate no acute abnormality. SOFT TISSUES/SKULL:  No acute abnormality of the visualized skull or soft tissues. No acute intracranial abnormality.          LABS  Labs Reviewed   CBC WITH AUTO DIFFERENTIAL - Abnormal; Notable for the following components:       Result Value    RBC 3.71 (*)     All other components within normal limits    Narrative:     Performed at:  [facility]) - [address]   Phone [phone]   COMPREHENSIVE METABOLIC PANEL W/ REFLEX TO MG FOR LOW K - Abnormal; Notable for the following components:    Glucose 113 (*)     BUN 24 (*)     GFR Non- 54 (*)     All other components within normal limits    Narrative:     Performed at:  [address],  [place], [address] [employer] [employer]   Phone [phone] - Abnormal; Notable for the following components:    Blood, Urine MODERATE (*)     Protein, UA TRACE (*)     All other components within normal limits    Narrative:     Performed at:  [address],  [place], [address] [employer]   Phone [phone] - Abnormal; Notable for the following components:    Protime 30.8 (*)     INR 2.63 (*)     All other components within normal limits    Narrative:     Performed at:  [address],  [place], [address] [employer]   Phone [phone]   TROPONIN    Narrative:     Performed at:  [address],  [place], [address] [employer]   Phone [phone]   SAMPLE POSSIBLE BLOOD BANK TESTING    Narrative:     Performed at:  [address],  [place], [address] [employer]   Phone [phone]   MICROSCOPIC URINALYSIS    Narrative:     Performed at:  [address],  [place], [address] [employer]   Phone [phone]     ED COURSE/MDM  Patient seen and evaluated. Old records reviewed. Diagnostic testing reviewed and results discussed. I have evaluated this patient. My supervising physician was available for consultation. Sabi Day presented to the ED today with above noted complaints. Physical exam is without focal or lateralizing deficits on exam. CN II-XII grossly intact. She was given 1500 ml IVF by squad and still had positive orthostatic BPs in the ER. She was given another 1L of IVF. Repeat orthostatics much improved.   Patient has been able to get up and walk in the ED without complaints. She reports feeling much improved and is in agreement with discharge. Blood work is without evidence of systemic infection. No anemia. No electrolyte abnormality. No evidence of acute kidney injury or transaminitis. Troponin is negative. UA is without evidence of infection. PT/INR within normal limits at 30.8/2. 63. CT of the head is without acute findings. EKG shows normal sinus rhythm. There is a bundle branch block present but this is not new or changed from prior. While in the ED the patient received   Medications   meclizine (ANTIVERT) tablet 25 mg (25 mg Oral Given 9/20/20 1625)   0.9 % sodium chloride bolus (0 mLs Intravenous Stopped 9/20/20 1902)     As the physical exam and diagnostic studies are unremarkable I think it is reasonable to discharge the patient at this point. Please refer to AVS for further details regarding discharge instructions. A discussion was had with the patient regarding diagnosis, diagnostic testing results, treatment/ plan of care, and follow up. All questions were answered. Patient will follow up as directed for further evaluation/treatment. The patient was given strict return precautions as we discussed symptoms that would necessitate return to the ED. Patient will return to ED for new/worsening symptoms. The patient verbalized their understanding and agreement with the above plan. I estimate there is LOW risk for ACUTE CORONARY SYNDROME, INTRACRANIAL HEMORRHAGE, MALIGNANT DYSRHYTHMIA, MENINGITIS, PNEUMONIA, PULMONARY EMBOLISM, SEPSIS, SUBARACHNOID HEMORRHAGE, SUBDURAL HEMATOMA, STROKE, or URINARY TRACT INFECTION, thus I consider the discharge disposition reasonable. Ondina Kohli   and I have discussed the diagnosis and risks, and we agree with discharging home to follow-up with their primary doctor. We also discussed returning to the Emergency Department immediately if new or worsening symptoms occur.  We have discussed the symptoms which are most concerning (e.g., changing or worsening pain, weakness, vomiting, fever) that necessitate immediate return. Clinical Impression  Final diagnoses:   Orthostatic hypotension   Lightheadedness   Chronic anticoagulation       Patient was sent home with a prescription for below medication/s. I did [place] patient on appropriate use of these medication. New Prescriptions    MECLIZINE (ANTIVERT) 25 MG TABLET    Take 1 tablet by mouth 3 times daily as needed for Dizziness    ONDANSETRON (ZOFRAN ODT) 4 MG DISINTEGRATING TABLET    Take 1 tablet by mouth every 8 hours as needed for Nausea       FOLLOW UP  Mary Ellen Swann MD  [address]  [phone]    Call in 1 day  For further evaluation    [facility]  ED  [address] [facility] [address]  Go to   If symptoms worsen      DISPOSITION  Patient was discharged to home in good condition. Comment: Please note this report has been produced using speech recognition software and may contain errors related to that system including errors in grammar, punctuation, and spelling, as well as words and phrases that may be inappropriate. If there are any questions or concerns please feel free to contact the dictating provider for clarification.         Kofi Morales, LIZZY - CNP  09/20/20 1931

## 2020-09-20 NOTE — ED NOTES
Pt was able to ambulate to bathroom and give urine sample with minimal assistance. States she felt weak but would be able to walk.      Mike Castillo  09/20/20 5433
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
## 2020-09-20 NOTE — ED NOTES

## 2020-09-20 NOTE — ED PROVIDER NOTES
I did not have face-to-face interaction with this patient. I did not discuss the case with the advanced practice provider. I was available for consultation on the patient if deemed necessary by advanced practice provider.   EKG  The Ekg interpreted by me shows  normal sinus rhythm with a rate of 78  Axis is   Left axis deviation  QTc is  normal  RBBB, LAFB  ST Segments: no acute change  No significant change from prior EKG dated 3 sep 2020           Lizzy Cruz MD  09/20/20 0593

## 2020-11-03 PROBLEM — M47.816 LUMBAR SPONDYLOSIS: Status: RESOLVED | Noted: 2018-11-21 | Resolved: 2020-11-03

## 2020-11-16 ENCOUNTER — TELEPHONE (OUTPATIENT)
Dept: FAMILY MEDICINE CLINIC | Age: 80
End: 2020-11-16

## 2020-11-16 NOTE — TELEPHONE ENCOUNTER
Dr. Brittany Rock office is needing to know if patient can stop her Warfarin 5 days prior to a Lumbar epidural steroid injection. Procedure not scheduled yet.

## 2020-11-30 NOTE — TELEPHONE ENCOUNTER
I would prefer she bridge with Lovenox if possible. Please let them know. She also follows with the Coumadin clinic.  Thanks

## 2020-12-07 NOTE — TELEPHONE ENCOUNTER
Pt. Also requesting RX for Lovenox when she has to temp stop the Warfarin. I did not fill in directions for Warfarin. Pt.  Asking that this be taken care of today so mail order has time to send her Lovenox to her before her surgery on

## 2020-12-09 RX ORDER — SIMVASTATIN 40 MG
40 TABLET ORAL NIGHTLY
Qty: 90 TABLET | Refills: 1 | Status: SHIPPED | OUTPATIENT
Start: 2020-12-09 | End: 2021-05-24

## 2020-12-09 RX ORDER — LISINOPRIL AND HYDROCHLOROTHIAZIDE 12.5; 1 MG/1; MG/1
1 TABLET ORAL DAILY
Qty: 90 TABLET | Refills: 3 | Status: SHIPPED | OUTPATIENT
Start: 2020-12-09 | End: 2021-12-01

## 2020-12-09 RX ORDER — SERTRALINE HYDROCHLORIDE 100 MG/1
TABLET, FILM COATED ORAL
Qty: 180 TABLET | Refills: 1 | Status: SHIPPED | OUTPATIENT
Start: 2020-12-09 | End: 2021-05-24

## 2020-12-09 RX ORDER — WARFARIN SODIUM 5 MG/1
TABLET ORAL
Qty: 124 TABLET | Refills: 0 | Status: SHIPPED | OUTPATIENT
Start: 2020-12-09 | End: 2021-03-25

## 2021-02-15 ENCOUNTER — ANTI-COAG VISIT (OUTPATIENT)
Dept: PHARMACY | Age: 81
End: 2021-02-15
Payer: MEDICARE

## 2021-02-15 DIAGNOSIS — Z86.718 HISTORY OF DVT (DEEP VEIN THROMBOSIS): ICD-10-CM

## 2021-02-15 LAB — INR BLD: 1.9

## 2021-02-15 PROCEDURE — 99211 OFF/OP EST MAY X REQ PHY/QHP: CPT | Performed by: INTERNAL MEDICINE

## 2021-02-15 PROCEDURE — 85610 PROTHROMBIN TIME: CPT | Performed by: INTERNAL MEDICINE

## 2021-02-15 NOTE — PROGRESS NOTES
Ms. Daniel Bone is here for management of anticoagulation for hx of DVT. PMH also significant for HTN. She presents today w/out complaint. Pt verifies dosing regimen as listed above. Pt denies s/s bleeding/swelling/SOB. + missed doses. + changes in Rx/OTCs/Herbal medications. No major changes to diet. Occasional EtOH use and denies tobacco.    Pt has procedure on 2/23. Bridge calendar given and reviewed. Will hold 5 days prior. INR 1.9 is within therapeutic range of 2-3. Recommend to continue 7.5mg daily, except 5 mg Q Sun. Hold the dose 5 days prior to procedure. Pt is advised to follow the bridging schedule. Pt has the 5 mg tablets. Will continue to monitor and check INR in 2 weeks. Dosing reminder card given with phone number, appointment date and time. Return to clinic: 3/1 @ 11:30 AM  Referring physician: Dr. Ankita Torres, PharmD Candidate    2/15/21 11:13 AM    I have seen the patient and reviewed the progress note written by the PharmD Candidate. I agree with this assessment and plan.    Nicholas Becker, JasonD 2/15/2021 12:06 PM

## 2021-02-21 DIAGNOSIS — R10.13 EPIGASTRIC ABDOMINAL PAIN: ICD-10-CM

## 2021-02-21 DIAGNOSIS — K21.9 GASTROESOPHAGEAL REFLUX DISEASE: ICD-10-CM

## 2021-02-22 RX ORDER — PANTOPRAZOLE SODIUM 40 MG/1
40 TABLET, DELAYED RELEASE ORAL DAILY
Qty: 90 TABLET | Refills: 1 | Status: SHIPPED | OUTPATIENT
Start: 2021-02-22 | End: 2021-08-09

## 2021-03-01 ENCOUNTER — ANTI-COAG VISIT (OUTPATIENT)
Dept: PHARMACY | Age: 81
End: 2021-03-01
Payer: MEDICARE

## 2021-03-01 DIAGNOSIS — Z86.718 HISTORY OF DVT (DEEP VEIN THROMBOSIS): ICD-10-CM

## 2021-03-01 LAB — INTERNATIONAL NORMALIZATION RATIO, POC: 1.2

## 2021-03-01 PROCEDURE — 85610 PROTHROMBIN TIME: CPT | Performed by: INTERNAL MEDICINE

## 2021-03-01 PROCEDURE — 99211 OFF/OP EST MAY X REQ PHY/QHP: CPT | Performed by: INTERNAL MEDICINE

## 2021-03-01 NOTE — PROGRESS NOTES
Ms. Jw Blount is here for management of anticoagulation for hx of DVT. PMH also significant for HTN. She presents today w/out complaint. Pt verifies dosing regimen as listed above. Pt denies s/s bleeding/swelling/SOB. + missed doses. + changes in Rx/OTCs/Herbal medications. No major changes to diet. Occasional EtOH use and denies tobacco.    Pt had an epidural procedure on 2/23. Bridge is finished, only on warfarin now (taken 6 doses so far). She has also stopped her percocet- may be contributing to lower INR. INR 1.2 is below therapeutic range of 2-3. Possibly due to being held for surgery and stopping percocet. Recommend to increase dose to 7.5mg daily, except 10mg on Monday. Pt has the 5 mg tablets. Will continue to monitor and check INR in 1 week. Dosing reminder card given with phone number, appointment date and time.    Return to clinic: 3/8 @ 11:45 AM   Referring physician: Dr. Pankaj Manzo

## 2021-04-15 ENCOUNTER — ANTI-COAG VISIT (OUTPATIENT)
Dept: PHARMACY | Age: 81
End: 2021-04-15
Payer: MEDICARE

## 2021-04-15 DIAGNOSIS — Z86.718 HISTORY OF DVT (DEEP VEIN THROMBOSIS): ICD-10-CM

## 2021-04-15 LAB — INTERNATIONAL NORMALIZATION RATIO, POC: 1.5

## 2021-04-15 PROCEDURE — 85610 PROTHROMBIN TIME: CPT

## 2021-04-15 PROCEDURE — 99211 OFF/OP EST MAY X REQ PHY/QHP: CPT

## 2021-04-15 NOTE — PROGRESS NOTES
Ms. Chanelle Roper is here for management of anticoagulation for hx of DVT. PMH also significant for HTN. She presents today w/out complaint. Pt verifies dosing regimen as listed above. Pt denies s/s bleeding/swelling/SOB. No missed doses. No changes in Rx/OTCs/Herbal medications. Occasional EtOH use and denies tobacco.    She admits to eating A LOT more vitamin K lately- more broccoli and salad and other veggies. Dose was increased last visit. Endorses missing Friday dose, which may partly explained for the low INR today. Will bolus and increase dose further. INR 1.5 is below therapeutic range of 2-3. [place] Recommend 10 mg today only and to increase dose to 7.5 mg Sun, Tue, Thu, and 10 mg on all other days. Pt has the 5 mg tablets. Will continue to monitor and check INR in 2 week. Dosing reminder card given with phone number, appointment date and time.    Return to clinic: 4/29 @ 1:30 PM   Referring physician: Dr. Paula Jones PharmD  4/15/2021 at 2:10 PM

## 2021-04-29 ENCOUNTER — ANTI-COAG VISIT (OUTPATIENT)
Dept: PHARMACY | Age: 81
End: 2021-04-29
Payer: MEDICARE

## 2021-04-29 DIAGNOSIS — Z86.718 HISTORY OF DVT (DEEP VEIN THROMBOSIS): ICD-10-CM

## 2021-04-29 LAB — INR BLD: 3.6

## 2021-04-29 PROCEDURE — 99211 OFF/OP EST MAY X REQ PHY/QHP: CPT | Performed by: FAMILY MEDICINE

## 2021-04-29 PROCEDURE — 85610 PROTHROMBIN TIME: CPT | Performed by: FAMILY MEDICINE

## 2021-05-13 ENCOUNTER — ANTI-COAG VISIT (OUTPATIENT)
Dept: PHARMACY | Age: 81
End: 2021-05-13
Payer: MEDICARE

## 2021-05-13 DIAGNOSIS — Z86.718 HISTORY OF DVT (DEEP VEIN THROMBOSIS): ICD-10-CM

## 2021-05-13 LAB — INR BLD: 3.3

## 2021-05-13 PROCEDURE — 85610 PROTHROMBIN TIME: CPT | Performed by: FAMILY MEDICINE

## 2021-05-13 PROCEDURE — 99211 OFF/OP EST MAY X REQ PHY/QHP: CPT | Performed by: FAMILY MEDICINE

## 2021-05-13 NOTE — PROGRESS NOTES
Ms. Chanelle Roper is here for management of anticoagulation for hx of DVT. PMH also significant for HTN. She presents today w/out complaint. Pt verifies dosing regimen as listed above. Pt denies s/s bleeding/swelling/SOB. No missed doses. No changes in Rx/OTCs/Herbal medications. Occasional EtOH use and denies tobacco.    She admits to eating A LOT more vitamin K lately- more broccoli and salad and other veggies. INR 3.3 is above therapeutic range of 2-3. [place] Recommend to take 5 mg today, then decrease dose to 7.5 mg daily except 10 mg Mon  Pt has the 5 mg tablets. Will continue to monitor and check INR in 3 week. Dosing reminder card given with phone number, appointment date and time.    Return to clinic: 6/3 @ 1 pm   Referring physician: Dr. Meenakshi Barbosa, PharmD 5/13/2021 1:06 PM

## 2021-05-23 DIAGNOSIS — F33.42 RECURRENT MAJOR DEPRESSIVE DISORDER, IN FULL REMISSION (HCC): ICD-10-CM

## 2021-05-23 DIAGNOSIS — E78.00 HYPERCHOLESTEREMIA: ICD-10-CM

## 2021-05-24 RX ORDER — SIMVASTATIN 40 MG
TABLET ORAL
Qty: 90 TABLET | Refills: 3 | Status: SHIPPED | OUTPATIENT
Start: 2021-05-24 | End: 2022-05-16

## 2021-05-24 RX ORDER — SERTRALINE HYDROCHLORIDE 100 MG/1
TABLET, FILM COATED ORAL
Qty: 180 TABLET | Refills: 3 | Status: SHIPPED | OUTPATIENT
Start: 2021-05-24 | End: 2022-05-16

## 2021-05-24 NOTE — TELEPHONE ENCOUNTER
Refill Request     Last Seen: Last Seen Department: 9/3/2020  Last Seen by PCP: 9/3/2020    Last Written: 12/9/2020    Next Appointment: Please advise on when to follow up in office.        Future Appointments   Date Time Provider Clare Bliss   6/3/2021  1:00 PM [address] [facility]             Requested Prescriptions     Pending Prescriptions Disp Refills    sertraline (ZOLOFT) 100 MG tablet [Pharmacy Med Name: SERTRALINE HCL 100MG TABLET] 180 tablet 3     Sig: TAKE 1 TABLET BY MOUTH  TWICE DAILY    simvastatin (ZOCOR) 40 MG tablet [Pharmacy Med Name: SIMVASTATIN  40MG  TAB] 90 tablet 3     Sig: TAKE 1 TABLET BY MOUTH AT  NIGHT

## 2021-06-03 ENCOUNTER — ANTI-COAG VISIT (OUTPATIENT)
Dept: PHARMACY | Age: 81
End: 2021-06-03
Payer: MEDICARE

## 2021-06-03 DIAGNOSIS — Z86.718 HISTORY OF DVT (DEEP VEIN THROMBOSIS): Primary | ICD-10-CM

## 2021-06-03 LAB — INR BLD: 2.1

## 2021-06-03 PROCEDURE — 85610 PROTHROMBIN TIME: CPT | Performed by: FAMILY MEDICINE

## 2021-06-03 NOTE — PROGRESS NOTES
Ms. Marlena Ford is here for management of anticoagulation for hx of DVT. PMH also significant for HTN. She presents today w/out complaint. Pt verifies dosing regimen as listed above. Pt denies s/s bleeding/swelling/SOB. No missed doses. No changes in Rx/OTCs/Herbal medications. Occasional EtOH use and denies tobacco.    She admits to eating A LOT more vitamin K lately- more broccoli and salad and other veggies. INR 2.1 is within therapeutic range of 2-3. Ge [place] Recommend to continue 7.5 mg daily except 10 mg Mon  Pt has the 5 mg tablets. Will continue to monitor and check INR in 4 week. Dosing reminder card given with phone number, appointment date and time.    Return to clinic: 7/1 @ 0115 pm   Referring physician: Dr. Brigitte Butler    I have seen the patient and I agree with the assessment and plan created by the PharmD Candidate  Pema CedilloerShanel 6/3/2021 1:29 PM

## 2021-08-09 DIAGNOSIS — K21.9 GASTROESOPHAGEAL REFLUX DISEASE: ICD-10-CM

## 2021-08-09 DIAGNOSIS — R10.13 EPIGASTRIC ABDOMINAL PAIN: ICD-10-CM

## 2021-08-09 RX ORDER — PANTOPRAZOLE SODIUM 40 MG/1
40 TABLET, DELAYED RELEASE ORAL DAILY
Qty: 90 TABLET | Refills: 3 | Status: SHIPPED | OUTPATIENT
Start: 2021-08-09 | End: 2022-09-16

## 2021-08-09 NOTE — TELEPHONE ENCOUNTER
Refill Request     Last Seen: Last Seen Department: 9/3/2020  Last Seen by PCP: Visit date not found    Last Written: 2/22/2021    Next Appointment:   No future appointments. Message to [facility] to schedule appointment.          Requested Prescriptions     Pending Prescriptions Disp Refills    pantoprazole (PROTONIX) 40 MG tablet [Pharmacy Med Name: Pantoprazole Sodium 40 MG Oral Tablet Delayed Release] 90 tablet 3     Sig: TAKE 1 TABLET BY MOUTH  DAILY

## 2021-08-11 NOTE — TELEPHONE ENCOUNTER
Pt answered and stated that will call office after figures out her schedule with her husbands appointment and will call to get her AWV appt scheduled

## 2021-08-16 ENCOUNTER — TELEPHONE (OUTPATIENT)
Dept: FAMILY MEDICINE CLINIC | Age: 81
End: 2021-08-16

## 2021-08-16 NOTE — TELEPHONE ENCOUNTER
Left message for patient to call back. Please help schedule for an AWV visit for after 9/4/2021. Ok to use a Monday and it can be in person.

## 2021-08-20 ENCOUNTER — TELEPHONE (OUTPATIENT)
Dept: FAMILY MEDICINE CLINIC | Age: 81
End: 2021-08-20

## 2021-08-20 ENCOUNTER — TELEMEDICINE (OUTPATIENT)
Dept: FAMILY MEDICINE CLINIC | Age: 81
End: 2021-08-20
Payer: MEDICARE

## 2021-08-20 DIAGNOSIS — F33.42 RECURRENT MAJOR DEPRESSIVE DISORDER, IN FULL REMISSION (HCC): ICD-10-CM

## 2021-08-20 DIAGNOSIS — R05.9 COUGH: Primary | ICD-10-CM

## 2021-08-20 DIAGNOSIS — M46.1 SACROILIITIS, NOT ELSEWHERE CLASSIFIED (HCC): ICD-10-CM

## 2021-08-20 DIAGNOSIS — I82.5Y3 CHRONIC DEEP VEIN THROMBOSIS (DVT) OF PROXIMAL VEIN OF BOTH LOWER EXTREMITIES (HCC): ICD-10-CM

## 2021-08-20 PROCEDURE — 99442 PR PHYS/QHP TELEPHONE EVALUATION 11-20 MIN: CPT | Performed by: FAMILY MEDICINE

## 2021-08-20 RX ORDER — AZITHROMYCIN 250 MG/1
250 TABLET, FILM COATED ORAL SEE ADMIN INSTRUCTIONS
Qty: 6 TABLET | Refills: 0 | Status: SHIPPED | OUTPATIENT
Start: 2021-08-20 | End: 2021-08-25

## 2021-08-20 RX ORDER — FLUTICASONE PROPIONATE 50 MCG
2 SPRAY, SUSPENSION (ML) NASAL DAILY
Qty: 1 BOTTLE | Refills: 5 | Status: SHIPPED | OUTPATIENT
Start: 2021-08-20

## 2021-08-20 NOTE — PROGRESS NOTES
Steve Tran is a 68 y.o. female evaluated via telephone on 4/17/2020. Consent:  She and/or health care decision maker is aware that that she may receive a bill for this telephone service, depending on her insurance coverage, and has provided verbal consent to proceed: Yes      Documentation:  I communicated with the patient and/or health care decision maker about:    +couple weeks  Worsening   + dry cough  No SOB, but feels smothered with exertion   No CP, fevers  Pain with swallowing, mild  Tickle in her throat  Left side of her neck is sore  Hurts when she turns her head from side to side  Left nostril burns     71 HR, 97%      Takes zyrtec nightly  Doesn't seem to help      No sick contacts   No abd complaints  Has diarrhea, + incontinence   No BRBR  Some pain with food intake, thinks it's from hernia     No COVID testing lately  S/p COVID vaccines in March     Details of this discussion including any medical advice provided:     1. Cough  - fluticasone (FLONASE) 50 MCG/ACT nasal spray; 2 sprays by Each Nostril route daily  Dispense: 1 Bottle; Refill: 5  - azithromycin (ZITHROMAX) 250 MG tablet; Take 1 tablet by mouth See Admin Instructions for 5 days 500mg on day 1 followed by 250mg on days 2 - 5  Dispense: 6 tablet; Refill: 0          2. Recurrent major depressive disorder, in full remission ([address]  stable  3. Sacroiliitis, not elsewhere classified ([address]    4. Chronic deep vein thrombosis (DVT) of proximal vein of both lower extremities (HCC)  No new concerns         I affirm this is a Patient Initiated Episode with an Established Patient who has not had a related appointment within my department in the past 7 days or scheduled within the next 24 hours.     Total Time: minutes: 11-20 minutes    Note: not billable if this call serves to triage the patient into an appointment for the relevant concern    Dr. Camille Gomez MD  8/20/21

## 2021-08-20 NOTE — TELEPHONE ENCOUNTER
Pt missed her video visit and we did a subsequent telephone visit so the visit was never arrived. Sent in now. Please let her know it was sent in.

## 2021-08-20 NOTE — TELEPHONE ENCOUNTER
Patient states she was suppose to be prescribed an antibiotic for her throat from todays VV but nothing was sent in.      Please advise

## 2021-08-23 ENCOUNTER — TELEPHONE (OUTPATIENT)
Dept: FAMILY MEDICINE CLINIC | Age: 81
End: 2021-08-23

## 2021-08-23 NOTE — TELEPHONE ENCOUNTER
----- Message from Ewa Bhakta sent at 8/20/2021  5:55 PM EDT -----  Subject: Message to Provider    QUESTIONS  Information for Provider? Patient wanted to let [place] know some insurance   information. Her and her  have been members with [employer] for   over 48 years. .. She is [employer] dependent. Please give her a call   back. ---------------------------------------------------------------------------  --------------  Colt CORTES  What is the best way for the office to contact you? OK to leave message on   voicemail  Preferred Call Back Phone Number? [phone]  ---------------------------------------------------------------------------  --------------  SCRIPT ANSWERS  Relationship to Patient?  Self

## 2021-08-26 ENCOUNTER — ANTI-COAG VISIT (OUTPATIENT)
Dept: PHARMACY | Age: 81
End: 2021-08-26
Payer: MEDICARE

## 2021-08-26 DIAGNOSIS — Z86.718 HISTORY OF DVT (DEEP VEIN THROMBOSIS): Primary | ICD-10-CM

## 2021-08-26 LAB — INR BLD: 2

## 2021-08-26 PROCEDURE — 85610 PROTHROMBIN TIME: CPT | Performed by: FAMILY MEDICINE

## 2021-08-26 PROCEDURE — 99211 OFF/OP EST MAY X REQ PHY/QHP: CPT | Performed by: FAMILY MEDICINE

## 2021-08-26 NOTE — PROGRESS NOTES
Ms. Kim Gusman is here for management of anticoagulation for hx of DVT. PMH also significant for HTN. She presents today w/out complaint. Pt verifies dosing regimen as listed above. Pt denies s/s bleeding/swelling/SOB. No missed doses. No changes in Rx/OTCs/Herbal medications. Occasional EtOH use and denies tobacco.    She admits to eating A LOT more vitamin K lately- more broccoli and salad and other veggies. On antibiotic, azithromycin, only a couple days left. INR 2.0 is within therapeutic range of 2-3. [place]  Recommend to continue 7.5 mg daily except 10 mg Mon  Pt has the 5 mg tablets. Will continue to monitor and check INR in 4 week. Dosing reminder card given with phone number, appointment date and time.    Return to clinic: 9/23 @ 1 pm    Referring physician: Dr. Lelo Cruz, PharmD 8/26/2021 11:58 AM

## 2021-09-13 ENCOUNTER — OFFICE VISIT (OUTPATIENT)
Dept: FAMILY MEDICINE CLINIC | Age: 81
End: 2021-09-13
Payer: MEDICARE

## 2021-09-13 VITALS
BODY MASS INDEX: 27.82 KG/M2 | SYSTOLIC BLOOD PRESSURE: 100 MMHG | HEIGHT: 59 IN | DIASTOLIC BLOOD PRESSURE: 66 MMHG | WEIGHT: 138 LBS

## 2021-09-13 DIAGNOSIS — M85.89 OSTEOPENIA OF MULTIPLE SITES: ICD-10-CM

## 2021-09-13 DIAGNOSIS — M54.50 CHRONIC BILATERAL LOW BACK PAIN, UNSPECIFIED WHETHER SCIATICA PRESENT: ICD-10-CM

## 2021-09-13 DIAGNOSIS — G89.29 CHRONIC BILATERAL LOW BACK PAIN, UNSPECIFIED WHETHER SCIATICA PRESENT: ICD-10-CM

## 2021-09-13 DIAGNOSIS — Z12.31 ENCOUNTER FOR SCREENING MAMMOGRAM FOR MALIGNANT NEOPLASM OF BREAST: ICD-10-CM

## 2021-09-13 DIAGNOSIS — R09.82 PND (POST-NASAL DRIP): ICD-10-CM

## 2021-09-13 DIAGNOSIS — Z00.00 ROUTINE GENERAL MEDICAL EXAMINATION AT A HEALTH CARE FACILITY: Primary | ICD-10-CM

## 2021-09-13 DIAGNOSIS — E55.9 VITAMIN D DEFICIENCY, UNSPECIFIED: ICD-10-CM

## 2021-09-13 PROCEDURE — 1123F ACP DISCUSS/DSCN MKR DOCD: CPT | Performed by: FAMILY MEDICINE

## 2021-09-13 PROCEDURE — 1036F TOBACCO NON-USER: CPT | Performed by: FAMILY MEDICINE

## 2021-09-13 PROCEDURE — 1090F PRES/ABSN URINE INCON ASSESS: CPT | Performed by: FAMILY MEDICINE

## 2021-09-13 PROCEDURE — G8417 CALC BMI ABV UP PARAM F/U: HCPCS | Performed by: FAMILY MEDICINE

## 2021-09-13 PROCEDURE — G0439 PPPS, SUBSEQ VISIT: HCPCS | Performed by: FAMILY MEDICINE

## 2021-09-13 PROCEDURE — G8427 DOCREV CUR MEDS BY ELIG CLIN: HCPCS | Performed by: FAMILY MEDICINE

## 2021-09-13 PROCEDURE — G8399 PT W/DXA RESULTS DOCUMENT: HCPCS | Performed by: FAMILY MEDICINE

## 2021-09-13 PROCEDURE — 99214 OFFICE O/P EST MOD 30 MIN: CPT | Performed by: FAMILY MEDICINE

## 2021-09-13 PROCEDURE — 4040F PNEUMOC VAC/ADMIN/RCVD: CPT | Performed by: FAMILY MEDICINE

## 2021-09-13 RX ORDER — CETIRIZINE HYDROCHLORIDE 10 MG/1
10 TABLET ORAL DAILY
COMMUNITY

## 2021-09-13 RX ORDER — AZELASTINE 1 MG/ML
1 SPRAY, METERED NASAL 2 TIMES DAILY
Qty: 30 ML | Refills: 5 | Status: SHIPPED | OUTPATIENT
Start: 2021-09-13 | End: 2022-06-17 | Stop reason: ALTCHOICE

## 2021-09-13 RX ORDER — TRAMADOL HYDROCHLORIDE 50 MG/1
50 TABLET ORAL EVERY 6 HOURS PRN
Qty: 28 TABLET | Refills: 0 | Status: SHIPPED | OUTPATIENT
Start: 2021-09-13 | End: 2021-10-06 | Stop reason: SDUPTHER

## 2021-09-13 SDOH — ECONOMIC STABILITY: TRANSPORTATION INSECURITY
IN THE PAST 12 MONTHS, HAS LACK OF TRANSPORTATION KEPT YOU FROM MEETINGS, WORK, OR FROM GETTING THINGS NEEDED FOR DAILY LIVING?: NO

## 2021-09-13 SDOH — ECONOMIC STABILITY: TRANSPORTATION INSECURITY
IN THE PAST 12 MONTHS, HAS THE LACK OF TRANSPORTATION KEPT YOU FROM MEDICAL APPOINTMENTS OR FROM GETTING MEDICATIONS?: NO

## 2021-09-13 SDOH — ECONOMIC STABILITY: FOOD INSECURITY: WITHIN THE PAST 12 MONTHS, THE FOOD YOU BOUGHT JUST DIDN'T LAST AND YOU DIDN'T HAVE MONEY TO GET MORE.: NEVER TRUE

## 2021-09-13 SDOH — ECONOMIC STABILITY: FOOD INSECURITY: WITHIN THE PAST 12 MONTHS, YOU WORRIED THAT YOUR FOOD WOULD RUN OUT BEFORE YOU GOT MONEY TO BUY MORE.: NEVER TRUE

## 2021-09-13 ASSESSMENT — PATIENT HEALTH QUESTIONNAIRE - PHQ9
SUM OF ALL RESPONSES TO PHQ QUESTIONS 1-9: 2
SUM OF ALL RESPONSES TO PHQ9 QUESTIONS 1 & 2: 2
1. LITTLE INTEREST OR PLEASURE IN DOING THINGS: 1
2. FEELING DOWN, DEPRESSED OR HOPELESS: 1
SUM OF ALL RESPONSES TO PHQ QUESTIONS 1-9: 2
SUM OF ALL RESPONSES TO PHQ QUESTIONS 1-9: 2

## 2021-09-13 ASSESSMENT — SOCIAL DETERMINANTS OF HEALTH (SDOH): HOW HARD IS IT FOR YOU TO PAY FOR THE VERY BASICS LIKE FOOD, HOUSING, MEDICAL CARE, AND HEATING?: NOT HARD AT ALL

## 2021-09-13 ASSESSMENT — LIFESTYLE VARIABLES: HOW OFTEN DO YOU HAVE A DRINK CONTAINING ALCOHOL: 0

## 2021-09-13 NOTE — PATIENT INSTRUCTIONS
Personalized Preventive Plan for Padmini Bird - 9/13/2021  Medicare offers a range of preventive health benefits. Some of the tests and screenings are paid in full while other may be subject to a deductible, co-insurance, and/or copay. Some of these benefits include a comprehensive review of your medical history including lifestyle, illnesses that may run in your family, and various assessments and screenings as appropriate. After reviewing your medical record and screening and assessments performed today your provider may have ordered immunizations, labs, imaging, and/or referrals for you. A list of these orders (if applicable) as well as your Preventive Care list are included within your After Visit Summary for your review. Other Preventive Recommendations:    · A preventive eye exam performed by an eye specialist is recommended every 1-2 years to screen for glaucoma; cataracts, macular degeneration, and other eye disorders. · A preventive dental visit is recommended every 6 months. · Try to get at least 150 minutes of exercise per week or 10,000 steps per day on a pedometer . · Order or download the FREE \"Exercise & Physical Activity: Your Everyday Guide\" from [employer]. Call 0-600.388.5141 or search [employer] online. · You need 7442-0235 mg of calcium and 8036-6627 IU of vitamin D per day. It is possible to meet your calcium requirement with diet alone, but a vitamin D supplement is usually necessary to meet this goal.  · When exposed to the sun, use a sunscreen that protects against both UVA and UVB radiation with an SPF of 30 or greater. Reapply every 2 to 3 hours or after sweating, drying off with a towel, or swimming. · Always wear a seat belt when traveling in a car. Always wear a helmet when riding a bicycle or motorcycle.

## 2021-09-13 NOTE — PROGRESS NOTES
Medicare Annual Wellness Visit  Name: Jazz Mckeon Date: 2021   MRN: <[id number]> Sex: Female   Age: 68 y.o. Ethnicity: Non- / Non    : 1940 Race: White (non-)      Licha Albright is here for Medicare AWV    Screenings for behavioral, psychosocial and functional/safety risks, and cognitive dysfunction are all negative except as indicated below. These results, as well as other patient data from the [address] form, are documented in Flowsheets linked to this Encounter. Allergies   Allergen Reactions    Imitrex [Sumatriptan] Anaphylaxis and Shortness Of Breath     Swelling in arm of injection cite, and SOB         Prior to Visit Medications    Medication Sig Taking? Authorizing Provider   cetirizine (ZYRTEC) 10 MG tablet Take 10 mg by mouth daily Yes Historical Provider, MD   azelastine (ASTELIN) 0.1 % nasal spray 1 spray by Nasal route 2 times daily Use in each nostril as directed Yes Radha Mendes MD   traMADol (ULTRAM) 50 MG tablet Take 1 tablet by mouth every 6 hours as needed for Pain for up to 7 days. Intended supply: 7 days.  Take lowest dose possible to manage pain Yes Radha Mendes MD   fluticasone (FLONASE) 50 MCG/ACT nasal spray 2 sprays by Each Nostril route daily Yes Rahda Mendes MD   pantoprazole (PROTONIX) 40 MG tablet TAKE 1 TABLET BY MOUTH  DAILY Yes Radha Mendes MD   sertraline (ZOLOFT) 100 MG tablet TAKE 1 TABLET BY MOUTH  TWICE DAILY Yes Radha Mendes MD   simvastatin (ZOCOR) 40 MG tablet TAKE 1 TABLET BY MOUTH AT  NIGHT Yes Radha Mendes MD   warfarin (COUMADIN) 5 MG tablet TAKE 1 TABLET BY MOUTH ON   AND THURSDAY AND 1  AND 1/2 TABLETS BY MOUTH ON ALL OTHER DAYS, OR DIRECTED BY COUMADIN CLINIC Yes LIZZY Aggarwal - JONATHON   lisinopril-hydroCHLOROthiazide (PRINZIDE;ZESTORETIC) 10-12.5 MG per tablet Take 1 tablet by mouth daily Yes Radha Mendes MD   [id number] (CEREFOLIN PO) Take by mouth Yes Historical Provider, MD   [address] 1 each by Does not apply route daily as needed (incontinence) Yes Lisy Espinoza MD   warfarin (COUMADIN) 7.5 MG tablet Take 7.5 mg by mouth Indications: Monday, Tuesday, Wednesday, Thursday, Friday, Saturday   Historical Provider, MD   Misc. Devices MISC Compression stockings/open toe pantyhose with compression 40/50  Patient not taking: Reported on 2021  MD Jennifer Crawford.  Devices MISC George panty hose  Open toe Petite plus beige  2 pairs  Patient not taking: Reported on 2021  Brandie Kearney MD         Past Medical History:   Diagnosis Date    Allergic rhinitis     Arthritis     Cancer ([facility] [address]     squamous cell on nose     Chronic back pain     Dementia ([facility] [address] 2019    Dental disease     Depression     Dizziness     GERD (gastroesophageal reflux disease)     H/O blood clots     Hearing loss     Hx of blood clots     Hyperlipidemia     Hypertension     Lumbar stenosis with neurogenic claudication     Osteoarthritis of neck     TMJ dysfunction        Past Surgical History:   Procedure Laterality Date     SECTION      x1    EMBOLECTOMY      EPIDURAL STEROID INJECTION Bilateral 2019    BILATERAL LUMBAR THREE, LUMBAR FOUR, LUMBAR FIVE RADIOFREQUENCY ABLATION SITE CONFIRMED BY FLUOROSCOPY performed by Nicola Shah MD at [facility] Right 8/15/2019    RIGHT SACROILIAC JOINT INJECTION SITE CONFIRMED BY FLUOROSCOPY performed by Nicola Shah MD at [address] [place] [address], TOTAL ABDOMINAL      LAMINECTOMY  2018    MICROLUMBAR LAMINECTOMY L4-5    LITHOTRIPSY      MOHS SURGERY      nose for squamous cell carcinoma    OTHER SURGICAL HISTORY      blood clots-          Family History   Problem Relation Age of Onset    Cancer Other     Hypertension Other     Seizures Other     Heart Disease Mother     Other Father         murdered     Colon Cancer Brother     Cancer Maternal Aunt         breast    Colon Cancer Maternal Grandmother        CareTeam (Including outside providers/suppliers regularly involved in providing care):   Patient Care Team:  Kj Verdugo MD as PCP - General (Family Medicine)  Kj Verdugo MD as PCP - [facility] Empaneled Provider  Akbar Thomas MD as Consulting Physician (Otolaryngology)  Thad Forman MD as Consulting Physician (Gastroenterology)  Kenna Lomax APRCONSTANZA - CNP as Nurse Practitioner (Nurse Practitioner)    Wt Readings from Last 3 Encounters:   09/13/21 138 lb (62.6 kg)   02/23/21 127 lb (57.6 kg)   09/20/20 127 lb (57.6 kg)     Vitals:    09/13/21 1303   BP: 100/66   Site: Right Upper Arm   Position: Sitting   Cuff Size: Small Adult   Weight: 138 lb (62.6 kg)   Height: 4' 11\" (1.499 m)     Body mass index is 27.87 kg/m². Based upon direct observation of the patient, evaluation of cognition reveals recent and remote memory intact.     General Appearance: alert and oriented to person, place and time, well developed and well- nourished, in no acute distress  Skin: warm and dry, no rash or erythema  Head: normocephalic and atraumatic  Eyes: pupils equal, round, and reactive to light, extraocular eye movements intact, conjunctivae normal  Neck: supple and non-tender without mass, no thyromegaly or thyroid nodules, no cervical lymphadenopathy  Pulmonary/Chest: clear to auscultation bilaterally- no wheezes, rales or rhonchi, normal air movement, no respiratory distress  Cardiovascular: normal rate, regular rhythm, normal S1 and S2, no murmurs, rubs, clicks, or gallops, distal pulses intact, no carotid bruits  Abdomen: soft, non-tender, non-distended, normal bowel sounds, no masses or organomegaly  Extremities: no cyanosis, clubbing or edema  Musculoskeletal: normal range of motion, no joint swelling, deformity or tenderness    Patient's complete Health Risk Assessment and screening values have been reviewed and are found in [facility]. The following problems were reviewed today and where indicated follow up appointments were made and/or referrals ordered. Positive Risk Factor Screenings with Interventions:     Fall Risk:  2 or more falls in past year?: (!) yes  Fall with injury in past year?: no  Fall Risk Interventions:    · Home safety tips provided  · Home exercises provided to promote strength and balance  · discuss plan for chronic back and knee pain           +LH, BP dropped     Colonoscopy: incontinent with her bowels since the stem cell injections by Orthopedics    Hernia surgery  Cataracts     General Health and ACP:  General  In general, how would you say your health is?: Very Good  In the past 7 days, have you experienced any of the following?  New or Increased Pain, New or Increased Fatigue, Loneliness, Social Isolation, Stress or Anger?: (!) New or Increased Pain  Do you get the social and emotional support that you need?: Yes  Do you have a Living Will?: (!) No  Advance Directives     Power of [address] [place] [address] Will ACP-Advance Directive ACP-Power of     Not on File Not on File Not on File Not on File      General Health Risk Interventions:  · No Living Will: Advance Care Planning addressed with patient today    Health Habits/Nutrition:  Health Habits/Nutrition  Do you exercise for at least 20 minutes 2-3 times per week?: (!) No  Have you lost any weight without trying in the past 3 months?: No  Do you eat only one meal per day?: No  Have you seen the dentist within the past year?: Yes  Body mass index: (!) 27.87  Health Habits/Nutrition Interventions:  · Inadequate physical activity:  will address back/knee pain, encouraged aquatic therapy    Hearing/Vision:  No exam data present  Hearing/Vision  Do you or your family notice any trouble with your hearing that hasn't been managed with hearing aids?: No  Do you have difficulty driving, watching TV, or doing any of your daily activities because of your eyesight?: No  Have you had an eye exam within the past year?: (!) No  Hearing/Vision Interventions:  · Vision concerns:  patient encouraged to make appointment with his/her eye specialist    Safety:  Safety  Do you have working smoke detectors?: Yes  Have all throw rugs been removed or fastened?: Yes  Do you have non-slip mats or surfaces in all bathtubs/showers?: (!) No  Do all of your stairways have a railing or banister?: Yes  Are your doorways, halls and stairs free of clutter?: Yes  Do you always fasten your seatbelt when you are in a car?: Yes  Safety Interventions:  · Home safety tips provided     Personalized Preventive Plan   Current Health Maintenance Status  Immunization History   Administered Date(s) Administered    COVID-19, Gomez Peter, PF, 30mcg/0.3mL 03/30/2021, 04/20/2021    Influenza Virus Vaccine 02/22/2021    Influenza, High Dose (Fluzone 65 yrs and older) 10/25/2017    Influenza, Quadv, adjuvanted, 65 yrs +, IM, PF (Fluad) 02/22/2021    Influenza, Triv, inactivated, subunit, adjuvanted, IM (Fluad 65 yrs and older) 10/10/2019    Pneumococcal Conjugate 13-valent (Idella Mcmullen) 06/13/2017, 10/25/2017    Pneumococcal Polysaccharide ([id number]) 12/12/2018    Tdap (Boostrix, Adacel) 12/12/2018        Health Maintenance   Topic Date Due    Hepatitis C screen  Never done    Shingles Vaccine (1 of 2) Never done    Lipid screen  11/20/2020    Flu vaccine (1) 09/01/2021    Annual Wellness Visit (AWV)  09/04/2021    Potassium monitoring  09/20/2021    Creatinine monitoring  09/20/2021    DTaP/Tdap/Td vaccine (2 - Td or Tdap) 12/12/2028    DEXA (modify frequency per FRAX score)  Completed    Pneumococcal 65+ years Vaccine  Completed    COVID-19 Vaccine  Completed    Hepatitis A vaccine  Aged Out    Hepatitis B vaccine  Aged Out    Hib vaccine  Aged Out    Meningococcal (ACWY) vaccine  Aged Out     Recommendations for [email or web address] Due: see orders and patient instructions/AVS.  . Recommended screening schedule for the next 5-10 years is provided to the patient in written form: see Patient Instructions/AVS.    Zeina Birch was seen today for medicare awv. Diagnoses and all orders for this visit:    Routine general medical examination at a health care facility  -     Galdino Patton MD, Gastroenterology, [facility]  -     Comprehensive Metabolic Panel; Future  -     CBC Auto Differential; Future  -     Hemoglobin A1C; Future  -     Hepatitis C Antibody; Future  -     Lipid Panel; Future  -     Vitamin D 25 Hydroxy; Future  -     TSH with Reflex; Future  -     DEXA BONE DENSITY AXIAL SKELETON; Future  -     BELKYS DIGITAL SCREEN W OR WO CAD BILATERAL; Future    Osteopenia of multiple sites  -     DEXA BONE DENSITY AXIAL SKELETON; Future    Encounter for screening mammogram for malignant neoplasm of breast  -     BELKYS DIGITAL SCREEN W OR WO CAD BILATERAL; Future    Chronic bilateral low back pain, unspecified whether sciatica present  -     PT aquatic therapy; Future  -     traMADol (ULTRAM) 50 MG tablet; Take 1 tablet by mouth every 6 hours as needed for Pain for up to 7 days. Intended supply: 7 days. Take lowest dose possible to manage pain    Vitamin D deficiency, unspecified   -     Vitamin D 25 Hydroxy; Future    PND (post-nasal drip)  -     azelastine (ASTELIN) 0.1 % nasal spray; 1 spray by Nasal route 2 times daily Use in each nostril as directed                 full plan for back pain reviewed with pt in detail, notes made for pt scanned into media. She defers returning to orthopedics for epidural injections.

## 2021-09-23 ENCOUNTER — ANTI-COAG VISIT (OUTPATIENT)
Dept: PHARMACY | Age: 81
End: 2021-09-23
Payer: MEDICARE

## 2021-09-23 DIAGNOSIS — Z86.718 HISTORY OF DVT (DEEP VEIN THROMBOSIS): Primary | ICD-10-CM

## 2021-09-23 LAB — INR BLD: 2.8

## 2021-09-23 PROCEDURE — 99211 OFF/OP EST MAY X REQ PHY/QHP: CPT | Performed by: INTERNAL MEDICINE

## 2021-09-23 PROCEDURE — 85610 PROTHROMBIN TIME: CPT | Performed by: INTERNAL MEDICINE

## 2021-09-23 NOTE — PROGRESS NOTES
Ms. Don Singh is here for management of anticoagulation for hx of DVT. PMH also significant for HTN. She presents today w/out complaint. Pt verifies dosing regimen as listed above. Pt denies s/s bleeding/swelling/SOB. No missed doses. No changes in Rx/OTCs/Herbal medications. Occasional EtOH use and denies tobacco.    She admitted to eating A LOT more vitamin K at prior appointment- more broccoli and salad and other veggies- has stayed consistent since last visit in August.    Started on tramadol BID on 9/14 and azelastine nasal spray. Has some bruising on right knee from fall. Discussed increased bleeding risk with tramadol, she usually takes with XS Tylenol. Patient denies dizziness/ drowsiness while taking. Will call clinic if increased bleeding or bruising occurs/ if concerns arise. INR 2.8 is within therapeutic range of 2-3. Geno Brar Recommend to continue 7.5 mg daily except 10 mg Mon  Pt has the 5 mg tablets. Will continue to monitor and check INR in 4 week. Dosing reminder card given with phone number, appointment date and time. Return to clinic: 10/21 @ 1 pm    Referring physician: Dr. Juwan Rashid  PharmD Candidate, 2022 9/23/2021 1:09 PM     I have seen the patient and reviewed the progress note written by the PharmD Candidate. I agree with this assessment and plan.    Ledy Fairchild, [facility], PharmD 9/23/2021 2:19 PM

## 2021-10-06 DIAGNOSIS — M54.50 CHRONIC BILATERAL LOW BACK PAIN, UNSPECIFIED WHETHER SCIATICA PRESENT: ICD-10-CM

## 2021-10-06 DIAGNOSIS — G89.29 CHRONIC BILATERAL LOW BACK PAIN, UNSPECIFIED WHETHER SCIATICA PRESENT: ICD-10-CM

## 2021-10-06 RX ORDER — TRAMADOL HYDROCHLORIDE 50 MG/1
50 TABLET ORAL EVERY 6 HOURS PRN
Qty: 28 TABLET | Refills: 0 | Status: SHIPPED | OUTPATIENT
Start: 2021-10-06 | End: 2021-10-22 | Stop reason: SDUPTHER

## 2021-10-06 NOTE — TELEPHONE ENCOUNTER
Refill Request - Controlled Substance    Last Seen Department: 9/13/2021  Last Seen by PCP: 9/13/2021    Last Written: 9/13/2021 #28    Last UDS: not on file    Med Agreement Signed On: 12/12/2018    Next Appointment: 3/16/2022      Requested Prescriptions     Pending Prescriptions Disp Refills    traMADol (ULTRAM) 50 MG tablet 28 tablet 0     Sig: Take 1 tablet by mouth every 6 hours as needed for Pain for up to 7 days. Intended supply: 7 days.  Take lowest dose possible to manage pain

## 2021-10-21 ENCOUNTER — ANTI-COAG VISIT (OUTPATIENT)
Dept: PHARMACY | Age: 81
End: 2021-10-21
Payer: MEDICARE

## 2021-10-21 DIAGNOSIS — Z86.718 HISTORY OF DVT (DEEP VEIN THROMBOSIS): Primary | ICD-10-CM

## 2021-10-21 LAB — INR BLD: 3.1

## 2021-10-21 PROCEDURE — 85610 PROTHROMBIN TIME: CPT

## 2021-10-21 PROCEDURE — 99211 OFF/OP EST MAY X REQ PHY/QHP: CPT

## 2021-10-21 NOTE — PROGRESS NOTES
Ms. Darrick Veras is here for management of anticoagulation for hx of DVT. PMH also significant for HTN. She presents today w/out complaint. Pt verifies dosing regimen as listed above. Pt denies s/s bleeding/swelling/SOB. No missed doses. No changes in Rx/OTCs/Herbal medications. Occasional EtOH use and denies tobacco.        Pt is still taking tramadol BID since 9/14. No signs of bleeding. Discussed increased bleeding risk with tramadol. Will call clinic if increased bleeding or bruising occurs/ if concerns arise. INR 3.1 is slightly above the therapeutic range of 2-3. Recommend to continue 7.5 mg daily except 10 mg Mon  Pt has the 5 mg tablets. Will continue to monitor and check INR in 4 week. Dosing reminder card given with phone number, appointment date and time. Return to clinic: 11/18 @ 1 pm    Referring physician: Dr. Fransisca Farrell.  Valerie Crenshaw  PharmD Candidate, 2022  10/21/2021 1:01 PM

## 2021-10-22 ENCOUNTER — PATIENT MESSAGE (OUTPATIENT)
Dept: FAMILY MEDICINE CLINIC | Age: 81
End: 2021-10-22

## 2021-10-22 DIAGNOSIS — M54.50 CHRONIC BILATERAL LOW BACK PAIN, UNSPECIFIED WHETHER SCIATICA PRESENT: ICD-10-CM

## 2021-10-22 DIAGNOSIS — G89.29 CHRONIC BILATERAL LOW BACK PAIN, UNSPECIFIED WHETHER SCIATICA PRESENT: ICD-10-CM

## 2021-10-22 RX ORDER — TRAMADOL HYDROCHLORIDE 50 MG/1
50 TABLET ORAL EVERY 6 HOURS PRN
Qty: 28 TABLET | Refills: 0 | Status: SHIPPED | OUTPATIENT
Start: 2021-10-22 | End: 2021-10-27 | Stop reason: SDUPTHER

## 2021-10-22 NOTE — TELEPHONE ENCOUNTER
From: Sola Sorensen  To: Jorge Perkins MD  Sent: 10/22/2021 4:13 PM EDT  Subject: Prescription Question    I seem to be doing well on the Tylenol arthritis and the tramadol. Could yoy pleass refill it again but make it for 30 days please instead of 7.  Thank you Morris Jim

## 2021-10-27 RX ORDER — TRAMADOL HYDROCHLORIDE 50 MG/1
50 TABLET ORAL EVERY 12 HOURS PRN
Qty: 60 TABLET | Refills: 0 | Status: SHIPPED | OUTPATIENT
Start: 2021-10-27 | End: 2021-12-02 | Stop reason: SDUPTHER

## 2021-11-18 ENCOUNTER — ANTI-COAG VISIT (OUTPATIENT)
Dept: PHARMACY | Age: 81
End: 2021-11-18
Payer: MEDICARE

## 2021-11-18 LAB — INR BLD: 4

## 2021-11-18 PROCEDURE — 99211 OFF/OP EST MAY X REQ PHY/QHP: CPT | Performed by: FAMILY MEDICINE

## 2021-11-18 PROCEDURE — 85610 PROTHROMBIN TIME: CPT | Performed by: FAMILY MEDICINE

## 2021-11-24 ENCOUNTER — PATIENT MESSAGE (OUTPATIENT)
Dept: FAMILY MEDICINE CLINIC | Age: 81
End: 2021-11-24

## 2021-11-24 DIAGNOSIS — M54.50 CHRONIC BILATERAL LOW BACK PAIN, UNSPECIFIED WHETHER SCIATICA PRESENT: ICD-10-CM

## 2021-11-24 DIAGNOSIS — G89.29 CHRONIC BILATERAL LOW BACK PAIN, UNSPECIFIED WHETHER SCIATICA PRESENT: ICD-10-CM

## 2021-11-24 NOTE — TELEPHONE ENCOUNTER
From: Saurabh Kang  To: Dr. Earl Lindsay: 11/24/2021 4:24 PM EST  Subject: Pain    Dr. Asai Read    I am out of my Tramadol again. I think its helping but i have to spread them out harvey i am only getting 7 dqys at a time. Could you please refil my perception and maybe make it a 30 day supply.     Thank you Boris Hilton

## 2021-11-24 NOTE — TELEPHONE ENCOUNTER
Last office visit 9/13/2021     Last written 10- for 30 days    Next office visit scheduled 3/16/2022    Requested Prescriptions     Pending Prescriptions Disp Refills    traMADol (ULTRAM) 50 MG tablet 60 tablet 0     Sig: Take 1 tablet by mouth every 12 hours as needed for Pain for up to 30 days.  Take lowest dose possible to manage pain

## 2021-11-28 DIAGNOSIS — I10 ESSENTIAL HYPERTENSION: ICD-10-CM

## 2021-11-28 DIAGNOSIS — I95.2 HYPOTENSION DUE TO DRUGS: ICD-10-CM

## 2021-11-28 NOTE — TELEPHONE ENCOUNTER
.  Refill Request     Last Seen: Last Seen Department: 9/13/2021  Last Seen by PCP: 9/13/2021    Last Written: 12/9/2020 90 with 3     Next Appointment:   Future Appointments   Date Time Provider Clare Bliss   12/2/2021  1:00 PM [id number]   3/16/2022 10:30 AM MD SHAZIA Maldonado  [place] [facility] [place]         Requested Prescriptions     Pending Prescriptions Disp Refills    lisinopril-hydroCHLOROthiazide (PRINZIDE;ZESTORETIC) 10-12.5 MG per tablet [Pharmacy Med Name: Lisinopril-hydroCHLOROthiazide 10-12.5 MG Oral Tablet] 90 tablet 3     Sig: TAKE 1 TABLET BY MOUTH  DAILY

## 2021-12-01 RX ORDER — LISINOPRIL AND HYDROCHLOROTHIAZIDE 12.5; 1 MG/1; MG/1
1 TABLET ORAL DAILY
Qty: 90 TABLET | Refills: 3 | Status: SHIPPED | OUTPATIENT
Start: 2021-12-01 | End: 2022-03-17

## 2021-12-02 ENCOUNTER — ANTI-COAG VISIT (OUTPATIENT)
Dept: PHARMACY | Age: 81
End: 2021-12-02
Payer: MEDICARE

## 2021-12-02 DIAGNOSIS — Z86.718 HISTORY OF DVT (DEEP VEIN THROMBOSIS): Primary | ICD-10-CM

## 2021-12-02 LAB — INTERNATIONAL NORMALIZATION RATIO, POC: 2.8

## 2021-12-02 PROCEDURE — 99211 OFF/OP EST MAY X REQ PHY/QHP: CPT

## 2021-12-02 PROCEDURE — 85610 PROTHROMBIN TIME: CPT

## 2021-12-02 RX ORDER — TRAMADOL HYDROCHLORIDE 50 MG/1
50 TABLET ORAL EVERY 12 HOURS PRN
Qty: 60 TABLET | Refills: 0 | Status: SHIPPED | OUTPATIENT
Start: 2021-12-02 | End: 2022-01-05 | Stop reason: SDUPTHER

## 2021-12-02 NOTE — PROGRESS NOTES
Ms. Fanta Crespo is here for management of anticoagulation for hx of DVT. PMH also significant for HTN. She presents today w/out complaint. Pt verifies dosing regimen as listed above. Pt denies s/s bleeding/swelling/SOB. No missed doses. No changes in Rx/OTCs/Herbal medications. Occasional EtOH use and denies tobacco.    Adden: 2/14 PCP visit- INR is slightly elevated at 3.3. Decrease dose of coumadin to 1 tablet or 5mg on both Monday and Friday and 1 1/2 tablets or 7.5 mg other days of the week. Contact coumadin clinic for appointment to follow up in 2 weeks. Felix Song  2/15/2022 at 3:12 PM      INR 2.8 is within the therapeutic range of 2-3. Recommend to continue dose o 5 mg Mon, 7.5 mg all other days  Pt has the 5 mg tablets. Will continue to monitor and check INR in 4 weeks. Dosing reminder card given with phone number, appointment date and time.    Return to clinic: 12/30 @ 1 pm   Referring physician: Dr. Roland House, PharmD  12/2/2021 at 1:22 PM

## 2021-12-30 RX ORDER — WARFARIN SODIUM 5 MG/1
TABLET ORAL
Qty: 130 TABLET | Refills: 0 | Status: SHIPPED | OUTPATIENT
Start: 2021-12-30 | End: 2022-02-28

## 2021-12-30 NOTE — TELEPHONE ENCOUNTER
Refill Request     Last Seen: Last Seen Department: 9/13/2021  Last Seen by PCP: 9/13/2021    Last Written: 3/25/2021    Next Appointment:   Future Appointments   Date Time Provider Clare Bliss   12/30/2021  1:00 PM [address]   3/16/2022 10:30 AM MD SHAZIA Raymundo  [place] [facility]       Future appointment scheduled      Requested Prescriptions     Pending Prescriptions Disp Refills    warfarin (COUMADIN) 5 MG tablet 130 tablet 1     Sig: TAKE 2 TABLET BY MOUTH ON Monday AND 1  AND 1/2 TABLETS BY MOUTH ON ALL OTHER DAYS, OR DIRECTED BY COUMADIN CLINIC

## 2022-01-04 DIAGNOSIS — G89.29 CHRONIC BILATERAL LOW BACK PAIN, UNSPECIFIED WHETHER SCIATICA PRESENT: ICD-10-CM

## 2022-01-04 DIAGNOSIS — M54.50 CHRONIC BILATERAL LOW BACK PAIN, UNSPECIFIED WHETHER SCIATICA PRESENT: ICD-10-CM

## 2022-01-04 NOTE — TELEPHONE ENCOUNTER
Refill Request - Controlled Substance    Last Seen Department: 2021  Last Seen by PCP: 2021    Last Written: 2021 rx  per pharmacist     Last UDS: not on file    Med Agreement Signed On: 2018    Next Appointment: 3/16/2022        Requested Prescriptions     Pending Prescriptions Disp Refills    traMADol (ULTRAM) 50 MG tablet 60 tablet 0     Sig: Take 1 tablet by mouth every 12 hours as needed for Pain for up to 30 days.  Take lowest dose possible to manage pain

## 2022-01-05 RX ORDER — TRAMADOL HYDROCHLORIDE 50 MG/1
50 TABLET ORAL EVERY 12 HOURS PRN
Qty: 60 TABLET | Refills: 0 | Status: SHIPPED | OUTPATIENT
Start: 2022-01-05 | End: 2022-02-14

## 2022-02-11 ENCOUNTER — NURSE TRIAGE (OUTPATIENT)
Dept: OTHER | Facility: CLINIC | Age: 82
End: 2022-02-11

## 2022-02-11 NOTE — TELEPHONE ENCOUNTER
Received call from [place] at [facility] with Red Flag Complaint. Subjective: Caller states \" just real off balance. \"     Current Symptoms: patient is sitting. When goes to stand feels off balance has to hold on to things to get around the room. Pt reports when got up to go to the bathroom at 3 am could hardly get up had to hold on to things to get around the house. Daughter reports patient is always a  little dizzy but this is non stop. Pt did go to UC the other day for reaction to a bite, was put on amoxicillin-clauv prednisone steroid pt started these medication wednesday. Patient denies any chest pains, headache, slurred speech, confusion, visual changes, weakness on one side of body. Patient reports  Ongoing shortness of breath with a little bit of exertion ongoing for 1 month. Pt does not have diabetes. Pt reports checked on watch is - 113/75. Onset: yesterday afternoon     Associated Symptoms: reduced activity    Pain Severity: no pain per pt report     Temperature: no fever per pt report    What has been tried: trying to increase fluid intake, propel     LMP: NA Pregnant: NA    Recommended disposition: GO TO ED/UCC NOW (OR TO OFFICE WITH PCP APPROVAL):              2nd level     Care advice provided, patient verbalizes understanding; denies any other questions or concerns; instructed to call back for any new or worsening symptoms. Writer provided warm transfer to [place] at [facility]  for further assessment and resume of care     Attention Provider: Thank you for allowing me to participate in the care of your patient. The patient was connected to triage in response to information provided to the ECC/PSC. Please do not respond through this encounter as the response is not directed to a shared pool.       Reason for Disposition   SEVERE dizziness (e.g., unable to stand, requires support to walk, feels like passing out now)     Patient reports severe dizziness unable to require support to walk    Protocols used: DIZZINESS-ADULT-[place]

## 2022-02-11 NOTE — TELEPHONE ENCOUNTER
Offered to do a vv and the patient stated she did not want to do one that she wants in person. States she will just wait it out and see if she gets worse. I scheduled her an appointment for next week. Pt will go to er or urgent care if it gets worse.

## 2022-02-13 DIAGNOSIS — G89.29 CHRONIC BILATERAL LOW BACK PAIN, UNSPECIFIED WHETHER SCIATICA PRESENT: ICD-10-CM

## 2022-02-13 DIAGNOSIS — M54.50 CHRONIC BILATERAL LOW BACK PAIN, UNSPECIFIED WHETHER SCIATICA PRESENT: ICD-10-CM

## 2022-02-14 ENCOUNTER — OFFICE VISIT (OUTPATIENT)
Dept: FAMILY MEDICINE CLINIC | Age: 82
End: 2022-02-14
Payer: MEDICARE

## 2022-02-14 VITALS
WEIGHT: 142.2 LBS | SYSTOLIC BLOOD PRESSURE: 110 MMHG | OXYGEN SATURATION: 97 % | HEART RATE: 81 BPM | DIASTOLIC BLOOD PRESSURE: 80 MMHG | BODY MASS INDEX: 28.72 KG/M2

## 2022-02-14 DIAGNOSIS — Z23 FLU VACCINE NEED: ICD-10-CM

## 2022-02-14 DIAGNOSIS — R06.02 SOB (SHORTNESS OF BREATH): ICD-10-CM

## 2022-02-14 DIAGNOSIS — I82.4Y3 DEEP VEIN THROMBOSIS (DVT) OF PROXIMAL VEIN OF BOTH LOWER EXTREMITIES, UNSPECIFIED CHRONICITY (HCC): Primary | ICD-10-CM

## 2022-02-14 PROBLEM — Z86.718 HISTORY OF DVT (DEEP VEIN THROMBOSIS): Status: RESOLVED | Noted: 2020-01-21 | Resolved: 2022-02-14

## 2022-02-14 LAB
A/G RATIO: 1.5 (ref 1.1–2.2)
ALBUMIN SERPL-MCNC: 4.1 G/DL (ref 3.4–5)
ALP BLD-CCNC: 57 U/L (ref 40–129)
ALT SERPL-CCNC: 11 U/L (ref 10–40)
ANION GAP SERPL CALCULATED.3IONS-SCNC: 11 MMOL/L (ref 3–16)
AST SERPL-CCNC: 16 U/L (ref 15–37)
BASOPHILS ABSOLUTE: 0 K/UL (ref 0–0.2)
BASOPHILS RELATIVE PERCENT: 0.8 %
BILIRUB SERPL-MCNC: <0.2 MG/DL (ref 0–1)
BUN BLDV-MCNC: 26 MG/DL (ref 7–20)
CALCIUM SERPL-MCNC: 9.2 MG/DL (ref 8.3–10.6)
CHLORIDE BLD-SCNC: 104 MMOL/L (ref 99–110)
CO2: 29 MMOL/L (ref 21–32)
CREAT SERPL-MCNC: 0.7 MG/DL (ref 0.6–1.2)
EOSINOPHILS ABSOLUTE: 0.1 K/UL (ref 0–0.6)
EOSINOPHILS RELATIVE PERCENT: 1 %
GFR AFRICAN AMERICAN: >60
GFR NON-AFRICAN AMERICAN: >60
GLUCOSE BLD-MCNC: 99 MG/DL (ref 70–99)
HCT VFR BLD CALC: 37.4 % (ref 36–48)
HEMOGLOBIN: 12.4 G/DL (ref 12–16)
INTERNATIONAL NORMALIZATION RATIO, POC: 3.3
LYMPHOCYTES ABSOLUTE: 1.2 K/UL (ref 1–5.1)
LYMPHOCYTES RELATIVE PERCENT: 22.1 %
MCH RBC QN AUTO: 32.2 PG (ref 26–34)
MCHC RBC AUTO-ENTMCNC: 33.2 G/DL (ref 31–36)
MCV RBC AUTO: 97.1 FL (ref 80–100)
MONOCYTES ABSOLUTE: 0.5 K/UL (ref 0–1.3)
MONOCYTES RELATIVE PERCENT: 9.2 %
NEUTROPHILS ABSOLUTE: 3.7 K/UL (ref 1.7–7.7)
NEUTROPHILS RELATIVE PERCENT: 66.9 %
PDW BLD-RTO: 14 % (ref 12.4–15.4)
PLATELET # BLD: 158 K/UL (ref 135–450)
PMV BLD AUTO: 9.9 FL (ref 5–10.5)
POTASSIUM REFLEX MAGNESIUM: 4.1 MMOL/L (ref 3.5–5.1)
PRO-BNP: 381 PG/ML (ref 0–449)
PROTHROMBIN TIME, POC: NORMAL
RBC # BLD: 3.85 M/UL (ref 4–5.2)
SODIUM BLD-SCNC: 144 MMOL/L (ref 136–145)
TOTAL PROTEIN: 6.8 G/DL (ref 6.4–8.2)
WBC # BLD: 5.5 K/UL (ref 4–11)

## 2022-02-14 PROCEDURE — 1090F PRES/ABSN URINE INCON ASSESS: CPT | Performed by: NURSE PRACTITIONER

## 2022-02-14 PROCEDURE — 1036F TOBACCO NON-USER: CPT | Performed by: NURSE PRACTITIONER

## 2022-02-14 PROCEDURE — 90694 VACC AIIV4 NO PRSRV 0.5ML IM: CPT | Performed by: NURSE PRACTITIONER

## 2022-02-14 PROCEDURE — 1123F ACP DISCUSS/DSCN MKR DOCD: CPT | Performed by: NURSE PRACTITIONER

## 2022-02-14 PROCEDURE — 4040F PNEUMOC VAC/ADMIN/RCVD: CPT | Performed by: NURSE PRACTITIONER

## 2022-02-14 PROCEDURE — G8417 CALC BMI ABV UP PARAM F/U: HCPCS | Performed by: NURSE PRACTITIONER

## 2022-02-14 PROCEDURE — 99214 OFFICE O/P EST MOD 30 MIN: CPT | Performed by: NURSE PRACTITIONER

## 2022-02-14 PROCEDURE — 85610 PROTHROMBIN TIME: CPT | Performed by: NURSE PRACTITIONER

## 2022-02-14 PROCEDURE — G0008 ADMIN INFLUENZA VIRUS VAC: HCPCS | Performed by: NURSE PRACTITIONER

## 2022-02-14 PROCEDURE — G8484 FLU IMMUNIZE NO ADMIN: HCPCS | Performed by: NURSE PRACTITIONER

## 2022-02-14 PROCEDURE — G8399 PT W/DXA RESULTS DOCUMENT: HCPCS | Performed by: NURSE PRACTITIONER

## 2022-02-14 PROCEDURE — G8427 DOCREV CUR MEDS BY ELIG CLIN: HCPCS | Performed by: NURSE PRACTITIONER

## 2022-02-14 RX ORDER — TRAMADOL HYDROCHLORIDE 50 MG/1
TABLET ORAL
Qty: 60 TABLET | Refills: 0 | Status: SHIPPED | OUTPATIENT
Start: 2022-02-14 | End: 2022-03-16 | Stop reason: SDUPTHER

## 2022-02-14 NOTE — TELEPHONE ENCOUNTER
Refill Request - Controlled Substance    Last Seen Department: 9/13/2021  Last Seen by PCP: 9/13/2021    Last Written: 1/5/22 60 tablet 0 refill     Last UDS: n/a     Med Agreement Signed On: 12/12/18     Next Appointment: 2/14/2022      Future appointment scheduled    Requested Prescriptions     Pending Prescriptions Disp Refills    traMADol (ULTRAM) 50 MG tablet [Pharmacy Med Name: traMADol HCL 50MG TABLET] 60 tablet      Sig: TAKE ONE TABLET BY MOUTH EVERY 12 HOURS AS NEEDED FOR PAIN TAKE LOWEST DOSE POSSIBLE TO MANAGE PAIN

## 2022-02-18 NOTE — PROGRESS NOTES
2021 - 2022 Flu Vaccine Questionnaire    VIS given -  Yes    1. Have you received any other vaccine within the last 14 days? No  2. Do you currently have an active infectious or acute respiratory illness or fever? No  3. Are you taking steroids or immune suppressive drugs? No  4. Have you ever had a reaction to a flu vaccine? No  5. Are you allergic to eggs, egg products, chicken, Thimerosal (preservative) Gentamycin, polymixin, neomycin or Latex? No  6. Have you ever had [place] Syndrome?   No
Called pt and the call was answered but no one responded and then the call was dropped. Will have to call back to ensure she has received this information.
INR is slightly elevated at 3.3. Decrease dose of coumadin to 1 tablet or 5mg on both Monday and Friday and 1 1/2 tablets or 7.5 mg other days of the week. Contact coumadin clinic for appointment to follow up in 2 weeks.
Pt is aware.  The coumadin clinic contacted her and gave her the dosing instructions and scheduled her follow up
equal, round, and reactive to light. Cardiovascular:      Rate and Rhythm: Normal rate and regular rhythm. Heart sounds: Murmur heard. Pulmonary:      Effort: Pulmonary effort is normal.   Musculoskeletal:         General: Normal range of motion. Skin:     General: Skin is warm. Neurological:      Mental Status: She is alert and oriented to person, place, and time.                   An electronic signature was used to authenticate this note.    --LIZZY ESCOBEDO - CNP

## 2022-02-28 RX ORDER — WARFARIN SODIUM 5 MG/1
TABLET ORAL
Qty: 130 TABLET | Refills: 3 | Status: SHIPPED | OUTPATIENT
Start: 2022-02-28

## 2022-02-28 NOTE — TELEPHONE ENCOUNTER
Refill Request     Last Seen: Last Seen Department: 2/14/2022  Last Seen by PCP: Visit date not found    Last Written: 12/30/21    Next Appointment:   Future Appointments   Date Time Provider Clare Bliss   3/2/2022 10:00 AM Gennaro Vega MD [id number]   3/4/2022 10:30 AM [address] [facility]   3/16/2022 10:30 AM Denver Mora MD [facility]       Future appointment scheduled      Requested Prescriptions     Pending Prescriptions Disp Refills    warfarin (COUMADIN) 5 MG tablet [Pharmacy Med Name: Warfarin Sodium 5 MG Oral Tablet] 130 tablet 3     Sig: TAKE 2 TABLETS BY MOUTH  DAILY ON MONDAY AND 1 AND  1/2 TABLETS BY MOUTH DAILY  ON ALL OTHER DAYS, OR  DIRECTED BY COUMADIN CLINIC

## 2022-03-03 ENCOUNTER — OFFICE VISIT (OUTPATIENT)
Dept: ENT CLINIC | Age: 82
End: 2022-03-03
Payer: MEDICARE

## 2022-03-03 VITALS
BODY MASS INDEX: 27.84 KG/M2 | WEIGHT: 141.8 LBS | HEIGHT: 60 IN | HEART RATE: 64 BPM | SYSTOLIC BLOOD PRESSURE: 116 MMHG | DIASTOLIC BLOOD PRESSURE: 63 MMHG | TEMPERATURE: 97.3 F

## 2022-03-03 DIAGNOSIS — H61.23 BILATERAL IMPACTED CERUMEN: Primary | ICD-10-CM

## 2022-03-03 DIAGNOSIS — H61.301 ACQUIRED STENOSIS OF RIGHT EXTERNAL EAR CANAL: ICD-10-CM

## 2022-03-03 PROCEDURE — 4040F PNEUMOC VAC/ADMIN/RCVD: CPT | Performed by: OTOLARYNGOLOGY

## 2022-03-03 PROCEDURE — 1090F PRES/ABSN URINE INCON ASSESS: CPT | Performed by: OTOLARYNGOLOGY

## 2022-03-03 PROCEDURE — G8484 FLU IMMUNIZE NO ADMIN: HCPCS | Performed by: OTOLARYNGOLOGY

## 2022-03-03 PROCEDURE — G8399 PT W/DXA RESULTS DOCUMENT: HCPCS | Performed by: OTOLARYNGOLOGY

## 2022-03-03 PROCEDURE — G0268 REMOVAL OF IMPACTED WAX MD: HCPCS | Performed by: OTOLARYNGOLOGY

## 2022-03-03 PROCEDURE — 1123F ACP DISCUSS/DSCN MKR DOCD: CPT | Performed by: OTOLARYNGOLOGY

## 2022-03-03 PROCEDURE — 99213 OFFICE O/P EST LOW 20 MIN: CPT | Performed by: OTOLARYNGOLOGY

## 2022-03-03 PROCEDURE — G8427 DOCREV CUR MEDS BY ELIG CLIN: HCPCS | Performed by: OTOLARYNGOLOGY

## 2022-03-03 PROCEDURE — G8417 CALC BMI ABV UP PARAM F/U: HCPCS | Performed by: OTOLARYNGOLOGY

## 2022-03-03 PROCEDURE — 1036F TOBACCO NON-USER: CPT | Performed by: OTOLARYNGOLOGY

## 2022-03-03 NOTE — PROGRESS NOTES
FOLLOW UP VISIT:    CHIEF COMPLAINT: Hearing loss    INTERIM HISTORY: Longstanding hearing loss bilateral.  Right may be worse than left. Aggressive in intensity. No ear pain or otorrhea. No tinnitus. PAST MEDICAL HISTORY:   Social History     Tobacco Use   Smoking Status Never Smoker   Smokeless Tobacco Never Used                                                    Social History     Substance and Sexual Activity   Alcohol Use No                                                    Current Outpatient Medications:     warfarin (COUMADIN) 5 MG tablet, TAKE 2 TABLETS BY MOUTH  DAILY ON MONDAY AND 1 AND  1/2 TABLETS BY MOUTH DAILY  ON ALL OTHER DAYS, OR  DIRECTED BY COUMADIN CLINIC, Disp: 130 tablet, Rfl: 3    traMADol (ULTRAM) 50 MG tablet, TAKE ONE TABLET BY MOUTH EVERY 12 HOURS AS NEEDED FOR PAIN TAKE LOWEST DOSE POSSIBLE TO MANAGE PAIN, Disp: 60 tablet, Rfl: 0    lisinopril-hydroCHLOROthiazide (PRINZIDE;ZESTORETIC) 10-12.5 MG per tablet, TAKE 1 TABLET BY MOUTH  DAILY, Disp: 90 tablet, Rfl: 3    cetirizine (ZYRTEC) 10 MG tablet, Take 10 mg by mouth daily, Disp: , Rfl:     azelastine (ASTELIN) 0.1 % nasal spray, 1 spray by Nasal route 2 times daily Use in each nostril as directed, Disp: 30 mL, Rfl: 5    fluticasone (FLONASE) 50 MCG/ACT nasal spray, 2 sprays by Each Nostril route daily, Disp: 1 Bottle, Rfl: 5    pantoprazole (PROTONIX) 40 MG tablet, TAKE 1 TABLET BY MOUTH  DAILY, Disp: 90 tablet, Rfl: 3    sertraline (ZOLOFT) 100 MG tablet, TAKE 1 TABLET BY MOUTH  TWICE DAILY, Disp: 180 tablet, Rfl: 3    simvastatin (ZOCOR) 40 MG tablet, TAKE 1 TABLET BY MOUTH AT  NIGHT, Disp: 90 tablet, Rfl: 3    L-Methylfolate-D94-G1-I1 (CEREFOLIN PO), Take by mouth, Disp: , Rfl:     Diapers & Supplies MISC, 1 each by Does not apply route daily as needed (incontinence), Disp: 100 each, Rfl: 3    Misc. Devices MISC, Compression stockings/open toe pantyhose with compression 40/50, Disp: 2 each, Rfl: 2    Misc. Devices MISC, George panty hose  Open toe Petite plus beige  2 pairs, Disp: 2 each, Rfl: 1                                                 Past Medical History:   Diagnosis Date    Allergic rhinitis     Arthritis     Cancer ([facility] [address]     squamous cell on nose     Chronic back pain     Dementia ([facility] [address] 2019    Dental disease     Depression     Dizziness     GERD (gastroesophageal reflux disease)     H/O blood clots     Hearing loss     Hx of blood clots     Hyperlipidemia     Hypertension     Lumbar stenosis with neurogenic claudication     Osteoarthritis of neck     TMJ dysfunction                                                     Past Surgical History:   Procedure Laterality Date     SECTION      x1    EMBOLECTOMY      EPIDURAL STEROID INJECTION Bilateral 2019    BILATERAL LUMBAR THREE, LUMBAR FOUR, LUMBAR FIVE RADIOFREQUENCY ABLATION SITE CONFIRMED BY FLUOROSCOPY performed by Debora Fay MD at [facility] Right 8/15/2019    RIGHT SACROILIAC JOINT INJECTION SITE CONFIRMED BY FLUOROSCOPY performed by Debora Fay MD at [address] [place] [address], TOTAL ABDOMINAL      LAMINECTOMY  2018    MICROLUMBAR LAMINECTOMY L4-5    LITHOTRIPSY      MOHS SURGERY      nose for squamous cell carcinoma    OTHER SURGICAL HISTORY      blood clots- 1963       FAMILY HISTORY: Family history reviewed and except as pertinent to the interim history is not contributory. REVIEW OF SYSTEMS:  All pertinent positive and negative findings included in HPI. Otherwise, all other systems are reviewed and are negative    PHYSICAL EXAMINATION:   GENERAL: wdwn- no acute distress  RESPIRATORY: No stridor. COMMUNICATION :  Normal voice  MENTAL STATUS: Alert and oriented x3  HEAD AND FACE: No skin lesions of the face.   EXTERNAL EARS AND NOSE: The external ears and nasal pyramid are normal.  FACIAL MUSCLES: All branches of the facial nerve intact. FACE PALPATION: Zygomatic arches and orbital rims are intact. No tenderness over the sinuses. EXTRAOCULAR MUSCLES: Intact with full range of motion. OTOSCOPY: Cerumen occludes both external auditory canals. TUNING FORKS:  [place] midline at 512 Hz  INTRANASAL:  Septum midline, turbinates normal, meati clear. LIPS, TEETH, GINGIVA:  Normal mucosa  PHARYNX:  Normal  NECK:  No masses. LYMPH NODES: No cervical lymphadenopathy. SALIVARY GLANDS: Parotid and submandibular glands normal.  THYROID: No goiter or thyroid nodules palpable. Cerumen removed from both external auditory canals with curettes. The right external auditory canal is quite stenotic. The left is more patent. The tympanic membranes and middle ear spaces are normal.  Hearing is not subjectively improved after removal of cerumen. IMPRESSION: Hearing loss in both ears in the presence of normal otoscopy. PLAN: Audiogram ordered. FOLLOW-UP: After audiometric evaluation.

## 2022-03-04 ENCOUNTER — ANTI-COAG VISIT (OUTPATIENT)
Dept: PHARMACY | Age: 82
End: 2022-03-04
Payer: MEDICARE

## 2022-03-04 LAB — INTERNATIONAL NORMALIZATION RATIO, POC: 2.2

## 2022-03-04 PROCEDURE — 85610 PROTHROMBIN TIME: CPT | Performed by: PHARMACIST

## 2022-03-04 PROCEDURE — 99211 OFF/OP EST MAY X REQ PHY/QHP: CPT | Performed by: PHARMACIST

## 2022-03-04 NOTE — PROGRESS NOTES
Ms. Eufemia Farias is here for management of anticoagulation for hx of DVT. PMH also significant for HTN. She presents today w/out complaint. Pt verifies dosing regimen as listed above. Pt denies s/s bleeding/swelling/SOB. No missed doses. No changes in Rx/OTCs/Herbal medications. Occasional EtOH use and denies tobacco.    Dose reduced 2 weeks ago after INR of 3.3 at MD office. No other changes. INR 2.2 is within the therapeutic range of 2-3. Recommend to continue dose of 5 mg Mon/Fri, 7.5 mg all other days  Pt has the 5 mg tablets. Will continue to monitor and check INR in 4 weeks. Dosing reminder card given with phone number, appointment date and time.    Return to clinic: 4/1/22 @ [phone] am   Referring physician: Dr. Keeley Spencer, PharmD 10:22 AM EST 3/4/22

## 2022-03-16 ENCOUNTER — OFFICE VISIT (OUTPATIENT)
Dept: FAMILY MEDICINE CLINIC | Age: 82
End: 2022-03-16
Payer: MEDICARE

## 2022-03-16 VITALS
SYSTOLIC BLOOD PRESSURE: 74 MMHG | HEART RATE: 76 BPM | DIASTOLIC BLOOD PRESSURE: 52 MMHG | OXYGEN SATURATION: 97 % | WEIGHT: 136.8 LBS | BODY MASS INDEX: 26.72 KG/M2

## 2022-03-16 DIAGNOSIS — G89.29 CHRONIC BILATERAL LOW BACK PAIN, UNSPECIFIED WHETHER SCIATICA PRESENT: ICD-10-CM

## 2022-03-16 DIAGNOSIS — M54.50 CHRONIC BILATERAL LOW BACK PAIN, UNSPECIFIED WHETHER SCIATICA PRESENT: ICD-10-CM

## 2022-03-16 DIAGNOSIS — R06.02 SOB (SHORTNESS OF BREATH): ICD-10-CM

## 2022-03-16 DIAGNOSIS — R42 DIZZINESS: Primary | ICD-10-CM

## 2022-03-16 DIAGNOSIS — F33.42 RECURRENT MAJOR DEPRESSIVE DISORDER, IN FULL REMISSION (HCC): ICD-10-CM

## 2022-03-16 PROBLEM — F32.9 REACTIVE DEPRESSION: Status: RESOLVED | Noted: 2017-04-11 | Resolved: 2022-03-16

## 2022-03-16 PROBLEM — R00.2 PALPITATIONS: Status: RESOLVED | Noted: 2019-11-08 | Resolved: 2022-03-16

## 2022-03-16 PROCEDURE — G8427 DOCREV CUR MEDS BY ELIG CLIN: HCPCS | Performed by: FAMILY MEDICINE

## 2022-03-16 PROCEDURE — 1090F PRES/ABSN URINE INCON ASSESS: CPT | Performed by: FAMILY MEDICINE

## 2022-03-16 PROCEDURE — 1123F ACP DISCUSS/DSCN MKR DOCD: CPT | Performed by: FAMILY MEDICINE

## 2022-03-16 PROCEDURE — G8484 FLU IMMUNIZE NO ADMIN: HCPCS | Performed by: FAMILY MEDICINE

## 2022-03-16 PROCEDURE — 99214 OFFICE O/P EST MOD 30 MIN: CPT | Performed by: FAMILY MEDICINE

## 2022-03-16 PROCEDURE — 1036F TOBACCO NON-USER: CPT | Performed by: FAMILY MEDICINE

## 2022-03-16 PROCEDURE — G8417 CALC BMI ABV UP PARAM F/U: HCPCS | Performed by: FAMILY MEDICINE

## 2022-03-16 PROCEDURE — 93000 ELECTROCARDIOGRAM COMPLETE: CPT | Performed by: FAMILY MEDICINE

## 2022-03-16 PROCEDURE — G8399 PT W/DXA RESULTS DOCUMENT: HCPCS | Performed by: FAMILY MEDICINE

## 2022-03-16 PROCEDURE — 4040F PNEUMOC VAC/ADMIN/RCVD: CPT | Performed by: FAMILY MEDICINE

## 2022-03-16 ASSESSMENT — ENCOUNTER SYMPTOMS
NAUSEA: 0
SHORTNESS OF BREATH: 1
COUGH: 0
ABDOMINAL PAIN: 0
VOMITING: 0
CHEST TIGHTNESS: 0
COLOR CHANGE: 0

## 2022-03-16 NOTE — TELEPHONE ENCOUNTER
Refill Request     Last Seen: Last Seen Department: 3/16/2022  Last Seen by PCP: 3/16/2022    Last Written: 02/14/2022 0 refills     Next Appointment:   Future Appointments   Date Time Provider Clare Bliss   3/18/2022 10:00 AM Willian Bennett [facility]   4/8/2022 10:30 AM [address] [facility]   6/17/2022  1:15 PM MD SHAZIA Cuadra  [place] [facility]       Future appointment scheduled      Requested Prescriptions     Pending Prescriptions Disp Refills    traMADol (ULTRAM) 50 MG tablet 60 tablet 0

## 2022-03-16 NOTE — PROGRESS NOTES
3/16/2022    This is a 80 y.o. female who presents for  Chief Complaint   Patient presents with    6 Month Follow-Up    Dizziness     off balance, trying to drink more water working a little bit        HPI:     +Dizziness, \"unsteady on my feet\"   Intermittent, not constant   VS avgs at home:   BP: 100-120/ 60-80  HR 78-80  O2 97-98%  With standing    With some head movements   No CP, palpitations noted although \"she hasn't been paying attention,\" HA, visual changes from baseline, n/t/w of extremities   Sometimes with SOB  SOB has become gradually more prominent, mainly with standing and exertion     Saw ENT, Dr. Leonor Arreola, impacted cerumen cleaned, did not help  Hearing testing on Friday     Needs to have cataract surgery     Dr. Sullivan Self: needs to make an appointment, will call today      Past Medical History:   Diagnosis Date    Allergic rhinitis     Arthritis     Cancer [facility] [address]     squamous cell on nose     Chronic back pain     Dementia ([facility] [address] 2019    Dental disease     Depression     Dizziness     GERD (gastroesophageal reflux disease)     H/O blood clots     Hearing loss     Hx of blood clots     Hyperlipidemia     Hypertension     Lumbar stenosis with neurogenic claudication     Osteoarthritis of neck     TMJ dysfunction        Past Surgical History:   Procedure Laterality Date     SECTION      x1    EMBOLECTOMY      EPIDURAL STEROID INJECTION Bilateral 2019    BILATERAL LUMBAR THREE, LUMBAR FOUR, LUMBAR FIVE RADIOFREQUENCY ABLATION SITE CONFIRMED BY FLUOROSCOPY performed by Tunde Guallpa MD at [facility] Right 8/15/2019    RIGHT SACROILIAC JOINT INJECTION SITE CONFIRMED BY FLUOROSCOPY performed by Tunde Guallpa MD at [address] [place] [address]  2018    MICROLUMBAR LAMINECTOMY L4-5    LITHOTRIPSY      MOHS SURGERY      nose for squamous cell carcinoma    OTHER SURGICAL  Cancer Other     Hypertension Other     Seizures Other     Heart Disease Mother     Other Father         murdered     Colon Cancer Brother     Cancer Maternal Aunt         breast    Colon Cancer Maternal Grandmother        Current Outpatient Medications   Medication Sig Dispense Refill    warfarin (COUMADIN) 5 MG tablet TAKE 2 TABLETS BY MOUTH  DAILY ON MONDAY AND 1 AND  1/2 TABLETS BY MOUTH DAILY  ON ALL OTHER DAYS, OR  DIRECTED BY COUMADIN CLINIC 130 tablet 3    traMADol (ULTRAM) 50 MG tablet TAKE ONE TABLET BY MOUTH EVERY 12 HOURS AS NEEDED FOR PAIN TAKE LOWEST DOSE POSSIBLE TO MANAGE PAIN 60 tablet 0    lisinopril-hydroCHLOROthiazide (PRINZIDE;ZESTORETIC) 10-12.5 MG per tablet TAKE 1 TABLET BY MOUTH  DAILY 90 tablet 3    cetirizine (ZYRTEC) 10 MG tablet Take 10 mg by mouth daily      azelastine (ASTELIN) 0.1 % nasal spray 1 spray by Nasal route 2 times daily Use in each nostril as directed 30 mL 5    fluticasone (FLONASE) 50 MCG/ACT nasal spray 2 sprays by Each Nostril route daily 1 Bottle 5    pantoprazole (PROTONIX) 40 MG tablet TAKE 1 TABLET BY MOUTH  DAILY 90 tablet 3    sertraline (ZOLOFT) 100 MG tablet TAKE 1 TABLET BY MOUTH  TWICE DAILY 180 tablet 3    simvastatin (ZOCOR) 40 MG tablet TAKE 1 TABLET BY MOUTH AT  NIGHT 90 tablet 3    L-Methylfolate-D06-K3-U8 (CEREFOLIN PO) Take by mouth      Diapers & Supplies MISC 1 each by Does not apply route daily as needed (incontinence) 100 each 3    Misc. Devices MISC Compression stockings/open toe pantyhose with compression 40/50 2 each 2    Misc. Devices MISC George panty hose  Open toe Petite plus beige  2 pairs 2 each 1     No current facility-administered medications for this visit.        Immunization History   Administered Date(s) Administered    COVID-19, Pfizer Purple top, DILUTE for use, 12+ yrs, 30mcg/0.3mL dose 03/30/2021, 04/20/2021    Influenza Virus Vaccine 02/22/2021    Influenza, High Dose (Fluzone 65 yrs and older) 10/25/2017 Rate and Rhythm: Normal rate and regular rhythm. Heart sounds: Normal heart sounds. No murmur heard. No friction rub. No gallop. Pulmonary:      Effort: Pulmonary effort is normal. No respiratory distress. Breath sounds: Normal breath sounds. No wheezing or rales. Chest:      Chest wall: No tenderness. Abdominal:      Palpations: Abdomen is soft. Tenderness: There is no abdominal tenderness. Musculoskeletal:         General: Normal range of motion. Cervical back: Normal range of motion and neck supple. Skin:     General: Skin is warm and dry. Capillary Refill: Capillary refill takes 2 to 3 seconds. Findings: No erythema. Neurological:      General: No focal deficit present. Mental Status: She is alert and oriented to person, place, and time. Cranial Nerves: No cranial nerve deficit. Sensory: No sensory deficit. Motor: No weakness. Coordination: Coordination normal.      Gait: Gait normal.   Psychiatric:         Mood and Affect: Mood normal.         Behavior: Behavior normal.         Thought Content: Thought content normal.         Judgment: Judgment normal.         Plan  1. Dizziness  Appears orthostatic and inner ear related based on Hx  - EKG 12 Lead  Orthostatics reviewed in office  ECHO ordered   Increase fluids to min 75 oz daily   Dec BP med: drop HCTZ 12.5, c/w Lisinopril 10 mg daily  Monitor BP/HR BID and with Sxs, if still symptomatic in 1-2 weeks, will dec ACE to 5 mg   Will schedule with Dr. Helane Paget today. + flonase daily, BPPV exercises  C/w seeing ENT/Audiology     2. Recurrent major depressive disorder, in full remission ([address]  stable    3. SOB (shortness of breath)  Per above         While assessing care for this patient, I have reviewed all pertinent lab work/imaging/ specialist notes and care in reference to those problems addressed above in detail. Appropriate medical decision making was based on this.      Please note that portions of this note may have been completed with a voice recognition program. Efforts were made to edit the dictations but occasionally words are mis-transcribed. Return in about 3 months (around 6/16/2022) for 30 minute visit, follow up blood pressure.

## 2022-03-17 ENCOUNTER — TELEPHONE (OUTPATIENT)
Dept: FAMILY MEDICINE CLINIC | Age: 82
End: 2022-03-17

## 2022-03-17 RX ORDER — TRAMADOL HYDROCHLORIDE 50 MG/1
50 TABLET ORAL EVERY 12 HOURS PRN
Qty: 60 TABLET | Refills: 0 | Status: SHIPPED | OUTPATIENT
Start: 2022-03-17 | End: 2022-04-20

## 2022-03-17 RX ORDER — LISINOPRIL 10 MG/1
10 TABLET ORAL DAILY
Qty: 30 TABLET | Refills: 1 | Status: SHIPPED | OUTPATIENT
Start: 2022-03-17 | End: 2022-05-23

## 2022-03-17 NOTE — PROGRESS NOTES
Threshold: 20 dBHL  Word Recognition: Excellent (100%), based on NU-6 25-word list at 45 dBHL using recorded speech stimuli. Tympanometry: Normal peak pressure and compliance, Type A tympanogram, consistent with normal middle ear function. Reliability: Good  Transducer: Inserts    See scanned audiogram dated 3/18/2022 for results. PATIENT EDUCATION:       The following items were discussed with the patient:   - Good Communication Strategies  - Hearing Loss and Hearing Aids    Educational information was shared in the After Visit Summary. RECOMMENDATIONS:                                                                                                                                                                                                                                                                      The following items are recommended based on patient report and results from today's appointment:  - Continue medical follow-up with Librado Anton MD.  - Retest hearing as medically indicated and/or sooner if a change in hearing is noted. - Briefly discussed future considerations for amplification. If desired, schedule a Hearing Aid Evaluation (HAE) appointment to discuss hearing aid options. - Utilize \"Good Communication Strategies\" as discussed to assist in speech understanding with communication partners. - Maintain a sound enriched environment to assist in the management of tinnitus symptoms. [facility] [employer], [address]  Audiologist      Chart CC'd to:  Librado Anton MD      Degree of   Hearing Sensitivity dB Range   Within Normal Limits (WNL) 0 - 20   Mild 20 - 40   Moderate 40 - 55   Moderately-Severe 55 - 70   Severe 70 - 90   Profound 90 +

## 2022-03-17 NOTE — TELEPHONE ENCOUNTER
Patient called in stating during her OV yesterday 3/16 there were discussion about putting her on only Lisinopril because her BP has been dropping, but that has not been sent in, please advise.

## 2022-03-18 ENCOUNTER — PROCEDURE VISIT (OUTPATIENT)
Dept: AUDIOLOGY | Age: 82
End: 2022-03-18
Payer: MEDICARE

## 2022-03-18 DIAGNOSIS — H93.13 TINNITUS, BILATERAL: ICD-10-CM

## 2022-03-18 DIAGNOSIS — H61.23 BILATERAL IMPACTED CERUMEN: Primary | ICD-10-CM

## 2022-03-18 DIAGNOSIS — H90.3 SENSORINEURAL HEARING LOSS, BILATERAL: Primary | ICD-10-CM

## 2022-03-18 DIAGNOSIS — H61.301 ACQUIRED STENOSIS OF RIGHT EXTERNAL EAR CANAL: ICD-10-CM

## 2022-03-18 DIAGNOSIS — R42 DIZZINESS AND GIDDINESS: ICD-10-CM

## 2022-03-18 PROCEDURE — 92557 COMPREHENSIVE HEARING TEST: CPT | Performed by: AUDIOLOGIST

## 2022-03-18 PROCEDURE — 92567 TYMPANOMETRY: CPT | Performed by: AUDIOLOGIST

## 2022-03-18 NOTE — Clinical Note
Dr. Kings Eng,    Please see note from this patient's audiogram from today. Please let me know if there is anything further you need.       Luma Diss [address] [place]  Audiologist

## 2022-03-18 NOTE — PATIENT INSTRUCTIONS
Good Communication Strategies    Communication can be challenging for anyone, but can be especially difficult for those with some degree of hearing loss. While we may not be able to control every factor that may lead to difficulty with communication, there are Good Communication Strategies that we can all use in our day-to-day lives. Communication takes both parties working together for it to be successful. Tips as a Listener:   1. Control your environment. It is important to limit the amount of background noise in the room when possible. You should also consider having a good light source in the room to best see the other person. 2. Ask for clarification. Instead of saying \"What?\", you can use parts of what you heard to make a new question. For example, if you heard the word \"Thursday\" but not the rest of the week, you may ask \"What was that about Thursday? \" or \"What did you want to do Thursday? \". This shows the person talking that you are listening and will help them better explain what they are saying. 3. Be an advocate for yourself. If you are hearing but not understanding, tell the other person \"I can hear you, but I need you to slow down when you speak. \"  Or if someone is facing the other direction, say \"I cannot hear you when you are not looking at me when we talk. \"       Tips as a Talker:   - Sit or stand 3 to 6 feet away to maximize audibility         -- It is unrealistic to believe someone else will fully hear your message if you are speaking from across the room or in a different room in the house   - Stay at eye level to help with visual cues   - Make sure you have the persons attention before speaking   - Use facial expressions and gestures to accentuate your message   - Raise your voice slightly (do not scream)   - Speak slowly and distinctly   - Use short, simple sentences   - Rephrase your words if the person is having a hard time understanding you    - To avoid distortion, dont speak directly into a persons ear      Some additional items that may be helpful:   - Remain patient - this is important for both parties   - Write down items that still cannot be heard/understood. You may write with pen/paper or consider typing/texting on a cell phone or smart device. - If background noise is unavoidable, try to keep yourself in a good position in the room. By sitting at a mi on the side of the restaurant (preferably a corner), it will be easier to communicate than if you were sitting at a table in the middle with background noise surrounding you. Keep yourself positioned away from music speakers or heavy foot traffic.   - If you have difficulty with the television, consider these options:      -- Use closed-captioning, which is a setting you can turn on that displays the spoken words in a written form on the screen. There may be a slight delay, but this can help fill in missing information. This can be especially helpful when watching programs with accented speech. -- Consider use of a sound bar or speakers that come from the front of the TV. With modern flat screen TVs, many of them have speakers that come out of the back of the device, which makes sound bounce off the wall behind it, then go into the room. Sound bars can allow the sound to go straight in your direction and can improve sound quality. -- Consider ear level devices to help improve the volume and/or sound quality of the program.  There are devices that work like headphones that you can adjust the volume for your ears while others can have the volume at a more comfortable level, such as \"TV Ears\". Most hearing aids have devices that allow them to connect directly to the TV and improve sound quality. Hearing Loss: Care Instructions  Your Care Instructions      Hearing loss is a sudden or slow decrease in how well you hear. It can range from mild to profound.  Permanent hearing loss can occur with aging, and it can happen when you are exposed long-term to loud noise. Examples include listening to loud music, riding motorcycles, or being around other loud machines. Hearing loss can affect your work and home life. It can make you feel lonely or depressed. You may feel that you have lost your independence. But hearing aids and other devices can help you hear better and feel connected to others. Follow-up care is a key part of your treatment and safety. Be sure to make and go to all appointments, and call your doctor if you are having problems. It's also a good idea to know your test results and keep a list of the medicines you take. How can you care for yourself at home? · Avoid loud noises whenever possible. This helps keep your hearing from getting worse. Always wear hearing protection around loud noises. · If appropriate, wear hearing aid(s) as directed. It is recommended that hearing aids are worn during all waking hours to keep your brain active and give it access to the sounds it is missing. · If you are beginning your process with hearing aid(s), schedule a \"Hearing Aid Evaluation\" with an audiologist to discuss your lifestyle, features of hearing aid technology, and styles of hearing aids available. It is recommended that you contact your insurance company to determine if you have a hearing aid benefit, as this may dictate who you can see for these services. · Have hearing tests as your doctor suggests. They can show whether your hearing has changed. Your hearing aid may need to be adjusted. · Use other assistive devices as needed. These may include:  ? Telephone amplifiers and hearing aids that can connect to a television, stereo, radio, or microphone. ? Devices that use lights or vibrations. These alert you to the doorbell, a ringing telephone, or a baby monitor. ? Television closed-captioning. This shows the words at the bottom of the screen. Most new TVs can do this. ? TTY (text telephone). This lets you type messages back and forth on the telephone instead of talking or listening. These devices are also called TDD. When messages are typed on the keyboard, they are sent over the phone line to a receiving TTY. The message is shown on a monitor. · Use pagers, fax machines, text, and email if it is hard for you to communicate by telephone. · Try to learn a listening technique called speech-reading. It is not lip-reading. You pay attention to people's gestures, expressions, posture, and tone of voice. These clues can help you understand what a person is saying. Face the person you are talking to, and have him or her face you. Make sure the lighting is good. You need to see the other person's face clearly. · Think about counseling if you need help to adjust to your hearing loss. When should you call for help? Watch closely for changes in your health, and be sure to contact your doctor if:    · You think your hearing is getting worse. · You have new symptoms, such as dizziness or nausea.

## 2022-04-08 ENCOUNTER — ANTI-COAG VISIT (OUTPATIENT)
Dept: PHARMACY | Age: 82
End: 2022-04-08
Payer: MEDICARE

## 2022-04-08 LAB — INR BLD: 2.2

## 2022-04-08 PROCEDURE — 85610 PROTHROMBIN TIME: CPT | Performed by: FAMILY MEDICINE

## 2022-04-08 PROCEDURE — 99211 OFF/OP EST MAY X REQ PHY/QHP: CPT | Performed by: FAMILY MEDICINE

## 2022-04-08 NOTE — PROGRESS NOTES
Ms. Chiara Koenig is here for management of anticoagulation for hx of DVT. PMH also significant for HTN. She presents today w/out complaint. Pt verifies dosing regimen as listed above. Pt denies s/s bleeding/swelling/SOB. No missed doses. No changes in Rx/OTCs/Herbal medications. Occasional EtOH use and denies tobacco.    Dose reduced 2 weeks ago after INR of 3.3 at MD office. No other changes. INR 2.2 is within the therapeutic range of 2-3. Recommend to continue dose of 5 mg Mon/Fri, 7.5 mg all other days  Pt has the 5 mg tablets. Will continue to monitor and check INR in 4 weeks. Dosing reminder card given with phone number, appointment date and time.    Return to clinic: 5/6 @ 1115 am   Referring physician: Dr. Eli Villegas, PharmD 4/8/2022 11:22 AM

## 2022-04-18 ENCOUNTER — TELEPHONE (OUTPATIENT)
Dept: FAMILY MEDICINE CLINIC | Age: 82
End: 2022-04-18

## 2022-04-18 NOTE — LETTER
[address]  [address] [facility] [address]  Phone: [phone]  Fax: [phone]    Sonia Barger         April 21, 2022     Patient: Biju Dawn   YOB: 1940   Date of Visit: 4/18/2022       To Whom It May Concern: It is my medical opinion that Isabella Hogue requires a disability parking placard for the following reasons:  She cannot walk 200 feet without stopping to rest.  Duration of need: 1 year    If you have any questions or concerns, please don't hesitate to call.     Sincerely,        Seymour Madrid MA

## 2022-04-20 DIAGNOSIS — G89.29 CHRONIC BILATERAL LOW BACK PAIN, UNSPECIFIED WHETHER SCIATICA PRESENT: ICD-10-CM

## 2022-04-20 DIAGNOSIS — M54.50 CHRONIC BILATERAL LOW BACK PAIN, UNSPECIFIED WHETHER SCIATICA PRESENT: ICD-10-CM

## 2022-04-20 RX ORDER — TRAMADOL HYDROCHLORIDE 50 MG/1
TABLET ORAL
Qty: 60 TABLET | Refills: 0 | Status: SHIPPED | OUTPATIENT
Start: 2022-04-20 | End: 2022-04-25

## 2022-04-20 NOTE — TELEPHONE ENCOUNTER
Refill Request - Controlled Substance    Last Seen Department: 3/16/2022  Last Seen by PCP: 3/16/2022    Last Written: 3/17/2022    Last UDS: N/A    Med Agreement Signed On: 12/12/2018    Next Appointment: 6/17/2022    Future appointment scheduled    Requested Prescriptions     Pending Prescriptions Disp Refills    traMADol (ULTRAM) 50 MG tablet [Pharmacy Med Name: traMADol HCL 50MG TABLET] 60 tablet 0     Sig: TAKE ONE TABLET BY MOUTH EVERY 12 HOURS AS NEEDED FOR PAIN.  REDUCE DOSES TAKEN AS PAIN BECOMES MANAGABLE

## 2022-04-21 NOTE — TELEPHONE ENCOUNTER
----- Message from Barbara Chavis MA sent at 4/15/2022  1:59 PM EDT -----  Subject: Message to Provider    QUESTIONS  Information for Provider? Jenna Romero (pt's daughter) is calling as she is needing   a new Rx for a handicap placard as the one she has  and they think   its from . Please call pt once Rx is ready so they can pick it up and   take it to the  [facility] and get the new one. Jenna Romero or patient can be reached @   number below to advise once Rx is ready for . Thank you so much!  ---------------------------------------------------------------------------  --------------  CALL BACK INFO  What is the best way for the office to contact you? OK to leave message on   voicemail  Preferred Call Back Phone Number? [phone]  ---------------------------------------------------------------------------  --------------  SCRIPT ANSWERS  Relationship to Patient? Other  Representative Name? Kaitchristi Loeraleena (daughter)-given 1x verbal advised that   updated HIPPA needs to be signed @ NOV  Is the Representative on the appropriate HIPAA document in Epic?  Yes
Per hippa lmom informing pt that placard was done and ready for .  Placed up front
Script written, signed. Please write letter and let pt know. Thank you!
Will leave for Dr. Arabella Steven to review, I can not locate one if we have done before and may have been paper script? ?. Thanks, Andres Solis
APER

## 2022-04-23 DIAGNOSIS — M54.50 CHRONIC BILATERAL LOW BACK PAIN, UNSPECIFIED WHETHER SCIATICA PRESENT: ICD-10-CM

## 2022-04-23 DIAGNOSIS — G89.29 CHRONIC BILATERAL LOW BACK PAIN, UNSPECIFIED WHETHER SCIATICA PRESENT: ICD-10-CM

## 2022-04-25 NOTE — TELEPHONE ENCOUNTER
Refill Request - Controlled Substance    Last Seen Department: 3/16/2022  Last Seen by PCP: 3/16/2022    Last Written: duplicate request. This was already sent in on 4/20/22. Please refuse     Last UDS: NA    Med Agreement Signed On: 12/12/18    Next Appointment: 6/17/2022    Future appointment scheduled    Requested Prescriptions     Pending Prescriptions Disp Refills    traMADol (ULTRAM) 50 MG tablet [Pharmacy Med Name: traMADol HCL 50MG TABLET] 60 tablet      Sig: TAKE ONE TABLET BY MOUTH EVERY 12 HOURS AS NEEDED FOR PAIN.  REDUCE DOSES TAKEN AS PAIN BECOMES MANAGABLE

## 2022-04-27 RX ORDER — TRAMADOL HYDROCHLORIDE 50 MG/1
TABLET ORAL
Qty: 60 TABLET | Refills: 0 | Status: SHIPPED | OUTPATIENT
Start: 2022-04-27 | End: 2022-05-27

## 2022-05-05 ENCOUNTER — ANTI-COAG VISIT (OUTPATIENT)
Dept: PHARMACY | Age: 82
End: 2022-05-05
Payer: MEDICARE

## 2022-05-05 LAB — INR BLD: 3.6

## 2022-05-05 PROCEDURE — 85610 PROTHROMBIN TIME: CPT

## 2022-05-05 PROCEDURE — 99211 OFF/OP EST MAY X REQ PHY/QHP: CPT

## 2022-05-05 NOTE — PROGRESS NOTES
Ms. Kolby Conti is here for management of anticoagulation for hx of DVT. PMH also significant for HTN. She presents today w/out complaint. Pt verifies dosing regimen as listed above. Pt denies s/s bleeding/swelling/SOB. No missed doses. No changes in Rx/OTCs/Herbal medications. Occasional EtOH use and denies tobacco.    Pt reports no changes. INR 3.6 is above the therapeutic range of 2-3. Recommend to hold today and decrease dose to 5 mg Mon/Wed/Fri, 7.5 mg all other days  Pt has the 5 mg tablets. Will continue to monitor and check INR in 2 weeks. Dosing reminder card given with phone number, appointment date and time.    Return to clinic: 5/19 @ 1130 am   Referring physician: Dr. Fox Tirado, Student Pharmacist 5/5/22 11:23 AM

## 2022-05-12 NOTE — TELEPHONE ENCOUNTER
I pended the order. Patient last seen on 11/23/21. Advised to follow up none. Next appointment 05/18/22.

## 2022-05-15 DIAGNOSIS — F33.42 RECURRENT MAJOR DEPRESSIVE DISORDER, IN FULL REMISSION (HCC): ICD-10-CM

## 2022-05-15 DIAGNOSIS — E78.00 HYPERCHOLESTEREMIA: ICD-10-CM

## 2022-05-16 RX ORDER — SIMVASTATIN 40 MG
TABLET ORAL
Qty: 90 TABLET | Refills: 3 | Status: SHIPPED | OUTPATIENT
Start: 2022-05-16

## 2022-05-16 RX ORDER — SERTRALINE HYDROCHLORIDE 100 MG/1
TABLET, FILM COATED ORAL
Qty: 180 TABLET | Refills: 3 | Status: SHIPPED | OUTPATIENT
Start: 2022-05-16 | End: 2022-06-23

## 2022-05-16 NOTE — TELEPHONE ENCOUNTER
Refill Request     CONFIRM preferrred pharmacy with the patient. If Mail Order Rx - Pend for 90 day refill.       Last Seen: Last Seen Department: 3/16/2022  Last Seen by PCP: 3/16/2022    Last Written: 5/24/2021, 90 day supply, 3 refills    Next Appointment:   Future Appointments   Date Time Provider Clare Bliss   5/19/2022 11:30 AM [address] [facility]   6/17/2022  1:15 PM MD SHAZIA Leon  [place] [facility]       Future appointment scheduled      Requested Prescriptions     Pending Prescriptions Disp Refills    sertraline (ZOLOFT) 100 MG tablet [Pharmacy Med Name: Sertraline HCl 100 MG Oral Tablet] 180 tablet 3     Sig: TAKE 1 TABLET BY MOUTH  TWICE DAILY    simvastatin (ZOCOR) 40 MG tablet [Pharmacy Med Name: Simvastatin 40 MG Oral Tablet] 90 tablet 3     Sig: TAKE 1 TABLET BY MOUTH AT  NIGHT

## 2022-05-19 ENCOUNTER — ANTI-COAG VISIT (OUTPATIENT)
Dept: PHARMACY | Age: 82
End: 2022-05-19
Payer: MEDICARE

## 2022-05-19 LAB — INR BLD: 2.1

## 2022-05-19 PROCEDURE — 85610 PROTHROMBIN TIME: CPT

## 2022-05-19 PROCEDURE — 99211 OFF/OP EST MAY X REQ PHY/QHP: CPT

## 2022-05-19 NOTE — PROGRESS NOTES
Ms. Usha Ruiz is here for management of anticoagulation for hx of DVT. PMH also significant for HTN. She presents today w/out complaint. Pt verifies dosing regimen as listed above. Pt denies s/s bleeding/swelling/SOB. No missed doses. No changes in Rx/OTCs/Herbal medications. Occasional EtOH use and denies tobacco.    Pt reports no changes. INR 2.1 is within the therapeutic range of 2-3. Recommend to continue 5 mg Mon/Wed/Fri, 7.5 mg all other days  Pt has the 5 mg tablets. Will continue to monitor and check INR in 4 weeks. Dosing reminder card given with phone number, appointment date and time.    Return to clinic: 06/16 @ 1:30pm  Referring physician: Dr. Lyubov Shin, Student Pharmacist 5/19/22 11:24 AM

## 2022-05-23 RX ORDER — LISINOPRIL 10 MG/1
TABLET ORAL
Qty: 90 TABLET | Refills: 1 | Status: SHIPPED | OUTPATIENT
Start: 2022-05-23 | End: 2022-05-25

## 2022-05-23 NOTE — TELEPHONE ENCOUNTER
Refill Request     CONFIRM preferrred pharmacy with the patient. If Mail Order Rx - Pend for 90 day refill.       Last Seen: Last Seen Department: 3/16/2022  Last Seen by PCP: 3/16/2022    Last Written: 3/17/22 with 1 refill #30    Next Appointment:   Future Appointments   Date Time Provider Clare Bliss   6/16/2022  1:30 PM [address] [facility]   6/17/2022  1:15 PM MD SHAZIA Cottrell  [place] [facility]   6/23/2022  8:20 AM LIZZY Mckoy CNP [facility]       Future appointment scheduled      Requested Prescriptions     Pending Prescriptions Disp Refills    lisinopril (PRINIVIL;ZESTRIL) 10 MG tablet [Pharmacy Med Name: LISINOPRIL 10 MG TABLET] 30 tablet 1     Sig: TAKE ONE TABLET BY MOUTH DAILY

## 2022-05-25 RX ORDER — LISINOPRIL 10 MG/1
TABLET ORAL
Qty: 90 TABLET | Refills: 1 | Status: SHIPPED | OUTPATIENT
Start: 2022-05-25

## 2022-05-25 NOTE — TELEPHONE ENCOUNTER
DUPLICATE REQUEST      Refill Request     CONFIRM preferrred pharmacy with the patient. If Mail Order Rx - Pend for 90 day refill.       Last Seen: Last Seen Department: 3/16/2022  Last Seen by PCP: 3/16/2022    Last Written: 5/23/2022, #90, 1 refill    Next Appointment:   Future Appointments   Date Time Provider Clare Bliss   6/16/2022  1:30 PM [address] [facility]   6/17/2022  1:15 PM Perry Mcguire MD [facility] [place] [facility]   6/23/2022  8:20 AM LZIZY Og CNP [facility]       Future appointment scheduled      Requested Prescriptions     Pending Prescriptions Disp Refills    lisinopril (PRINIVIL;ZESTRIL) 10 MG tablet [Pharmacy Med Name: LISINOPRIL 10 MG TABLET] 30 tablet      Sig: TAKE ONE TABLET BY MOUTH DAILY

## 2022-06-16 ENCOUNTER — ANTI-COAG VISIT (OUTPATIENT)
Dept: PHARMACY | Age: 82
End: 2022-06-16
Payer: MEDICARE

## 2022-06-16 LAB — INR BLD: 2.3

## 2022-06-16 PROCEDURE — 99211 OFF/OP EST MAY X REQ PHY/QHP: CPT

## 2022-06-16 PROCEDURE — 85610 PROTHROMBIN TIME: CPT

## 2022-06-16 NOTE — PROGRESS NOTES
Ms. Ace Ohara is here for management of anticoagulation for hx of DVT. PMH also significant for HTN. She presents today w/out complaint. Pt verifies dosing regimen as listed above. Pt denies s/s bleeding/swelling/SOB. No missed doses. No changes in Rx/OTCs/Herbal medications. Occasional EtOH use and denies tobacco.    Pt reports no changes. INR 2.3 is within the therapeutic range of 2-3. Recommend to continue 5 mg Mon/Wed/Fri, 7.5 mg all other days  Pt has the 5 mg tablets. Will continue to monitor and check INR in 4 weeks. Dosing reminder card given with phone number, appointment date and time.    Return to clinic: 7/14@ 1:30pm  Referring physician: Dr. Alyssia Lugo

## 2022-06-17 ENCOUNTER — OFFICE VISIT (OUTPATIENT)
Dept: FAMILY MEDICINE CLINIC | Age: 82
End: 2022-06-17
Payer: MEDICARE

## 2022-06-17 VITALS
DIASTOLIC BLOOD PRESSURE: 80 MMHG | HEART RATE: 68 BPM | OXYGEN SATURATION: 94 % | SYSTOLIC BLOOD PRESSURE: 138 MMHG | BODY MASS INDEX: 27.58 KG/M2 | WEIGHT: 141.2 LBS

## 2022-06-17 DIAGNOSIS — R06.02 SOB (SHORTNESS OF BREATH): ICD-10-CM

## 2022-06-17 DIAGNOSIS — I82.4Y3 DEEP VEIN THROMBOSIS (DVT) OF PROXIMAL VEIN OF BOTH LOWER EXTREMITIES, UNSPECIFIED CHRONICITY (HCC): ICD-10-CM

## 2022-06-17 DIAGNOSIS — R06.02 SOB (SHORTNESS OF BREATH): Primary | ICD-10-CM

## 2022-06-17 DIAGNOSIS — R93.89 ABNORMAL FINDINGS ON DIAGNOSTIC IMAGING OF OTHER SPECIFIED BODY STRUCTURES: ICD-10-CM

## 2022-06-17 LAB
A/G RATIO: 1.4 (ref 1.1–2.2)
ALBUMIN SERPL-MCNC: 4.2 G/DL (ref 3.4–5)
ALP BLD-CCNC: 79 U/L (ref 40–129)
ALT SERPL-CCNC: 12 U/L (ref 10–40)
ANION GAP SERPL CALCULATED.3IONS-SCNC: 14 MMOL/L (ref 3–16)
AST SERPL-CCNC: 18 U/L (ref 15–37)
BASOPHILS ABSOLUTE: 0 K/UL (ref 0–0.2)
BASOPHILS RELATIVE PERCENT: 1.1 %
BILIRUB SERPL-MCNC: 0.3 MG/DL (ref 0–1)
BUN BLDV-MCNC: 23 MG/DL (ref 7–20)
CALCIUM SERPL-MCNC: 9.6 MG/DL (ref 8.3–10.6)
CHLORIDE BLD-SCNC: 104 MMOL/L (ref 99–110)
CO2: 26 MMOL/L (ref 21–32)
CREAT SERPL-MCNC: 0.6 MG/DL (ref 0.6–1.2)
EOSINOPHILS ABSOLUTE: 0.1 K/UL (ref 0–0.6)
EOSINOPHILS RELATIVE PERCENT: 2.2 %
GFR AFRICAN AMERICAN: >60
GFR NON-AFRICAN AMERICAN: >60
GLUCOSE BLD-MCNC: 92 MG/DL (ref 70–99)
HCT VFR BLD CALC: 37.5 % (ref 36–48)
HEMOGLOBIN: 12.4 G/DL (ref 12–16)
LYMPHOCYTES ABSOLUTE: 1.5 K/UL (ref 1–5.1)
LYMPHOCYTES RELATIVE PERCENT: 33.5 %
MCH RBC QN AUTO: 31.9 PG (ref 26–34)
MCHC RBC AUTO-ENTMCNC: 33.2 G/DL (ref 31–36)
MCV RBC AUTO: 96.2 FL (ref 80–100)
MONOCYTES ABSOLUTE: 0.5 K/UL (ref 0–1.3)
MONOCYTES RELATIVE PERCENT: 11.4 %
NEUTROPHILS ABSOLUTE: 2.4 K/UL (ref 1.7–7.7)
NEUTROPHILS RELATIVE PERCENT: 51.8 %
PDW BLD-RTO: 14.2 % (ref 12.4–15.4)
PLATELET # BLD: 180 K/UL (ref 135–450)
PMV BLD AUTO: 9.7 FL (ref 5–10.5)
POTASSIUM SERPL-SCNC: 4.7 MMOL/L (ref 3.5–5.1)
PRO-BNP: 341 PG/ML (ref 0–449)
RBC # BLD: 3.89 M/UL (ref 4–5.2)
SODIUM BLD-SCNC: 144 MMOL/L (ref 136–145)
TOTAL PROTEIN: 7.3 G/DL (ref 6.4–8.2)
TSH REFLEX: 1.03 UIU/ML (ref 0.27–4.2)
WBC # BLD: 4.6 K/UL (ref 4–11)

## 2022-06-17 PROCEDURE — G8399 PT W/DXA RESULTS DOCUMENT: HCPCS | Performed by: FAMILY MEDICINE

## 2022-06-17 PROCEDURE — 1123F ACP DISCUSS/DSCN MKR DOCD: CPT | Performed by: FAMILY MEDICINE

## 2022-06-17 PROCEDURE — G8427 DOCREV CUR MEDS BY ELIG CLIN: HCPCS | Performed by: FAMILY MEDICINE

## 2022-06-17 PROCEDURE — 99214 OFFICE O/P EST MOD 30 MIN: CPT | Performed by: FAMILY MEDICINE

## 2022-06-17 PROCEDURE — 1090F PRES/ABSN URINE INCON ASSESS: CPT | Performed by: FAMILY MEDICINE

## 2022-06-17 PROCEDURE — G8417 CALC BMI ABV UP PARAM F/U: HCPCS | Performed by: FAMILY MEDICINE

## 2022-06-17 PROCEDURE — 1036F TOBACCO NON-USER: CPT | Performed by: FAMILY MEDICINE

## 2022-06-17 RX ORDER — TRAMADOL HYDROCHLORIDE 50 MG/1
50 TABLET ORAL EVERY 6 HOURS PRN
COMMUNITY
End: 2022-06-23

## 2022-06-17 ASSESSMENT — PATIENT HEALTH QUESTIONNAIRE - PHQ9
2. FEELING DOWN, DEPRESSED OR HOPELESS: 1
9. THOUGHTS THAT YOU WOULD BE BETTER OFF DEAD, OR OF HURTING YOURSELF: 1
SUM OF ALL RESPONSES TO PHQ QUESTIONS 1-9: 15
5. POOR APPETITE OR OVEREATING: 0
3. TROUBLE FALLING OR STAYING ASLEEP: 3
8. MOVING OR SPEAKING SO SLOWLY THAT OTHER PEOPLE COULD HAVE NOTICED. OR THE OPPOSITE, BEING SO FIGETY OR RESTLESS THAT YOU HAVE BEEN MOVING AROUND A LOT MORE THAN USUAL: 3
7. TROUBLE CONCENTRATING ON THINGS, SUCH AS READING THE NEWSPAPER OR WATCHING TELEVISION: 3
SUM OF ALL RESPONSES TO PHQ QUESTIONS 1-9: 14
10. IF YOU CHECKED OFF ANY PROBLEMS, HOW DIFFICULT HAVE THESE PROBLEMS MADE IT FOR YOU TO DO YOUR WORK, TAKE CARE OF THINGS AT HOME, OR GET ALONG WITH OTHER PEOPLE: 2
6. FEELING BAD ABOUT YOURSELF - OR THAT YOU ARE A FAILURE OR HAVE LET YOURSELF OR YOUR FAMILY DOWN: 1
SUM OF ALL RESPONSES TO PHQ QUESTIONS 1-9: 15
SUM OF ALL RESPONSES TO PHQ QUESTIONS 1-9: 15
4. FEELING TIRED OR HAVING LITTLE ENERGY: 3

## 2022-06-17 ASSESSMENT — COLUMBIA-SUICIDE SEVERITY RATING SCALE - C-SSRS
1. WITHIN THE PAST MONTH, HAVE YOU WISHED YOU WERE DEAD OR WISHED YOU COULD GO TO SLEEP AND NOT WAKE UP?: NO
2. HAVE YOU ACTUALLY HAD ANY THOUGHTS OF KILLING YOURSELF?: NO
6. HAVE YOU EVER DONE ANYTHING, STARTED TO DO ANYTHING, OR PREPARED TO DO ANYTHING TO END YOUR LIFE?: NO

## 2022-06-17 NOTE — LETTER
[address]  [facility] [address]  Phone: [phone]  Fax: [phone]    Sara Lang MD         June 17, 2022     Patient: Mal Gautam   YOB: 1940   Date of Visit: 6/17/2022       To Whom It May Concern: It is my medical opinion that Farheen Bustos requires a disability parking placard for the following reasons:  She cannot walk 200 feet without stopping to rest.  Duration of need: 5 years    If you have any questions or concerns, please don't hesitate to call.     Sincerely,        Sara Lang MD

## 2022-06-17 NOTE — PROGRESS NOTES
2022    This is a 80 y.o. female who presents for  Chief Complaint   Patient presents with    3 Month Follow-Up    Shortness of Breath     for a while       HPI:     SOB  Saw me in 3/22 for this  Was encouraged to schedule an Echo and make a f/u visit with Dr. Taran Russell she forgot to complete both items today    Sxs Fluctuate   Working outside in the heat makes it worse  With exertion inside, constant, no progression   No fevers, cough, s/s of infection  + smothering feeling, has had for a while   Denies CP, palpitations  Has some orthopnea    + intense back pain, imbalance, seeing a back specialist on        Past Medical History:   Diagnosis Date    Allergic rhinitis     Arthritis     Cancer ([facility]     squamous cell on nose     Chronic back pain     Dementia ([facility] 2019    Dental disease     Depression     Dizziness     GERD (gastroesophageal reflux disease)     H/O blood clots     Hearing loss     Hx of blood clots     Hyperlipidemia     Hypertension     Lumbar stenosis with neurogenic claudication     Osteoarthritis of neck     TMJ dysfunction        Past Surgical History:   Procedure Laterality Date     SECTION      x1    EMBOLECTOMY      EPIDURAL STEROID INJECTION Bilateral 2019    BILATERAL LUMBAR THREE, LUMBAR FOUR, LUMBAR FIVE RADIOFREQUENCY ABLATION SITE CONFIRMED BY FLUOROSCOPY performed by Beckie Hopson MD at [facility] Right 8/15/2019    RIGHT SACROILIAC JOINT INJECTION SITE CONFIRMED BY FLUOROSCOPY performed by Beckie Hopson MD at [address] [facility] (CERVIX STATUS UNKNOWN)      HYSTERECTOMY, TOTAL ABDOMINAL (CERVIX REMOVED)      LAMINECTOMY  2018    MICROLUMBAR LAMINECTOMY L4-5    LITHOTRIPSY      MOHS SURGERY      nose for squamous cell carcinoma    OTHER SURGICAL HISTORY      blood clots-        Social History     Socioeconomic History    Marital status:      Spouse name: Not on file    Number of children: Not on file    Years of education: Not on file    Highest education level: Not on file   Occupational History    Not on file   Tobacco Use    Smoking status: Never Smoker    Smokeless tobacco: Never Used   Vaping Use    Vaping Use: Never used   Substance and Sexual Activity    Alcohol use: No    Drug use: No    Sexual activity: Not on file   Other Topics Concern    Not on file   Social History Narrative    Not on file     Social Determinants of Health     Financial Resource Strain: Low Risk     Difficulty of Paying Living Expenses: Not hard at all   Food Insecurity: No Food Insecurity    Worried About [address] [place] [address] in the Last Year: Never true    Arabella of Food in the Last Year: Never true   Transportation Needs: No Transportation Needs    Lack of Transportation (Medical): No    Lack of Transportation (Non-Medical):  No   Physical Activity:     Days of Exercise per Week: Not on file    Minutes of Exercise per Session: Not on file   Stress:     Feeling of Stress : Not on file   Social Connections:     Frequency of Communication with Friends and Family: Not on file    Frequency of Social Gatherings with Friends and Family: Not on file    Attends [employer] Services: Not on file    Active Member of [address] [place] [address] or Organizations: Not on file    Attends Club or Organization Meetings: Not on file    Marital Status: Not on file   Intimate Partner Violence:     Fear of Current or Ex-Partner: Not on file    Emotionally Abused: Not on file    Physically Abused: Not on file    Sexually Abused: Not on file   Housing Stability:     Unable to Pay for Housing in the Last Year: Not on file    Number of [place] in the Last Year: Not on file    Unstable Housing in the Last Year: Not on file       Family History   Problem Relation Age of Onset    Cancer Other     Hypertension Other     Seizures Other     Heart Disease Mother    Melgar Other Father         murdered  Colon Cancer Brother     Cancer Maternal Aunt         breast    Colon Cancer Maternal Grandmother        Current Outpatient Medications   Medication Sig Dispense Refill    traMADol (ULTRAM) 50 MG tablet Take 50 mg by mouth every 6 hours as needed for Pain.  Acetaminophen (TYLENOL ARTHRITIS EXT RELIEF PO) Take by mouth      lisinopril (PRINIVIL;ZESTRIL) 10 MG tablet TAKE ONE TABLET BY MOUTH DAILY 90 tablet 1    sertraline (ZOLOFT) 100 MG tablet TAKE 1 TABLET BY MOUTH  TWICE DAILY 180 tablet 3    simvastatin (ZOCOR) 40 MG tablet TAKE 1 TABLET BY MOUTH AT  NIGHT 90 tablet 3    warfarin (COUMADIN) 5 MG tablet TAKE 2 TABLETS BY MOUTH  DAILY ON MONDAY AND 1 AND  1/2 TABLETS BY MOUTH DAILY  ON ALL OTHER DAYS, OR  DIRECTED BY COUMADIN CLINIC 130 tablet 3    cetirizine (ZYRTEC) 10 MG tablet Take 10 mg by mouth daily      fluticasone (FLONASE) 50 MCG/ACT nasal spray 2 sprays by Each Nostril route daily 1 Bottle 5    pantoprazole (PROTONIX) 40 MG tablet TAKE 1 TABLET BY MOUTH  DAILY 90 tablet 3    L-Methylfolate-[id number] (CEREFOLIN PO) Take by mouth      Diapers & Supplies MISC 1 each by Does not apply route daily as needed (incontinence) 100 each 3    Handicap Placard MISC by Does not apply route 1 each 0    Misc. Devices MISC Compression stockings/open toe pantyhose with compression 40/50 2 each 2    Misc. Devices MISC George panty hose  Open toe Petite plus beige  2 pairs 2 each 1     No current facility-administered medications for this visit.        Immunization History   Administered Date(s) Administered    COVID-19, Pfizer Purple top, DILUTE for use, 12+ yrs, 30mcg/0.3mL dose 03/30/2021, 04/20/2021    Influenza Virus Vaccine 02/22/2021    Influenza, High Dose (Fluzone 65 yrs and older) 10/25/2017    Influenza, Quadv, adjuvanted, 65 yrs +, IM, PF (Fluad) 02/22/2021, 02/14/2022    Influenza, Triv, inactivated, subunit, adjuvanted, IM (Fluad 65 yrs and older) 10/10/2019    Pneumococcal Conjugate 13-valent Cristina Peoples) 06/13/2017, 10/25/2017    Pneumococcal Polysaccharide ([id number]) 12/12/2018    Tdap (Boostrix, Adacel) 12/12/2018       Allergies   Allergen Reactions    Imitrex [Sumatriptan] Anaphylaxis and Shortness Of Breath     Swelling in arm of injection cite, and SOB       Review of Systems   Constitutional: Negative for activity change, appetite change, chills, diaphoresis, fatigue and fever. Eyes: Negative for visual disturbance. Respiratory: Positive for shortness of breath. Negative for chest tightness. Cardiovascular: Negative for chest pain, palpitations and leg swelling. Gastrointestinal: Negative for abdominal pain, nausea and vomiting. Genitourinary: Negative for decreased urine volume. Musculoskeletal: Positive for arthralgias and back pain. Skin: Negative for color change. Neurological: Negative for dizziness, weakness, light-headedness, numbness and headaches. /80 (Site: Left Upper Arm, Position: Sitting, Cuff Size: Medium Adult)   Pulse 68   Wt 141 lb 3.2 oz (64 kg)   SpO2 94%   BMI 27.58 kg/m²     Physical Exam  Vitals and nursing note reviewed. Constitutional:       General: She is not in acute distress. Appearance: She is well-developed. She is not diaphoretic. HENT:      Head: Normocephalic and atraumatic. Eyes:      Conjunctiva/sclera: Conjunctivae normal.      Pupils: Pupils are equal, round, and reactive to light. Neck:      Vascular: No JVD. Cardiovascular:      Rate and Rhythm: Normal rate and regular rhythm. Heart sounds: Normal heart sounds. No murmur heard. No friction rub. No gallop. Pulmonary:      Effort: Pulmonary effort is normal. No respiratory distress. Breath sounds: Normal breath sounds. No wheezing or rales. Chest:      Chest wall: No tenderness. Abdominal:      Palpations: Abdomen is soft. Tenderness: There is no abdominal tenderness. Musculoskeletal:         General: Normal range of motion. Cervical back: Normal range of motion and neck supple. Skin:     General: Skin is warm and dry. Capillary Refill: Capillary refill takes 2 to 3 seconds. Findings: No erythema. Neurological:      Mental Status: She is alert and oriented to person, place, and time. Psychiatric:         Behavior: Behavior normal.         Thought Content: Thought content normal.         Judgment: Judgment normal.         Plan  1. SOB (shortness of breath)  Did not schedule her ECHO or make an appmnt with Cardiology as instructed in 3/22, encouraged again   Check labs, including BNP  - Comprehensive Metabolic Panel; Future  - CBC with Auto Differential; Future  - TSH with Reflex; Future  - Brain Natriuretic Peptide; Future          2. Deep vein thrombosis (DVT) of proximal vein of both lower extremities, unspecified chronicity (HCC)  No new Sxs  3. Abnormal findings on diagnostic imaging of other specified body structures   - TSH with Reflex; Future    I have spent 30 minutes on patient care, with more than 50% spent counseling and coordinating care for diagnoses and plans listed above. While assessing care for this patient, I have reviewed all pertinent lab work/imaging/ specialist notes and care in reference to those problems addressed above in detail. Appropriate medical decision making was based on this. Please note that portions of this note may have been completed with a voice recognition program. Efforts were made to edit the dictations but occasionally words are mis-transcribed. Return if symptoms worsen or fail to improve.

## 2022-06-22 ASSESSMENT — ENCOUNTER SYMPTOMS
COLOR CHANGE: 0
ABDOMINAL PAIN: 0
VOMITING: 0
NAUSEA: 0
SHORTNESS OF BREATH: 1
CHEST TIGHTNESS: 0
BACK PAIN: 1

## 2022-06-23 ENCOUNTER — OFFICE VISIT (OUTPATIENT)
Dept: PAIN MANAGEMENT | Age: 82
End: 2022-06-23
Payer: MEDICARE

## 2022-06-23 VITALS
HEART RATE: 63 BPM | OXYGEN SATURATION: 97 % | WEIGHT: 141 LBS | DIASTOLIC BLOOD PRESSURE: 80 MMHG | HEIGHT: 60 IN | BODY MASS INDEX: 27.68 KG/M2 | TEMPERATURE: 97.3 F | SYSTOLIC BLOOD PRESSURE: 160 MMHG

## 2022-06-23 DIAGNOSIS — M54.40 CHRONIC MIDLINE LOW BACK PAIN WITH SCIATICA, SCIATICA LATERALITY UNSPECIFIED: ICD-10-CM

## 2022-06-23 DIAGNOSIS — M96.1 FAILED BACK SURGICAL SYNDROME: ICD-10-CM

## 2022-06-23 DIAGNOSIS — M50.90 CERVICAL DISC DISEASE: ICD-10-CM

## 2022-06-23 DIAGNOSIS — M51.36 DDD (DEGENERATIVE DISC DISEASE), LUMBAR: ICD-10-CM

## 2022-06-23 DIAGNOSIS — G89.4 CHRONIC PAIN SYNDROME: ICD-10-CM

## 2022-06-23 DIAGNOSIS — M48.061 SPINAL STENOSIS OF LUMBAR REGION WITHOUT NEUROGENIC CLAUDICATION: ICD-10-CM

## 2022-06-23 DIAGNOSIS — R26.89 BALANCE PROBLEM: ICD-10-CM

## 2022-06-23 DIAGNOSIS — F32.A DEPRESSION, UNSPECIFIED DEPRESSION TYPE: ICD-10-CM

## 2022-06-23 DIAGNOSIS — M47.816 LUMBAR FACET ARTHROPATHY: ICD-10-CM

## 2022-06-23 DIAGNOSIS — K59.03 DRUG-INDUCED CONSTIPATION: ICD-10-CM

## 2022-06-23 DIAGNOSIS — Z91.81 AT HIGH RISK FOR FALLS: ICD-10-CM

## 2022-06-23 DIAGNOSIS — G89.29 CHRONIC MIDLINE LOW BACK PAIN WITH SCIATICA, SCIATICA LATERALITY UNSPECIFIED: ICD-10-CM

## 2022-06-23 DIAGNOSIS — M43.10 DEGENERATIVE SPONDYLOLISTHESIS: ICD-10-CM

## 2022-06-23 PROCEDURE — 99204 OFFICE O/P NEW MOD 45 MIN: CPT | Performed by: NURSE PRACTITIONER

## 2022-06-23 PROCEDURE — 1090F PRES/ABSN URINE INCON ASSESS: CPT | Performed by: NURSE PRACTITIONER

## 2022-06-23 PROCEDURE — G8417 CALC BMI ABV UP PARAM F/U: HCPCS | Performed by: NURSE PRACTITIONER

## 2022-06-23 PROCEDURE — G8427 DOCREV CUR MEDS BY ELIG CLIN: HCPCS | Performed by: NURSE PRACTITIONER

## 2022-06-23 RX ORDER — DULOXETIN HYDROCHLORIDE 30 MG/1
30 CAPSULE, DELAYED RELEASE ORAL DAILY
Qty: 30 CAPSULE | Refills: 3 | Status: SHIPPED | OUTPATIENT
Start: 2022-06-23 | End: 2022-07-21 | Stop reason: SDUPTHER

## 2022-06-23 RX ORDER — HYDROCODONE BITARTRATE AND ACETAMINOPHEN 7.5; 325 MG/1; MG/1
1 TABLET ORAL 2 TIMES DAILY
Qty: 56 TABLET | Refills: 0 | Status: SHIPPED | OUTPATIENT
Start: 2022-06-23 | End: 2022-07-21 | Stop reason: SDUPTHER

## 2022-06-23 NOTE — PATIENT INSTRUCTIONS
Patient Education        Back Stretches: Exercises  Introduction  Here are some examples of exercises for stretching your back. Start eachexercise slowly. Ease off the exercise if you start to have pain. Your doctor or physical therapist will tell you when you can start theseexercises and which ones will work best for you. How to do the exercises  Overhead stretch    1. Stand comfortably with your feet shoulder-width apart. 2. Looking straight ahead, raise both arms over your head and reach toward the ceiling. Do not allow your head to tilt back. 3. Hold for 15 to 30 seconds, then lower your arms to your sides. 4. Repeat 2 to 4 times. Side stretch    1. Stand comfortably with your feet shoulder-width apart. 2. Raise one arm over your head, and then lean to the other side. 3. Slide your hand down your leg as you let the weight of your arm gently stretch your side muscles. Hold for 15 to 30 seconds. 4. Repeat 2 to 4 times on each side. Press-up    1. Lie on your stomach, supporting your body with your forearms. 2. Press your elbows down into the floor to raise your upper back. As you do this, relax your stomach muscles and allow your back to arch without using your back muscles. As your press up, do not let your hips or pelvis come off the floor. 3. Hold for 15 to 30 seconds, then relax. 4. Repeat 2 to 4 times. Relax and rest    1. Lie on your back with a rolled towel under your neck and a pillow under your knees. Extend your arms comfortably to your sides. 2. Relax and breathe normally. 3. Remain in this position for about 10 minutes. 4. If you can, do this 2 or 3 times each day. Follow-up care is a key part of your treatment and safety. Be sure to make and go to all appointments, and call your doctor if you are having problems. It's also a good idea to know your test results and keep alist of the medicines you take. Where can you learn more? Go to https://cherelle.healthBharat [employer]. org and sign in to your [employer] account. Enter J052 in the [employer]Kix box to learn more about \"Back Stretches: Exercises. \"     If you do not have an account, please click on the \"Sign Up Now\" link. Current as of: March 9, 2022               Content Version: 13.3  © 2006-2022 "[employer]. Care instructions adapted under license by [facility] ([facility]). If you have questions about a medical condition or this instruction, always ask your healthcare professional. [place] [address] any warranty or liability for your use of this information. Patient Education        Neck Arthritis: Exercises  Introduction  Here are some examples of exercises for you to try. The exercises may be suggested for a condition or for rehabilitation. Start each exercise slowly. Ease off the exercises if you start to have pain. You will be told when to start these exercises and which ones will work bestfor you. How to do the exercises  Neck stretches to the side    1. This stretch works best if you keep your shoulder down as you lean away from it. To help you remember to do this, start by relaxing your shoulders and lightly holding on to your thighs or your chair. 2. Tilt your head toward your shoulder and hold for 15 to 30 seconds. Let the weight of your head stretch your muscles. 3. Repeat 2 to 4 times toward each shoulder. Chin tuck    1. Lie on the floor with a rolled-up towel under your neck. Your head should be touching the floor. 2. Slowly bring your chin toward your chest.  3. Hold for a count of 6, and then relax for up to 10 seconds. 4. Repeat 8 to 12 times. Active cervical rotation    1. Sit in a firm chair, or stand up straight. 2. Keeping your chin level, turn your head to the right, and hold for 15 to 30 seconds. 3. Turn your head to the left and hold for 15 to 30 seconds. 4. Repeat 2 to 4 times to each side. Shoulder blade squeeze    1.  While standing, squeeze your shoulder blades together. 2. Do not raise your shoulders up as you are squeezing. 3. Hold for 6 seconds. 4. Repeat 8 to 12 times. Shoulder rolls    1. Sit comfortably with your feet shoulder-width apart. You can also do this exercise standing up. 2. Roll your shoulders up, then back, and then down in a smooth, circular motion. 3. Repeat 2 to 4 times. Follow-up care is a key part of your treatment and safety. Be sure to make and go to all appointments, and call your doctor if you are having problems. It's also a good idea to know your test results and keep alist of the medicines you take. Where can you learn more? Go to https://SecondbrainpeLAST MINUTE NETWORKeb.RxRevu. org and sign in to your [employer] account. Enter I376 in the REscour box to learn more about \"Neck Arthritis: Exercises. \"     If you do not have an account, please click on the \"Sign Up Now\" link. Current as of: March 9, 2022               Content Version: 13.3  © 2006-2022 Healthwise, Incorporated. Care instructions adapted under license by [facility] [address] If you have questions about a medical condition or this instruction, always ask your healthcare professional. [place] [address] any warranty or liability for your use of this information.

## 2022-06-23 NOTE — PROGRESS NOTES
HISTORY OF PRESENT ILLNESS:  Ms. Usha Ruiz is a 80 y.o. female presents for consultation at the kind request of Dr. Olamide Noland her primary care physician for chronic pain management. Her presenting problems are pain in the neck, lower back radiating to BLE. She has also been evaluated by ENT for cerumen impaction. Onset of pain began in 2014 spontaneously, admits to having worked in the yard lifting heavy material. Denies direct trauma or injury to the spine. Has been evaluated by multiple orthopedics providers over the years. Received JULIÁN of the lumbar spine from Dr. Bel Moffett in 2018, last had radiofrequency ablation in 2019 with SI joint injection a few months later through Dr. Vicki Headley with no relief of pain. Had Lumbar Laminectomy in 2018 with Dr. Cristóbal Colon, which relieved some of the strain related to sciatica. Still continues to experience pain with activities. Was under pain management with Dr. Vicki Headley who completed radiofrequency ablation as well as stem cell procedure which did not seem to help stabilize her pain, he also prescribed oxycodone-acetaminophen 5-325 mg tablets. She decided to discontinue care in 2021 due to not wanting any more injections. She was last dispensed tramadol 50 mg tablets through her PCP on 5/25/2020 2 x 30 days Endorses poor balance which leads to frequent falls, ambulates mainly with a cane. 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 Denies having had physical therapy in over 2 years. She rates the pain in the lower back at 10/10, 6/10 in the legs, 2/10 in the neck, arms. She describes it as aching, burning, numbness, tingling, pins and needles. Pain is greaterin her lower back than legs. Pain is made worse by: walking, standing, sitting, bending, lifting, house chores, reaching overhead, getting up in the morning, getting in/out the chair, going up/down the stairs.  Activities that have been limited by pain that she otherwise tolerated well are activities of daily living. Alternative therapies she has previously attempted are pain medicine, anti-inflammatories, muscle relaxants, local injections, ice/heat pack, brace/cane/walker, JULIÁN, exercise by therapist, manipulation by chiropractor. Current treatment regimen has helped relieve about 40% of the pain. Relieving factors of pain include pain medicine, lying on the side in a fetal position. Shedenies side effects from the current pain regimen. In the last week she reports having extreme bothersome symptoms to the lower back all the time. Pain prevents her from walking more than 10 minutes, sitting more than 30 minutes, standing more than 30 minutes. Has social/recreational life is severely restricted by pain. Patient reports mood is depressed and that she has no suicidal/homicidal inclination. Depression is mainly due to activity limitations, as well as pain. Reports having taken Zoloft for many years which does not seem to help with her mood. Daughter reports patient sleeps most of the time. Sleep patterns are 8 hours including naps during the day time. Patient denies neurological bowel or bladder concerns. Patient denies misusing/abusing her narcotic pain medications or using any illegal drugs. she admits to morning stiffness, fatigue, and headaches. Reports having had a history of migraine headaches, currently attributes most of her headaches to back pain. Currently on SSI lives with her , her 2 daughters are her caregivers. Denies cigarette smoking or alcohol consumption     ROS:  The patient's social history, past medical history, family history, medications, allergies and review of systems have all been reviewed and verified from  the patient questionnaire form which has been filled by the patient.   The  allergies, medication list  and past medical and surgical history and other information recorded by the MA was again reviewed today  These forms have been scanned into the \"media\" tab of patients electronic medical record. Please check page 6 of the patient questionnaire for for family history    PHYSICAL EXAM:  Please see the physical exam form for a detailed examination on this visit. This form has been completed and scanned into the  Media section of the chart    Physical Exam  Constitutional:       General: She is not in acute distress. Appearance: She is well-developed. HENT:      Head: Normocephalic and atraumatic. Nose: Nose normal.   Eyes:      Conjunctiva/sclera: Conjunctivae normal.      Pupils: Pupils are equal, round, and reactive to light. Neck:      Thyroid: No thyromegaly. Cardiovascular:      Rate and Rhythm: Normal rate and regular rhythm. Heart sounds: Normal heart sounds. Pulmonary:      Effort: Pulmonary effort is normal. No respiratory distress. Breath sounds: Normal breath sounds. Abdominal:      General: Bowel sounds are normal. There is no distension. Palpations: Abdomen is soft. Tenderness: There is no abdominal tenderness. There is no right CVA tenderness or left CVA tenderness. Musculoskeletal:         General: Tenderness present. Right shoulder: Tenderness (tender spots noted mainly with palpation) present. Left shoulder: Tenderness (tender spots noted mainly with palpation) present. Cervical back: Neck supple. Tenderness (pain with 30 degree extention, palpation) present. Decreased range of motion. Thoracic back: Tenderness present. Lumbar back: Tenderness (guarded pain with 20 degree flexion, 5 degree extention, palpation) present. Decreased range of motion. Right hip: Tenderness present. Decreased range of motion. Left hip: Tenderness present. Decreased range of motion. Right lower leg: Edema (+1 pitting edema BLE) present. Left lower leg: Edema (+1 pitting edema BLE) present. Lymphadenopathy:      Cervical: No cervical adenopathy.    Skin: General: Skin is warm and dry. Findings: No rash. Nails: There is no clubbing. Neurological:      Mental Status: She is alert and oriented to person, place, and time. Cranial Nerves: No cranial nerve deficit. Sensory: No sensory deficit. Motor: Weakness present. Gait: Gait abnormal (slow unsteady gait with the aid of a cane, limping to the LLE). Deep Tendon Reflexes: Reflexes are normal and symmetric. Reflex Scores:       Patellar reflexes are 2+ on the right side and 2+ on the left side. Achilles reflexes are 2+ on the right side and 2+ on the left side. Psychiatric:         Mood and Affect: Mood is depressed. Speech: Speech normal.         Behavior: Behavior normal.        CT of the Cervical spine 9/3/20:  No acute intracranial abnormality.       No acute abnormality of the cervical spine. MRI of the Lumbar spine 9/1/20:  1. Advanced degenerative change with grade 1 retrolisthesis at T12-L1, L1-2   and L2-3 and grade 1 anterolisthesis at L4-5 and L5-S1.   2. Multilevel central canal stenosis, moderate at L2-3 and mild at L1-2, L3-4   and L4-5.   3. Multilevel neural foraminal narrowing as noted above.  Overall, the   findings are very similar compared to the prior study. CT of the Lumbar spine 7/19/18:      Multilevel mild to moderate spondylosis with mild canal stenosis at L4-L5, L5-S1 and moderate    left foraminal stenosis at L4.       BP (!) 160/80   Pulse 63   Temp 97.3 °F (36.3 °C)   Ht 5' (1.524 m)   Wt 141 lb (64 kg)   SpO2 97%   BMI 27.54 kg/m²      ASSESSMENT:    1. Chronic pain syndrome    2. DDD (degenerative disc disease), lumbar    3. Lumbar facet arthropathy    4. Spinal stenosis of lumbar region without neurogenic claudication    5. Chronic midline low back pain with sciatica, sciatica laterality unspecified    6. Degenerative spondylolisthesis    7. Failed back surgical syndrome    8. Cervical disc disease    9.  Depression, unspecified depression type    10. Drug-induced constipation    11. Balance problem    12. At high risk for falls         PLAN:   -Chronic opiate treatment protocol was discussed withthe patient, informed consent was obtained. -Treatment guidelines were discussed and established  -Risks and benefits of narcotics were addressedwith the patient  - Obtainable long term and short term goals of opioid therapy were reviewed, including pain relief, sleep, psychosocial and physical functioning   -Obtain urine Toxicology today  -Norco 7.5-325 mg tabs bid prn, Cymbalta 30 mg tabs orally daily, ZTlido 1.8% topical daily  -Home physical therapy  -Reviewed previous imaging studies/blood work  -Education provided on opioids, side effects reviewed. Concerns for higher doses of opioids with risk for falling/unsteady gait. Discussed excessive sleeping, advised patient to monitor patient. Prescription will be provided for stand up walker with a seat. Home PT for balance training  -Patient was ordered a high profile brace to reduce pain by limiting flexion, extension, and lateral rotation. Was advised to wear it only during periods of increased physical activity and to limit the use to few hours at a time. ROM, physical activity was encouraged. -CBT techniques- relaxation therapies such as biofeedback, mindfulness based stress reduction, imagery, cognitive restructuring, problem solving discussed with patient  -SOAPP score 1  -ORT score 1 low risk  -PHQ-score 18 moderate risk  -Return in about 4 weeks (around 7/21/2022). Controlled Substances Monitoring: Periodic Controlled Substance Monitoring: No signs of potential drug abuse or diversion identified.  LIZZY Laguna - CNP)

## 2022-07-14 ENCOUNTER — ANTI-COAG VISIT (OUTPATIENT)
Dept: PHARMACY | Age: 82
End: 2022-07-14
Payer: MEDICARE

## 2022-07-14 LAB — INR BLD: 2.7

## 2022-07-14 PROCEDURE — 99211 OFF/OP EST MAY X REQ PHY/QHP: CPT

## 2022-07-14 PROCEDURE — 85610 PROTHROMBIN TIME: CPT

## 2022-07-14 NOTE — PROGRESS NOTES
Ms. Lissette Arauz is here for management of anticoagulation for hx of DVT. PMH also significant for HTN. She presents today w/out complaint. Pt verifies dosing regimen as listed above. Pt denies s/s bleeding/swelling/SOB. No missed doses. No changes in Rx/OTCs/Herbal medications. Occasional EtOH use and denies tobacco.    Pt has been having a lot of neck and back pain. She has been seeing her MD and has another appointment with him next week. Reports no other changes. INR 2.7 is within the therapeutic range of 2-3. Recommend to continue 5 mg Mon/Wed/Fri, 7.5 mg all other days  Pt has the 5 mg tablets. Will continue to monitor and check INR in 4 weeks. Dosing reminder card given with phone number, appointment date and time.    Return to clinic: 8/11 @ 1:00pm  Referring physician: Dr. Nay Ugalde, Pharmacy Student

## 2022-07-20 NOTE — TELEPHONE ENCOUNTER
Hospitalist Matthew calling b/c pt is vomiting,dry heeves and isn't eating. Pt has had this since 11/22. Shannon Perez wants to know what to do? Rehab Medicine Rehab Medicine Hospitalist Hospitalist Hospitalist Hospitalist Hospitalist Physiatry Rehab Medicine Rehab Medicine Rehab Medicine Hospitalist Hospitalist Hospitalist Hospitalist Physiatry Physiatry Rehab Medicine Rehab Medicine Hospitalist Rehab Medicine Rehab Medicine Physiatry Hospitalist Physiatry

## 2022-07-21 ENCOUNTER — OFFICE VISIT (OUTPATIENT)
Dept: PAIN MANAGEMENT | Age: 82
End: 2022-07-21
Payer: COMMERCIAL

## 2022-07-21 VITALS
OXYGEN SATURATION: 97 % | SYSTOLIC BLOOD PRESSURE: 143 MMHG | DIASTOLIC BLOOD PRESSURE: 86 MMHG | HEART RATE: 64 BPM | HEIGHT: 60 IN | TEMPERATURE: 97.5 F | WEIGHT: 141 LBS | BODY MASS INDEX: 27.68 KG/M2

## 2022-07-21 DIAGNOSIS — M51.36 DDD (DEGENERATIVE DISC DISEASE), LUMBAR: ICD-10-CM

## 2022-07-21 DIAGNOSIS — M96.1 FAILED BACK SURGICAL SYNDROME: ICD-10-CM

## 2022-07-21 DIAGNOSIS — R26.89 BALANCE PROBLEM: ICD-10-CM

## 2022-07-21 DIAGNOSIS — M50.90 CERVICAL DISC DISEASE: ICD-10-CM

## 2022-07-21 DIAGNOSIS — M47.816 LUMBAR FACET ARTHROPATHY: ICD-10-CM

## 2022-07-21 DIAGNOSIS — M48.061 SPINAL STENOSIS OF LUMBAR REGION WITHOUT NEUROGENIC CLAUDICATION: ICD-10-CM

## 2022-07-21 DIAGNOSIS — M43.10 DEGENERATIVE SPONDYLOLISTHESIS: ICD-10-CM

## 2022-07-21 DIAGNOSIS — G89.4 CHRONIC PAIN SYNDROME: ICD-10-CM

## 2022-07-21 DIAGNOSIS — F32.A DEPRESSION, UNSPECIFIED DEPRESSION TYPE: ICD-10-CM

## 2022-07-21 PROCEDURE — G8427 DOCREV CUR MEDS BY ELIG CLIN: HCPCS | Performed by: NURSE PRACTITIONER

## 2022-07-21 PROCEDURE — G8417 CALC BMI ABV UP PARAM F/U: HCPCS | Performed by: NURSE PRACTITIONER

## 2022-07-21 PROCEDURE — G8399 PT W/DXA RESULTS DOCUMENT: HCPCS | Performed by: NURSE PRACTITIONER

## 2022-07-21 PROCEDURE — 99213 OFFICE O/P EST LOW 20 MIN: CPT | Performed by: NURSE PRACTITIONER

## 2022-07-21 PROCEDURE — 1036F TOBACCO NON-USER: CPT | Performed by: NURSE PRACTITIONER

## 2022-07-21 PROCEDURE — 1123F ACP DISCUSS/DSCN MKR DOCD: CPT | Performed by: NURSE PRACTITIONER

## 2022-07-21 PROCEDURE — 1090F PRES/ABSN URINE INCON ASSESS: CPT | Performed by: NURSE PRACTITIONER

## 2022-07-21 RX ORDER — HYDROCODONE BITARTRATE AND ACETAMINOPHEN 7.5; 325 MG/1; MG/1
1 TABLET ORAL 2 TIMES DAILY
Qty: 56 TABLET | Refills: 0 | Status: SHIPPED | OUTPATIENT
Start: 2022-07-21 | End: 2022-08-18 | Stop reason: SDUPTHER

## 2022-07-21 RX ORDER — DULOXETIN HYDROCHLORIDE 30 MG/1
30 CAPSULE, DELAYED RELEASE ORAL DAILY
Qty: 30 CAPSULE | Refills: 3 | Status: SHIPPED | OUTPATIENT
Start: 2022-07-21 | End: 2022-08-18 | Stop reason: SDUPTHER

## 2022-07-21 RX ORDER — NALOXONE HYDROCHLORIDE 4 MG/.1ML
SPRAY NASAL
Qty: 1 EACH | Refills: 0 | Status: SHIPPED | OUTPATIENT
Start: 2022-07-21

## 2022-07-21 NOTE — PROGRESS NOTES
[facility]  1940  [id number]      HISTORY OF PRESENT ILLNESS: Ms. Cynthia Hester is a 80 y.o. female returns for a follow up visit for pain management  She has a diagnosis of   1. Chronic pain syndrome    2. DDD (degenerative disc disease), lumbar    3. Degenerative spondylolisthesis    4. Spinal stenosis of lumbar region without neurogenic claudication    5. Lumbar facet arthropathy    6. Cervical disc disease    7. Failed back surgical syndrome    8. Depression, unspecified depression type    9. Balance problem      As per Information Obtained from the PADT (Patient Assessment and Documentation Tool)    She complains of pain in the both arm(s): entire area, bilateral lower back, bilateral mid-back, and bilateral upper back She rates the pain 7/10 and describes it as burning, numbness. Current treatment regimen has helped relieve about 20% of the pain. She denies any side effects from the current pain regimen. Patient reports that since the last follow up visit the physical functioning is unchanged, family/social relationships are worse, mood is unchanged sleep patterns are worse, and that the overall functioning is worse. Patient denies misusing/abusing her narcotic pain medications or using any illegal drugs. Upon obtaining medical history from Ms. Cynthia Hester states that pain is somewhat manageable on current pain therapy. Takes the pain medications as prescribed. Reports the pain medications does not seem to last long enough to manage back pain. Reports experiencing back spasms. She has fallen atleast 2 times since the last o.v after tripping an object in home. Denies injuries or loss of consciousness. Mood/anxiety is stable. Sleep is fair with an average of 5-6 hours. Denies to having issues of constipation. Tolerating activities/house chores with moderate tenderness to the lower back. ALLERGIES: Patients list of allergies were reviewed     MEDICATIONS: Ms. Cytnhia Hester list of medications were reviewed. Her current medications are   Outpatient Medications Prior to Visit   Medication Sig Dispense Refill    Acetaminophen (TYLENOL ARTHRITIS EXT RELIEF PO) Take by mouth      lisinopril (PRINIVIL;ZESTRIL) 10 MG tablet TAKE ONE TABLET BY MOUTH DAILY 90 tablet 1    simvastatin (ZOCOR) 40 MG tablet TAKE 1 TABLET BY MOUTH AT  NIGHT 90 tablet 3    Handicap Placard MISC by Does not apply route 1 each 0    warfarin (COUMADIN) 5 MG tablet TAKE 2 TABLETS BY MOUTH  DAILY ON MONDAY AND 1 AND  1/2 TABLETS BY MOUTH DAILY  ON ALL OTHER DAYS, OR  DIRECTED BY COUMADIN CLINIC 130 tablet 3    cetirizine (ZYRTEC) 10 MG tablet Take 10 mg by mouth daily      fluticasone (FLONASE) 50 MCG/ACT nasal spray 2 sprays by Each Nostril route daily 1 Bottle 5    pantoprazole (PROTONIX) 40 MG tablet TAKE 1 TABLET BY MOUTH  DAILY 90 tablet 3    L-Methylfolate-O10-P7-T0 (CEREFOLIN PO) Take by mouth      Diapers & Supplies MISC 1 each by Does not apply route daily as needed (incontinence) 100 each 3    Misc. Devices MISC Compression stockings/open toe pantyhose with compression 40/50 2 each 2    Misc. Devices MISC George panty hose  Open toe Petite plus beige  2 pairs 2 each 1    HYDROcodone-acetaminophen (NORCO) 7.5-325 MG per tablet Take 1 tablet by mouth 2 times daily for 28 days. Intended supply: 30 days 56 tablet 0    DULoxetine (CYMBALTA) 30 MG extended release capsule Take 1 capsule by mouth daily 30 capsule 3    Lidocaine 1.8 % PTCH Apply 1 Film topically daily 30 patch 0     No facility-administered medications prior to visit. SOCIAL/FAMILY/PAST MEDICAL HISTORY: Ms. Lorenzo Mcallister, family and past medical history was reviewed. REVIEW OF SYSTEMS:    Respiratory: Negative for apnea, chest tightness and shortness of breath or change in baseline breathing. Gastrointestinal: Negative for nausea, vomiting, abdominal pain, diarrhea, constipation, blood in stool and abdominal distention. PHYSICAL EXAM:   Nursing note and vitals reviewed.  BP (!) 143/86   Pulse 64   Temp 97.5 °F (36.4 °C)   Ht 5' (1.524 m)   Wt 141 lb (64 kg)   SpO2 97%   BMI 27.54 kg/m²   Constitutional: She appears well-developed and well-nourished. No acute distress. Skin: Skin is warm and dry, good turgor. No rash noted. She is not diaphoretic. Cardiovascular: Normal rate, regular rhythm, normal heart sounds, and does not have murmur. Pulmonary/Chest: Effort normal. No respiratory distress. She does not have wheezes in the lung fields. She has no rales. Neurological/Psychiatric:She is alert and oriented to person, place, and time. Coordination is  normal. Slow unsteady gait with the aid of a cane Her mood isAppropriate and affect is Neutral/Euthymic(normal) . Prescription pain medication monitoring:                  MEDD current = 15              ORT Score = 1 low risk              Other Risk factors - (mood) Depression              Date of Last Medication Agreement: 6/24/22              Date Naloxone prescribed: 7/21/22              UDT:                          Date of last UDT: 6/23/22                          Adverse report: No;               OARRS:                          Checked today: Yes                          Adverse report: No    IMPRESSION:   1. Chronic pain syndrome    2. DDD (degenerative disc disease), lumbar    3. Degenerative spondylolisthesis    4. Spinal stenosis of lumbar region without neurogenic claudication    5. Lumbar facet arthropathy    6. Cervical disc disease    7. Failed back surgical syndrome    8. Depression, unspecified depression type    9. Balance problem        PLAN:  Informed verbal consent was obtained:  -Patient's opioid therapy will be maintained at current dose  -Prescription for upright walker will be provided, home PT will be reordered for balance training  -Home exercises/Marii exercises recommended  -Education provided on safety in the home, to reduce risk of falling.  Advised patient to get up slowly from the chair or bed after sleeping at night. Maintain clutter free environment, well lit rooms, non-skid shoes. Use her walker with ambulation at home at all times. -CBT techniques- relaxation therapies such as biofeedback, mindfulness based stress reduction, imagery, cognitive restructuring, problem solving discussed with patient   -Last UDS 6/23/22 consistent  -Return in about 4 weeks (around 8/18/2022). Analgesic Plan:              Continue present regimen: Norco 7.5-325 mg tabs bid prn              Adjust dose of present analgesic: No              Switch analgesics: No              Add/Adjust concomitant therapy: Cymbalta, Narcan    Current Outpatient Medications   Medication Sig Dispense Refill    HYDROcodone-acetaminophen (NORCO) 7.5-325 MG per tablet Take 1 tablet by mouth in the morning and 1 tablet before bedtime. Do all this for 28 days. Intended supply: 30 days. 56 tablet 0    DULoxetine (CYMBALTA) 30 MG extended release capsule Take 1 capsule by mouth in the morning.  30 capsule 3    Lidocaine 1.8 % PTCH Apply 1 Film topically daily 30 patch 0    naloxone (NARCAN) 4 MG/0.1ML LIQD nasal spray Use as directed 1 each 0    Acetaminophen (TYLENOL ARTHRITIS EXT RELIEF PO) Take by mouth      lisinopril (PRINIVIL;ZESTRIL) 10 MG tablet TAKE ONE TABLET BY MOUTH DAILY 90 tablet 1    simvastatin (ZOCOR) 40 MG tablet TAKE 1 TABLET BY MOUTH AT  NIGHT 90 tablet 3    Handicap Placard MISC by Does not apply route 1 each 0    warfarin (COUMADIN) 5 MG tablet TAKE 2 TABLETS BY MOUTH  DAILY ON MONDAY AND 1 AND  1/2 TABLETS BY MOUTH DAILY  ON ALL OTHER DAYS, OR  DIRECTED BY COUMADIN CLINIC 130 tablet 3    cetirizine (ZYRTEC) 10 MG tablet Take 10 mg by mouth daily      fluticasone (FLONASE) 50 MCG/ACT nasal spray 2 sprays by Each Nostril route daily 1 Bottle 5    pantoprazole (PROTONIX) 40 MG tablet TAKE 1 TABLET BY MOUTH  DAILY 90 tablet 3    L-Methylfolate-C85-I6-U1 (CEREFOLIN PO) Take by mouth      Diapers & Supplies MISC 1 allergies, medication list  and past medical and surgical history and other information recorded by the MA was again reviewed today  These forms have been scanned into the \"media\" tab of patients electronic medical record. PHYSICAL EXAM:  Please see the physical exam form for a detailed examination on this visit. This form has been completed and scanned into the  Media section of the chart     She was advised against drinking alcohol with the narcotic pain medicines, advised against driving or handling machinery while adjusting the dose of medicines or if having cognitive  issues related to the current medications. Risk of overdose and death, if medicines not taken as prescribed, were also discussed. If the patient develops new symptoms or if the symptoms worsen, the patient should call the office. While transcribing every attempt was made to maintain the accuracy of the note in terms of it's contents,there may have been some errors made inadvertently. Thank you for allowing me to participate in the care of this patient. Aquiles Campa CNP.     Cc: Nicolas Harley MD

## 2022-08-18 ENCOUNTER — OFFICE VISIT (OUTPATIENT)
Dept: PAIN MANAGEMENT | Age: 82
End: 2022-08-18
Payer: COMMERCIAL

## 2022-08-18 VITALS
WEIGHT: 138 LBS | DIASTOLIC BLOOD PRESSURE: 73 MMHG | HEART RATE: 62 BPM | BODY MASS INDEX: 27.09 KG/M2 | TEMPERATURE: 97.3 F | SYSTOLIC BLOOD PRESSURE: 133 MMHG | OXYGEN SATURATION: 98 % | HEIGHT: 60 IN

## 2022-08-18 DIAGNOSIS — M47.816 LUMBAR FACET ARTHROPATHY: ICD-10-CM

## 2022-08-18 DIAGNOSIS — R26.89 BALANCE PROBLEM: ICD-10-CM

## 2022-08-18 DIAGNOSIS — M43.10 DEGENERATIVE SPONDYLOLISTHESIS: ICD-10-CM

## 2022-08-18 DIAGNOSIS — F32.A DEPRESSION, UNSPECIFIED DEPRESSION TYPE: ICD-10-CM

## 2022-08-18 DIAGNOSIS — M51.36 DDD (DEGENERATIVE DISC DISEASE), LUMBAR: ICD-10-CM

## 2022-08-18 DIAGNOSIS — M96.1 FAILED BACK SURGICAL SYNDROME: ICD-10-CM

## 2022-08-18 DIAGNOSIS — M48.061 SPINAL STENOSIS OF LUMBAR REGION WITHOUT NEUROGENIC CLAUDICATION: ICD-10-CM

## 2022-08-18 DIAGNOSIS — G89.4 CHRONIC PAIN SYNDROME: ICD-10-CM

## 2022-08-18 DIAGNOSIS — M50.90 CERVICAL DISC DISEASE: ICD-10-CM

## 2022-08-18 PROCEDURE — 99213 OFFICE O/P EST LOW 20 MIN: CPT | Performed by: NURSE PRACTITIONER

## 2022-08-18 PROCEDURE — G8427 DOCREV CUR MEDS BY ELIG CLIN: HCPCS | Performed by: NURSE PRACTITIONER

## 2022-08-18 PROCEDURE — G8417 CALC BMI ABV UP PARAM F/U: HCPCS | Performed by: NURSE PRACTITIONER

## 2022-08-18 PROCEDURE — 1090F PRES/ABSN URINE INCON ASSESS: CPT | Performed by: NURSE PRACTITIONER

## 2022-08-18 PROCEDURE — 1036F TOBACCO NON-USER: CPT | Performed by: NURSE PRACTITIONER

## 2022-08-18 PROCEDURE — 1123F ACP DISCUSS/DSCN MKR DOCD: CPT | Performed by: NURSE PRACTITIONER

## 2022-08-18 PROCEDURE — G8399 PT W/DXA RESULTS DOCUMENT: HCPCS | Performed by: NURSE PRACTITIONER

## 2022-08-18 RX ORDER — HYDROCODONE BITARTRATE AND ACETAMINOPHEN 7.5; 325 MG/1; MG/1
1 TABLET ORAL 2 TIMES DAILY
Qty: 56 TABLET | Refills: 0 | Status: SHIPPED | OUTPATIENT
Start: 2022-08-18 | End: 2022-09-12 | Stop reason: SDUPTHER

## 2022-08-18 RX ORDER — DULOXETIN HYDROCHLORIDE 30 MG/1
30 CAPSULE, DELAYED RELEASE ORAL DAILY
Qty: 30 CAPSULE | Refills: 3 | Status: SHIPPED | OUTPATIENT
Start: 2022-08-18 | End: 2022-09-12

## 2022-08-18 NOTE — PROGRESS NOTES
[facility] [place]  1940  [id number]      HISTORY OF PRESENT ILLNESS: Ms. Brenden Rivera is a 80 y.o. female returns for a follow up visit for pain management  She has a diagnosis of   1. Chronic pain syndrome    2. DDD (degenerative disc disease), lumbar    3. Degenerative spondylolisthesis    4. Spinal stenosis of lumbar region without neurogenic claudication    5. Lumbar facet arthropathy    6. Failed back surgical syndrome    7. Cervical disc disease    8. Depression, unspecified depression type    9. Balance problem      As per Information Obtained from the PADT (Patient Assessment and Documentation Tool)    She complains of pain in the bilateral lower back, bilateral mid-back, and bilateral upper back She rates the pain 7/10 and describes it as aching, burning, numbness, pins and needles. Current treatment regimen has helped relieve about 20% of the pain. She denies any side effects from the current pain regimen. Patient reports that since the last follow up visit the physical functioning is unchanged, family/social relationships are better, mood is unchanged sleep patterns are unchanged, and that the overall functioning is unchanged. Patient denies misusing/abusing her narcotic pain medications or using any illegal drugs. Upon obtaining medical history from Ms. Brenden Rivera states that pain is manageable on current pain therapy. Takes the pain medications as prescribed. She has been working on obtaining upright walker which isnot covered by her insurance. Mood/anxiety is stable. Sleep is fair with an average of 5-6 hours. Denies to having issues of constipation. Tolerating activities/house chores with moderate tenderness to the lower back. ALLERGIES: Patients list of allergies were reviewed     MEDICATIONS: Ms. Brenden Rivera list of medications were reviewed. Her current medications are   Outpatient Medications Prior to Visit   Medication Sig Dispense Refill    naloxone (NARCAN) 4 MG/0.1ML LIQD nasal spray Use as directed 1 each 0    Acetaminophen (TYLENOL ARTHRITIS EXT RELIEF PO) Take by mouth      lisinopril (PRINIVIL;ZESTRIL) 10 MG tablet TAKE ONE TABLET BY MOUTH DAILY 90 tablet 1    simvastatin (ZOCOR) 40 MG tablet TAKE 1 TABLET BY MOUTH AT  NIGHT 90 tablet 3    Handicap Placard MISC by Does not apply route 1 each 0    warfarin (COUMADIN) 5 MG tablet TAKE 2 TABLETS BY MOUTH  DAILY ON MONDAY AND 1 AND  1/2 TABLETS BY MOUTH DAILY  ON ALL OTHER DAYS, OR  DIRECTED BY COUMADIN CLINIC 130 tablet 3    cetirizine (ZYRTEC) 10 MG tablet Take 10 mg by mouth daily      fluticasone (FLONASE) 50 MCG/ACT nasal spray 2 sprays by Each Nostril route daily 1 Bottle 5    pantoprazole (PROTONIX) 40 MG tablet TAKE 1 TABLET BY MOUTH  DAILY 90 tablet 3    L-Methylfolate-R19-V7-Z3 (CEREFOLIN PO) Take by mouth      Diapers & Supplies MISC 1 each by Does not apply route daily as needed (incontinence) 100 each 3    Misc. Devices MISC Compression stockings/open toe pantyhose with compression 40/50 2 each 2    Misc. Devices MISC George panty hose  Open toe Petite plus beige  2 pairs 2 each 1    HYDROcodone-acetaminophen (NORCO) 7.5-325 MG per tablet Take 1 tablet by mouth in the morning and 1 tablet before bedtime. Do all this for 28 days. Intended supply: 30 days. 56 tablet 0    DULoxetine (CYMBALTA) 30 MG extended release capsule Take 1 capsule by mouth in the morning. 30 capsule 3    Lidocaine 1.8 % PTCH Apply 1 Film topically daily 30 patch 0     No facility-administered medications prior to visit. SOCIAL/FAMILY/PAST MEDICAL HISTORY: Ms. Montesinos Stagers, family and past medical history was reviewed. REVIEW OF SYSTEMS:    Respiratory: Negative for apnea, chest tightness and shortness of breath or change in baseline breathing. Gastrointestinal: Negative for nausea, vomiting, abdominal pain, diarrhea, constipation, blood in stool and abdominal distention. PHYSICAL EXAM:   Nursing note and vitals reviewed.  /73   Pulse 62   Temp 97.3 °F (36.3 °C)   Ht 5' (1.524 m)   Wt 138 lb (62.6 kg)   SpO2 98%   BMI 26.95 kg/m²   Constitutional: She appears well-developed and well-nourished. No acute distress. Skin: Skin is warm and dry, good turgor. No rash noted. She is not diaphoretic. Cardiovascular: Normal rate, regular rhythm, normal heart sounds, and does not have murmur. Pulmonary/Chest: Effort normal. No respiratory distress. She does not have wheezes in the lung fields. She has no rales. Neurological/Psychiatric:She is alert and oriented to person, place, and time. Coordination is  normal. Stable gait with the aid of a cane. Her mood isAppropriate and affect is Neutral/Euthymic(normal) . Prescription pain medication monitoring:                  MEDD current = 15              ORT Score = 1 low risk              Other Risk factors - (mood)Depression              Date of Last Medication Agreement: 6/24/22              Date Naloxone prescribed: 7/21/22              UDT:                          Date of last UDT: 7/5/22                          Adverse report: No;               OARRS:                          Checked today: Yes                          Adverse report: No    IMPRESSION:   1. Chronic pain syndrome    2. DDD (degenerative disc disease), lumbar    3. Degenerative spondylolisthesis    4. Spinal stenosis of lumbar region without neurogenic claudication    5. Lumbar facet arthropathy    6. Failed back surgical syndrome    7. Cervical disc disease    8. Depression, unspecified depression type    9. Balance problem        PLAN:  Informed verbal consent was obtained:  -Patient's opioid therapy will be maintained at current dose  -Home exercises/Marii exercises recommended  -CBT techniques- relaxation therapies such as biofeedback, mindfulness based stress reduction, imagery, cognitive restructuring, problem solving discussed with patient   -Last UDS 7/5/22 consistent  -Return in about 4 weeks (around 9/15/2022). Analgesic Plan:              Continue present regimen: Norco 7.5-325 mg tabs bid prn              Adjust dose of present analgesic: No              Switch analgesics: No              Add/Adjust concomitant therapy: Cymbalta, Lidocaine, Narcan    Current Outpatient Medications   Medication Sig Dispense Refill    Lidocaine 1.8 % PTCH Apply 1 Film topically daily 30 patch 0    HYDROcodone-acetaminophen (NORCO) 7.5-325 MG per tablet Take 1 tablet by mouth 2 times daily for 28 days. Intended supply: 30 days 56 tablet 0    DULoxetine (CYMBALTA) 30 MG extended release capsule Take 1 capsule by mouth daily 30 capsule 3    naloxone (NARCAN) 4 MG/0.1ML LIQD nasal spray Use as directed 1 each 0    Acetaminophen (TYLENOL ARTHRITIS EXT RELIEF PO) Take by mouth      lisinopril (PRINIVIL;ZESTRIL) 10 MG tablet TAKE ONE TABLET BY MOUTH DAILY 90 tablet 1    simvastatin (ZOCOR) 40 MG tablet TAKE 1 TABLET BY MOUTH AT  NIGHT 90 tablet 3    Handicap Placard MISC by Does not apply route 1 each 0    warfarin (COUMADIN) 5 MG tablet TAKE 2 TABLETS BY MOUTH  DAILY ON MONDAY AND 1 AND  1/2 TABLETS BY MOUTH DAILY  ON ALL OTHER DAYS, OR  DIRECTED BY COUMADIN CLINIC 130 tablet 3    cetirizine (ZYRTEC) 10 MG tablet Take 10 mg by mouth daily      fluticasone (FLONASE) 50 MCG/ACT nasal spray 2 sprays by Each Nostril route daily 1 Bottle 5    pantoprazole (PROTONIX) 40 MG tablet TAKE 1 TABLET BY MOUTH  DAILY 90 tablet 3    L-Methylfolate-V60-Q6-O2 (CEREFOLIN PO) Take by mouth      Diapers & Supplies MISC 1 each by Does not apply route daily as needed (incontinence) 100 each 3    Misc. Devices MISC Compression stockings/open toe pantyhose with compression 40/50 2 each 2    Misc. Devices MISC George panty hose  Open toe Petite plus beige  2 pairs 2 each 1     No current facility-administered medications for this visit. I will continue her current medication regimen  which is part of the above treatment schedule.  It has been helping with Ms. Zak's chronic  medical problems which for this visit include:   Diagnoses of Chronic pain syndrome, DDD (degenerative disc disease), lumbar, Degenerative spondylolisthesis, Spinal stenosis of lumbar region without neurogenic claudication, Lumbar facet arthropathy, Failed back surgical syndrome, Cervical disc disease, Depression, unspecified depression type, and Balance problem were pertinent to this visit. Risks and benefits of the medications and other alternative treatments  including no treatment were discussed with the patient. The common side effects of these medications were also explained to the patient. Informed verbal consent was obtained. Goals of current treatment regimen include improvement in pain, restoration of functioning- with focus on improvement in physical performance, general activity, work or disability,emotional distress, health care utilization and  decreased medication consumption. Will continue to monitor progress towards achieving/maintaining therapeutic goals with special emphasis on  1. Improvement in perceived interfernce  of pain with ADL's. Ability to do home exercises independently. Ability to do household chores indoor and/or outdoor work and social and leisure activities. Improve psychosocial and physical functioning. - she is showing progression towards this treatment goal with the current regimen. She was advised against drinking alcohol with the narcotic pain medicines, advised against driving or handling machinery while adjusting the dose of medicines or if having cognitive  issues related to the current medications. Risk of overdose and death, if medicines not taken as prescribed, were also discussed. If the patient develops new symptoms or if the symptoms worsen, the patient should call the office. While transcribing every attempt was made to maintain the accuracy of the note in terms of it's contents,there may have been some errors made inadvertently.   Thank you for allowing me to participate in the care of this patient. Colmenares Courser JONATHON.     Cc: Ivy Washington MD

## 2022-08-19 ENCOUNTER — TELEPHONE (OUTPATIENT)
Dept: PAIN MANAGEMENT | Age: 82
End: 2022-08-19

## 2022-08-26 ENCOUNTER — TELEPHONE (OUTPATIENT)
Dept: FAMILY MEDICINE CLINIC | Age: 82
End: 2022-08-26

## 2022-08-26 NOTE — TELEPHONE ENCOUNTER
Left message for pt to schedule nurse AWV- This can be done  in office or via telephone-( please schedule as a [facility] appt type if telephone.)  Due after 9/14/22

## 2022-09-12 ENCOUNTER — OFFICE VISIT (OUTPATIENT)
Dept: PAIN MANAGEMENT | Age: 82
End: 2022-09-12
Payer: MEDICARE

## 2022-09-12 VITALS
HEIGHT: 60 IN | WEIGHT: 140 LBS | SYSTOLIC BLOOD PRESSURE: 157 MMHG | TEMPERATURE: 97.5 F | HEART RATE: 57 BPM | OXYGEN SATURATION: 98 % | DIASTOLIC BLOOD PRESSURE: 87 MMHG | BODY MASS INDEX: 27.48 KG/M2

## 2022-09-12 DIAGNOSIS — M43.10 DEGENERATIVE SPONDYLOLISTHESIS: ICD-10-CM

## 2022-09-12 DIAGNOSIS — M50.90 CERVICAL DISC DISEASE: ICD-10-CM

## 2022-09-12 DIAGNOSIS — M48.061 SPINAL STENOSIS OF LUMBAR REGION WITHOUT NEUROGENIC CLAUDICATION: ICD-10-CM

## 2022-09-12 DIAGNOSIS — M96.1 FAILED BACK SURGICAL SYNDROME: ICD-10-CM

## 2022-09-12 DIAGNOSIS — G89.4 CHRONIC PAIN SYNDROME: ICD-10-CM

## 2022-09-12 DIAGNOSIS — M51.36 DDD (DEGENERATIVE DISC DISEASE), LUMBAR: ICD-10-CM

## 2022-09-12 DIAGNOSIS — K59.03 DRUG-INDUCED CONSTIPATION: ICD-10-CM

## 2022-09-12 DIAGNOSIS — F32.A DEPRESSION, UNSPECIFIED DEPRESSION TYPE: ICD-10-CM

## 2022-09-12 DIAGNOSIS — M47.816 LUMBAR FACET ARTHROPATHY: ICD-10-CM

## 2022-09-12 PROCEDURE — 1123F ACP DISCUSS/DSCN MKR DOCD: CPT | Performed by: NURSE PRACTITIONER

## 2022-09-12 PROCEDURE — 99214 OFFICE O/P EST MOD 30 MIN: CPT | Performed by: NURSE PRACTITIONER

## 2022-09-12 RX ORDER — HYDROCODONE BITARTRATE AND ACETAMINOPHEN 7.5; 325 MG/1; MG/1
1 TABLET ORAL EVERY 8 HOURS PRN
Qty: 84 TABLET | Refills: 0 | Status: SHIPPED | OUTPATIENT
Start: 2022-09-12 | End: 2022-10-13 | Stop reason: SDUPTHER

## 2022-09-12 NOTE — PROGRESS NOTES
Mariama Darío  1940  [id number]      HISTORY OF PRESENT ILLNESS: Ms. José Miguel Recinos is a 80 y.o. female returns for a follow up visit for pain management  She has a diagnosis of   1. Chronic pain syndrome    2. DDD (degenerative disc disease), lumbar    3. Degenerative spondylolisthesis    4. Spinal stenosis of lumbar region without neurogenic claudication    5. Lumbar facet arthropathy    6. Failed back surgical syndrome    7. Cervical disc disease    8. Depression, unspecified depression type    9. Drug-induced constipation      As per Information Obtained from the PADT (Patient Assessment and Documentation Tool)    She complains of pain in the right arm(s): entire area, right hand(s): entire area, bilateral lower back, and lower both sides  She rates the pain 6/10 and describes it as aching, burning. Current treatment regimen has helped relieve about 40% of the pain. She denies any side effects from the current pain regimen. Patient reports that since the last follow up visit the physical functioning is unchanged, family/social relationships are unchanged, mood is worse sleep patterns are unchanged, and that the overall functioning is unchanged. Patient denies misusing/abusing her narcotic pain medications or using any illegal drugs. Upon obtaining medical history from Ms. José Miguel Recinos states that pain is somewhat manageable on current pain therapy. Takes the pain medications as prescribed. Pain still bothersome mainly in the lower back with activities. Mood is low, unsure whether Cymbalta works for her. Sleep is fair with an average of 5-6 hours. Denies to having issues of constipation. Tolerating activities/house chores with moderate tenderness to the lower back. ALLERGIES: Patients list of allergies were reviewed     MEDICATIONS: Ms. José Miguel Recinos list of medications were reviewed. Her current medications are   Outpatient Medications Prior to Visit   Medication Sig Dispense Refill    naloxone (NARCAN) 4 MG/0.1ML LIQD nasal spray Use as directed 1 each 0    Acetaminophen (TYLENOL ARTHRITIS EXT RELIEF PO) Take by mouth      lisinopril (PRINIVIL;ZESTRIL) 10 MG tablet TAKE ONE TABLET BY MOUTH DAILY 90 tablet 1    simvastatin (ZOCOR) 40 MG tablet TAKE 1 TABLET BY MOUTH AT  NIGHT 90 tablet 3    Handicap Placard MISC by Does not apply route 1 each 0    warfarin (COUMADIN) 5 MG tablet TAKE 2 TABLETS BY MOUTH  DAILY ON MONDAY AND 1 AND  1/2 TABLETS BY MOUTH DAILY  ON ALL OTHER DAYS, OR  DIRECTED BY COUMADIN CLINIC 130 tablet 3    cetirizine (ZYRTEC) 10 MG tablet Take 10 mg by mouth daily      fluticasone (FLONASE) 50 MCG/ACT nasal spray 2 sprays by Each Nostril route daily 1 Bottle 5    pantoprazole (PROTONIX) 40 MG tablet TAKE 1 TABLET BY MOUTH  DAILY 90 tablet 3    L-Methylfolate-U21-O0-K6 (CEREFOLIN PO) Take by mouth      Diapers & Supplies MISC 1 each by Does not apply route daily as needed (incontinence) 100 each 3    Misc. Devices MISC Compression stockings/open toe pantyhose with compression 40/50 2 each 2    Misc. Devices MISC George panty hose  Open toe Petite plus beige  2 pairs 2 each 1    Lidocaine 1.8 % PTCH Apply 1 Film topically daily 30 patch 0    HYDROcodone-acetaminophen (NORCO) 7.5-325 MG per tablet Take 1 tablet by mouth 2 times daily for 28 days. Intended supply: 30 days 56 tablet 0    DULoxetine (CYMBALTA) 30 MG extended release capsule Take 1 capsule by mouth daily 30 capsule 3     No facility-administered medications prior to visit. SOCIAL/FAMILY/PAST MEDICAL HISTORY: Ms. Glory Goff, family and past medical history was reviewed. REVIEW OF SYSTEMS:    Respiratory: Negative for apnea, chest tightness and shortness of breath or change in baseline breathing. Gastrointestinal: Negative for nausea, vomiting, abdominal pain, diarrhea, constipation, blood in stool and abdominal distention. PHYSICAL EXAM:   Nursing note and vitals reviewed.  BP (!) 157/87   Pulse 57   Temp 97.5 °F (36.4 °C)   Ht 5' (1.524 m)   Wt 140 lb (63.5 kg)   SpO2 98%   BMI 27.34 kg/m²   Constitutional: She appears well-developed and well-nourished. No acute distress. Skin: Skin is warm and dry, good turgor. No rash noted. She is not diaphoretic. Cardiovascular: Normal rate, regular rhythm, normal heart sounds, and does not have murmur. Pulmonary/Chest: Effort normal. No respiratory distress. She does not have wheezes in the lung fields. She has no rales. Neurological/Psychiatric:She is alert and oriented to person, place, and time. Coordination is  normal.  Her mood isAppropriate and affect is Flat/blunted . Prescription pain medication monitoring:                  MEDD current = 15              ORT Score = 1 low risk              Other Risk factors - (mood) Depression              Date of Last Medication Agreement: 6/24/22              Date Naloxone prescribed: 7/21/22              UDT:                          Date of last UDT: 6/23/22                          Adverse report: No;               OARRS:                          Checked today: Yes                          Adverse report: No    IMPRESSION:   1. Chronic pain syndrome    2. DDD (degenerative disc disease), lumbar    3. Degenerative spondylolisthesis    4. Spinal stenosis of lumbar region without neurogenic claudication    5. Lumbar facet arthropathy    6. Failed back surgical syndrome    7. Cervical disc disease    8. Depression, unspecified depression type    9. Drug-induced constipation        PLAN:  Informed verbal consent was obtained:  -Patient's opioid therapy will be adjusted today  -D/c Cymbalta, Zoloft 50 mg tabs orally daily  -Home PT for Balance training  -Monitor BP, f/u with PCP if it continues to stay elevated or she becomes symptomatic with SOB, CP, syncopy.  she is currently asymptomatic   -Home exercises/Marii exercises recommended  -CBT techniques- relaxation therapies such as biofeedback, mindfulness based stress reduction, imagery, cognitive restructuring, problem solving discussed with patient   -Last UDS 6/24/22 consistent  -Return in about 4 weeks (around 10/10/2022). Analgesic Plan:              Continue present regimen: Norco 7.5-325 mg tabs q8h prn              Adjust dose of present analgesic: Yes              Switch analgesics: No              Add/Adjust concomitant therapy: Zoloft, Lidocaine, Narcan    Current Outpatient Medications   Medication Sig Dispense Refill    HYDROcodone-acetaminophen (NORCO) 7.5-325 MG per tablet Take 1 tablet by mouth every 8 hours as needed for Pain for up to 28 days. Intended supply: 30 days 84 tablet 0    sertraline (ZOLOFT) 50 MG tablet Take 1 tablet by mouth daily 30 tablet 0    Lidocaine 1.8 % PTCH Apply 1 Film topically daily (Patient not taking: Reported on 9/15/2022) 30 patch 0    naloxone (NARCAN) 4 MG/0.1ML LIQD nasal spray Use as directed 1 each 0    Acetaminophen (TYLENOL ARTHRITIS EXT RELIEF PO) Take by mouth      lisinopril (PRINIVIL;ZESTRIL) 10 MG tablet TAKE ONE TABLET BY MOUTH DAILY 90 tablet 1    simvastatin (ZOCOR) 40 MG tablet TAKE 1 TABLET BY MOUTH AT  NIGHT 90 tablet 3    Handicap Placard MISC by Does not apply route 1 each 0    warfarin (COUMADIN) 5 MG tablet TAKE 2 TABLETS BY MOUTH  DAILY ON MONDAY AND 1 AND  1/2 TABLETS BY MOUTH DAILY  ON ALL OTHER DAYS, OR  DIRECTED BY COUMADIN CLINIC 130 tablet 3    cetirizine (ZYRTEC) 10 MG tablet Take 10 mg by mouth daily      fluticasone (FLONASE) 50 MCG/ACT nasal spray 2 sprays by Each Nostril route daily 1 Bottle 5    pantoprazole (PROTONIX) 40 MG tablet TAKE 1 TABLET BY MOUTH  DAILY 90 tablet 3    L-Methylfolate-T43-B3-Q5 (CEREFOLIN PO) Take by mouth      Diapers & Supplies MISC 1 each by Does not apply route daily as needed (incontinence) 100 each 3    Misc. Devices MISC Compression stockings/open toe pantyhose with compression 40/50 2 each 2    Misc.  Devices MISC George panty hose  Open toe Petite plus beige  2 pairs 2 each 1     No current facility-administered medications for this visit. I will continue her current medication regimen  which is part of the above treatment schedule. It has been helping with Ms. Crespo's chronic  medical problems which for this visit include:   Diagnoses of Chronic pain syndrome, DDD (degenerative disc disease), lumbar, Degenerative spondylolisthesis, Spinal stenosis of lumbar region without neurogenic claudication, Lumbar facet arthropathy, Failed back surgical syndrome, Cervical disc disease, Depression, unspecified depression type, and Drug-induced constipation were pertinent to this visit. Risks and benefits of the medications and other alternative treatments  including no treatment were discussed with the patient. The common side effects of these medications were also explained to the patient. Informed verbal consent was obtained. Goals of current treatment regimen include improvement in pain, restoration of functioning- with focus on improvement in physical performance, general activity, work or disability,emotional distress, health care utilization and  decreased medication consumption. Will continue to monitor progress towards achieving/maintaining therapeutic goals with special emphasis on  1. Improvement in perceived interfernce  of pain with ADL's. Ability to do home exercises independently. Ability to do household chores indoor and/or outdoor work and social and leisure activities. Improve psychosocial and physical functioning. - she is showing progression towards this treatment goal with the current regimen. She was advised against drinking alcohol with the narcotic pain medicines, advised against driving or handling machinery while adjusting the dose of medicines or if having cognitive  issues related to the current medications. Risk of overdose and death, if medicines not taken as prescribed, were also discussed.  If the patient develops new symptoms or if the symptoms worsen, the patient should call the office. While transcribing every attempt was made to maintain the accuracy of the note in terms of it's contents,there may have been some errors made inadvertently. Thank you for allowing me to participate in the care of this patient. Sabra Nur CNP.     Cc: Matheus Vega MD

## 2022-09-14 ENCOUNTER — TELEPHONE (OUTPATIENT)
Dept: FAMILY MEDICINE CLINIC | Age: 82
End: 2022-09-14

## 2022-09-14 NOTE — PROGRESS NOTES
She was put on Norco by pain management. Past Medical History:   has a past medical history of Allergic rhinitis, Arthritis, Cancer ([address] Chronic back pain, Dementia ([address] Dental disease, Depression, Dizziness, GERD (gastroesophageal reflux disease), H/O blood clots, Hearing loss, Hx of blood clots, Hyperlipidemia, Hypertension, Lumbar stenosis with neurogenic claudication, Osteoarthritis of neck, and TMJ dysfunction. Surgical History:   has a past surgical history that includes  section; Hysterectomy; Lithotripsy; other surgical history; Mohs surgery; embolectomy; laminectomy (2018); epidural steroid injection (Bilateral, 2019); Hysterectomy, total abdominal; and epidural steroid injection (Right, 8/15/2019). Social History:   reports that she has never smoked. She has never used smokeless tobacco. She reports that she does not drink alcohol and does not use drugs. Family History:  family history includes Cancer in her maternal aunt and another family member; [place] in her brother and maternal grandmother; Heart Disease and pig valve in her mother; Hypertension in an other family member; Other in her father; Seizures in an other family member. Home Medications:  Were reviewed and are listed in nursing record and/or below  Prior to Admission medications    Medication Sig Start Date End Date Taking? Authorizing Provider   HYDROcodone-acetaminophen (NORCO) 7.5-325 MG per tablet Take 1 tablet by mouth every 8 hours as needed for Pain for up to 28 days.  Intended supply: 30 days 9/12/22 10/10/22 Yes LIZZY Kumar CNP   sertraline (ZOLOFT) 50 MG tablet Take 1 tablet by mouth daily 9/12/22 10/12/22 Yes Marni Arcos APRN - CNP   naloxone [facility]) 4 MG/0.1ML LIQD nasal spray Use as directed 22  Yes LIZZY Kumar CNP   Acetaminophen (TYLENOL ARTHRITIS EXT RELIEF PO) Take by mouth   Yes Historical Provider, MD   lisinopril (PRINIVIL;ZESTRIL) 10 MG tablet TAKE ONE TABLET BY MOUTH DAILY 5/25/22  Yes Rob Franco MD   simvastatin (ZOCOR) 40 MG tablet TAKE 1 TABLET BY MOUTH AT  NIGHT 5/16/22  Yes Rob Franco MD   warfarin (COUMADIN) 5 MG tablet TAKE 2 TABLETS BY MOUTH  DAILY ON MONDAY AND 1 AND  1/2 TABLETS BY MOUTH DAILY  ON ALL OTHER DAYS, OR  DIRECTED BY COUMADIN CLINIC 2/28/22  Yes Rob Franco MD   cetirizine (ZYRTEC) 10 MG tablet Take 10 mg by mouth daily   Yes Historical Provider, MD   fluticasone (FLONASE) 50 MCG/ACT nasal spray 2 sprays by Each Nostril route daily 8/20/21  Yes Rob Franco MD   pantoprazole (PROTONIX) 40 MG tablet TAKE 1 TABLET BY MOUTH  DAILY 8/9/21  Yes Rob Franco MD   L-Methylfolate-R85-[id number] (CEREFOLIN PO) Take by mouth   Yes Historical Provider, MD   Lidocaine 1.8 % PTCH Apply 1 Film topically daily  Patient not taking: Reported on 9/15/2022 9/12/22 10/12/22  [place] Dues, APRN - CNP   Handicap Placard MISC by Does not apply route 4/20/22   Rob Franco MD   Diapers & Supplies MISC 1 each by Does not apply route daily as needed (incontinence) 11/18/19   Rob Franco MD   Misc. Devices MISC Compression stockings/open toe pantyhose with compression 40/50 6/21/18   Bethany Silva MD   Misc. Devices MISC George panty hose  Open toe Petite plus beige  2 pairs 4/11/17   Bethany Silva MD          Allergies:  Imitrex [sumatriptan]     Review of Systems:   A 14 point review of symptoms completed. Pertinent positives identified in the HPI, all other review of symptoms negative as below.       Objective   PHYSICAL EXAM:    Vitals:    09/15/22 1330   BP: (!) 142/78   Temp:    SpO2:       Weight: 142 lb 8 oz (64.6 kg)         General Appearance:  Alert, cooperative, no distress, appears stated age   Head:  Normocephalic, without obvious abnormality, atraumatic   Eyes:  PERRL, conjunctiva/corneas clear   Nose: Nares normal, no drainage or sinus tenderness   Throat: Lips, mucosa, and tongue normal   Neck: Supple, symmetrical, trachea midline, no adenopathy, thyroid: not enlarged, symmetric, no tenderness/mass/nodules, no carotid bruit or JVD   Lungs:   Clear to auscultation bilaterally, respirations unlabored   Chest Wall:  No deformity or tenderness   Heart:  Regular rate and rhythm, S1, S2 slightly decrs, 2/6sm   Abdomen:   Soft, non-tender, bowel sounds active all four quadrants,  no masses, no organomegaly   Extremities: Extremities normal, atraumatic, no cyanosis or edema   Pulses: 2+ and symmetric   Skin: Skin color, texture, turgor normal, no rashes or lesions   Pysch: Normal mood and affect   Neurologic: Normal gross motor and sensory exam.         Labs   CBC:   Lab Results   Component Value Date/Time    WBC 4.6 06/17/2022 02:07 PM    RBC 3.89 06/17/2022 02:07 PM    HGB 12.4 06/17/2022 02:07 PM    HCT 37.5 06/17/2022 02:07 PM    MCV 96.2 06/17/2022 02:07 PM    RDW 14.2 06/17/2022 02:07 PM     06/17/2022 02:07 PM     CMP:  Lab Results   Component Value Date/Time     06/17/2022 02:07 PM    K 4.7 06/17/2022 02:07 PM    K 4.1 02/14/2022 02:11 PM     06/17/2022 02:07 PM    CO2 26 06/17/2022 02:07 PM    BUN 23 06/17/2022 02:07 PM    CREATININE 0.6 06/17/2022 02:07 PM    GFRAA >60 06/17/2022 02:07 PM    AGRATIO 1.4 06/17/2022 02:07 PM    LABGLOM >60 06/17/2022 02:07 PM    GLUCOSE 92 06/17/2022 02:07 PM    PROT 7.3 06/17/2022 02:07 PM    CALCIUM 9.6 06/17/2022 02:07 PM    BILITOT 0.3 06/17/2022 02:07 PM    ALKPHOS 79 06/17/2022 02:07 PM    AST 18 06/17/2022 02:07 PM    ALT 12 06/17/2022 02:07 PM     PT/INR:  No results found for: PTINR  HgBA1c:  Lab Results   Component Value Date    LABA1C 5.9 09/13/2021     Lab Results   Component Value Date    TROPONINI <0.01 09/20/2020         Cardiac Data     Last EKG: nsr rbbb lafb pacs from 8/2019     Ekg from 3/2022 similar to prior     Echo: 9/3/19  Summary   --There is a prominent echogenic mass on the atrial side of the anterior   tricuspid valve leaflet. , suggest LUIS for further evaluation. -- Left ventricular systolic function is normal with a visually estimated   ejection fraction of 55%. Mild concentric left ventricular hypertrophy. No   regional wall motion abnormalities are noted. Indeterminate diastolic   function. -- Moderate to severe calcification of the posterior mitral annulus. -- The left atrium appears mildly enlarged. -- A bubble study was performed and fails to show evidence of shunting.   -- Lipomatous hypertrophy of the interatrial septum. Mild aortic stenosis. Systolic pulmonary artery pressure (SPAP) is normal and estimated at 28 mmHg   (right atrial pressure 3 mmHg). Frequent ectopy during the exam.    ECHO/LUIS: 11/20/19   Summary   Ejection fraction is visually estimated at 55%. Mild left atrial enlargement. A bubble study was performed and showed no evidence of shunting. There is lipomatous hypertrophy of the atrial septum. There is a prominent Eustachian valve. There is no evidence of a mass. Mild tricuspid regurgitation. Pt is at acceptable, low cv risk, for stem cell injection in back as per   pain mgtment svc. Stress Test:    Cath:    Studies: Cardiac MRI 9/23/19  Suspected lipomatous hypertrophy of the inter atrial septum   Normal appearance of tricuspid, mitral and aortic valves   Small rounded masslike lesion contiguous with the endocardial surface of the right atrial base. Differential diagnosis is given above. I have reviewed labs and imaging/xray/diagnostic testing in this note. Assessment      1. Essential hypertension    2. Hypercholesteremia    3. Atrial septal hypertrophy       Plan   Echo to evaluate for heart function, sob, abnl ekg   Stress Kim to evaluate for strength, sob, abnl ekg   Call [phone] to schedule echo and Kim stress test   Follow up with NP 3 months        Scribe's attestation:   This note was scribed in the presence of Dr. Ban Ivan MD   by Randall Bethea, LPN      Thank you for allowing us to participate in the care of Heather Desouza. Please call me with any questions [phone]. Ban Ivan MD, [facility] [place]   Interventional Cardiologist  [place]   [phone] [facility]  [phone] [address] [place] [address]  9/15/2022 2:10 PM    I will address the patient's cardiac risk factors and adjusted pharmacologic treatment as needed. In addition, I have reinforced the need for patient directed risk factor modification. Tobacco use was discussed with the patient and educated on the negative effects and was asked not to use. All questions and concerns were addressed to the patient/family. Alternatives to my treatment were discussed. I, Dr Ban Ivan, personally performed the services described in this documentation, as scribed by the above signed scribe in my presence. It is both accurate and complete to my knowledge. I agree with the details independently gathered by the clinical support staff and the scribed note accurately describes my personal service to the patient.

## 2022-09-15 ENCOUNTER — OFFICE VISIT (OUTPATIENT)
Dept: CARDIOLOGY CLINIC | Age: 82
End: 2022-09-15
Payer: MEDICARE

## 2022-09-15 VITALS
HEIGHT: 60 IN | DIASTOLIC BLOOD PRESSURE: 78 MMHG | TEMPERATURE: 68 F | SYSTOLIC BLOOD PRESSURE: 142 MMHG | BODY MASS INDEX: 27.98 KG/M2 | OXYGEN SATURATION: 99 % | WEIGHT: 142.5 LBS

## 2022-09-15 DIAGNOSIS — I10 ESSENTIAL HYPERTENSION: Primary | ICD-10-CM

## 2022-09-15 DIAGNOSIS — I51.7 ATRIAL SEPTAL HYPERTROPHY: ICD-10-CM

## 2022-09-15 DIAGNOSIS — I51.89 OTHER ILL-DEFINED HEART DISEASES: ICD-10-CM

## 2022-09-15 DIAGNOSIS — E78.00 HYPERCHOLESTEREMIA: ICD-10-CM

## 2022-09-15 PROCEDURE — 99214 OFFICE O/P EST MOD 30 MIN: CPT | Performed by: INTERNAL MEDICINE

## 2022-09-15 PROCEDURE — 1123F ACP DISCUSS/DSCN MKR DOCD: CPT | Performed by: INTERNAL MEDICINE

## 2022-09-15 NOTE — PATIENT INSTRUCTIONS
Plan   Echo to evaluate for heart function  Stress Kim to evaluate for strength  Call [phone] to schedule echo and Kim stress test   Continue taking all the same medications for chronic heart conditions above   Follow up with NP 3 months

## 2022-09-16 DIAGNOSIS — K21.9 GASTROESOPHAGEAL REFLUX DISEASE: ICD-10-CM

## 2022-09-16 DIAGNOSIS — R10.13 EPIGASTRIC ABDOMINAL PAIN: ICD-10-CM

## 2022-09-16 RX ORDER — PANTOPRAZOLE SODIUM 40 MG/1
40 TABLET, DELAYED RELEASE ORAL DAILY
Qty: 90 TABLET | Refills: 3 | Status: SHIPPED | OUTPATIENT
Start: 2022-09-16

## 2022-09-16 NOTE — TELEPHONE ENCOUNTER
.Refill Request     CONFIRM preferrred pharmacy with the patient. If Mail Order Rx - Pend for 90 day refill. Last Seen: Last Seen Department: 6/17/2022  Last Seen by PCP: 6/17/2022    Last Written: 8-9-21 90 with 3     If no future appointment scheduled, route STAFF MESSAGE with patient name to the [facility] for scheduling. Next Appointment:   Future Appointments   Date Time Provider Clare Bliss   10/13/2022 10:00 AM LIZZY Bacon CNP [facility]   12/1/2022  2:00 PM LIZZY Crenshaw CNP [facility]       Message sent to [facility] to schedule appt with patient?   YES      Requested Prescriptions     Pending Prescriptions Disp Refills    pantoprazole (PROTONIX) 40 MG tablet [Pharmacy Med Name: Pantoprazole Sodium 40 MG Oral Tablet Delayed Release] 90 tablet 3     Sig: TAKE 1 TABLET BY MOUTH  DAILY

## 2022-10-13 ENCOUNTER — ANTI-COAG VISIT (OUTPATIENT)
Dept: PHARMACY | Age: 82
End: 2022-10-13

## 2022-10-13 ENCOUNTER — OFFICE VISIT (OUTPATIENT)
Dept: PAIN MANAGEMENT | Age: 82
End: 2022-10-13
Payer: MEDICARE

## 2022-10-13 VITALS
DIASTOLIC BLOOD PRESSURE: 86 MMHG | OXYGEN SATURATION: 97 % | HEART RATE: 51 BPM | SYSTOLIC BLOOD PRESSURE: 165 MMHG | BODY MASS INDEX: 27.88 KG/M2 | TEMPERATURE: 97.3 F | WEIGHT: 142 LBS | HEIGHT: 60 IN

## 2022-10-13 DIAGNOSIS — M51.36 DDD (DEGENERATIVE DISC DISEASE), LUMBAR: ICD-10-CM

## 2022-10-13 DIAGNOSIS — M50.90 CERVICAL DISC DISEASE: ICD-10-CM

## 2022-10-13 DIAGNOSIS — M47.816 LUMBAR FACET ARTHROPATHY: ICD-10-CM

## 2022-10-13 DIAGNOSIS — M43.10 DEGENERATIVE SPONDYLOLISTHESIS: ICD-10-CM

## 2022-10-13 DIAGNOSIS — G89.4 CHRONIC PAIN SYNDROME: ICD-10-CM

## 2022-10-13 DIAGNOSIS — M96.1 FAILED BACK SURGICAL SYNDROME: ICD-10-CM

## 2022-10-13 DIAGNOSIS — F33.42 RECURRENT MAJOR DEPRESSIVE DISORDER, IN FULL REMISSION (HCC): ICD-10-CM

## 2022-10-13 DIAGNOSIS — M48.061 SPINAL STENOSIS OF LUMBAR REGION WITHOUT NEUROGENIC CLAUDICATION: ICD-10-CM

## 2022-10-13 LAB — INR BLD: 2.5

## 2022-10-13 PROCEDURE — 1123F ACP DISCUSS/DSCN MKR DOCD: CPT | Performed by: NURSE PRACTITIONER

## 2022-10-13 PROCEDURE — 99214 OFFICE O/P EST MOD 30 MIN: CPT | Performed by: NURSE PRACTITIONER

## 2022-10-13 RX ORDER — HYDROCODONE BITARTRATE AND ACETAMINOPHEN 7.5; 325 MG/1; MG/1
1 TABLET ORAL EVERY 8 HOURS PRN
Qty: 84 TABLET | Refills: 0 | Status: SHIPPED | OUTPATIENT
Start: 2022-10-13 | End: 2022-11-10

## 2022-10-13 RX ORDER — DULOXETIN HYDROCHLORIDE 60 MG/1
60 CAPSULE, DELAYED RELEASE ORAL DAILY
Qty: 30 CAPSULE | Refills: 3 | Status: SHIPPED | OUTPATIENT
Start: 2022-10-13

## 2022-10-13 NOTE — PROGRESS NOTES
Ms. Savannah Hinds is here for management of anticoagulation for hx of DVT. PMH also significant for HTN. She presents today w/out complaint. Pt verifies dosing regimen as listed above. Pt denies s/s bleeding/swelling/SOB. No missed doses. No changes in Rx/OTCs/Herbal medications. Occasional EtOH use and denies tobacco.      INR 2.5 is within the therapeutic range of 2-3. Recommend to continue 5 mg Mon/Wed/Fri, 7.5 mg all other days  Pt has the 5 mg tablets. Will continue to monitor and check INR in 4 weeks. Dosing reminder card given with phone number, appointment date and time.    Return to clinic: 11/10 @ 5555  Referring physician: Dr. Levy Shaffer

## 2022-10-13 NOTE — PROGRESS NOTES
Alexandru Ohm  1940  [id number]      HISTORY OF PRESENT ILLNESS: Ms. Mal Dorantes is a 80 y.o. female returns for a follow up visit for pain management  She has a diagnosis of   1. Chronic pain syndrome    2. DDD (degenerative disc disease), lumbar    3. Degenerative spondylolisthesis    4. Lumbar facet arthropathy    5. Spinal stenosis of lumbar region without neurogenic claudication    6. Failed back surgical syndrome    7. Cervical disc disease    8. Recurrent major depressive disorder, in full remission ([facility] [address]      As per Information Obtained from the PADT (Patient Assessment and Documentation Tool)    She complains of pain in the bilateral lower back and bilateral mid-back She rates the pain 5/10 and describes it as sharp, aching, burning, throbbing. Current treatment regimen has helped relieve about 30% of the pain. She has constipation any side effects from the current pain regimen. Patient reports that since the last follow up visit the physical functioning is better, family/social relationships are unchanged, mood is worse sleep patterns are unchanged, and that the overall functioning is unchanged. Patient denies misusing/abusing her narcotic pain medications or using any illegal drugs. Upon obtaining medical history from Ms. Mal Dorantes states that pain is manageable on current pain therapy. Takes the pain medications as prescribed. Mood/anxiety is low does not think the Zoloft is helping. Admitted to stress related to her  who is usually in a bad mood. Sleep is fair with an average of 5-6 hours. Denies to having issues of constipation. Tolerating activities/house chores with moderate tenderness to the lower back. ALLERGIES: Patients list of allergies were reviewed     MEDICATIONS: Ms. Mal Dorantes list of medications were reviewed. Her current medications are   Outpatient Medications Prior to Visit   Medication Sig Dispense Refill    pantoprazole (PROTONIX) 40 MG tablet TAKE 1 TABLET BY MOUTH DAILY 90 tablet 3    naloxone (NARCAN) 4 MG/0.1ML LIQD nasal spray Use as directed 1 each 0    Acetaminophen (TYLENOL ARTHRITIS EXT RELIEF PO) Take by mouth      lisinopril (PRINIVIL;ZESTRIL) 10 MG tablet TAKE ONE TABLET BY MOUTH DAILY 90 tablet 1    simvastatin (ZOCOR) 40 MG tablet TAKE 1 TABLET BY MOUTH AT  NIGHT 90 tablet 3    Handicap Placard MISC by Does not apply route 1 each 0    warfarin (COUMADIN) 5 MG tablet TAKE 2 TABLETS BY MOUTH  DAILY ON MONDAY AND 1 AND  1/2 TABLETS BY MOUTH DAILY  ON ALL OTHER DAYS, OR  DIRECTED BY COUMADIN CLINIC 130 tablet 3    cetirizine (ZYRTEC) 10 MG tablet Take 10 mg by mouth daily      fluticasone (FLONASE) 50 MCG/ACT nasal spray 2 sprays by Each Nostril route daily 1 Bottle 5    L-Methylfolate-N65-J0-W8 (CEREFOLIN PO) Take by mouth      Diapers & Supplies MISC 1 each by Does not apply route daily as needed (incontinence) 100 each 3    Misc. Devices MISC Compression stockings/open toe pantyhose with compression 40/50 2 each 2    Misc. Devices MISC George panty hose  Open toe Petite plus beige  2 pairs 2 each 1    sertraline (ZOLOFT) 50 MG tablet Take 1 tablet by mouth daily 30 tablet 0     No facility-administered medications prior to visit. SOCIAL/FAMILY/PAST MEDICAL HISTORY: Ms. Colin Whelan, family and past medical history was reviewed. REVIEW OF SYSTEMS:    Respiratory: Negative for apnea, chest tightness and shortness of breath or change in baseline breathing. Gastrointestinal: Negative for nausea, vomiting, abdominal pain, diarrhea, constipation, blood in stool and abdominal distention. PHYSICAL EXAM:   Nursing note and vitals reviewed. BP (!) 165/86   Pulse 51   Temp 97.3 °F (36.3 °C)   Ht 5' (1.524 m)   Wt 142 lb (64.4 kg)   SpO2 97%   BMI 27.73 kg/m²   Constitutional: She appears well-developed and well-nourished. No acute distress. Skin: Skin is warm and dry, good turgor. No rash noted. She is not diaphoretic.   Cardiovascular: Normal rate, regular rhythm, normal heart sounds, and does not have murmur. Pulmonary/Chest: Effort normal. No respiratory distress. She does not have wheezes in the lung fields. She has no rales. Neurological/Psychiatric:She is alert and oriented to person, place, and time. Coordination is  normal.  Her mood isDepressed and affect is Neutral/Euthymic(normal) . Prescription pain medication monitoring:                  MEDD current = 15              ORT Score = 1 low risk              Other Risk factors - (mood) Depression              Date of Last Medication Agreement: 6/24/22              Date Naloxone prescribed: 7/21/22              UDT:                          Date of last UDT: 6/23/22                          Adverse report: No              OARRS:                          Checked today: Yes                          Adverse report: No    IMPRESSION:   1. Chronic pain syndrome    2. DDD (degenerative disc disease), lumbar    3. Degenerative spondylolisthesis    4. Lumbar facet arthropathy    5. Spinal stenosis of lumbar region without neurogenic claudication    6. Failed back surgical syndrome    7. Cervical disc disease    8. Recurrent major depressive disorder, in full remission ([facility] [address]      PLAN:  Informed verbal consent was obtained:  -Patient's opioid therapy will be maintained at current dose  -D/c Zoloft, Start Cymbalta 30 mg tabs orally daily  -Home exercises/Marii exercises recommended  -CBT techniques- relaxation therapies such as biofeedback, mindfulness based stress reduction, imagery, cognitive restructuring, problem solving discussed with patient   -She was advised weight reduction, diet changes- 800-1200 curt diet, diet diary, exercising, nutritional  consult increased physical activity as tolerated   -Last UDS 6/23/22 consistent  -Return in about 4 weeks (around 11/10/2022).       Analgesic Plan:              Continue present regimen: Norco 7.5-325 mg tabs q8h prn              Adjust dose of death, if medicines not taken as prescribed, were also discussed. If the patient develops new symptoms or if the symptoms worsen, the patient should call the office. While transcribing every attempt was made to maintain the accuracy of the note in terms of it's contents,there may have been some errors made inadvertently. Thank you for allowing me to participate in the care of this patient. Concha Carrillo CNP.     Cc: Fredis Looney MD

## 2022-11-07 ENCOUNTER — IMMUNIZATION (OUTPATIENT)
Dept: FAMILY MEDICINE CLINIC | Age: 82
End: 2022-11-07
Payer: MEDICARE

## 2022-11-07 PROCEDURE — 90694 VACC AIIV4 NO PRSRV 0.5ML IM: CPT | Performed by: FAMILY MEDICINE

## 2022-11-07 PROCEDURE — G0008 ADMIN INFLUENZA VIRUS VAC: HCPCS | Performed by: FAMILY MEDICINE

## 2022-11-07 NOTE — PROGRESS NOTES
.2022 - 2023 Flu Vaccine Questionnaire    VIS given -  Yes    Have you received any other vaccine within the last 14 days? No  2. Do you currently have an active infectious or acute respiratory illness or fever? No  3. Are you taking steroids or immune suppressive drugs? No  4. Have you ever had a reaction to a flu vaccine? No  5. Are you allergic to eggs, egg products, chicken, Thimerosal (preservative) Gentamycin, polymixin, neomycin or Latex? No  6. Have you ever had Guillian Clayton Syndrome?   No

## 2022-11-10 ENCOUNTER — ANTI-COAG VISIT (OUTPATIENT)
Dept: PHARMACY | Age: 82
End: 2022-11-10
Payer: MEDICARE

## 2022-11-10 ENCOUNTER — OFFICE VISIT (OUTPATIENT)
Dept: PAIN MANAGEMENT | Age: 82
End: 2022-11-10
Payer: MEDICARE

## 2022-11-10 VITALS
TEMPERATURE: 97.5 F | OXYGEN SATURATION: 95 % | SYSTOLIC BLOOD PRESSURE: 157 MMHG | HEART RATE: 58 BPM | HEIGHT: 60 IN | BODY MASS INDEX: 27.09 KG/M2 | DIASTOLIC BLOOD PRESSURE: 87 MMHG | WEIGHT: 138 LBS

## 2022-11-10 DIAGNOSIS — M51.36 DDD (DEGENERATIVE DISC DISEASE), LUMBAR: ICD-10-CM

## 2022-11-10 DIAGNOSIS — M43.10 DEGENERATIVE SPONDYLOLISTHESIS: ICD-10-CM

## 2022-11-10 DIAGNOSIS — M48.061 SPINAL STENOSIS OF LUMBAR REGION WITHOUT NEUROGENIC CLAUDICATION: ICD-10-CM

## 2022-11-10 DIAGNOSIS — F33.42 RECURRENT MAJOR DEPRESSIVE DISORDER, IN FULL REMISSION (HCC): ICD-10-CM

## 2022-11-10 DIAGNOSIS — M96.1 FAILED BACK SURGICAL SYNDROME: ICD-10-CM

## 2022-11-10 DIAGNOSIS — M50.90 CERVICAL DISC DISEASE: ICD-10-CM

## 2022-11-10 DIAGNOSIS — G89.4 CHRONIC PAIN SYNDROME: ICD-10-CM

## 2022-11-10 DIAGNOSIS — M47.816 LUMBAR FACET ARTHROPATHY: ICD-10-CM

## 2022-11-10 LAB — INTERNATIONAL NORMALIZATION RATIO, POC: 2.8

## 2022-11-10 PROCEDURE — 99213 OFFICE O/P EST LOW 20 MIN: CPT | Performed by: NURSE PRACTITIONER

## 2022-11-10 PROCEDURE — 3078F DIAST BP <80 MM HG: CPT | Performed by: NURSE PRACTITIONER

## 2022-11-10 PROCEDURE — 99211 OFF/OP EST MAY X REQ PHY/QHP: CPT | Performed by: INTERNAL MEDICINE

## 2022-11-10 PROCEDURE — 3074F SYST BP LT 130 MM HG: CPT | Performed by: NURSE PRACTITIONER

## 2022-11-10 PROCEDURE — 85610 PROTHROMBIN TIME: CPT | Performed by: INTERNAL MEDICINE

## 2022-11-10 PROCEDURE — 1123F ACP DISCUSS/DSCN MKR DOCD: CPT | Performed by: NURSE PRACTITIONER

## 2022-11-10 RX ORDER — DULOXETIN HYDROCHLORIDE 60 MG/1
60 CAPSULE, DELAYED RELEASE ORAL DAILY
Qty: 30 CAPSULE | Refills: 3 | Status: SHIPPED | OUTPATIENT
Start: 2022-11-10

## 2022-11-10 RX ORDER — HYDROCODONE BITARTRATE AND ACETAMINOPHEN 7.5; 325 MG/1; MG/1
1 TABLET ORAL EVERY 8 HOURS PRN
Qty: 84 TABLET | Refills: 0 | Status: SHIPPED | OUTPATIENT
Start: 2022-11-10 | End: 2022-12-01

## 2022-11-10 NOTE — PROGRESS NOTES
Rolan Bruce  1940  [id number]      HISTORY OF PRESENT ILLNESS: Ms. Padmini Cuevas is a 80 y.o. female returns for a follow up visit for pain management  She has a diagnosis of   1. Chronic pain syndrome    2. DDD (degenerative disc disease), lumbar    3. Lumbar facet arthropathy    4. Spinal stenosis of lumbar region without neurogenic claudication    5. Degenerative spondylolisthesis    6. Failed back surgical syndrome    7. Cervical disc disease    8. Recurrent major depressive disorder, in full remission ([facility] [address]      As per Information Obtained from the PADT (Patient Assessment and Documentation Tool)    She complains of pain in the bilateral lower back, bilateral mid-back, and left upper leg(s): upper She rates the pain 5/10 and describes it as burning, stabbing. Current treatment regimen has helped relieve about 40% of the pain. She denies any side effects from the current pain regimen. Patient reports that since the last follow up visit the physical functioning is unchanged, family/social relationships are unchanged, mood is worse sleep patterns are unchanged, and that the overall functioning is better. Patient denies misusing/abusing her narcotic pain medications or using any illegal drugs. Upon obtaining medical history from Ms. Padmini Cuevas states that pain is manageable on current pain therapy. Takes the pain medications as prescribed. Mood/anxiety is stable. Sleep is fair with an average of 5-6 hours. Denies to having issues of constipation. Tolerating activities/house chores with moderate tenderness to the lower back. ALLERGIES: Patients list of allergies were reviewed     MEDICATIONS: Ms. Padmini Cuevas list of medications were reviewed. Her current medications are   Outpatient Medications Prior to Visit   Medication Sig Dispense Refill    pantoprazole (PROTONIX) 40 MG tablet TAKE 1 TABLET BY MOUTH  DAILY 90 tablet 3    naloxone (NARCAN) 4 MG/0.1ML LIQD nasal spray Use as directed 1 each 0    Acetaminophen (TYLENOL ARTHRITIS EXT RELIEF PO) Take by mouth      lisinopril (PRINIVIL;ZESTRIL) 10 MG tablet TAKE ONE TABLET BY MOUTH DAILY 90 tablet 1    simvastatin (ZOCOR) 40 MG tablet TAKE 1 TABLET BY MOUTH AT  NIGHT 90 tablet 3    Handicap Placard MISC by Does not apply route 1 each 0    warfarin (COUMADIN) 5 MG tablet TAKE 2 TABLETS BY MOUTH  DAILY ON MONDAY AND 1 AND  1/2 TABLETS BY MOUTH DAILY  ON ALL OTHER DAYS, OR  DIRECTED BY COUMADIN CLINIC 130 tablet 3    cetirizine (ZYRTEC) 10 MG tablet Take 10 mg by mouth daily      fluticasone (FLONASE) 50 MCG/ACT nasal spray 2 sprays by Each Nostril route daily 1 Bottle 5    L-Methylfolate-[id number] (CEREFOLIN PO) Take by mouth      Diapers & Supplies MISC 1 each by Does not apply route daily as needed (incontinence) 100 each 3    Misc. Devices MISC Compression stockings/open toe pantyhose with compression 40/50 2 each 2    Misc. Devices MISC George panty hose  Open toe Petite plus beige  2 pairs 2 each 1    HYDROcodone-acetaminophen (NORCO) 7.5-325 MG per tablet Take 1 tablet by mouth every 8 hours as needed for Pain for up to 28 days. Intended supply: 30 days 84 tablet 0    Lidocaine 1.8 % PTCH Apply 1 Film topically daily 30 patch 0    DULoxetine (CYMBALTA) 60 MG extended release capsule Take 1 capsule by mouth daily 30 capsule 3    sertraline (ZOLOFT) 50 MG tablet Take 1 tablet by mouth daily 30 tablet 0     No facility-administered medications prior to visit. SOCIAL/FAMILY/PAST MEDICAL HISTORY: Ms. Yuri Manley, family and past medical history was reviewed. REVIEW OF SYSTEMS:    Respiratory: Negative for apnea, chest tightness and shortness of breath or change in baseline breathing. Gastrointestinal: Negative for nausea, vomiting, abdominal pain, diarrhea, constipation, blood in stool and abdominal distention. PHYSICAL EXAM:   Nursing note and vitals reviewed.  BP (!) 157/87   Pulse 58   Temp 97.5 °F (36.4 °C)   Ht 5' (1.524 m)   Wt 138 lb (62.6 kg) SpO2 95%   BMI 26.95 kg/m²   Constitutional: She appears well-developed and well-nourished. No acute distress. Skin: Skin is warm and dry, good turgor. No rash noted. She is not diaphoretic. Cardiovascular: Normal rate, regular rhythm, normal heart sounds, and does not have murmur. Pulmonary/Chest: Effort normal. No respiratory distress. She does not have wheezes in the lung fields. She has no rales. Neurological/Psychiatric:She is alert and oriented to person, place, and time. Coordination is  normal.  Her mood isAppropriate and affect is Neutral/Euthymic(normal) . Prescription pain medication monitoring:                  MEDD current = 15              ORT Score = 1 low risk              Other Risk factors - (mood) Depression              Date of Last Medication Agreement: 6/24/22              Date Naloxone prescribed: 7/21/22              UDT:                          Date of last UDT: 6/23/22                          Adverse report: No              OARRS:                          Checked today: Yes                          Adverse report: No      IMPRESSION:   1. Chronic pain syndrome    2. DDD (degenerative disc disease), lumbar    3. Lumbar facet arthropathy    4. Spinal stenosis of lumbar region without neurogenic claudication    5. Degenerative spondylolisthesis    6. Failed back surgical syndrome    7. Cervical disc disease    8. Recurrent major depressive disorder, in full remission ([facility] [address]        PLAN:  Informed verbal consent was obtained:  - Patient's opioid therapy will be maintained at current dose  -Home exercises/Marii exercises recommended  -Monitor BP, f/u with PCP if it continues to stay elevated or she becomes symptomatic with SOB, CP, syncopy.  she is currently asymptomatic   -CBT techniques- relaxation therapies such as biofeedback, mindfulness based stress reduction, imagery, cognitive restructuring, problem solving discussed with patient   -Last UDS 6/23/22 consistent  -Return in about 4 weeks (around 12/8/2022). Analgesic Plan:              Continue present regimen: Norco 7.5-325 mg tabs q8h prn              Adjust dose of present analgesic: No              Switch analgesics: No              Add/Adjust concomitant therapy: Cymbalta, Lidocaine, Narcan    I will continue her current medication regimen  which is part of the above treatment schedule. It has been helping with Ms. Crespo's chronic  medical problems which for this visit include:   Diagnoses of Chronic pain syndrome, DDD (degenerative disc disease), lumbar, Lumbar facet arthropathy, Spinal stenosis of lumbar region without neurogenic claudication, Degenerative spondylolisthesis, Failed back surgical syndrome, Cervical disc disease, and Recurrent major depressive disorder, in full remission ([facility] [address] were pertinent to this visit. Risks and benefits of the medications and other alternative treatments  including no treatment were discussed with the patient. The common side effects of these medications were also explained to the patient. Informed verbal consent was obtained. Goals of current treatment regimen include improvement in pain, restoration of functioning- with focus on improvement in physical performance, general activity, work or disability,emotional distress, health care utilization and  decreased medication consumption. Will continue to monitor progress towards achieving/maintaining therapeutic goals with special emphasis on  1. Improvement in perceived interfernce  of pain with ADL's. Ability to do home exercises independently. Ability to do household chores indoor and/or outdoor work and social and leisure activities. Improve psychosocial and physical functioning. - she is showing progression towards this treatment goal with the current regimen.      She was advised against drinking alcohol with the narcotic pain medicines, advised against driving or handling machinery while adjusting the dose of medicines or if having cognitive  issues related to the current medications. Risk of overdose and death, if medicines not taken as prescribed, were also discussed. If the patient develops new symptoms or if the symptoms worsen, the patient should call the office. While transcribing every attempt was made to maintain the accuracy of the note in terms of it's contents,there may have been some errors made inadvertently. Thank you for allowing me to participate in the care of this patient. Cj Ochoa CNP.     Cc: Mckenzie Tinsley MD

## 2022-11-10 NOTE — PROGRESS NOTES
Ms. Marty Lafleur is here for management of anticoagulation for hx of DVT. PMH also significant for HTN. She presents today w/out complaint. Pt verifies dosing regimen as listed above. Pt denies s/s bleeding/swelling/SOB. No missed doses. No changes in Rx/OTCs/Herbal medications. Occasional EtOH use and denies tobacco.    Having cataract surgery in a couple weeks. INR 2.8 is within the therapeutic range of 2-3. Recommend to continue 5 mg Mon/Wed/Fri, 7.5 mg all other days  Pt has the 5 mg tablets. Will continue to monitor and check INR in 4 weeks. Dosing reminder card given with phone number, appointment date and time.    Return to clinic: 12/8 @ 5  Referring physician: Dr. Jay Lin

## 2022-11-18 ENCOUNTER — OFFICE VISIT (OUTPATIENT)
Dept: FAMILY MEDICINE CLINIC | Age: 82
End: 2022-11-18
Payer: MEDICARE

## 2022-11-18 VITALS
WEIGHT: 138 LBS | HEART RATE: 75 BPM | BODY MASS INDEX: 26.95 KG/M2 | OXYGEN SATURATION: 93 % | DIASTOLIC BLOOD PRESSURE: 70 MMHG | SYSTOLIC BLOOD PRESSURE: 122 MMHG

## 2022-11-18 DIAGNOSIS — Z01.811 PRE-OP CHEST EXAM: Primary | ICD-10-CM

## 2022-11-18 PROCEDURE — 3078F DIAST BP <80 MM HG: CPT | Performed by: STUDENT IN AN ORGANIZED HEALTH CARE EDUCATION/TRAINING PROGRAM

## 2022-11-18 PROCEDURE — 1123F ACP DISCUSS/DSCN MKR DOCD: CPT | Performed by: STUDENT IN AN ORGANIZED HEALTH CARE EDUCATION/TRAINING PROGRAM

## 2022-11-18 PROCEDURE — 3074F SYST BP LT 130 MM HG: CPT | Performed by: STUDENT IN AN ORGANIZED HEALTH CARE EDUCATION/TRAINING PROGRAM

## 2022-11-18 PROCEDURE — 99213 OFFICE O/P EST LOW 20 MIN: CPT | Performed by: STUDENT IN AN ORGANIZED HEALTH CARE EDUCATION/TRAINING PROGRAM

## 2022-11-18 RX ORDER — WARFARIN SODIUM 5 MG/1
TABLET ORAL
Qty: 130 TABLET | Refills: 3 | Status: CANCELLED | OUTPATIENT
Start: 2022-11-18

## 2022-11-18 SDOH — ECONOMIC STABILITY: HOUSING INSECURITY: IN THE LAST 12 MONTHS, HOW MANY PLACES HAVE YOU LIVED?: 1

## 2022-11-18 SDOH — ECONOMIC STABILITY: FOOD INSECURITY: WITHIN THE PAST 12 MONTHS, THE FOOD YOU BOUGHT JUST DIDN'T LAST AND YOU DIDN'T HAVE MONEY TO GET MORE.: NEVER TRUE

## 2022-11-18 SDOH — ECONOMIC STABILITY: HOUSING INSECURITY
IN THE LAST 12 MONTHS, WAS THERE A TIME WHEN YOU DID NOT HAVE A STEADY PLACE TO SLEEP OR SLEPT IN A SHELTER (INCLUDING NOW)?: NO

## 2022-11-18 SDOH — ECONOMIC STABILITY: FOOD INSECURITY: WITHIN THE PAST 12 MONTHS, YOU WORRIED THAT YOUR FOOD WOULD RUN OUT BEFORE YOU GOT MONEY TO BUY MORE.: NEVER TRUE

## 2022-11-18 SDOH — ECONOMIC STABILITY: INCOME INSECURITY: IN THE LAST 12 MONTHS, WAS THERE A TIME WHEN YOU WERE NOT ABLE TO PAY THE MORTGAGE OR RENT ON TIME?: NO

## 2022-11-18 ASSESSMENT — ENCOUNTER SYMPTOMS
PHOTOPHOBIA: 0
CHEST TIGHTNESS: 0
BLOOD IN STOOL: 0
SHORTNESS OF BREATH: 0
WHEEZING: 0
ABDOMINAL DISTENTION: 0

## 2022-11-18 ASSESSMENT — SOCIAL DETERMINANTS OF HEALTH (SDOH): HOW HARD IS IT FOR YOU TO PAY FOR THE VERY BASICS LIKE FOOD, HOUSING, MEDICAL CARE, AND HEATING?: NOT HARD AT ALL

## 2022-11-18 NOTE — PROGRESS NOTES
Subjective:     Jasmyn Israel is a 80 y.o.  female who presents to the office today for a preoperative consultation at the request of surgeon Dr. Gianna Cevallos;; who plans on performing bilateral ( right then left) cataract surgery on November 23 for eight, the Dec 14th for left. Planned anesthesia is Local.  The patient has the following known anesthesia issues:  none   Patient has a bleeding risk of : warfarin - Per Dr Ailyn Wolfe, no need to stop before surgery. Patient does not have objection to receiving blood products if needed. 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 (HCC)    Essential hypertension    Lumbar stenosis    Hypercholesteremia    Cervical disc disease    Degenerative spondylolisthesis    Balance problem    Impacted cerumen of right ear    Generalized abdominal pain    Constipation    Hiatal hernia    Other chest pain    SOB (shortness of breath)    Spinal stenosis of lumbar region    Lumbar facet arthropathy    Disc displacement, lumbar    Combined forms of age-related cataract of both eyes    Atrial septal hypertrophy    Recurrent major depressive disorder, in full remission (HCC)    Hematuria       Allergies   Allergen Reactions    Imitrex [Sumatriptan] Anaphylaxis and Shortness Of Breath     Swelling in arm of injection cite, and SOB       Family History   Problem Relation Age of Onset    Cancer Other     Hypertension Other     Seizures Other     Heart Disease Mother     Other Father         murdered     Colon Cancer Brother     Cancer Maternal Aunt         breast    Colon Cancer Maternal Grandmother          Review of Systems  Review of Systems   Constitutional:  Negative for fever and unexpected weight change. Eyes:  Negative for photophobia.    Respiratory:  Negative for chest tightness, shortness of breath and wheezing. Cardiovascular:  Negative for chest pain, palpitations and leg swelling. Gastrointestinal:  Negative for abdominal distention and blood in stool. Genitourinary:  Negative for dysuria. Neurological:  Negative for seizures and syncope. Psychiatric/Behavioral:  Negative for behavioral problems. Objective:      Physical Exam    Physical Exam  Constitutional:       Appearance: Normal appearance. HENT:      Head: Atraumatic. Right Ear: External ear normal.      Left Ear: External ear normal.      Nose: Nose normal.      Mouth/Throat:      Mouth: Mucous membranes are moist.      Pharynx: Oropharynx is clear. Eyes:      Extraocular Movements: Extraocular movements intact. Conjunctiva/sclera: Conjunctivae normal.      Pupils: Pupils are equal, round, and reactive to light. Cardiovascular:      Rate and Rhythm: Normal rate and regular rhythm. Pulses: Normal pulses. Heart sounds: Murmur (5/6 syslotic ejection murmur over her RUSB) heard. Pulmonary:      Effort: Pulmonary effort is normal.      Breath sounds: Normal breath sounds. Abdominal:      General: Bowel sounds are normal. There is no distension. Palpations: Abdomen is soft. There is no mass. Tenderness: There is no abdominal tenderness. Musculoskeletal:         General: Normal range of motion. Cervical back: Normal range of motion. Right lower leg: No edema. Left lower leg: No edema. Skin:     Capillary Refill: Capillary refill takes less than 2 seconds. Neurological:      General: No focal deficit present. Mental Status: She is alert.    Psychiatric:         Mood and Affect: Mood normal.           Cardiographics  Not needed    Imaging  Chest X-Ray: not needed    Lab Review   Anti-coag visit on 11/10/2022   Component Date Value    INR,(POC) 11/10/2022 2.8    Anti-coag visit on 10/13/2022   Component Date Value    INR 10/13/2022 2.5 Assessment:     80 y.o. female  with planned surgery as above. Known risk factors for perioperative complications: None  Warfarin use for DVT history     Difficulty with intubation is not anticipated. Cardiac Risk Estimation: per the Revised Cardiac Risk Index (Circ. 100:1043, 1999), the patient's risk factors for cardiac complications include none, putting her in: RCI RISK CLASS II (1 risk factor, risk of major cardiac compl. appr. 1.3%)    Current medications which may produce withdrawal symptoms if withheld perioperatively: none        Plan:      1. Preoperative workup as follows none  2. Change in medication regimen before surgery: none, continue med regimen including morning of surgery, w/sip of water  3. Patient's overall benefit from surgery is not outweighed by risk for age and comorbidity. Cleared from family medicine standpoint. Final clearance per operating surgeon.        Katerina Blunt MD  11/18/2022

## 2022-12-01 ENCOUNTER — OFFICE VISIT (OUTPATIENT)
Dept: CARDIOLOGY CLINIC | Age: 82
End: 2022-12-01
Payer: MEDICARE

## 2022-12-01 VITALS
HEART RATE: 78 BPM | SYSTOLIC BLOOD PRESSURE: 112 MMHG | BODY MASS INDEX: 27.48 KG/M2 | WEIGHT: 140 LBS | HEIGHT: 60 IN | DIASTOLIC BLOOD PRESSURE: 70 MMHG | OXYGEN SATURATION: 92 %

## 2022-12-01 DIAGNOSIS — E78.00 HYPERCHOLESTEREMIA: ICD-10-CM

## 2022-12-01 DIAGNOSIS — I51.7 ATRIAL SEPTAL HYPERTROPHY: Primary | ICD-10-CM

## 2022-12-01 DIAGNOSIS — I82.4Y3 DEEP VEIN THROMBOSIS (DVT) OF PROXIMAL VEIN OF BOTH LOWER EXTREMITIES, UNSPECIFIED CHRONICITY (HCC): ICD-10-CM

## 2022-12-01 DIAGNOSIS — I10 ESSENTIAL HYPERTENSION: ICD-10-CM

## 2022-12-01 DIAGNOSIS — I45.10 RBBB: ICD-10-CM

## 2022-12-01 PROCEDURE — 1123F ACP DISCUSS/DSCN MKR DOCD: CPT | Performed by: NURSE PRACTITIONER

## 2022-12-01 PROCEDURE — 3078F DIAST BP <80 MM HG: CPT | Performed by: NURSE PRACTITIONER

## 2022-12-01 PROCEDURE — 99213 OFFICE O/P EST LOW 20 MIN: CPT | Performed by: NURSE PRACTITIONER

## 2022-12-01 PROCEDURE — 3074F SYST BP LT 130 MM HG: CPT | Performed by: NURSE PRACTITIONER

## 2022-12-01 RX ORDER — MOXIFLOXACIN 5 MG/ML
SOLUTION/ DROPS OPHTHALMIC
COMMUNITY
Start: 2022-10-25

## 2022-12-01 RX ORDER — DICLOFENAC SODIUM 1 MG/ML
SOLUTION/ DROPS OPHTHALMIC
COMMUNITY
Start: 2022-10-25

## 2022-12-01 RX ORDER — PREDNISOLONE ACETATE 10 MG/ML
SUSPENSION/ DROPS OPHTHALMIC
COMMUNITY
Start: 2022-10-25

## 2022-12-01 RX ORDER — LISINOPRIL AND HYDROCHLOROTHIAZIDE 12.5; 1 MG/1; MG/1
TABLET ORAL
COMMUNITY
Start: 2022-10-17

## 2022-12-01 NOTE — PROGRESS NOTES
[address]   Cardiac Evaluation    Primary Care Doctor:  Richard Radford MD    Chief Complaint   Patient presents with    3 Month Follow-Up    Hypertension    Other     DVT, hypercholesteremia        Assessment:    1. Atrial septal hypertrophy    2. Essential hypertension    3. Hypercholesteremia    4. RBBB    5. Deep vein thrombosis (DVT) of proximal vein of both lower extremities, unspecified chronicity (HCC)        Plan:   No change in current heart/ BP medicines  Echocardiogram and stress test as recommended - call [phone]) to schedule   After these tests we can provide recommendations for surgery  See notes on physicians  Follow up with Dr. Lisa Vickers or me in 6 months     Vitals:    12/01/22 1418   BP: 112/70   Site: Right Upper Arm   Position: Sitting   Cuff Size: Medium Adult   Pulse: 78   SpO2: 92%   Weight: 140 lb (63.5 kg)   Height: 5' (1.524 m)       Primary Cardiologist: Dr. Niesha Bautista     History of Present Illness:   I had the pleasure of seeing Cesar Kawasaki (80 y.o.) in follow up for atrial septal hypertrophy, RBBB as well as history of DVT on warfarin and hiatal hernia. At last visit in September she reported symptoms of fatigue, shortness of breath and left arm pain. Echocardiogram and stress test ordered, not yet completed. She is still having pain in right and left arm. Wondering if can be related arthritis since using to pull herself up out of bed. Not very active. No energy. Sleeping a lot. Ongoing for couple of years, since [facility] in 2020. Increased forgetfulness but tested negative for dementia. She is planning hiatal hernia surgery next summer. Has some bleeding problems usually with blood draws and easy bruisibility. Last INR 2.8. Cesar Kawasaki describes symptoms including dyspnea, fatigue, edema but denies chest pain, palpitations, orthopnea, PND, early saiety, syncope.      NYHA:   II  ACC/ AHA Stage:    C    Past Medical History:   has a past medical history of Allergic rhinitis, Arthritis, Cancer ([facility] [address] Chronic back pain, Dementia ([facility] [address] Dental disease, Depression, Dizziness, GERD (gastroesophageal reflux disease), H/O blood clots, Hearing loss, Hx of blood clots, Hyperlipidemia, Hypertension, Lumbar stenosis with neurogenic claudication, Osteoarthritis of neck, and TMJ dysfunction. Surgical History:   has a past surgical history that includes  section; Hysterectomy; Lithotripsy; other surgical history; Mohs surgery; embolectomy; laminectomy (2018); epidural steroid injection (Bilateral, 2019); Hysterectomy, total abdominal; and epidural steroid injection (Right, 8/15/2019). Social History:   reports that she has never smoked. She has never used smokeless tobacco. She reports that she does not drink alcohol and does not use drugs. Family History:   Family History   Problem Relation Age of Onset    Cancer Other     Hypertension Other     Seizures Other     Heart Disease Mother     Other Father         murdered     Colon Cancer Brother     Cancer Maternal Aunt         breast    Colon Cancer Maternal Grandmother        Home Medications:  Prior to Admission medications    Medication Sig Start Date End Date Taking? Authorizing Provider   prednisoLONE acetate (PRED FORTE) 1 % ophthalmic suspension INSTILL 1 DROP INTO SURGICAL EYE(S) FOUR TIMES DAILY, STARTING 2 DAYS PRIOR TO SURGERY. 10/25/22  Yes Historical Provider, MD   moxifloxacin (VIGAMOX) 0.5 % ophthalmic solution INSTILL 1 DROP INTO SURGICAL EYE(S) FOUR TIMES DAILY, STARTING 2 DAYS PRIOR TO SURGERY. 10/25/22  Yes Historical Provider, MD   diclofenac (VOLTAREN) 0.1 % ophthalmic solution INSTILL 1 DROP INTO SURGICAL EYE(S) FOUR TIMES DAILY, STARTING 2 DAYS PRIOR TO SURGERY.  10/25/22  Yes Historical Provider, MD   lisinopril-hydroCHLOROthiazide (PRINZIDE;ZESTORETIC) 10-12.5 MG per tablet  10/17/22  Yes Historical Provider, MD   DULoxetine (CYMBALTA) 60 MG extended release capsule Take 1 capsule by mouth daily 11/10/22  Yes LIZZY Victoria CNP   pantoprazole (PROTONIX) 40 MG tablet TAKE 1 TABLET BY MOUTH  DAILY 9/16/22  Yes Vicky Terrazas MD   Acetaminophen (TYLENOL ARTHRITIS EXT RELIEF PO) Take by mouth in the morning and at bedtime   Yes Historical Provider, MD   simvastatin (ZOCOR) 40 MG tablet TAKE 1 TABLET BY MOUTH AT  NIGHT 5/16/22  Yes Vicky Terrazas MD   Handicap Placard MISC by Does not apply route 4/20/22  Yes Vicky Terrazas MD   warfarin (COUMADIN) 5 MG tablet TAKE 2 TABLETS BY MOUTH  DAILY ON MONDAY AND 1 AND  1/2 TABLETS BY MOUTH DAILY  ON ALL OTHER DAYS, OR  DIRECTED BY COUMADIN CLINIC  Patient taking differently: 1 1/2 tablets M, W, Fr. 1 Tab every other day 2/28/22  Yes Vicky Terrazas MD   cetirizine (ZYRTEC) 10 MG tablet Take 10 mg by mouth daily   Yes Historical Provider, MD   fluticasone (FLONASE) 50 MCG/ACT nasal spray 2 sprays by Each Nostril route daily  Patient taking differently: 2 sprays by Each Nostril route as needed 8/20/21  Yes Vicky Terrazas MD   [id number] (CEREFOLIN PO) Take by mouth   Yes Historical Provider, MD   [address] 1 each by Does not apply route daily as needed (incontinence) 11/18/19  Yes Vicky Terrazas MD   Misc. Devices MISC Compression stockings/open toe pantyhose with compression 40/50 6/21/18  Yes Rosibel Rahman MD   Misc. Devices MISC George panty hose  Open toe Petite plus beige  2 pairs 4/11/17  Yes Rosibel Rahman MD   HYDROcodone-acetaminophen (NORCO) 7.5-325 MG per tablet Take 1 tablet by mouth every 8 hours as needed for Pain for up to 28 days.  Intended supply: 30 days  Patient not taking: Reported on 12/1/2022 11/10/22 12/8/22  LIZZY Victoria CNP   naloxone [facility]) 4 MG/0.1ML LIQD nasal spray Use as directed  Patient not taking: Reported on 12/1/2022 7/21/22   LIZZY Victoria - CNP   lisinopril (PRINIVIL;ZESTRIL) 10 MG tablet TAKE ONE TABLET BY MOUTH DAILY  Patient not taking: Reported on 12/1/2022 5/25/22   Brian Watson MD        Allergies:  Imitrex [sumatriptan]     Physical Examination:    Vitals:    12/01/22 1418   BP: 112/70   Site: Right Upper Arm   Position: Sitting   Cuff Size: Medium Adult   Pulse: 78   SpO2: 92%   Weight: 140 lb (63.5 kg)   Height: 5' (1.524 m)     Constitutional and General Appearance: Warm and dry, no apparent distress, normal coloration  HEENT:  Normocephalic, atraumatic  Respiratory:  Normal excursion and expansion without use of accessory muscles  Resp Auscultation: Clear to auscultation   Cardiovascular: The apical impulses not displaced  Heart tones are crisp and normal  JVP 8 cm H2O  Regular rate and rhythm, normal O3O3, + systolic murmur, no g/r  Peripheral pulses are symmetrical and full  There is no clubbing, cyanosis of the extremities.   1+ BLE pretibial edema, compression stockings in place   Pedal Pulses: 2+ and equal   Abdomen:  No masses or tenderness  Liver/Spleen: No Abnormalities Noted  Neurological/Psychiatric:  Alert and oriented in all spheres  Moves all extremities well  Exhibits normal gait balance and coordination  No abnormalities of mood, affect, memory, mentation, or behavior are noted    Lab Data:  Most recent lab results below reviewed in office    CBC:   Lab Results   Component Value Date/Time    WBC 4.6 06/17/2022 02:07 PM    WBC 5.5 02/14/2022 02:11 PM    WBC 5.0 09/13/2021 02:12 PM    RBC 3.89 06/17/2022 02:07 PM    RBC 3.85 02/14/2022 02:11 PM    RBC 3.73 09/13/2021 02:12 PM    HGB 12.4 06/17/2022 02:07 PM    HGB 12.4 02/14/2022 02:11 PM    HGB 11.9 09/13/2021 02:12 PM    HCT 37.5 06/17/2022 02:07 PM    HCT 37.4 02/14/2022 02:11 PM    HCT 35.9 09/13/2021 02:12 PM    MCV 96.2 06/17/2022 02:07 PM    MCV 97.1 02/14/2022 02:11 PM    MCV 96.4 09/13/2021 02:12 PM    RDW 14.2 06/17/2022 02:07 PM    RDW 14.0 02/14/2022 02:11 PM    RDW 14.3 09/13/2021 02:12 PM     06/17/2022 02:07 PM     02/14/2022 02:11 PM     09/13/2021 02:12 PM     BMP:  Lab Results   Component Value Date/Time     06/17/2022 02:07 PM     02/14/2022 02:11 PM     09/13/2021 02:12 PM    K 4.7 06/17/2022 02:07 PM    K 4.1 02/14/2022 02:11 PM    K 4.1 09/13/2021 02:12 PM    K 3.9 09/20/2020 03:00 PM    K 4.1 09/03/2020 08:13 PM     06/17/2022 02:07 PM     02/14/2022 02:11 PM     09/13/2021 02:12 PM    CO2 26 06/17/2022 02:07 PM    CO2 29 02/14/2022 02:11 PM    CO2 29 09/13/2021 02:12 PM    PHOS 3.1 10/24/2018 12:36 PM    BUN 23 06/17/2022 02:07 PM    BUN 26 02/14/2022 02:11 PM    BUN 26 09/13/2021 02:12 PM    CREATININE 0.6 06/17/2022 02:07 PM    CREATININE 0.7 02/14/2022 02:11 PM    CREATININE 0.7 09/13/2021 02:12 PM     BNP:   Lab Results   Component Value Date/Time    PROBNP 341 06/17/2022 02:07 PM    PROBNP 381 02/14/2022 02:11 PM     LIPID:   Lab Results   Component Value Date/Time    TRIG 90 09/13/2021 02:12 PM    TRIG 63 08/28/2019 12:06 PM    HDL 61 09/13/2021 02:12 PM    HDL 77 11/20/2019 09:55 AM    LDLCALC 74 09/13/2021 02:12 PM    LDLCALC 71 11/20/2019 09:55 AM       Cardiac Imaging:  ECHO/LUIS: 11/20/19   Summary   Ejection fraction is visually estimated at 55%. Mild left atrial enlargement. A bubble study was performed and showed no evidence of shunting. There is lipomatous hypertrophy of the atrial septum. There is a prominent Eustachian valve. There is no evidence of a mass. Mild tricuspid regurgitation. Pt is at acceptable, low cv risk, for stem cell injection in back as per   pain mgtment svc. Cardiac MRI 9/23/19  Suspected lipomatous hypertrophy of the inter atrial septum   Normal appearance of tricuspid, mitral and aortic valves   Small rounded masslike lesion contiguous with the endocardial surface of the right atrial base. Differential diagnosis is given above.      Echo: 9/3/19  Summary   --There is a prominent echogenic mass on the atrial side of the anterior   tricuspid valve leaflet. , suggest LUIS for further evaluation. -- Left ventricular systolic function is normal with a visually estimated   ejection fraction of 55%. Mild concentric left ventricular hypertrophy. No   regional wall motion abnormalities are noted. Indeterminate diastolic   function. -- Moderate to severe calcification of the posterior mitral annulus. -- The left atrium appears mildly enlarged. -- A bubble study was performed and fails to show evidence of shunting.   -- Lipomatous hypertrophy of the interatrial septum. Mild aortic stenosis. Systolic pulmonary artery pressure (SPAP) is normal and estimated at 28 mmHg   (right atrial pressure 3 mmHg).    Frequent ectopy during the exam.         I appreciate the opportunity of cooperating in the care of this individual.    Beena Chakraborty, LIZZY - CNP, 12/1/2022, 2:42 PM

## 2022-12-01 NOTE — PATIENT INSTRUCTIONS
No change in current heart/ BP medicines  Echocardiogram and stress test as recommended - call [address] [place] [address] ([phone]) to schedule   After these tests we can provide recommendations for surgery  See notes on physicians  Follow  up with Dr. Elise Restrepo or me in 6 months             [address]

## 2022-12-08 ENCOUNTER — OFFICE VISIT (OUTPATIENT)
Dept: PAIN MANAGEMENT | Age: 82
End: 2022-12-08
Payer: MEDICARE

## 2022-12-08 VITALS
HEART RATE: 61 BPM | HEIGHT: 60 IN | DIASTOLIC BLOOD PRESSURE: 83 MMHG | BODY MASS INDEX: 27.34 KG/M2 | OXYGEN SATURATION: 95 % | SYSTOLIC BLOOD PRESSURE: 162 MMHG | TEMPERATURE: 97.7 F

## 2022-12-08 DIAGNOSIS — M48.061 SPINAL STENOSIS OF LUMBAR REGION WITHOUT NEUROGENIC CLAUDICATION: ICD-10-CM

## 2022-12-08 DIAGNOSIS — M43.10 DEGENERATIVE SPONDYLOLISTHESIS: ICD-10-CM

## 2022-12-08 DIAGNOSIS — M51.36 DDD (DEGENERATIVE DISC DISEASE), LUMBAR: ICD-10-CM

## 2022-12-08 DIAGNOSIS — M96.1 FAILED BACK SURGICAL SYNDROME: ICD-10-CM

## 2022-12-08 DIAGNOSIS — M50.90 CERVICAL DISC DISEASE: ICD-10-CM

## 2022-12-08 DIAGNOSIS — F33.42 RECURRENT MAJOR DEPRESSIVE DISORDER, IN FULL REMISSION (HCC): ICD-10-CM

## 2022-12-08 DIAGNOSIS — G89.4 CHRONIC PAIN SYNDROME: ICD-10-CM

## 2022-12-08 DIAGNOSIS — M47.816 LUMBAR FACET ARTHROPATHY: ICD-10-CM

## 2022-12-08 PROCEDURE — 3074F SYST BP LT 130 MM HG: CPT | Performed by: NURSE PRACTITIONER

## 2022-12-08 PROCEDURE — 1123F ACP DISCUSS/DSCN MKR DOCD: CPT | Performed by: NURSE PRACTITIONER

## 2022-12-08 PROCEDURE — 3078F DIAST BP <80 MM HG: CPT | Performed by: NURSE PRACTITIONER

## 2022-12-08 PROCEDURE — 99213 OFFICE O/P EST LOW 20 MIN: CPT | Performed by: NURSE PRACTITIONER

## 2022-12-08 RX ORDER — DULOXETIN HYDROCHLORIDE 60 MG/1
60 CAPSULE, DELAYED RELEASE ORAL DAILY
Qty: 30 CAPSULE | Refills: 3 | Status: SHIPPED | OUTPATIENT
Start: 2022-12-08

## 2022-12-08 RX ORDER — HYDROCODONE BITARTRATE AND ACETAMINOPHEN 7.5; 325 MG/1; MG/1
1 TABLET ORAL EVERY 8 HOURS PRN
Qty: 84 TABLET | Refills: 0 | Status: SHIPPED | OUTPATIENT
Start: 2022-12-08 | End: 2023-01-05

## 2022-12-08 NOTE — PROGRESS NOTES
Zachery Mas  1940  [id number]      HISTORY OF PRESENT ILLNESS: Ms. Adriana Griffiths is a [de-identified] y.o. female returns for a follow up visit for pain management  She has a diagnosis of   1. Chronic pain syndrome    2. DDD (degenerative disc disease), lumbar    3. Lumbar facet arthropathy    4. Spinal stenosis of lumbar region without neurogenic claudication    5. Degenerative spondylolisthesis    6. Failed back surgical syndrome    7. Cervical disc disease    8. Recurrent major depressive disorder, in full remission ([facility] [address]      As per Information Obtained from the PADT (Patient Assessment and Documentation Tool)    She complains of pain in the  upper, mid and lower back She rates the pain 5/10 and describes it as aching, burning, numbness, pins and needles. Current treatment regimen has helped relieve about 30% of the pain. She denies any side effects from the current pain regimen. Patient reports that since the last follow up visit the physical functioning is unchanged, family/social relationships are better, mood is unchanged sleep patterns are unchanged, and that the overall functioning is better. Patient denies misusing/abusing her narcotic pain medications or using any illegal drugs. Upon obtaining medical history from Ms. Adriana Griffiths states that pain is manageable on current pain therapy. Takes the pain medications as prescribed. She has sinus headaches on occasion, Tylenol with pain medications help manage the symptoms. Mood/anxiety is stable. Sleep is fair with an average of 5-6 hours. Denies to having issues of constipation. Tolerating activities/house chores with moderate tenderness to the lower back. ALLERGIES: Patients list of allergies were reviewed     MEDICATIONS: Ms. Adriana Griffiths list of medications were reviewed. Her current medications are   Outpatient Medications Prior to Visit   Medication Sig Dispense Refill    prednisoLONE acetate (PRED FORTE) 1 % ophthalmic suspension INSTILL 1 DROP INTO SURGICAL EYE(S) FOUR TIMES DAILY, STARTING 2 DAYS PRIOR TO SURGERY. moxifloxacin (VIGAMOX) 0.5 % ophthalmic solution INSTILL 1 DROP INTO SURGICAL EYE(S) FOUR TIMES DAILY, STARTING 2 DAYS PRIOR TO SURGERY. diclofenac (VOLTAREN) 0.1 % ophthalmic solution INSTILL 1 DROP INTO SURGICAL EYE(S) FOUR TIMES DAILY, STARTING 2 DAYS PRIOR TO SURGERY. lisinopril-hydroCHLOROthiazide (PRINZIDE;ZESTORETIC) 10-12.5 MG per tablet       pantoprazole (PROTONIX) 40 MG tablet TAKE 1 TABLET BY MOUTH  DAILY 90 tablet 3    Acetaminophen (TYLENOL ARTHRITIS EXT RELIEF PO) Take by mouth in the morning and at bedtime      simvastatin (ZOCOR) 40 MG tablet TAKE 1 TABLET BY MOUTH AT  NIGHT 90 tablet 3    Handicap Placard MISC by Does not apply route 1 each 0    warfarin (COUMADIN) 5 MG tablet TAKE 2 TABLETS BY MOUTH  DAILY ON MONDAY AND 1 AND  1/2 TABLETS BY MOUTH DAILY  ON ALL OTHER DAYS, OR  DIRECTED BY COUMADIN CLINIC (Patient taking differently: 1 1/2 tablets M, W, Fr. 1 Tab every other day) 130 tablet 3    cetirizine (ZYRTEC) 10 MG tablet Take 10 mg by mouth daily      fluticasone (FLONASE) 50 MCG/ACT nasal spray 2 sprays by Each Nostril route daily (Patient taking differently: 2 sprays by Each Nostril route as needed) 1 Bottle 5    L-Methylfolate-[id number] (CEREFOLIN PO) Take by mouth      Diapers & Supplies MISC 1 each by Does not apply route daily as needed (incontinence) 100 each 3    Misc. Devices MISC Compression stockings/open toe pantyhose with compression 40/50 2 each 2    Misc. Devices MISC George panty hose  Open toe Petite plus beige  2 pairs 2 each 1    DULoxetine (CYMBALTA) 60 MG extended release capsule Take 1 capsule by mouth daily 30 capsule 3     No facility-administered medications prior to visit. SOCIAL/FAMILY/PAST MEDICAL HISTORY: Ms. Eddy Litten, family and past medical history was reviewed.      REVIEW OF SYSTEMS:    Respiratory: Negative for apnea, chest tightness and shortness of breath or change in baseline breathing. Gastrointestinal: Negative for nausea, vomiting, abdominal pain, diarrhea, constipation, blood in stool and abdominal distention. PHYSICAL EXAM:   Nursing note and vitals reviewed. BP (!) 162/83   Pulse 61   Temp 97.7 °F (36.5 °C)   Ht 5' (1.524 m)   SpO2 95%   BMI 27.34 kg/m²   Constitutional: She appears well-developed and well-nourished. No acute distress. Skin: Skin is warm and dry, good turgor. No rash noted. She is not diaphoretic. Cardiovascular: Normal rate, regular rhythm, normal heart sounds, and does not have murmur. Pulmonary/Chest: Effort normal. No respiratory distress. She does not have wheezes in the lung fields. She has no rales. Neurological/Psychiatric:She is alert and oriented to person, place, and time. Coordination is  normal.  Her mood isAppropriate and affect is Neutral/Euthymic(normal) . Prescription pain medication monitoring:                  MEDD current = 15              ORT Score = 1 low risk              Other Risk factors - (mood) Depression              Date of Last Medication Agreement: 6/24/22              Date Naloxone prescribed: 7/21/22              UDT:                          Date of last UDT: 6/23/22                          Adverse report: No              OARRS:                          Checked today: Yes                          Adverse report: No    IMPRESSION:   1. Chronic pain syndrome    2. DDD (degenerative disc disease), lumbar    3. Lumbar facet arthropathy    4. Spinal stenosis of lumbar region without neurogenic claudication    5. Degenerative spondylolisthesis    6. Failed back surgical syndrome    7. Cervical disc disease    8.  Recurrent major depressive disorder, in full remission ([facility] [address]      PLAN:  Informed verbal consent was obtained:  -Patient's opioid therapy will be maintained at current dose  -Home exercises/Marii exercises recommended  -Recommended patient monitor her headache symptoms, call the office if symptoms worsen, currently reports symptoms are stable on therapy  -CBT techniques- relaxation therapies such as biofeedback, mindfulness based stress reduction, imagery, cognitive restructuring, problem solving discussed with patient   -Last UDS 6/23/22 consistent  -No follow-ups on file. -[place]AR[place] record was obtained and reviewed  for the last one year and no indicators of drug misuse  were found. Any other controlled substance prescriptions  seen on the record have been accounted for, I am aware of the patient receiving these medications. Huan Siegel OARRS record will be rechecked as part of office protocol. Analgesic Plan:              Continue present regimen: Norco 7.5-325 mg tabs q8h prn              Adjust dose of present analgesic: No              Switch analgesics: No              Add/Adjust concomitant therapy: Cymbalta, Lidocaine, Narcan    I will continue her current medication regimen  which is part of the above treatment schedule. It has been helping with Ms. Crespo's chronic  medical problems which for this visit include:   Diagnoses of Chronic pain syndrome, DDD (degenerative disc disease), lumbar, Lumbar facet arthropathy, Spinal stenosis of lumbar region without neurogenic claudication, Degenerative spondylolisthesis, Failed back surgical syndrome, Cervical disc disease, and Recurrent major depressive disorder, in full remission ([address] were pertinent to this visit. Risks and benefits of the medications and other alternative treatments  including no treatment were discussed with the patient. The common side effects of these medications were also explained to the patient. Informed verbal consent was obtained. Goals of current treatment regimen include improvement in pain, restoration of functioning- with focus on improvement in physical performance, general activity, work or disability,emotional distress, health care utilization and  decreased medication consumption.  Will continue to monitor progress towards achieving/maintaining therapeutic goals with special emphasis on  1. Improvement in perceived interfernce  of pain with ADL's. Ability to do home exercises independently. Ability to do household chores indoor and/or outdoor work and social and leisure activities. Improve psychosocial and physical functioning. - she is showing progression towards this treatment goal with the current regimen. She was advised against drinking alcohol with the narcotic pain medicines, advised against driving or handling machinery while adjusting the dose of medicines or if having cognitive  issues related to the current medications. Risk of overdose and death, if medicines not taken as prescribed, were also discussed. If the patient develops new symptoms or if the symptoms worsen, the patient should call the office. While transcribing every attempt was made to maintain the accuracy of the note in terms of it's contents,there may have been some errors made inadvertently. Thank you for allowing me to participate in the care of this patient. Rashad Hernandez CNP.     Cc: Giuliana Bird MD

## 2022-12-09 ENCOUNTER — TELEPHONE (OUTPATIENT)
Dept: FAMILY MEDICINE CLINIC | Age: 82
End: 2022-12-09

## 2022-12-15 ENCOUNTER — ANTI-COAG VISIT (OUTPATIENT)
Dept: PHARMACY | Age: 82
End: 2022-12-15
Payer: MEDICARE

## 2022-12-15 LAB — INR BLD: 2.1

## 2022-12-15 PROCEDURE — 85610 PROTHROMBIN TIME: CPT

## 2022-12-15 PROCEDURE — 99211 OFF/OP EST MAY X REQ PHY/QHP: CPT

## 2022-12-15 NOTE — PROGRESS NOTES
Ms. Beth Scherer is here for management of anticoagulation for hx of DVT. PMH also significant for HTN. She presents today w/out complaint. Pt verifies dosing regimen as listed above. Pt denies s/s bleeding/swelling/SOB. No missed doses. No changes in Rx/OTCs/Herbal medications. Occasional EtOH use and denies tobacco.    Had cataract surgery  a couple weeks ago. INR 2.1 is within the therapeutic range of 2-3. Recommend to continue 5 mg Mon/Wed/Fri, 7.5 mg all other days  Pt has the 5 mg tablets. Will continue to monitor and check INR in 4 weeks. Dosing reminder card given with phone number, appointment date and time.    Return to clinic: 1/12 @ [phone]  Referring physician: Dr. Jewel Ayon

## 2022-12-19 RX ORDER — LISINOPRIL AND HYDROCHLOROTHIAZIDE 12.5; 1 MG/1; MG/1
TABLET ORAL
Qty: 90 TABLET | Refills: 3 | Status: SHIPPED | OUTPATIENT
Start: 2022-12-19

## 2022-12-19 RX ORDER — WARFARIN SODIUM 5 MG/1
TABLET ORAL
Qty: 143 TABLET | Refills: 3 | Status: SHIPPED | OUTPATIENT
Start: 2022-12-19

## 2022-12-19 NOTE — TELEPHONE ENCOUNTER
.Refill Request     CONFIRM preferrred pharmacy with the patient. If Mail Order Rx - Pend for 90 day refill. Last Seen: Last Seen Department: 11/18/2022  Last Seen by PCP: 6/17/2022    Last Written: 2-28-22 130 with 3     If no future appointment scheduled, route STAFF MESSAGE with patient name to the [facility] for scheduling. Next Appointment:   Future Appointments   Date Time Provider Clare Bliss   1/5/2023  1:00 PM LIZZY Farris - CNP [facility]   1/12/2023  1:15 PM Kelsi Booker [facility]   6/5/2023  1:15 PM LIZZY Cruz - CNP [facility]       Message sent to  to schedule appt with patient?   N/A      Requested Prescriptions     Pending Prescriptions Disp Refills    lisinopril-hydroCHLOROthiazide (PRINZIDE;ZESTORETIC) 10-12.5 MG per tablet [Pharmacy Med Name: Lisinopril-hydroCHLOROthiazide 10-12.5 MG Oral Tablet] 90 tablet 3     Sig: TAKE 1 TABLET BY MOUTH  DAILY    warfarin (COUMADIN) 5 MG tablet [Pharmacy Med Name: Warfarin Sodium 5 MG Oral Tablet] 143 tablet 3     Sig: TAKE 2 TABLETS BY MOUTH  DAILY ON MONDAY AND 1 AND  1/2 TABLETS BY MOUTH DAILY  ON ALL OTHER DAYS, OR  DIRECTED BY COUMADIN CLINIC

## 2023-01-12 ENCOUNTER — ANTI-COAG VISIT (OUTPATIENT)
Dept: PHARMACY | Age: 83
End: 2023-01-12
Payer: MEDICARE

## 2023-01-12 LAB — INR BLD: 1.6

## 2023-01-12 PROCEDURE — 99211 OFF/OP EST MAY X REQ PHY/QHP: CPT

## 2023-01-12 PROCEDURE — 85610 PROTHROMBIN TIME: CPT

## 2023-01-12 NOTE — PROGRESS NOTES
Ms. Rosana Hodgson is here for management of anticoagulation for hx of DVT. PMH also significant for HTN. She presents today w/out complaint. Pt verifies dosing regimen as listed above. Pt denies s/s bleeding/swelling/SOB. No missed doses. No changes in Rx/OTCs/Herbal medications. Occasional EtOH use and denies tobacco.    Pt. States she has eaten more greens lately and she missed 1 dose over a week ago but does not remember the day. The pt. seemed distressed due to her  going through dialysis, she couldn't recall the OTC medications/supplements her PCP told her to take at her last appointment. INR 1.6 is below the therapeutic range of 2-3. Recommend to increase dose to 5 mg Mon/Fri, 7.5 mg all other days  Pt has the 5 mg tablets. Will continue to monitor and check INR in 3 weeks. Dosing reminder card given with phone number, appointment date and time.    Return to clinic: 2/2/23 @ 11:00am  Referring physician: Dr. Guille Oliva, PharmD  1/12/2023 at 2:36 PM

## 2023-01-19 ENCOUNTER — TELEPHONE (OUTPATIENT)
Dept: FAMILY MEDICINE CLINIC | Age: 83
End: 2023-01-19

## 2023-01-26 ENCOUNTER — OFFICE VISIT (OUTPATIENT)
Dept: PAIN MANAGEMENT | Age: 83
End: 2023-01-26
Payer: MEDICARE

## 2023-01-26 VITALS
HEIGHT: 60 IN | HEART RATE: 63 BPM | DIASTOLIC BLOOD PRESSURE: 85 MMHG | BODY MASS INDEX: 27.34 KG/M2 | OXYGEN SATURATION: 98 % | SYSTOLIC BLOOD PRESSURE: 142 MMHG | TEMPERATURE: 97.3 F

## 2023-01-26 DIAGNOSIS — M47.816 LUMBAR FACET ARTHROPATHY: ICD-10-CM

## 2023-01-26 DIAGNOSIS — G89.4 CHRONIC PAIN SYNDROME: ICD-10-CM

## 2023-01-26 DIAGNOSIS — M50.90 CERVICAL DISC DISEASE: ICD-10-CM

## 2023-01-26 DIAGNOSIS — F33.42 RECURRENT MAJOR DEPRESSIVE DISORDER, IN FULL REMISSION (HCC): ICD-10-CM

## 2023-01-26 DIAGNOSIS — R05.9 COUGH, UNSPECIFIED TYPE: ICD-10-CM

## 2023-01-26 DIAGNOSIS — M43.10 DEGENERATIVE SPONDYLOLISTHESIS: ICD-10-CM

## 2023-01-26 DIAGNOSIS — M48.061 SPINAL STENOSIS OF LUMBAR REGION WITHOUT NEUROGENIC CLAUDICATION: ICD-10-CM

## 2023-01-26 DIAGNOSIS — K59.03 DRUG-INDUCED CONSTIPATION: ICD-10-CM

## 2023-01-26 DIAGNOSIS — M51.36 DDD (DEGENERATIVE DISC DISEASE), LUMBAR: ICD-10-CM

## 2023-01-26 DIAGNOSIS — M96.1 FAILED BACK SURGICAL SYNDROME: ICD-10-CM

## 2023-01-26 PROCEDURE — 3074F SYST BP LT 130 MM HG: CPT | Performed by: NURSE PRACTITIONER

## 2023-01-26 PROCEDURE — 3078F DIAST BP <80 MM HG: CPT | Performed by: NURSE PRACTITIONER

## 2023-01-26 PROCEDURE — 1123F ACP DISCUSS/DSCN MKR DOCD: CPT | Performed by: NURSE PRACTITIONER

## 2023-01-26 PROCEDURE — 99213 OFFICE O/P EST LOW 20 MIN: CPT | Performed by: NURSE PRACTITIONER

## 2023-01-26 RX ORDER — HYDROCODONE BITARTRATE AND ACETAMINOPHEN 7.5; 325 MG/1; MG/1
1 TABLET ORAL EVERY 8 HOURS PRN
Qty: 84 TABLET | Refills: 0 | Status: SHIPPED | OUTPATIENT
Start: 2023-01-26 | End: 2023-02-23 | Stop reason: SDUPTHER

## 2023-01-26 RX ORDER — METHYLPREDNISOLONE 4 MG/1
TABLET ORAL
Qty: 1 KIT | Refills: 0 | Status: SHIPPED | OUTPATIENT
Start: 2023-01-26 | End: 2023-02-01

## 2023-02-02 ENCOUNTER — ANTI-COAG VISIT (OUTPATIENT)
Dept: PHARMACY | Age: 83
End: 2023-02-02

## 2023-02-02 LAB — INR BLD: 2.4

## 2023-02-02 NOTE — PROGRESS NOTES
Ms. Marty Lafleur is here for management of anticoagulation for hx of DVT. PMH also significant for HTN. She presents today w/out complaint. Pt verifies dosing regimen as listed above. Pt denies s/s bleeding/swelling/SOB. No missed doses. No changes in Rx/OTCs/Herbal medications. Occasional EtOH use and denies tobacco.      INR 2.4 is within the therapeutic range of 2-3. Recommend to 5 mg Mon/Fri, 7.5 mg all other days  Pt has the 5 mg tablets. Will continue to monitor and check INR in 4 weeks. Dosing reminder card given with phone number, appointment date and time.    Return to clinic: 3/2/23 @ 11:00am  Referring physician: Dr. Jay Lin

## 2023-02-23 ENCOUNTER — OFFICE VISIT (OUTPATIENT)
Dept: PAIN MANAGEMENT | Age: 83
End: 2023-02-23
Payer: MEDICARE

## 2023-02-23 VITALS
HEIGHT: 60 IN | OXYGEN SATURATION: 96 % | TEMPERATURE: 97.5 F | WEIGHT: 138 LBS | DIASTOLIC BLOOD PRESSURE: 90 MMHG | HEART RATE: 86 BPM | BODY MASS INDEX: 27.09 KG/M2 | SYSTOLIC BLOOD PRESSURE: 143 MMHG

## 2023-02-23 DIAGNOSIS — M96.1 FAILED BACK SURGICAL SYNDROME: ICD-10-CM

## 2023-02-23 DIAGNOSIS — M47.816 LUMBAR FACET ARTHROPATHY: ICD-10-CM

## 2023-02-23 DIAGNOSIS — M43.10 DEGENERATIVE SPONDYLOLISTHESIS: ICD-10-CM

## 2023-02-23 DIAGNOSIS — M50.90 CERVICAL DISC DISEASE: ICD-10-CM

## 2023-02-23 DIAGNOSIS — F33.42 RECURRENT MAJOR DEPRESSIVE DISORDER, IN FULL REMISSION (HCC): ICD-10-CM

## 2023-02-23 DIAGNOSIS — G89.4 CHRONIC PAIN SYNDROME: ICD-10-CM

## 2023-02-23 DIAGNOSIS — M48.061 SPINAL STENOSIS OF LUMBAR REGION WITHOUT NEUROGENIC CLAUDICATION: ICD-10-CM

## 2023-02-23 DIAGNOSIS — M51.36 DDD (DEGENERATIVE DISC DISEASE), LUMBAR: ICD-10-CM

## 2023-02-23 PROCEDURE — 3078F DIAST BP <80 MM HG: CPT | Performed by: NURSE PRACTITIONER

## 2023-02-23 PROCEDURE — 99213 OFFICE O/P EST LOW 20 MIN: CPT | Performed by: NURSE PRACTITIONER

## 2023-02-23 PROCEDURE — 3074F SYST BP LT 130 MM HG: CPT | Performed by: NURSE PRACTITIONER

## 2023-02-23 PROCEDURE — 1123F ACP DISCUSS/DSCN MKR DOCD: CPT | Performed by: NURSE PRACTITIONER

## 2023-02-23 RX ORDER — HYDROCODONE BITARTRATE AND ACETAMINOPHEN 7.5; 325 MG/1; MG/1
1 TABLET ORAL EVERY 8 HOURS PRN
Qty: 84 TABLET | Refills: 0 | Status: SHIPPED | OUTPATIENT
Start: 2023-02-23 | End: 2023-03-23

## 2023-02-23 RX ORDER — DULOXETIN HYDROCHLORIDE 60 MG/1
60 CAPSULE, DELAYED RELEASE ORAL DAILY
Qty: 90 CAPSULE | Refills: 0 | Status: SHIPPED | OUTPATIENT
Start: 2023-02-23 | End: 2023-05-24

## 2023-02-23 NOTE — PROGRESS NOTES
Lester Coley  1940  [id number]      HISTORY OF PRESENT ILLNESS: Ms. Girish Santos is a [de-identified] y.o. female returns for a follow up visit for pain management  She has a diagnosis of   1. Chronic pain syndrome    2. DDD (degenerative disc disease), lumbar    3. Spinal stenosis of lumbar region without neurogenic claudication    4. Lumbar facet arthropathy    5. Degenerative spondylolisthesis    6. Failed back surgical syndrome    7. Cervical disc disease    8. Recurrent major depressive disorder, in full remission ([facility] [address]      As per Information Obtained from the PADT (Patient Assessment and Documentation Tool)    She complains of pain in the  lower  back radiating down both legs  She rates the pain 5/10 and describes it as aching, burning, numbness, pins and needles. Current treatment regimen has helped relieve about 30% of the pain. She denies any side effects from the current pain regimen. Patient reports that since the last follow up visit the physical functioning is unchanged, family/social relationships are worse, mood is unchanged sleep patterns are unchanged, and that the overall functioning is worse. Patient denies misusing/abusing her narcotic pain medications or using any illegal drugs. Upon obtaining medical history from Ms. Girish Santos states that pain is manageable on current pain therapy. Takes the pain medications as prescribed. Mood/anxiety is stable. Sleep is fair with an average of 5-6 hours. Denies to having issues of constipation. Tolerating activities/house chores with moderate tenderness to the lower back. ALLERGIES: Patients list of allergies were reviewed     MEDICATIONS: Ms. Girish Santos list of medications were reviewed. Her current medications are   Outpatient Medications Prior to Visit   Medication Sig Dispense Refill    lisinopril-hydroCHLOROthiazide (PRINZIDE;ZESTORETIC) 10-12.5 MG per tablet TAKE 1 TABLET BY MOUTH  DAILY 90 tablet 3    warfarin (COUMADIN) 5 MG tablet TAKE 2 TABLETS BY MOUTH DAILY ON MONDAY AND 1 AND  1/2 TABLETS BY MOUTH DAILY  ON ALL OTHER DAYS, OR  DIRECTED BY COUMADIN CLINIC 143 tablet 3    prednisoLONE acetate (PRED FORTE) 1 % ophthalmic suspension INSTILL 1 DROP INTO SURGICAL EYE(S) FOUR TIMES DAILY, STARTING 2 DAYS PRIOR TO SURGERY. moxifloxacin (VIGAMOX) 0.5 % ophthalmic solution INSTILL 1 DROP INTO SURGICAL EYE(S) FOUR TIMES DAILY, STARTING 2 DAYS PRIOR TO SURGERY. diclofenac (VOLTAREN) 0.1 % ophthalmic solution INSTILL 1 DROP INTO SURGICAL EYE(S) FOUR TIMES DAILY, STARTING 2 DAYS PRIOR TO SURGERY. pantoprazole (PROTONIX) 40 MG tablet TAKE 1 TABLET BY MOUTH  DAILY 90 tablet 3    Acetaminophen (TYLENOL ARTHRITIS EXT RELIEF PO) Take by mouth in the morning and at bedtime      simvastatin (ZOCOR) 40 MG tablet TAKE 1 TABLET BY MOUTH AT  NIGHT 90 tablet 3    Handicap Placard MISC by Does not apply route 1 each 0    cetirizine (ZYRTEC) 10 MG tablet Take 10 mg by mouth daily      fluticasone (FLONASE) 50 MCG/ACT nasal spray 2 sprays by Each Nostril route daily (Patient taking differently: 2 sprays by Each Nostril route as needed) 1 Bottle 5    L-Methylfolate-J24-C6-Q6 (CEREFOLIN PO) Take by mouth      Diapers & Supplies MISC 1 each by Does not apply route daily as needed (incontinence) 100 each 3    Misc. Devices MISC Compression stockings/open toe pantyhose with compression 40/50 2 each 2    Misc. Devices MISC George panty hose  Open toe Petite plus beige  2 pairs 2 each 1    Lidocaine 1.8 % PTCH Apply 1 Film topically daily 30 patch 0    HYDROcodone-acetaminophen (NORCO) 7.5-325 MG per tablet Take 1 tablet by mouth every 8 hours as needed for Pain for up to 28 days. Intended supply: 30 days 84 tablet 0    DULoxetine (CYMBALTA) 60 MG extended release capsule Take 1 capsule by mouth daily 30 capsule 3     No facility-administered medications prior to visit. SOCIAL/FAMILY/PAST MEDICAL HISTORY: Ms. Tyrone Hylton, family and past medical history was reviewed.      REVIEW OF SYSTEMS:    Respiratory: Negative for apnea, chest tightness and shortness of breath or change in baseline breathing. Gastrointestinal: Negative for nausea, vomiting, abdominal pain, diarrhea, constipation, blood in stool and abdominal distention. PHYSICAL EXAM:   Nursing note and vitals reviewed. BP (!) 143/90   Pulse 86   Temp 97.5 °F (36.4 °C)   Ht 5' (1.524 m)   Wt 138 lb (62.6 kg)   SpO2 96%   BMI 26.95 kg/m²   Constitutional: She appears well-developed and well-nourished. No acute distress. Skin: Skin is warm and dry, good turgor. No rash noted. She is not diaphoretic. Cardiovascular: Normal rate, regular rhythm, normal heart sounds, and does not have murmur. Pulmonary/Chest: Effort normal. No respiratory distress. She does not have wheezes in the lung fields. She has no rales. Neurological/Psychiatric:She is alert and oriented to person, place, and time. Coordination is  normal.  Her mood isAppropriate and affect is Neutral/Euthymic(normal) . Prescription pain medication monitoring:                  MEDD current = 15              ORT Score = 1 low risk              Other Risk factors - (mood) Depression              Date of Last Medication Agreement: 6/24/22              Date Naloxone prescribed: 7/21/22              UDT:                          Date of last UDT: 6/23/22                          Adverse report: No              OARRS:                          Checked today: Yes                          Adverse report: No    IMPRESSION:   1. Chronic pain syndrome    2. DDD (degenerative disc disease), lumbar    3. Spinal stenosis of lumbar region without neurogenic claudication    4. Lumbar facet arthropathy    5. Degenerative spondylolisthesis    6. Failed back surgical syndrome    7. Cervical disc disease    8.  Recurrent major depressive disorder, in full remission [facility])        PLAN:  Informed verbal consent was obtained:  -Patient's opioid therapy will be maintained at current dose  -Home exercises/Marii exercises recommended  -CBT techniques- relaxation therapies such as biofeedback, mindfulness based stress reduction, imagery, cognitive restructuring, problem solving discussed with patient   -She was advised weight reduction, diet changes- 800-1200 curt diet, diet diary, exercising, nutritional  consult increased physical activity as tolerated   -Last UDS 6/23/22 consistent  -Return in about 4 weeks (around 3/23/2023). Analgesic Plan:              Continue present regimen: Norco 7.5-325 mg tabs q8h prn              Adjust dose of present analgesic: No              Switch analgesics: No              Add/Adjust concomitant therapy: Cymbalta, Lidocaine, Narcan    I will continue her current medication regimen  which is part of the above treatment schedule. It has been helping with Ms. Crespo's chronic  medical problems which for this visit include:   Diagnoses of Chronic pain syndrome, DDD (degenerative disc disease), lumbar, Spinal stenosis of lumbar region without neurogenic claudication, Lumbar facet arthropathy, Degenerative spondylolisthesis, Failed back surgical syndrome, Cervical disc disease, and Recurrent major depressive disorder, in full remission ([address] were pertinent to this visit. Risks and benefits of the medications and other alternative treatments  including no treatment were discussed with the patient. The common side effects of these medications were also explained to the patient. Informed verbal consent was obtained. Goals of current treatment regimen include improvement in pain, restoration of functioning- with focus on improvement in physical performance, general activity, work or disability,emotional distress, health care utilization and  decreased medication consumption. Will continue to monitor progress towards achieving/maintaining therapeutic goals with special emphasis on  1. Improvement in perceived interfernce  of pain with ADL's.  Ability to do home exercises independently. Ability to do household chores indoor and/or outdoor work and social and leisure activities. Improve psychosocial and physical functioning. - she is showing progression towards this treatment goal with the current regimen. She was advised against drinking alcohol with the narcotic pain medicines, advised against driving or handling machinery while adjusting the dose of medicines or if having cognitive  issues related to the current medications. Risk of overdose and death, if medicines not taken as prescribed, were also discussed. If the patient develops new symptoms or if the symptoms worsen, the patient should call the office. While transcribing every attempt was made to maintain the accuracy of the note in terms of it's contents,there may have been some errors made inadvertently. Thank you for allowing me to participate in the care of this patient. Jc Khan CNP.     Cc: Marilu Balderas MD

## 2023-03-16 ENCOUNTER — ANTI-COAG VISIT (OUTPATIENT)
Dept: PHARMACY | Age: 83
End: 2023-03-16
Payer: MEDICARE

## 2023-03-16 LAB — INR BLD: 2.3

## 2023-03-16 PROCEDURE — 85610 PROTHROMBIN TIME: CPT

## 2023-03-16 PROCEDURE — 99211 OFF/OP EST MAY X REQ PHY/QHP: CPT

## 2023-03-16 NOTE — PROGRESS NOTES
Ms. Zully Duke is here for management of anticoagulation for hx of DVT. PMH also significant for HTN. She presents today w/out complaint. Pt verifies dosing regimen as listed above. Pt denies s/s bleeding/swelling/SOB. No missed doses. No changes in Rx/OTCs/Herbal medications. Occasional EtOH use and denies tobacco.    Patient reports no changes. INR 2.3 is within the therapeutic range of 2-3. Recommend to 5 mg Mon/Fri, 7.5 mg all other days  Pt has the 5 mg tablets. Will continue to monitor and check INR in 4 weeks. Dosing reminder card given with phone number, appointment date and time.    Return to clinic: week of April 10-14 (daughter will call to schedule appointment)  Referring physician: Dr. Villar Running  PharmD Candidate [id number]

## 2023-03-23 ENCOUNTER — OFFICE VISIT (OUTPATIENT)
Dept: PAIN MANAGEMENT | Age: 83
End: 2023-03-23
Payer: MEDICARE

## 2023-03-23 VITALS
HEIGHT: 60 IN | SYSTOLIC BLOOD PRESSURE: 134 MMHG | DIASTOLIC BLOOD PRESSURE: 80 MMHG | BODY MASS INDEX: 26.5 KG/M2 | OXYGEN SATURATION: 96 % | HEART RATE: 67 BPM | WEIGHT: 135 LBS

## 2023-03-23 DIAGNOSIS — M47.816 LUMBAR FACET ARTHROPATHY: ICD-10-CM

## 2023-03-23 DIAGNOSIS — M50.90 CERVICAL DISC DISEASE: ICD-10-CM

## 2023-03-23 DIAGNOSIS — F33.42 RECURRENT MAJOR DEPRESSIVE DISORDER, IN FULL REMISSION (HCC): ICD-10-CM

## 2023-03-23 DIAGNOSIS — M51.36 DDD (DEGENERATIVE DISC DISEASE), LUMBAR: ICD-10-CM

## 2023-03-23 DIAGNOSIS — M48.061 SPINAL STENOSIS OF LUMBAR REGION WITHOUT NEUROGENIC CLAUDICATION: ICD-10-CM

## 2023-03-23 DIAGNOSIS — G89.4 CHRONIC PAIN SYNDROME: ICD-10-CM

## 2023-03-23 DIAGNOSIS — M96.1 FAILED BACK SURGICAL SYNDROME: ICD-10-CM

## 2023-03-23 DIAGNOSIS — M43.10 DEGENERATIVE SPONDYLOLISTHESIS: ICD-10-CM

## 2023-03-23 PROCEDURE — 99213 OFFICE O/P EST LOW 20 MIN: CPT | Performed by: NURSE PRACTITIONER

## 2023-03-23 PROCEDURE — 3074F SYST BP LT 130 MM HG: CPT | Performed by: NURSE PRACTITIONER

## 2023-03-23 PROCEDURE — 3078F DIAST BP <80 MM HG: CPT | Performed by: NURSE PRACTITIONER

## 2023-03-23 PROCEDURE — 1123F ACP DISCUSS/DSCN MKR DOCD: CPT | Performed by: NURSE PRACTITIONER

## 2023-03-23 RX ORDER — HYDROCODONE BITARTRATE AND ACETAMINOPHEN 7.5; 325 MG/1; MG/1
1 TABLET ORAL EVERY 8 HOURS PRN
Qty: 84 TABLET | Refills: 0 | Status: SHIPPED | OUTPATIENT
Start: 2023-03-23 | End: 2023-04-20

## 2023-03-23 RX ORDER — DULOXETIN HYDROCHLORIDE 60 MG/1
60 CAPSULE, DELAYED RELEASE ORAL DAILY
Qty: 90 CAPSULE | Refills: 0 | Status: SHIPPED | OUTPATIENT
Start: 2023-03-23 | End: 2023-06-21

## 2023-03-23 NOTE — PROGRESS NOTES
history was reviewed. REVIEW OF SYSTEMS:    Respiratory: Negative for apnea, chest tightness and shortness of breath or change in baseline breathing. Gastrointestinal: Negative for nausea, vomiting, abdominal pain, diarrhea, constipation, blood in stool and abdominal distention. PHYSICAL EXAM:   Nursing note and vitals reviewed. /80   Pulse 67   Ht 5' (1.524 m)   Wt 135 lb (61.2 kg)   SpO2 96%   BMI 26.37 kg/m²   Constitutional: She appears well-developed and well-nourished. No acute distress. Skin: Skin is warm and dry, good turgor. No rash noted. She is not diaphoretic. Cardiovascular: Normal rate, regular rhythm, normal heart sounds, and does not have murmur. Pulmonary/Chest: Effort normal. No respiratory distress. She does not have wheezes in the lung fields. She has no rales. Neurological/Psychiatric:She is alert and oriented to person, place, and time. Coordination is  normal.  Her mood isAppropriate and affect is Neutral/Euthymic(normal) . Prescription pain medication monitoring:                  MEDD current = 15              ORT Score = 1 low risk              Other Risk factors - (mood) Depression              Date of Last Medication Agreement: 6/23/22              Date Naloxone prescribed:               UDT:                          Date of last UDT: 6/23/22                          Adverse report: No              OARRS:                          Checked today: Yes                          Adverse report: No    IMPRESSION:   1. Chronic pain syndrome    2. DDD (degenerative disc disease), lumbar    3. Lumbar facet arthropathy    4. Spinal stenosis of lumbar region without neurogenic claudication    5. Failed back surgical syndrome    6. Degenerative spondylolisthesis    7. Cervical disc disease    8.  Recurrent major depressive disorder, in full remission [facility])        PLAN:  Informed verbal consent was obtained:  -Patient's opioid therapy will be maintained at current

## 2023-03-31 ENCOUNTER — OFFICE VISIT (OUTPATIENT)
Dept: FAMILY MEDICINE CLINIC | Age: 83
End: 2023-03-31

## 2023-03-31 VITALS
BODY MASS INDEX: 26.7 KG/M2 | HEIGHT: 60 IN | TEMPERATURE: 97.6 F | SYSTOLIC BLOOD PRESSURE: 158 MMHG | HEART RATE: 68 BPM | DIASTOLIC BLOOD PRESSURE: 76 MMHG | OXYGEN SATURATION: 94 % | WEIGHT: 136 LBS

## 2023-03-31 DIAGNOSIS — I10 ESSENTIAL HYPERTENSION: ICD-10-CM

## 2023-03-31 DIAGNOSIS — R79.9 ABNORMAL FINDING OF BLOOD CHEMISTRY, UNSPECIFIED: ICD-10-CM

## 2023-03-31 DIAGNOSIS — E78.2 MIXED HYPERLIPIDEMIA: ICD-10-CM

## 2023-03-31 DIAGNOSIS — R41.3 MEMORY LOSS: ICD-10-CM

## 2023-03-31 DIAGNOSIS — Z00.00 MEDICARE ANNUAL WELLNESS VISIT, SUBSEQUENT: Primary | ICD-10-CM

## 2023-03-31 DIAGNOSIS — F33.2 SEVERE EPISODE OF RECURRENT MAJOR DEPRESSIVE DISORDER, WITHOUT PSYCHOTIC FEATURES (HCC): ICD-10-CM

## 2023-03-31 DIAGNOSIS — I82.4Y3 DEEP VEIN THROMBOSIS (DVT) OF PROXIMAL VEIN OF BOTH LOWER EXTREMITIES, UNSPECIFIED CHRONICITY (HCC): ICD-10-CM

## 2023-03-31 DIAGNOSIS — L98.9 SKIN LESION: ICD-10-CM

## 2023-03-31 PROBLEM — R06.02 SOB (SHORTNESS OF BREATH): Status: RESOLVED | Noted: 2019-09-25 | Resolved: 2023-03-31

## 2023-03-31 PROBLEM — M48.061 LUMBAR STENOSIS: Status: RESOLVED | Noted: 2017-04-11 | Resolved: 2023-03-31

## 2023-03-31 PROBLEM — E78.00 HYPERCHOLESTEREMIA: Status: RESOLVED | Noted: 2017-04-11 | Resolved: 2023-03-31

## 2023-03-31 PROBLEM — K59.00 CONSTIPATION: Status: RESOLVED | Noted: 2019-09-16 | Resolved: 2023-03-31

## 2023-03-31 PROBLEM — R07.89 OTHER CHEST PAIN: Status: RESOLVED | Noted: 2019-09-25 | Resolved: 2023-03-31

## 2023-03-31 PROBLEM — H61.21 IMPACTED CERUMEN OF RIGHT EAR: Status: RESOLVED | Noted: 2018-12-17 | Resolved: 2023-03-31

## 2023-03-31 PROBLEM — R10.84 GENERALIZED ABDOMINAL PAIN: Status: RESOLVED | Noted: 2019-09-16 | Resolved: 2023-03-31

## 2023-03-31 ASSESSMENT — ENCOUNTER SYMPTOMS
NAUSEA: 0
VOMITING: 0
CHEST TIGHTNESS: 0
BACK PAIN: 0
COLOR CHANGE: 0
SHORTNESS OF BREATH: 0
ABDOMINAL PAIN: 0

## 2023-03-31 ASSESSMENT — ANXIETY QUESTIONNAIRES
6. BECOMING EASILY ANNOYED OR IRRITABLE: 2
7. FEELING AFRAID AS IF SOMETHING AWFUL MIGHT HAPPEN: 0
3. WORRYING TOO MUCH ABOUT DIFFERENT THINGS: 3
4. TROUBLE RELAXING: 2
IF YOU CHECKED OFF ANY PROBLEMS ON THIS QUESTIONNAIRE, HOW DIFFICULT HAVE THESE PROBLEMS MADE IT FOR YOU TO DO YOUR WORK, TAKE CARE OF THINGS AT HOME, OR GET ALONG WITH OTHER PEOPLE: EXTREMELY DIFFICULT
5. BEING SO RESTLESS THAT IT IS HARD TO SIT STILL: 1
1. FEELING NERVOUS, ANXIOUS, OR ON EDGE: 3
GAD7 TOTAL SCORE: 14
2. NOT BEING ABLE TO STOP OR CONTROL WORRYING: 3

## 2023-03-31 ASSESSMENT — PATIENT HEALTH QUESTIONNAIRE - PHQ9
8. MOVING OR SPEAKING SO SLOWLY THAT OTHER PEOPLE COULD HAVE NOTICED. OR THE OPPOSITE, BEING SO FIGETY OR RESTLESS THAT YOU HAVE BEEN MOVING AROUND A LOT MORE THAN USUAL: 3
4. FEELING TIRED OR HAVING LITTLE ENERGY: 2
SUM OF ALL RESPONSES TO PHQ9 QUESTIONS 1 & 2: 6
1. LITTLE INTEREST OR PLEASURE IN DOING THINGS: 3
7. TROUBLE CONCENTRATING ON THINGS, SUCH AS READING THE NEWSPAPER OR WATCHING TELEVISION: 3
10. IF YOU CHECKED OFF ANY PROBLEMS, HOW DIFFICULT HAVE THESE PROBLEMS MADE IT FOR YOU TO DO YOUR WORK, TAKE CARE OF THINGS AT HOME, OR GET ALONG WITH OTHER PEOPLE: 2
SUM OF ALL RESPONSES TO PHQ QUESTIONS 1-9: 18
9. THOUGHTS THAT YOU WOULD BE BETTER OFF DEAD, OR OF HURTING YOURSELF: 0
5. POOR APPETITE OR OVEREATING: 1
3. TROUBLE FALLING OR STAYING ASLEEP: 2
2. FEELING DOWN, DEPRESSED OR HOPELESS: 3
6. FEELING BAD ABOUT YOURSELF - OR THAT YOU ARE A FAILURE OR HAVE LET YOURSELF OR YOUR FAMILY DOWN: 1

## 2023-03-31 ASSESSMENT — LIFESTYLE VARIABLES
HOW OFTEN DO YOU HAVE A DRINK CONTAINING ALCOHOL: NEVER
HOW MANY STANDARD DRINKS CONTAINING ALCOHOL DO YOU HAVE ON A TYPICAL DAY: PATIENT DOES NOT DRINK

## 2023-03-31 NOTE — PATIENT INSTRUCTIONS
imagine those worry thoughts floating across your mind's eneida. Just let them pass by as you watch. Follow-up care is a key part of your treatment and safety. Be sure to make and go to all appointments, and call your doctor if you are having problems. It's also a good idea to know your test results and keep a list of the medicines you take. Where can you learn more? Go to http://www.cruz.com/ and enter M676 to learn more about \"Learning About Mindfulness for Stress. \"  Current as of: October 20, 2022               Content Version: 13.6  © 9495-2056 [employer]. Care instructions adapted under license by [employer]. If you have questions about a medical condition or this instruction, always ask your healthcare professional. [address] any warranty or liability for your use of this information. Learning About Emotional Support  When do you need emotional support? You might find getting support from others helpful when you have a long-term health problem. Often people feel alone, confused, or scared when coping with an illness. But you aren't alone. Other people are going through the same thing you are and know how you feel. Talking with others about your feelings can help you feel better. Your family and friends can give you support. So can your doctor, a support group, or a [employer]. If you have a support network, you will not feel as alone. You will learn new ways to deal with your situation, and you may try harder to overcome it. Where you can get support  Family and friends: They can help you cope by giving you comfort and encouragement. Counseling: Professional counseling can help you cope with situations that interfere with your life and cause stress. Counseling can help you understand and deal with your illness. Your doctor: Find a doctor you trust and feel comfortable with. Be open and honest about your fears and concerns.  Your doctor

## 2023-03-31 NOTE — PROGRESS NOTES
SURGICAL HISTORY      blood clots- 1961       Social History     Socioeconomic History    Marital status:      Spouse name: Not on file    Number of children: Not on file    Years of education: Not on file    Highest education level: Not on file   Occupational History    Not on file   Tobacco Use    Smoking status: Never    Smokeless tobacco: Never   Vaping Use    Vaping Use: Never used   Substance and Sexual Activity    Alcohol use: No    Drug use: No    Sexual activity: Not on file   Other Topics Concern    Not on file   Social History Narrative    Not on file     Social Determinants of Health     Financial Resource Strain: Low Risk     Difficulty of Paying Living Expenses: Not hard at all   Food Insecurity: No Food Insecurity    Worried About Running Out of Food in the Last Year: Never true    [address] in the Last Year: Never true   Transportation Needs: No Transportation Needs    Lack of Transportation (Medical): No    Lack of Transportation (Non-Medical): No   Physical Activity: Inactive    Days of Exercise per Week: 0 days    Minutes of Exercise per Session: 0 min   Stress: Not on file   Social Connections: Not on file   Intimate Partner Violence: Not on file   Housing Stability: Low Risk     Unable to Pay for Housing in the Last Year: No    Number of Places Lived in the Last Year: 1    Unstable Housing in the Last Year: No       Family History   Problem Relation Age of Onset    Cancer Other     Hypertension Other     Seizures Other     Heart Disease Mother     Other Father         murdered     Colon Cancer Brother     Cancer Maternal Aunt         breast    Colon Cancer Maternal Grandmother        Current Outpatient Medications   Medication Sig Dispense Refill    Lidocaine 1.8 % PTCH Apply 1 Film topically daily 30 patch 0    HYDROcodone-acetaminophen (NORCO) 7.5-325 MG per tablet Take 1 tablet by mouth every 8 hours as needed for Pain for up to 28 days.  Intended supply: 30 days 84 tablet 0

## 2023-04-19 ENCOUNTER — ANTI-COAG VISIT (OUTPATIENT)
Dept: PHARMACY | Age: 83
End: 2023-04-19
Payer: MEDICARE

## 2023-04-19 LAB — INR BLD: 2.6

## 2023-04-19 PROCEDURE — 99211 OFF/OP EST MAY X REQ PHY/QHP: CPT

## 2023-04-19 PROCEDURE — 85610 PROTHROMBIN TIME: CPT

## 2023-04-19 NOTE — PROGRESS NOTES
MsAmie Bills is here for management of anticoagulation for hx of DVT. PMH also significant for HTN. She presents today w/out complaint. Pt verifies dosing regimen as listed above. Pt denies s/s bleeding/swelling/SOB. No missed doses. No changes in Rx/OTCs/Herbal medications. Occasional EtOH use and denies tobacco.    Patient reports no changes. INR 2.6 is within the therapeutic range of 2-3. Recommend to continue 5 mg Mon/Fri, 7.5 mg all other days  Pt has the 5 mg tablets. Will continue to monitor and check INR in 4 weeks. Dosing reminder card given with phone number, appointment date and time.    Return to clinic: 5/17  1145  Referring physician: Dr. Lalit Kelly Only    Intervention Detail:   Total # of Interventions Recommended: 0  Total # of Interventions Accepted: 0  Time Spent (min): 15

## 2023-04-20 ENCOUNTER — OFFICE VISIT (OUTPATIENT)
Dept: PAIN MANAGEMENT | Age: 83
End: 2023-04-20
Payer: MEDICARE

## 2023-04-20 VITALS
BODY MASS INDEX: 26.31 KG/M2 | OXYGEN SATURATION: 94 % | DIASTOLIC BLOOD PRESSURE: 89 MMHG | SYSTOLIC BLOOD PRESSURE: 149 MMHG | HEART RATE: 70 BPM | WEIGHT: 134 LBS | HEIGHT: 60 IN | TEMPERATURE: 97.5 F

## 2023-04-20 DIAGNOSIS — M48.061 SPINAL STENOSIS OF LUMBAR REGION WITHOUT NEUROGENIC CLAUDICATION: ICD-10-CM

## 2023-04-20 DIAGNOSIS — M47.816 LUMBAR FACET ARTHROPATHY: ICD-10-CM

## 2023-04-20 DIAGNOSIS — M51.36 DDD (DEGENERATIVE DISC DISEASE), LUMBAR: ICD-10-CM

## 2023-04-20 DIAGNOSIS — G89.4 CHRONIC PAIN SYNDROME: ICD-10-CM

## 2023-04-20 DIAGNOSIS — M43.10 DEGENERATIVE SPONDYLOLISTHESIS: ICD-10-CM

## 2023-04-20 DIAGNOSIS — M96.1 FAILED BACK SURGICAL SYNDROME: ICD-10-CM

## 2023-04-20 DIAGNOSIS — F33.42 RECURRENT MAJOR DEPRESSIVE DISORDER, IN FULL REMISSION (HCC): ICD-10-CM

## 2023-04-20 DIAGNOSIS — R26.89 BALANCE PROBLEM: ICD-10-CM

## 2023-04-20 DIAGNOSIS — M50.90 CERVICAL DISC DISEASE: ICD-10-CM

## 2023-04-20 PROCEDURE — 99213 OFFICE O/P EST LOW 20 MIN: CPT | Performed by: NURSE PRACTITIONER

## 2023-04-20 PROCEDURE — 1123F ACP DISCUSS/DSCN MKR DOCD: CPT | Performed by: NURSE PRACTITIONER

## 2023-04-20 PROCEDURE — 3078F DIAST BP <80 MM HG: CPT | Performed by: NURSE PRACTITIONER

## 2023-04-20 PROCEDURE — 3074F SYST BP LT 130 MM HG: CPT | Performed by: NURSE PRACTITIONER

## 2023-04-20 RX ORDER — HYDROCODONE BITARTRATE AND ACETAMINOPHEN 7.5; 325 MG/1; MG/1
1 TABLET ORAL EVERY 8 HOURS PRN
Qty: 84 TABLET | Refills: 0 | Status: SHIPPED | OUTPATIENT
Start: 2023-04-20 | End: 2023-05-18

## 2023-04-20 RX ORDER — DULOXETIN HYDROCHLORIDE 60 MG/1
60 CAPSULE, DELAYED RELEASE ORAL DAILY
Qty: 90 CAPSULE | Refills: 0 | Status: SHIPPED | OUTPATIENT
Start: 2023-04-20 | End: 2023-07-19

## 2023-04-20 NOTE — PROGRESS NOTES
per tablet Take 1 tablet by mouth every 8 hours as needed for Pain for up to 28 days. Intended supply: 30 days 84 tablet 0    DULoxetine (CYMBALTA) 60 MG extended release capsule Take 1 capsule by mouth daily 90 capsule 0    lisinopril-hydroCHLOROthiazide (PRINZIDE;ZESTORETIC) 10-12.5 MG per tablet TAKE 1 TABLET BY MOUTH  DAILY 90 tablet 3    warfarin (COUMADIN) 5 MG tablet TAKE 2 TABLETS BY MOUTH  DAILY ON MONDAY AND 1 AND  1/2 TABLETS BY MOUTH DAILY  ON ALL OTHER DAYS, OR  DIRECTED BY COUMADIN CLINIC 143 tablet 3    prednisoLONE acetate (PRED FORTE) 1 % ophthalmic suspension INSTILL 1 DROP INTO SURGICAL EYE(S) FOUR TIMES DAILY, STARTING 2 DAYS PRIOR TO SURGERY. moxifloxacin (VIGAMOX) 0.5 % ophthalmic solution INSTILL 1 DROP INTO SURGICAL EYE(S) FOUR TIMES DAILY, STARTING 2 DAYS PRIOR TO SURGERY. diclofenac (VOLTAREN) 0.1 % ophthalmic solution INSTILL 1 DROP INTO SURGICAL EYE(S) FOUR TIMES DAILY, STARTING 2 DAYS PRIOR TO SURGERY. pantoprazole (PROTONIX) 40 MG tablet TAKE 1 TABLET BY MOUTH  DAILY 90 tablet 3    Acetaminophen (TYLENOL ARTHRITIS EXT RELIEF PO) Take by mouth in the morning and at bedtime      simvastatin (ZOCOR) 40 MG tablet TAKE 1 TABLET BY MOUTH AT  NIGHT 90 tablet 3    Handicap Placard MISC by Does not apply route 1 each 0    cetirizine (ZYRTEC) 10 MG tablet Take 1 tablet by mouth daily      fluticasone (FLONASE) 50 MCG/ACT nasal spray 2 sprays by Each Nostril route daily (Patient taking differently: 2 sprays by Each Nostril route as needed) 1 Bottle 5    L-Methylfolate-Q88-H2-D4 (CEREFOLIN PO) Take by mouth      Diapers & Supplies MISC 1 each by Does not apply route daily as needed (incontinence) 100 each 3    Misc. Devices MISC Compression stockings/open toe pantyhose with compression 40/50 2 each 2    Misc. Devices MISC George panty hose  Open toe Petite plus beige  2 pairs 2 each 1     No facility-administered medications prior to visit.        SOCIAL/FAMILY/PAST MEDICAL HISTORY: Ms.

## 2023-05-01 ENCOUNTER — OFFICE VISIT (OUTPATIENT)
Dept: FAMILY MEDICINE CLINIC | Age: 83
End: 2023-05-01

## 2023-05-01 VITALS
TEMPERATURE: 98.3 F | OXYGEN SATURATION: 92 % | WEIGHT: 136.2 LBS | DIASTOLIC BLOOD PRESSURE: 70 MMHG | HEIGHT: 60 IN | HEART RATE: 63 BPM | SYSTOLIC BLOOD PRESSURE: 144 MMHG | BODY MASS INDEX: 26.74 KG/M2

## 2023-05-01 DIAGNOSIS — R41.3 MEMORY LOSS: ICD-10-CM

## 2023-05-01 DIAGNOSIS — R79.9 ABNORMAL FINDING OF BLOOD CHEMISTRY, UNSPECIFIED: ICD-10-CM

## 2023-05-01 DIAGNOSIS — I10 ESSENTIAL HYPERTENSION: Primary | ICD-10-CM

## 2023-05-01 DIAGNOSIS — E78.2 MIXED HYPERLIPIDEMIA: ICD-10-CM

## 2023-05-01 DIAGNOSIS — F33.2 SEVERE EPISODE OF RECURRENT MAJOR DEPRESSIVE DISORDER, WITHOUT PSYCHOTIC FEATURES (HCC): ICD-10-CM

## 2023-05-01 LAB
ANION GAP SERPL CALCULATED.3IONS-SCNC: 9 MMOL/L (ref 3–16)
BUN SERPL-MCNC: 18 MG/DL (ref 7–20)
CALCIUM SERPL-MCNC: 9.5 MG/DL (ref 8.3–10.6)
CHLORIDE SERPL-SCNC: 103 MMOL/L (ref 99–110)
CHOLEST SERPL-MCNC: 137 MG/DL (ref 0–199)
CO2 SERPL-SCNC: 31 MMOL/L (ref 21–32)
CREAT SERPL-MCNC: 0.7 MG/DL (ref 0.6–1.2)
GFR SERPLBLD CREATININE-BSD FMLA CKD-EPI: >60 ML/MIN/{1.73_M2}
GLUCOSE SERPL-MCNC: 90 MG/DL (ref 70–99)
HDLC SERPL-MCNC: 67 MG/DL (ref 40–60)
LDLC SERPL CALC-MCNC: 59 MG/DL
POTASSIUM SERPL-SCNC: 4.1 MMOL/L (ref 3.5–5.1)
SODIUM SERPL-SCNC: 143 MMOL/L (ref 136–145)
TRIGL SERPL-MCNC: 55 MG/DL (ref 0–150)
TSH SERPL DL<=0.005 MIU/L-ACNC: 1.09 UIU/ML (ref 0.27–4.2)
VLDLC SERPL CALC-MCNC: 11 MG/DL

## 2023-05-01 ASSESSMENT — ENCOUNTER SYMPTOMS
VOMITING: 0
ABDOMINAL PAIN: 0
SHORTNESS OF BREATH: 0
CHEST TIGHTNESS: 0
NAUSEA: 0
BACK PAIN: 0
COLOR CHANGE: 0

## 2023-05-01 NOTE — PROGRESS NOTES
2023    This is a [de-identified] y.o. female who presents for  Chief Complaint   Patient presents with    Hypertension       HPI:     Saw me in 3/23 with elevated blood pressures  Had unexpectedly lost her  prior  Was dealing with intense stress at home with financial obligations and family fueding    BP is better today   No acute concerning Sxs: No CP, SOB, palpitations, dizziness, HA, etc.   States it was harder to get her stockings on today   Did not obtain labs I ordered last visit    Sleeping more, admits to continued depression. Still refuses to seek grief counseling but appears more open to it today. Sees Dr. Etelvina Pearce next month.  Did not schedule or complete her stress testing/echo from      Past Medical History:   Diagnosis Date    Allergic rhinitis     Arthritis     Cancer ([facility] [address]     squamous cell on nose     Chronic back pain     Dementia ([facility] [address] 2019    Dental disease     Depression     Dizziness     GERD (gastroesophageal reflux disease)     H/O blood clots     Hearing loss     Hx of blood clots     Hyperlipidemia     Hypertension     Lumbar stenosis with neurogenic claudication     Osteoarthritis of neck     TMJ dysfunction        Past Surgical History:   Procedure Laterality Date     SECTION      x1    EMBOLECTOMY      EPIDURAL STEROID INJECTION Bilateral 2019    BILATERAL LUMBAR THREE, LUMBAR FOUR, LUMBAR FIVE RADIOFREQUENCY ABLATION SITE CONFIRMED BY FLUOROSCOPY performed by Carrol Corley MD at [address] Right 8/15/2019    RIGHT SACROILIAC JOINT INJECTION SITE CONFIRMED BY FLUOROSCOPY performed by Carrol Corley MD at [address] ([address] [place] [address])      HYSTERECTOMY, TOTAL ABDOMINAL (CERVIX REMOVED)      LAMINECTOMY  2018    MICROLUMBAR LAMINECTOMY L4-5    LITHOTRIPSY      MOHS SURGERY      nose for squamous cell carcinoma    OTHER SURGICAL HISTORY      blood clots-        Social History     Socioeconomic

## 2023-05-02 LAB
EST. AVERAGE GLUCOSE BLD GHB EST-MCNC: 114 MG/DL
HBA1C MFR BLD: 5.6 %

## 2023-05-17 ENCOUNTER — ANTI-COAG VISIT (OUTPATIENT)
Dept: PHARMACY | Age: 83
End: 2023-05-17
Payer: MEDICARE

## 2023-05-17 LAB — INR BLD: 3

## 2023-05-17 PROCEDURE — 99211 OFF/OP EST MAY X REQ PHY/QHP: CPT

## 2023-05-17 PROCEDURE — 85610 PROTHROMBIN TIME: CPT

## 2023-05-17 NOTE — PROGRESS NOTES
Ms. Tosha Martin is here for management of anticoagulation for hx of DVT. PMH also significant for HTN. She presents today w/out complaint. Pt verifies dosing regimen as listed above. Pt denies s/s bleeding/swelling/SOB. No missed doses. No changes in Rx/OTCs/Herbal medications. Occasional EtOH use and denies tobacco.    Patient reports no changes. INR 3.0 is within the therapeutic range of 2-3. Recommend to continue 5 mg Mon/Fri, 7.5 mg all other days  Pt has the 5 mg tablets. Will continue to monitor and check INR in 4 weeks. Dosing reminder card given with phone number, appointment date and time.    Return to clinic: 6/14 1130  Referring physician: Dr. Raphael Green Only    Intervention Detail:   Total # of Interventions Recommended: 0  Total # of Interventions Accepted: 0  Time Spent (min): 15

## 2023-05-18 ENCOUNTER — OFFICE VISIT (OUTPATIENT)
Dept: PAIN MANAGEMENT | Age: 83
End: 2023-05-18
Payer: MEDICARE

## 2023-05-18 VITALS
BODY MASS INDEX: 26.5 KG/M2 | WEIGHT: 135 LBS | HEART RATE: 67 BPM | SYSTOLIC BLOOD PRESSURE: 149 MMHG | DIASTOLIC BLOOD PRESSURE: 86 MMHG | HEIGHT: 60 IN | OXYGEN SATURATION: 92 % | TEMPERATURE: 97.3 F

## 2023-05-18 DIAGNOSIS — M43.10 DEGENERATIVE SPONDYLOLISTHESIS: ICD-10-CM

## 2023-05-18 DIAGNOSIS — M48.061 SPINAL STENOSIS OF LUMBAR REGION WITHOUT NEUROGENIC CLAUDICATION: ICD-10-CM

## 2023-05-18 DIAGNOSIS — M47.816 LUMBAR FACET ARTHROPATHY: ICD-10-CM

## 2023-05-18 DIAGNOSIS — F33.42 RECURRENT MAJOR DEPRESSIVE DISORDER, IN FULL REMISSION (HCC): ICD-10-CM

## 2023-05-18 DIAGNOSIS — G89.4 CHRONIC PAIN SYNDROME: ICD-10-CM

## 2023-05-18 DIAGNOSIS — M51.36 DDD (DEGENERATIVE DISC DISEASE), LUMBAR: ICD-10-CM

## 2023-05-18 DIAGNOSIS — M50.90 CERVICAL DISC DISEASE: ICD-10-CM

## 2023-05-18 DIAGNOSIS — M96.1 FAILED BACK SURGICAL SYNDROME: ICD-10-CM

## 2023-05-18 PROCEDURE — 99213 OFFICE O/P EST LOW 20 MIN: CPT | Performed by: NURSE PRACTITIONER

## 2023-05-18 PROCEDURE — 3074F SYST BP LT 130 MM HG: CPT | Performed by: NURSE PRACTITIONER

## 2023-05-18 PROCEDURE — 3078F DIAST BP <80 MM HG: CPT | Performed by: NURSE PRACTITIONER

## 2023-05-18 PROCEDURE — 1123F ACP DISCUSS/DSCN MKR DOCD: CPT | Performed by: NURSE PRACTITIONER

## 2023-05-18 RX ORDER — DULOXETIN HYDROCHLORIDE 60 MG/1
60 CAPSULE, DELAYED RELEASE ORAL DAILY
Qty: 90 CAPSULE | Refills: 0 | Status: SHIPPED | OUTPATIENT
Start: 2023-05-18 | End: 2023-08-16

## 2023-05-18 RX ORDER — HYDROCODONE BITARTRATE AND ACETAMINOPHEN 7.5; 325 MG/1; MG/1
1 TABLET ORAL EVERY 8 HOURS PRN
Qty: 84 TABLET | Refills: 0 | Status: SHIPPED | OUTPATIENT
Start: 2023-05-18 | End: 2023-06-15

## 2023-05-18 NOTE — PROGRESS NOTES
Mathew Elias  1940  [id number]      HISTORY OF PRESENT ILLNESS: Ms. Yarelis Lopez is a [de-identified] y.o. female returns for a follow up visit for pain management  She has a diagnosis of   1. Chronic pain syndrome    2. Cervical disc disease    3. DDD (degenerative disc disease), lumbar    4. Lumbar facet arthropathy    5. Degenerative spondylolisthesis    6. Spinal stenosis of lumbar region without neurogenic claudication    7. Failed back surgical syndrome    8. Recurrent major depressive disorder, in full remission ([facility] [address]      As per Information Obtained from the PADT (Patient Assessment and Documentation Tool)    She complains of pain in the  uppe and lower back, both buttocks, both nees  She rates the pain 5/10 and describes it as aching, burning, numbness, pins and needles. Current treatment regimen has helped relieve about 40% of the pain. She denies any side effects from the current pain regimen. Patient reports that since the last follow up visit the physical functioning is unchanged, family/social relationships are unchanged, mood is unchanged sleep patterns are worse, and that the overall functioning is worse. Patient denies misusing/abusing her narcotic pain medications or using any illegal drugs. Upon obtaining medical history from Ms. Yarelis Lopez states that pain is manageable on current pain therapy. Takes the pain medications as prescribed. Mood/anxiety is stable. Sleep is fair with an average of 5-6 hours. Denies to having issues of constipation. Tolerating activities/house chores with moderate tenderness to the lower back. ALLERGIES: Patients list of allergies were reviewed     MEDICATIONS: Ms. Yarelis Lopez list of medications were reviewed. Her current medications are   Outpatient Medications Prior to Visit   Medication Sig Dispense Refill    lisinopril-hydroCHLOROthiazide (PRINZIDE;ZESTORETIC) 10-12.5 MG per tablet TAKE 1 TABLET BY MOUTH  DAILY 90 tablet 3    warfarin (COUMADIN) 5 MG tablet TAKE 2 TABLETS

## 2023-05-22 RX ORDER — LISINOPRIL 10 MG/1
TABLET ORAL
Qty: 90 TABLET | Refills: 1 | Status: SHIPPED | OUTPATIENT
Start: 2023-05-22

## 2023-05-22 NOTE — TELEPHONE ENCOUNTER
.Refill Request     CONFIRM preferred pharmacy with the patient. If Mail Order Rx - Pend for 90 day refill. Last Seen: Last Seen Department: 5/1/2023  Last Seen by PCP: 5/1/2023    Last Written: 12/19/22 90 with 3     If no future appointment scheduled:  Review the last OV with PCP and review information for follow-up visit,  Route STAFF MESSAGE with patient name to the [facility] for scheduling with the following information:            -  Timing of next visit           -  Visit type ie Physical, OV, etc           -  Diagnoses/Reason ie. COPD, HTN - Do not use MEDICATION, Follow-up or CHECK UP - Give reason for visit      Next Appointment:   Future Appointments   Date Time Provider Clare Bliss   6/5/2023  1:15 PM LIZZY Franklin CNP Seats [facility]   6/14/2023 11:30 AM [address] [facility]   6/15/2023  8:20 AM LIZZY Darnell CNP R BANK PAIN [facility]   6/22/2023 11:00 AM A ECHO ROOM [facility] AND CAR Ellie [place]   6/22/2023 12:30 PM [facility] NUCLEAR STRESS ROOM [facility] AND CAR [place]ie [place]       Message sent to [employer] to schedule appt with patient?   YES      Requested Prescriptions     Pending Prescriptions Disp Refills    lisinopril (PRINIVIL;ZESTRIL) 10 MG tablet [Pharmacy Med Name: LISINOPRIL 10 MG TABLET] 90 tablet 1     Sig: TAKE ONE TABLET BY MOUTH DAILY

## 2023-06-05 ENCOUNTER — OFFICE VISIT (OUTPATIENT)
Dept: CARDIOLOGY CLINIC | Age: 83
End: 2023-06-05
Payer: MEDICARE

## 2023-06-05 VITALS
SYSTOLIC BLOOD PRESSURE: 128 MMHG | BODY MASS INDEX: 26.11 KG/M2 | HEART RATE: 63 BPM | DIASTOLIC BLOOD PRESSURE: 70 MMHG | HEIGHT: 60 IN | OXYGEN SATURATION: 93 % | WEIGHT: 133 LBS

## 2023-06-05 DIAGNOSIS — I45.10 RBBB: ICD-10-CM

## 2023-06-05 DIAGNOSIS — I10 ESSENTIAL HYPERTENSION: ICD-10-CM

## 2023-06-05 DIAGNOSIS — R09.89 BILATERAL CAROTID BRUITS: ICD-10-CM

## 2023-06-05 DIAGNOSIS — I82.4Y3 DEEP VEIN THROMBOSIS (DVT) OF PROXIMAL VEIN OF BOTH LOWER EXTREMITIES, UNSPECIFIED CHRONICITY (HCC): ICD-10-CM

## 2023-06-05 DIAGNOSIS — E78.00 HYPERCHOLESTEREMIA: ICD-10-CM

## 2023-06-05 DIAGNOSIS — I51.7 ATRIAL SEPTAL HYPERTROPHY: Primary | ICD-10-CM

## 2023-06-05 PROCEDURE — 1123F ACP DISCUSS/DSCN MKR DOCD: CPT | Performed by: NURSE PRACTITIONER

## 2023-06-05 PROCEDURE — 3074F SYST BP LT 130 MM HG: CPT | Performed by: NURSE PRACTITIONER

## 2023-06-05 PROCEDURE — 3078F DIAST BP <80 MM HG: CPT | Performed by: NURSE PRACTITIONER

## 2023-06-05 PROCEDURE — 99214 OFFICE O/P EST MOD 30 MIN: CPT | Performed by: NURSE PRACTITIONER

## 2023-06-05 RX ORDER — PRAVASTATIN SODIUM 20 MG
20 TABLET ORAL DAILY
Qty: 90 TABLET | Refills: 1 | Status: SHIPPED | OUTPATIENT
Start: 2023-06-05

## 2023-06-05 NOTE — PROGRESS NOTES
[address]   Cardiac Evaluation    Primary Care Doctor:  Gabrielle Early MD    Chief Complaint   Patient presents with    Hypertension     Last time she went to MD her SBP was above 150. 5/18/2023    Other     DVT, RBBB, Atrial Septal Hypertrophy    Shortness of Breath     Any strenuous activity    Palpitations     When she lays down after activity (including standing) her heart is pounding and pt states can hear her heart beat in her head    Dizziness     Issues with balance        Assessment:    1. Atrial septal hypertrophy    2. Essential hypertension    3. Hypercholesteremia    4. RBBB    5. Deep vein thrombosis (DVT) of proximal vein of both lower extremities, unspecified chronicity (HCC)        Plan:   Stop the simvastatin. Wait 2 weeks then start pravastatin 20 mg daily (cholesterol, memory)  No change in other heart/ BP medicines  Echocardiogram and stress test as scheduled  Carotid ultrasound  Will call you with results and any new recommendations  Check your BP when you have the smothery feeling and lightheadedness   Follow up with me in 6 weeks  Stay hydrated !!!    Vitals:    06/05/23 1309   BP: 128/70   Site: Right Upper Arm   Position: Sitting   Cuff Size: Medium Adult   Pulse: 63   SpO2: 93%   Weight: 133 lb (60.3 kg)   Height: 5' (1.524 m)       Primary Cardiologist: Dr. Larissa Martin     History of Present Illness:   I had the pleasure of seeing Adrianna Pruitt ([de-identified] y.o.) in follow up for atrial septal hypertrophy, chronic RBBB, hypertension, hyperlipidemia as well as history of DVT on warfarin and hiatal hernia. Her BP has been running higher at other provider offices. She has shortness of breath and smothery feeling when working outside. She also feels her heart beat in her head when she lays down. This occurred previously but stopped and now restarted. She also notes smothery feels when she eats. Her  passed away in March. She has lost a lot of weight since then.

## 2023-06-05 NOTE — PATIENT INSTRUCTIONS
Stop the simvastatin.   Wait 2 weeks then start pravastatin 20 mg daily (cholesterol, memory)  No change in other heart/ BP medicines  Echocardiogram and stress test as scheduled  Carotid ultrasound - call [address] [place] [address] [phone]) to schedule (maybe same day as echo and stress)  Will call you with results and any new recommendations  Check your BP when you have the smothery feeling and lightheadedness   Follow up with me in 6 weeks  Stay hydrated !!!

## 2023-06-17 DIAGNOSIS — E78.00 HYPERCHOLESTEREMIA: ICD-10-CM

## 2023-06-19 DIAGNOSIS — K21.9 GASTROESOPHAGEAL REFLUX DISEASE: ICD-10-CM

## 2023-06-19 DIAGNOSIS — R10.13 EPIGASTRIC ABDOMINAL PAIN: ICD-10-CM

## 2023-06-19 RX ORDER — PANTOPRAZOLE SODIUM 40 MG/1
40 TABLET, DELAYED RELEASE ORAL DAILY
Qty: 90 TABLET | Refills: 0 | Status: SHIPPED | OUTPATIENT
Start: 2023-06-19

## 2023-06-19 RX ORDER — SIMVASTATIN 40 MG
TABLET ORAL
Qty: 90 TABLET | Refills: 3 | OUTPATIENT
Start: 2023-06-19

## 2023-06-19 NOTE — TELEPHONE ENCOUNTER
Per cardiologist note dated 6/5/2023    Plan:   Stop the simvastatin.   Wait 2 weeks then start pravastatin 20 mg daily (cholesterol, memory)  No change in other heart/ BP medicines  Echocardiogram and stress test as scheduled  Carotid ultrasound  Will call you with results and any new recommendations  Check your BP when you have the smothery feeling and lightheadedness   Follow up with me in 6 weeks  Stay hydrated !!!      Refusing refill due to change in therapy by cardiology

## 2023-06-19 NOTE — TELEPHONE ENCOUNTER
Refill Request     CONFIRM preferred pharmacy with the patient. If Mail Order Rx - Pend for 90 day refill. Last Seen: Last Seen Department: 5/1/2023  Last Seen by PCP: 5/1/2023    Last Written: 9/16/2022, #90, 3 refill    If no future appointment scheduled:  Review the last OV with PCP and review information for follow-up visit,  Route STAFF MESSAGE with patient name to the [facility] for scheduling with the following information:            -  Timing of next visit           -  Visit type ie Physical, OV, etc           -  Diagnoses/Reason ie. COPD, HTN - Do not use MEDICATION, Follow-up or CHECK UP - Give reason for visit      Next Appointment:   Future Appointments   Date Time Provider [address]   6/22/2023 11:00 AM [facility] ECHO ROOM [facility] AND ELEN Montiel [facility]   6/22/2023 12:30 PM [facility] NUCLEAR STRESS ROOM [facility] AND ELEN Montiel [facility]   6/28/2023 11:30 AM [facility] ANTICOAGULATION CLINIC [facility] Tushar Manning [facility]   7/13/2023 10:00 AM LIZZY Norman CNP R BANK PAIN [facility]   7/20/2023  1:30 PM LIZZY Leon CNPed [facility]       Message sent to [address] [place] [address] to schedule appt with patient?   N/A      Requested Prescriptions     Pending Prescriptions Disp Refills    pantoprazole (PROTONIX) 40 MG tablet [Pharmacy Med Name: Pantoprazole Sodium 40 MG Oral Tablet Delayed Release] 100 tablet 2     Sig: TAKE 1 TABLET BY MOUTH  DAILY

## 2023-06-22 ENCOUNTER — HOSPITAL ENCOUNTER (OUTPATIENT)
Dept: CARDIOLOGY | Age: 83
Discharge: HOME | End: 2023-06-22
Payer: MEDICARE

## 2023-06-22 ENCOUNTER — TELEPHONE (OUTPATIENT)
Dept: CARDIOLOGY CLINIC | Age: 83
End: 2023-06-22

## 2023-06-22 DIAGNOSIS — I51.7 ATRIAL SEPTAL HYPERTROPHY: ICD-10-CM

## 2023-06-22 DIAGNOSIS — I51.89 OTHER ILL-DEFINED HEART DISEASES: ICD-10-CM

## 2023-06-22 LAB
LV EF: 55 %
LV EF: 60 %
LVEF MODALITY: NORMAL
LVEF MODALITY: NORMAL

## 2023-06-22 PROCEDURE — 3430000000 HC RX DIAGNOSTIC RADIOPHARMACEUTICAL: Performed by: INTERNAL MEDICINE

## 2023-06-22 PROCEDURE — 93306 TTE W/DOPPLER COMPLETE: CPT

## 2023-06-22 PROCEDURE — 6360000002 HC RX W HCPCS: Performed by: INTERNAL MEDICINE

## 2023-06-22 PROCEDURE — A9502 TC99M TETROFOSMIN: HCPCS | Performed by: INTERNAL MEDICINE

## 2023-06-22 RX ORDER — AMINOPHYLLINE DIHYDRATE 25 MG/ML
75 INJECTION, SOLUTION INTRAVENOUS ONCE
Status: COMPLETED | OUTPATIENT
Start: 2023-06-22 | End: 2023-06-22

## 2023-06-22 RX ORDER — REGADENOSON 0.08 MG/ML
0.4 INJECTION, SOLUTION INTRAVENOUS
Status: COMPLETED | OUTPATIENT
Start: 2023-06-22 | End: 2023-06-22

## 2023-06-22 RX ADMIN — REGADENOSON 0.4 MG: 0.08 INJECTION, SOLUTION INTRAVENOUS at 12:24

## 2023-06-22 RX ADMIN — AMINOPHYLLINE 75 MG: 25 INJECTION, SOLUTION INTRAVENOUS at 12:30

## 2023-06-22 RX ADMIN — TETROFOSMIN 10 MILLICURIE: 1.38 INJECTION, POWDER, LYOPHILIZED, FOR SOLUTION INTRAVENOUS at 11:15

## 2023-06-22 RX ADMIN — TETROFOSMIN 32.5 MILLICURIE: 1.38 INJECTION, POWDER, LYOPHILIZED, FOR SOLUTION INTRAVENOUS at 12:24

## 2023-06-22 NOTE — TELEPHONE ENCOUNTER
----- Message from Yomaira Schneider MD sent at 6/22/2023  3:12 PM EDT -----  Let patient know their echo test raises concern for AV wear and tear and she may benefit from eval in TAVR and ct surgery clinics for opinion on it, lets refer to those clinics. Thanks.

## 2023-06-22 NOTE — TELEPHONE ENCOUNTER
----- Message from Martina Noe MD sent at 6/22/2023  3:13 PM EDT -----  Let patient know their stress test shows good ht function, however, echo was abnl, see that result note regarding findings/recs on that. Thanks.

## 2023-06-22 NOTE — TELEPHONE ENCOUNTER
Pt informed and understanding.  She reports she will let us know if she decided to proceed with seeing CT surgery/TAVR

## 2023-06-28 ENCOUNTER — ANTI-COAG VISIT (OUTPATIENT)
Dept: PHARMACY | Age: 83
End: 2023-06-28
Payer: MEDICARE

## 2023-06-28 LAB — INR BLD: 1.7

## 2023-06-28 PROCEDURE — 85610 PROTHROMBIN TIME: CPT

## 2023-06-28 PROCEDURE — 99211 OFF/OP EST MAY X REQ PHY/QHP: CPT

## 2023-07-05 ENCOUNTER — TELEPHONE (OUTPATIENT)
Dept: CARDIOLOGY CLINIC | Age: 83
End: 2023-07-05

## 2023-07-05 ENCOUNTER — HOSPITAL ENCOUNTER (OUTPATIENT)
Dept: VASCULAR LAB | Age: 83
Discharge: HOME | End: 2023-07-05
Payer: MEDICARE

## 2023-07-05 DIAGNOSIS — I51.7 ATRIAL SEPTAL HYPERTROPHY: ICD-10-CM

## 2023-07-05 DIAGNOSIS — R09.89 BILATERAL CAROTID BRUITS: ICD-10-CM

## 2023-07-05 DIAGNOSIS — I45.10 RBBB: ICD-10-CM

## 2023-07-05 DIAGNOSIS — E78.00 HYPERCHOLESTEREMIA: ICD-10-CM

## 2023-07-05 PROCEDURE — 93880 EXTRACRANIAL BILAT STUDY: CPT

## 2023-07-05 NOTE — TELEPHONE ENCOUNTER
----- Message from LIZZY Holliday CNP sent at 7/5/2023  4:36 PM EDT -----  Mild non-obstructive plaque in both carotid arteries.    LIZZY Holliday CNP

## 2023-07-13 ENCOUNTER — OFFICE VISIT (OUTPATIENT)
Dept: PAIN MANAGEMENT | Age: 83
End: 2023-07-13
Payer: MEDICARE

## 2023-07-13 VITALS
TEMPERATURE: 97.7 F | DIASTOLIC BLOOD PRESSURE: 89 MMHG | WEIGHT: 133 LBS | BODY MASS INDEX: 26.11 KG/M2 | SYSTOLIC BLOOD PRESSURE: 169 MMHG | HEIGHT: 60 IN | HEART RATE: 66 BPM | OXYGEN SATURATION: 97 %

## 2023-07-13 DIAGNOSIS — M51.36 DDD (DEGENERATIVE DISC DISEASE), LUMBAR: ICD-10-CM

## 2023-07-13 DIAGNOSIS — M96.1 FAILED BACK SURGICAL SYNDROME: ICD-10-CM

## 2023-07-13 DIAGNOSIS — M47.816 LUMBAR FACET ARTHROPATHY: ICD-10-CM

## 2023-07-13 DIAGNOSIS — M50.90 CERVICAL DISC DISEASE: ICD-10-CM

## 2023-07-13 DIAGNOSIS — F32.A DEPRESSION, UNSPECIFIED DEPRESSION TYPE: ICD-10-CM

## 2023-07-13 DIAGNOSIS — G89.4 CHRONIC PAIN SYNDROME: ICD-10-CM

## 2023-07-13 DIAGNOSIS — M48.061 SPINAL STENOSIS OF LUMBAR REGION WITHOUT NEUROGENIC CLAUDICATION: ICD-10-CM

## 2023-07-13 DIAGNOSIS — M43.10 DEGENERATIVE SPONDYLOLISTHESIS: ICD-10-CM

## 2023-07-13 DIAGNOSIS — E66.09 CLASS 1 OBESITY DUE TO EXCESS CALORIES IN ADULT, UNSPECIFIED BMI, UNSPECIFIED WHETHER SERIOUS COMORBIDITY PRESENT: ICD-10-CM

## 2023-07-13 PROCEDURE — 3074F SYST BP LT 130 MM HG: CPT | Performed by: NURSE PRACTITIONER

## 2023-07-13 PROCEDURE — 99213 OFFICE O/P EST LOW 20 MIN: CPT | Performed by: NURSE PRACTITIONER

## 2023-07-13 PROCEDURE — 3078F DIAST BP <80 MM HG: CPT | Performed by: NURSE PRACTITIONER

## 2023-07-13 PROCEDURE — 1123F ACP DISCUSS/DSCN MKR DOCD: CPT | Performed by: NURSE PRACTITIONER

## 2023-07-13 RX ORDER — HYDROCODONE BITARTRATE AND ACETAMINOPHEN 7.5; 325 MG/1; MG/1
1 TABLET ORAL EVERY 8 HOURS PRN
Qty: 84 TABLET | Refills: 0 | Status: SHIPPED | OUTPATIENT
Start: 2023-07-13 | End: 2023-08-10

## 2023-07-13 NOTE — PROGRESS NOTES
Duyen Ray  1940  [id number]      HISTORY OF PRESENT ILLNESS: Ms. Alvarez Mcdonald is a 82 y.o. female returns for a follow up visit for pain management  She has a diagnosis of   1. Chronic pain syndrome    2. Cervical disc disease    3. DDD (degenerative disc disease), lumbar    4. Lumbar facet arthropathy    5. Spinal stenosis of lumbar region without neurogenic claudication    6. Degenerative spondylolisthesis    7. Failed back surgical syndrome    8. Depression, unspecified depression type    9. Class 1 obesity due to excess calories in adult, unspecified BMI, unspecified whether serious comorbidity present      As per Information Obtained from the PADT (Patient Assessment and Documentation Tool)    She complains of pain in the  lower back  She rates the pain 4/10 and describes it as numbness, pins and needles. Current treatment regimen has helped relieve about 40% of the pain. She has feels like something in bed with pt at night  any side effects from the current pain regimen. Patient reports that since the last follow up visit the physical functioning is unchanged, family/social relationships are unchanged, mood is unchanged sleep patterns are unchanged, and that the overall functioning is unchanged. Patient denies misusing/abusing her narcotic pain medications or using any illegal drugs. Upon obtaining medical history from Ms. Alvarez Mcdonald states that pain is manageable on current pain therapy. Takes the pain medications as prescribed. Mood/anxiety is stable. Sleep is fair with an average of 5-6 hours. Denies to having issues of constipation. Tolerating activities/house chores with moderate tenderness to the lower back. ALLERGIES: Patients list of allergies were reviewed     MEDICATIONS: Ms. Alvarez Mcdonald list of medications were reviewed. Her current medications are   Outpatient Medications Prior to Visit   Medication Sig Dispense Refill    pantoprazole (PROTONIX) 40 MG tablet TAKE 1 TABLET BY MOUTH  DAILY 90

## 2023-07-16 DIAGNOSIS — E78.00 HYPERCHOLESTEREMIA: ICD-10-CM

## 2023-07-16 NOTE — TELEPHONE ENCOUNTER
.Refill Request     CONFIRM preferred pharmacy with the patient. If Mail Order Rx - Pend for 90 day refill. Last Seen: Last Seen Department: 5/1/2023  Last Seen by PCP: 5/1/2023    Last Written: 5/16/22 90 with 3     If no future appointment scheduled:  Review the last OV with PCP and review information for follow-up visit,  Route STAFF MESSAGE with patient name to the [facility] for scheduling with the following information:            -  Timing of next visit           -  Visit type ie Physical, OV, etc           -  Diagnoses/Reason ie. COPD, HTN - Do not use MEDICATION, Follow-up or CHECK UP - Give reason for visit      Next Appointment:   Future Appointments   Date Time Provider [address]   7/20/2023  1:30 PM LIZZY Logan CNP Rosalita Ends [facility]   7/26/2023 11:30 AM Guero [facility]S   8/10/2023  9:40 AM LIZZY Waters CNP R BANK PAIN [facility]   9/21/2023  1:30 PM Alma Delia Torres MD [facility]       Message sent to [address] [place] [address] to schedule appt with patient?   N/A      Requested Prescriptions     Pending Prescriptions Disp Refills    simvastatin (ZOCOR) 40 MG tablet [Pharmacy Med Name: Simvastatin 40 MG Oral Tablet] 90 tablet 0     Sig: TAKE 1 TABLET BY MOUTH AT  NIGHT

## 2023-07-17 RX ORDER — SIMVASTATIN 40 MG
TABLET ORAL
Qty: 90 TABLET | Refills: 0 | OUTPATIENT
Start: 2023-07-17

## 2023-07-17 NOTE — TELEPHONE ENCOUNTER
Please see if patient would like to establish with a resident sooner than her scheduled appointment with me if she doesn't want to wait that long to establish with our office.

## 2023-07-18 NOTE — TELEPHONE ENCOUNTER
Tried calling patient someone picked up and after saying hello 3 times hung up on me will try again later

## 2023-07-19 RX ORDER — LISINOPRIL 10 MG/1
10 TABLET ORAL DAILY
Qty: 90 TABLET | Refills: 1 | Status: SHIPPED | OUTPATIENT
Start: 2023-07-19

## 2023-07-19 RX ORDER — LISINOPRIL 10 MG/1
10 TABLET ORAL DAILY
Qty: 90 TABLET | Refills: 1 | OUTPATIENT
Start: 2023-07-19

## 2023-07-19 NOTE — TELEPHONE ENCOUNTER
Refill Request     CONFIRM preferred pharmacy with the patient. If Mail Order Rx - Pend for 90 day refill. Last Seen: Last Seen Department: 5/1/2023  Last Seen by PCP: 5/1/2023    Last Written: lisinopril- 05/22/2023 90 tablets 1 refill     If no future appointment scheduled:  Review the last OV with PCP and review information for follow-up visit,  Route STAFF MESSAGE with patient name to the [facility] for scheduling with the following information:            -  Timing of next visit           -  Visit type ie Physical, OV, etc           -  Diagnoses/Reason ie. COPD, HTN - Do not use MEDICATION, Follow-up or CHECK UP - Give reason for visit      Next Appointment:   Future Appointments   Date Time Provider [address]   7/20/2023  1:30 PM LIZZY Romero CNP [facility]   7/26/2023 11:30 AM [facility]   8/10/2023  9:40 AM LIZZY Marinelli CNP R BANK PAIN [facility]   9/21/2023  1:30 PM Massiel Burch MD [address] [facility]       Message sent to [address] [place] [address] to schedule appt with patient?   NO      Requested Prescriptions     Pending Prescriptions Disp Refills    lisinopril (PRINIVIL;ZESTRIL) 10 MG tablet 90 tablet 1     Sig: Take 1 tablet by mouth daily

## 2023-07-19 NOTE — TELEPHONE ENCOUNTER
Last script sent to [facility], insurance is requiring a mail-in pharmacy, patient is switching to [facility].

## 2023-07-20 ENCOUNTER — OFFICE VISIT (OUTPATIENT)
Dept: CARDIOLOGY CLINIC | Age: 83
End: 2023-07-20
Payer: MEDICARE

## 2023-07-20 VITALS
DIASTOLIC BLOOD PRESSURE: 68 MMHG | OXYGEN SATURATION: 95 % | WEIGHT: 135 LBS | HEIGHT: 60 IN | SYSTOLIC BLOOD PRESSURE: 128 MMHG | BODY MASS INDEX: 26.5 KG/M2 | HEART RATE: 77 BPM

## 2023-07-20 DIAGNOSIS — I10 ESSENTIAL HYPERTENSION: ICD-10-CM

## 2023-07-20 DIAGNOSIS — I45.10 RBBB: ICD-10-CM

## 2023-07-20 DIAGNOSIS — I35.0 AORTIC VALVE STENOSIS, ETIOLOGY OF CARDIAC VALVE DISEASE UNSPECIFIED: ICD-10-CM

## 2023-07-20 DIAGNOSIS — I82.4Y3 DEEP VEIN THROMBOSIS (DVT) OF PROXIMAL VEIN OF BOTH LOWER EXTREMITIES, UNSPECIFIED CHRONICITY (HCC): ICD-10-CM

## 2023-07-20 DIAGNOSIS — E78.00 HYPERCHOLESTEREMIA: ICD-10-CM

## 2023-07-20 DIAGNOSIS — I51.7 ATRIAL SEPTAL HYPERTROPHY: Primary | ICD-10-CM

## 2023-07-20 PROCEDURE — 1123F ACP DISCUSS/DSCN MKR DOCD: CPT | Performed by: NURSE PRACTITIONER

## 2023-07-20 PROCEDURE — 99214 OFFICE O/P EST MOD 30 MIN: CPT | Performed by: NURSE PRACTITIONER

## 2023-07-20 PROCEDURE — 3078F DIAST BP <80 MM HG: CPT | Performed by: NURSE PRACTITIONER

## 2023-07-20 PROCEDURE — 3074F SYST BP LT 130 MM HG: CPT | Performed by: NURSE PRACTITIONER

## 2023-07-20 NOTE — PATIENT INSTRUCTIONS
No change in current heart medicines  Schedule an appointment with Dr. Terence Ayoub or Dr. Jonathan Yao for aortic stenosis  Will consider further testing of heart and heart valves to determine when and what type of valve procedure you need  Stay well  hydrated.   Call if systolic BP at home < 454

## 2023-07-20 NOTE — PROGRESS NOTES
[facility]   Cardiac Evaluation    Primary Care Doctor:  Gisselle Perea MD    Chief Complaint   Patient presents with    Other     RBBB, DVT, atrial septal hypertrophy    Hypertension        Assessment:    1. Atrial septal hypertrophy    2. Essential hypertension    3. Hypercholesteremia    4. RBBB    5. Deep vein thrombosis (DVT) of proximal vein of both lower extremities, unspecified chronicity ([address])    6. Aortic valve stenosis, etiology of cardiac valve disease unspecified        Plan:   No change in current heart medicines  Schedule an appointment with Dr. Jacob Montano or Dr. Amena Rojas for aortic stenosis  Will consider further testing of heart and heart valves to determine when and what type of valve procedure you need  Stay well  hydrated. Call if systolic BP at home < 375    Vitals:    07/20/23 1333 07/20/23 1350   BP: (!) 140/72 128/68   Site:  Left Upper Arm   Position:  Sitting   Cuff Size:  Medium Adult   Pulse: 77    SpO2: 95%    Weight: 135 lb (61.2 kg)    Height: 5' (1.524 m)        Primary Cardiologist: Dr. Narda Smith     History of Present Illness:   I had the pleasure of seeing Sergio Fried (82 y.o.) in follow up for atrial septal hypertrophy, chronic RBBB, hypertension, hyperlipidemia as well as history of DVT on chronic warfarin. Last month she was seen for hypertension associated with shortness of breath as well as palpitations and decreased memory. A carotid ultrasound showed mild bilateral stenosis. A stress test was negative for inducible ischemia. An echocardiogram showed moderate to severe aortic stenosis, moderate mitral stenosis and elevated filling pressures with pulmonary hypertension. She is having dizziness more frequently. Still with some shortness of breath. BP at home 110-120/ 60-70's. Her lisinopril dose was decreased to once daily from bid due to hypotension. Still have trouble finding her words, getting them out right. About the same.     Feels no different

## 2023-07-21 DIAGNOSIS — I10 ESSENTIAL HYPERTENSION: ICD-10-CM

## 2023-07-21 NOTE — TELEPHONE ENCOUNTER
Refill Request     CONFIRM preferred pharmacy with the patient. If Mail Order Rx - Pend for 90 day refill. Last Seen: Last Seen Department: 5/1/2023  Last Seen by PCP: 11/18/2022    Last Written: 8/28/2019 90 with 1     If no future appointment scheduled:  Review the last OV with PCP and review information for follow-up visit,  Route STAFF MESSAGE with patient name to the [facility] for scheduling with the following information:            -  Timing of next visit           -  Visit type ie Physical, OV, etc           -  Diagnoses/Reason ie. COPD, HTN - Do not use MEDICATION, Follow-up or CHECK UP - Give reason for visit      Next Appointment:   Future Appointments   Date Time Provider [address]   7/26/2023 11:30 AM [address] [facility]   8/10/2023  9:40 AM LIZZY Perez - CNP R BANK PAIN [facility]   9/21/2023  1:30 PM Roxanna Wolf MD [address] [facility]       Message sent to [address] [place] [address] to schedule appt with patient?   NO      Requested Prescriptions     Pending Prescriptions Disp Refills    lisinopril-hydroCHLOROthiazide (PRINZIDE;ZESTORETIC) 20-25 MG per tablet 90 tablet 1     Sig: Take 1 tablet by mouth daily

## 2023-07-24 RX ORDER — LISINOPRIL AND HYDROCHLOROTHIAZIDE 25; 20 MG/1; MG/1
1 TABLET ORAL DAILY
Qty: 90 TABLET | Refills: 1 | Status: SHIPPED | OUTPATIENT
Start: 2023-07-24

## 2023-07-26 ENCOUNTER — ANTI-COAG VISIT (OUTPATIENT)
Dept: PHARMACY | Age: 83
End: 2023-07-26
Payer: MEDICARE

## 2023-07-26 LAB — INR BLD: 3

## 2023-07-26 PROCEDURE — 99211 OFF/OP EST MAY X REQ PHY/QHP: CPT | Performed by: HOME HEALTH

## 2023-07-26 PROCEDURE — 85610 PROTHROMBIN TIME: CPT | Performed by: HOME HEALTH

## 2023-07-26 NOTE — PROGRESS NOTES
Ms. Rashi Melendez is here for management of anticoagulation for hx of DVT x 2 separate occasions. PMH also significant for HTN. She presents today w/out complaint. Pt verifies dosing regimen as listed above. Pt denies s/s bleeding/swelling/SOB. No missed doses. No changes in Rx/OTCs/Herbal medications. Occasional EtOH use and denies tobacco.        INR 3.0 is within therapeutic range of 2-3. Recommend to continue 5 mg Mon/Fri, 7.5 mg all other days  Pt has the 5 mg tablets. Will continue to monitor and check INR in 4 weeks. Dosing reminder card given with phone number, appointment date and time.    Return to clinic: 8/2323 1045  Referring physician: Dr. Fede Cummins Only    Intervention Detail:   Total # of Interventions Recommended: 0  Total # of Interventions Accepted: 0  Time Spent (min): 15

## 2023-07-31 DIAGNOSIS — E78.00 HYPERCHOLESTEREMIA: ICD-10-CM

## 2023-07-31 RX ORDER — SIMVASTATIN 40 MG
40 TABLET ORAL NIGHTLY
Qty: 90 TABLET | Refills: 3 | OUTPATIENT
Start: 2023-07-31

## 2023-07-31 NOTE — TELEPHONE ENCOUNTER
Refill Request     CONFIRM preferred pharmacy with the patient. If Mail Order Rx - Pend for 90 day refill. Last Seen: Last Seen Department: 5/1/2023  Last Seen by PCP: 11/18/2022    Last Written: simvastatin- 05/16/2022 90 tablets 3 refills    If no future appointment scheduled:  Review the last OV with PCP and review information for follow-up visit,  Route STAFF MESSAGE with patient name to the [facility] for scheduling with the following information:            -  Timing of next visit           -  Visit type ie Physical, OV, etc           -  Diagnoses/Reason ie. COPD, HTN - Do not use MEDICATION, Follow-up or CHECK UP - Give reason for visit      Next Appointment:   Future Appointments   Date Time Provider [address]   8/10/2023  9:40 AM Mendel Hua, APRN - CNP R BANK PAIN [facility]   8/23/2023 11:45 AM [address] [place] [address]   9/21/2023  1:30 PM Radha Flaherty MD [address] [facility]       Message sent to [employer] to schedule appt with patient?   NO      Requested Prescriptions     Pending Prescriptions Disp Refills    simvastatin (ZOCOR) 40 MG tablet 90 tablet 3     Sig: Take 1 tablet by mouth nightly

## 2023-08-10 ENCOUNTER — OFFICE VISIT (OUTPATIENT)
Dept: PAIN MANAGEMENT | Age: 83
End: 2023-08-10
Payer: MEDICARE

## 2023-08-10 VITALS
OXYGEN SATURATION: 97 % | HEART RATE: 63 BPM | TEMPERATURE: 97.3 F | BODY MASS INDEX: 25.91 KG/M2 | WEIGHT: 132 LBS | HEIGHT: 60 IN | SYSTOLIC BLOOD PRESSURE: 155 MMHG | DIASTOLIC BLOOD PRESSURE: 84 MMHG

## 2023-08-10 DIAGNOSIS — M51.36 DDD (DEGENERATIVE DISC DISEASE), LUMBAR: ICD-10-CM

## 2023-08-10 DIAGNOSIS — M50.90 CERVICAL DISC DISEASE: ICD-10-CM

## 2023-08-10 DIAGNOSIS — R19.7 DIARRHEA, UNSPECIFIED TYPE: ICD-10-CM

## 2023-08-10 DIAGNOSIS — G89.4 CHRONIC PAIN SYNDROME: ICD-10-CM

## 2023-08-10 DIAGNOSIS — M96.1 FAILED BACK SURGICAL SYNDROME: ICD-10-CM

## 2023-08-10 DIAGNOSIS — M48.061 SPINAL STENOSIS OF LUMBAR REGION WITHOUT NEUROGENIC CLAUDICATION: ICD-10-CM

## 2023-08-10 DIAGNOSIS — M43.10 DEGENERATIVE SPONDYLOLISTHESIS: ICD-10-CM

## 2023-08-10 DIAGNOSIS — M47.816 LUMBAR FACET ARTHROPATHY: ICD-10-CM

## 2023-08-10 PROCEDURE — 99214 OFFICE O/P EST MOD 30 MIN: CPT | Performed by: NURSE PRACTITIONER

## 2023-08-10 PROCEDURE — 1123F ACP DISCUSS/DSCN MKR DOCD: CPT | Performed by: NURSE PRACTITIONER

## 2023-08-10 PROCEDURE — 3074F SYST BP LT 130 MM HG: CPT | Performed by: NURSE PRACTITIONER

## 2023-08-10 PROCEDURE — 3078F DIAST BP <80 MM HG: CPT | Performed by: NURSE PRACTITIONER

## 2023-08-10 RX ORDER — HYDROCODONE BITARTRATE AND ACETAMINOPHEN 7.5; 325 MG/1; MG/1
1 TABLET ORAL EVERY 8 HOURS PRN
Qty: 84 TABLET | Refills: 0 | Status: SHIPPED | OUTPATIENT
Start: 2023-08-10 | End: 2023-09-07

## 2023-08-10 NOTE — PROGRESS NOTES
Martin   1940  [id number]    HISTORY OF PRESENT ILLNESS: Ms. Zora Dove is a 82 y.o. female returns for a follow up visit for pain management  She has a diagnosis of   1. Chronic pain syndrome    2. Cervical disc disease    3. DDD (degenerative disc disease), lumbar    4. Degenerative spondylolisthesis    5. Spinal stenosis of lumbar region without neurogenic claudication    6. Lumbar facet arthropathy    7. Failed back surgical syndrome    8. Diarrhea, unspecified type      As per Information Obtained from the PADT (Patient Assessment and Documentation Tool)    She complains of pain in the  lower back, both legs  She rates the pain 4/10 and describes it as aching, stabbing. Current treatment regimen has helped relieve about 40% of the pain. She has feels like something walking on me  any side effects from the current pain regimen. Patient reports that since the last follow up visit the physical functioning is unchanged, family/social relationships are unchanged, mood is worse sleep patterns are worse, and that the overall functioning is unchanged. Patient denies misusing/abusing her narcotic pain medications or using any illegal drugs. Upon obtaining medical history from Ms. Zora Dove states that pain is manageable on current pain therapy. Takes the pain medications as prescribed. Patient has had diarrhea for 2-3 days with occasional aching. Denies associated N/V. Endorses a history of hiatal hernia which is yet to be mended. Mood/anxiety is stable. Sleep is fair with an average of 5-6 hours. Denies to having issues of constipation. Tolerating activities/house chores with moderate tenderness to the lower back. ALLERGIES: Patients list of allergies were reviewed     MEDICATIONS: Ms. Zora Dove list of medications were reviewed. Her current medications are   Outpatient Medications Prior to Visit   Medication Sig Dispense Refill    lisinopril-hydroCHLOROthiazide (PRINZIDE;ZESTORETIC) 20-25 MG per tablet

## 2023-08-21 DIAGNOSIS — K21.9 GASTROESOPHAGEAL REFLUX DISEASE: ICD-10-CM

## 2023-08-21 DIAGNOSIS — R10.13 EPIGASTRIC ABDOMINAL PAIN: ICD-10-CM

## 2023-08-21 RX ORDER — PANTOPRAZOLE SODIUM 40 MG/1
40 TABLET, DELAYED RELEASE ORAL DAILY
Qty: 90 TABLET | Refills: 0 | Status: SHIPPED | OUTPATIENT
Start: 2023-08-21

## 2023-08-21 NOTE — TELEPHONE ENCOUNTER
Refill Request     CONFIRM preferred pharmacy with the patient. If Mail Order Rx - Pend for 90 day refill. Last Seen: Last Seen Department: 5/1/2023  Last Seen by PCP: 11/18/2022    Last Written: 06/19/2023 90 tab 0 refills    If no future appointment scheduled:  Review the last OV with PCP and review information for follow-up visit,  Route STAFF MESSAGE with patient name to the [facility] for scheduling with the following information:            -  Timing of next visit           -  Visit type ie Physical, OV, etc           -  Diagnoses/Reason ie. COPD, HTN - Do not use MEDICATION, Follow-up or CHECK UP - Give reason for visit      Next Appointment:   Future Appointments   Date Time Provider [address]   8/23/2023 11:45 AM [address]   9/7/2023 10:00 AM LIZZY Shepherd - CNP R BANK PAIN [facility]   9/21/2023  1:30 PM Dozier Sandifer, MD 2[address] [facility]       Message sent to [address] [place] [address] to schedule appt with patient?   NO      Requested Prescriptions     Pending Prescriptions Disp Refills    pantoprazole (PROTONIX) 40 MG tablet 90 tablet 0     Sig: Take 1 tablet by mouth daily

## 2023-09-07 ENCOUNTER — HOSPITAL ENCOUNTER (OUTPATIENT)
Dept: GENERAL RADIOLOGY | Age: 83
Discharge: HOME | End: 2023-09-07
Payer: MEDICARE

## 2023-09-07 ENCOUNTER — OFFICE VISIT (OUTPATIENT)
Dept: PAIN MANAGEMENT | Age: 83
End: 2023-09-07
Payer: MEDICARE

## 2023-09-07 VITALS
WEIGHT: 130 LBS | TEMPERATURE: 97.2 F | BODY MASS INDEX: 25.52 KG/M2 | HEART RATE: 55 BPM | DIASTOLIC BLOOD PRESSURE: 85 MMHG | HEIGHT: 60 IN | SYSTOLIC BLOOD PRESSURE: 146 MMHG | OXYGEN SATURATION: 100 %

## 2023-09-07 DIAGNOSIS — R07.81 RIB PAIN ON LEFT SIDE: ICD-10-CM

## 2023-09-07 DIAGNOSIS — M50.90 CERVICAL DISC DISEASE: ICD-10-CM

## 2023-09-07 DIAGNOSIS — G89.4 CHRONIC PAIN SYNDROME: ICD-10-CM

## 2023-09-07 DIAGNOSIS — M43.10 DEGENERATIVE SPONDYLOLISTHESIS: ICD-10-CM

## 2023-09-07 DIAGNOSIS — M51.36 DDD (DEGENERATIVE DISC DISEASE), LUMBAR: ICD-10-CM

## 2023-09-07 DIAGNOSIS — M47.816 LUMBAR FACET ARTHROPATHY: ICD-10-CM

## 2023-09-07 DIAGNOSIS — M96.1 FAILED BACK SURGICAL SYNDROME: ICD-10-CM

## 2023-09-07 DIAGNOSIS — F33.42 RECURRENT MAJOR DEPRESSIVE DISORDER, IN FULL REMISSION (HCC): ICD-10-CM

## 2023-09-07 DIAGNOSIS — M48.061 SPINAL STENOSIS OF LUMBAR REGION WITHOUT NEUROGENIC CLAUDICATION: ICD-10-CM

## 2023-09-07 PROCEDURE — 1123F ACP DISCUSS/DSCN MKR DOCD: CPT | Performed by: NURSE PRACTITIONER

## 2023-09-07 PROCEDURE — 71100 X-RAY EXAM RIBS UNI 2 VIEWS: CPT

## 2023-09-07 PROCEDURE — 3074F SYST BP LT 130 MM HG: CPT | Performed by: NURSE PRACTITIONER

## 2023-09-07 PROCEDURE — 3078F DIAST BP <80 MM HG: CPT | Performed by: NURSE PRACTITIONER

## 2023-09-07 PROCEDURE — 99214 OFFICE O/P EST MOD 30 MIN: CPT | Performed by: NURSE PRACTITIONER

## 2023-09-07 RX ORDER — DULOXETIN HYDROCHLORIDE 60 MG/1
60 CAPSULE, DELAYED RELEASE ORAL DAILY
Qty: 90 CAPSULE | Refills: 0 | Status: SHIPPED | OUTPATIENT
Start: 2023-09-07 | End: 2023-12-06

## 2023-09-07 RX ORDER — HYDROCODONE BITARTRATE AND ACETAMINOPHEN 7.5; 325 MG/1; MG/1
1 TABLET ORAL EVERY 8 HOURS PRN
Qty: 84 TABLET | Refills: 0 | Status: SHIPPED | OUTPATIENT
Start: 2023-09-07 | End: 2023-10-05

## 2023-09-07 NOTE — PROGRESS NOTES
Kala Abraham  1940  [id number]    HISTORY OF PRESENT ILLNESS: Ms. Neeta Ferguson is a 82 y.o. female returns for a follow up visit for pain management  She has a diagnosis of   1. Chronic pain syndrome    2. Rib pain on left side    3. Cervical disc disease    4. DDD (degenerative disc disease), lumbar    5. Degenerative spondylolisthesis    6. Lumbar facet arthropathy    7. Spinal stenosis of lumbar region without neurogenic claudication    8. Failed back surgical syndrome    9. Recurrent major depressive disorder, in full remission ([address])      As per Information Obtained from the PADT (Patient Assessment and Documentation Tool)    She complains of pain in the  mid and lower back  She rates the pain 4/10 and describes it as aching. Current treatment regimen has helped relieve about 60% of the pain. She denies any side effects from the current pain regimen. Patient reports that since the last follow up visit the physical functioning is unchanged, family/social relationships are unchanged, mood is unchanged sleep patterns are unchanged, and that the overall functioning is unchanged. Patient denies misusing/abusing her narcotic pain medications or using any illegal drugs. Upon obtaining medical history from Ms. Neeta Ferguson states that pain is manageable on current pain therapy. Takes the pain medications as prescribed. Reports having her left side against the floor while fixing something under her bed. She has since had tenderness to the left side ribcage. Mood/anxiety is stable. Sleep is fair with an average of 5-6 hours. Denies to having issues of constipation. Tolerating activities/house chores with moderate tenderness to the lower back, left ribcage. ALLERGIES: Patients list of allergies were reviewed     MEDICATIONS: Ms. Neeta Ferguson list of medications were reviewed. Her current medications are   Outpatient Medications Prior to Visit   Medication Sig Dispense Refill    pantoprazole (PROTONIX) 40 MG tablet Take 1

## 2023-09-20 ENCOUNTER — ANTI-COAG VISIT (OUTPATIENT)
Dept: PHARMACY | Age: 83
End: 2023-09-20
Payer: MEDICARE

## 2023-09-20 LAB — INR BLD: 2.2

## 2023-09-20 PROCEDURE — 99211 OFF/OP EST MAY X REQ PHY/QHP: CPT | Performed by: HOME HEALTH

## 2023-09-20 PROCEDURE — 85610 PROTHROMBIN TIME: CPT | Performed by: HOME HEALTH

## 2023-09-20 NOTE — PROGRESS NOTES
Ms. Jessenia Billingsley is here for management of anticoagulation for hx of DVT x 2 separate occasions. PMH also significant for HTN. She presents today w/out complaint. Pt verifies dosing regimen as listed above. Pt denies s/s bleeding/swelling/SOB. No missed doses. No changes in Rx/OTCs/Herbal medications. Occasional EtOH use and denies tobacco.        INR 2.2 is within therapeutic range of 2-3. Recommend to continue 5 mg Mon/Fri, 7.5 mg all other days  Pt has the 5 mg tablets. Will continue to monitor and check INR in 4 weeks. Dosing reminder card given with phone number, appointment date and time.    Return to clinic: 10/18/23 1045  Referring physician: Dr. Paula Akers Only    Intervention Detail:   Total # of Interventions Recommended: 0  Total # of Interventions Accepted: 0  Time Spent (min): 15

## 2023-09-22 ENCOUNTER — OFFICE VISIT (OUTPATIENT)
Dept: PRIMARY CARE CLINIC | Age: 83
End: 2023-09-22
Payer: MEDICARE

## 2023-09-22 VITALS
WEIGHT: 131.6 LBS | RESPIRATION RATE: 16 BRPM | HEART RATE: 64 BPM | DIASTOLIC BLOOD PRESSURE: 78 MMHG | HEIGHT: 60 IN | OXYGEN SATURATION: 95 % | BODY MASS INDEX: 25.84 KG/M2 | TEMPERATURE: 98.3 F | SYSTOLIC BLOOD PRESSURE: 139 MMHG

## 2023-09-22 DIAGNOSIS — I99.8 VASCULAR INSUFFICIENCY: ICD-10-CM

## 2023-09-22 DIAGNOSIS — R32 URINARY INCONTINENCE, UNSPECIFIED TYPE: ICD-10-CM

## 2023-09-22 DIAGNOSIS — R41.3 MEMORY LOSS: Primary | ICD-10-CM

## 2023-09-22 DIAGNOSIS — K44.9 HIATAL HERNIA: ICD-10-CM

## 2023-09-22 DIAGNOSIS — Z23 IMMUNIZATION DUE: ICD-10-CM

## 2023-09-22 PROBLEM — I82.4Y3 DEEP VEIN THROMBOSIS (DVT) OF PROXIMAL VEIN OF BOTH LOWER EXTREMITIES (HCC): Status: RESOLVED | Noted: 2017-04-11 | Resolved: 2023-09-22

## 2023-09-22 PROCEDURE — 1123F ACP DISCUSS/DSCN MKR DOCD: CPT | Performed by: FAMILY MEDICINE

## 2023-09-22 PROCEDURE — 3078F DIAST BP <80 MM HG: CPT | Performed by: FAMILY MEDICINE

## 2023-09-22 PROCEDURE — 99214 OFFICE O/P EST MOD 30 MIN: CPT | Performed by: FAMILY MEDICINE

## 2023-09-22 PROCEDURE — 3075F SYST BP GE 130 - 139MM HG: CPT | Performed by: FAMILY MEDICINE

## 2023-09-22 RX ORDER — ZOSTER VACCINE RECOMBINANT, ADJUVANTED 50 MCG/0.5
0.5 KIT INTRAMUSCULAR ONCE
Qty: 1 EACH | Refills: 0 | Status: SHIPPED | OUTPATIENT
Start: 2023-09-22 | End: 2023-09-22

## 2023-09-22 SDOH — ECONOMIC STABILITY: FOOD INSECURITY: WITHIN THE PAST 12 MONTHS, YOU WORRIED THAT YOUR FOOD WOULD RUN OUT BEFORE YOU GOT MONEY TO BUY MORE.: NEVER TRUE

## 2023-09-22 SDOH — ECONOMIC STABILITY: FOOD INSECURITY: WITHIN THE PAST 12 MONTHS, THE FOOD YOU BOUGHT JUST DIDN'T LAST AND YOU DIDN'T HAVE MONEY TO GET MORE.: NEVER TRUE

## 2023-09-22 SDOH — ECONOMIC STABILITY: INCOME INSECURITY: HOW HARD IS IT FOR YOU TO PAY FOR THE VERY BASICS LIKE FOOD, HOUSING, MEDICAL CARE, AND HEATING?: NOT HARD AT ALL

## 2023-09-22 SDOH — HEALTH STABILITY: PHYSICAL HEALTH: ON AVERAGE, HOW MANY DAYS PER WEEK DO YOU ENGAGE IN MODERATE TO STRENUOUS EXERCISE (LIKE A BRISK WALK)?: 0 DAYS

## 2023-09-22 ASSESSMENT — ENCOUNTER SYMPTOMS
DIARRHEA: 0
COUGH: 0
BLOOD IN STOOL: 0
SHORTNESS OF BREATH: 1
COLOR CHANGE: 0

## 2023-09-22 ASSESSMENT — PATIENT HEALTH QUESTIONNAIRE - PHQ9
7. TROUBLE CONCENTRATING ON THINGS, SUCH AS READING THE NEWSPAPER OR WATCHING TELEVISION: 2
1. LITTLE INTEREST OR PLEASURE IN DOING THINGS: 3
SUM OF ALL RESPONSES TO PHQ QUESTIONS 1-9: 14
4. FEELING TIRED OR HAVING LITTLE ENERGY: 1
9. THOUGHTS THAT YOU WOULD BE BETTER OFF DEAD, OR OF HURTING YOURSELF: 0
SUM OF ALL RESPONSES TO PHQ QUESTIONS 1-9: 14
5. POOR APPETITE OR OVEREATING: 1
2. FEELING DOWN, DEPRESSED OR HOPELESS: 2
SUM OF ALL RESPONSES TO PHQ QUESTIONS 1-9: 14
6. FEELING BAD ABOUT YOURSELF - OR THAT YOU ARE A FAILURE OR HAVE LET YOURSELF OR YOUR FAMILY DOWN: 1
SUM OF ALL RESPONSES TO PHQ9 QUESTIONS 1 & 2: 5
10. IF YOU CHECKED OFF ANY PROBLEMS, HOW DIFFICULT HAVE THESE PROBLEMS MADE IT FOR YOU TO DO YOUR WORK, TAKE CARE OF THINGS AT HOME, OR GET ALONG WITH OTHER PEOPLE: 1
8. MOVING OR SPEAKING SO SLOWLY THAT OTHER PEOPLE COULD HAVE NOTICED. OR THE OPPOSITE, BEING SO FIGETY OR RESTLESS THAT YOU HAVE BEEN MOVING AROUND A LOT MORE THAN USUAL: 1
3. TROUBLE FALLING OR STAYING ASLEEP: 3
SUM OF ALL RESPONSES TO PHQ QUESTIONS 1-9: 14

## 2023-09-22 ASSESSMENT — SOCIAL DETERMINANTS OF HEALTH (SDOH)
WITHIN THE LAST YEAR, HAVE YOU BEEN KICKED, HIT, SLAPPED, OR OTHERWISE PHYSICALLY HURT BY YOUR PARTNER OR EX-PARTNER?: PATIENT DECLINED
WITHIN THE LAST YEAR, HAVE YOU BEEN AFRAID OF YOUR PARTNER OR EX-PARTNER?: PATIENT DECLINED
WITHIN THE LAST YEAR, HAVE TO BEEN RAPED OR FORCED TO HAVE ANY KIND OF SEXUAL ACTIVITY BY YOUR PARTNER OR EX-PARTNER?: PATIENT DECLINED
WITHIN THE LAST YEAR, HAVE YOU BEEN HUMILIATED OR EMOTIONALLY ABUSED IN OTHER WAYS BY YOUR PARTNER OR EX-PARTNER?: PATIENT DECLINED

## 2023-09-22 NOTE — ASSESSMENT & PLAN NOTE
Poorly controlled, wears compression stockings for this which helps. Would like to see vascular surgery - referral provided as requested.

## 2023-09-22 NOTE — ASSESSMENT & PLAN NOTE
Poorly controlled, lives with daughter. Referral provided to neurology today to follow-up. Also grieving 's recent loss, ?mood. PHQ9 score 14, GAD11, GDS 14, MDQ negative. Denied SI/HI today. Provided SI Hotline number as well as [place]. [facility] to establish care for this. Call back/ED precautions discussed.

## 2023-09-22 NOTE — PROGRESS NOTES
David Garza is a 82y.o. 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     Chronic back pain     Deep vein thrombosis (DVT) of proximal vein of both lower extremities ([address]) 2017    Dementia ([address]) 2019    Dental disease     Depression     Dizziness     GERD (gastroesophageal reflux disease)     H/O blood clots     Hearing loss     Hiatal hernia     Hyperlipidemia     Hypertension     Lumbar stenosis with neurogenic claudication     Nephrolithiasis     Osteoarthritis of neck     TMJ dysfunction      Social History     Tobacco Use    Smoking status: Never    Smokeless tobacco: Never   Vaping Use    Vaping Use: Never used   Substance Use Topics    Alcohol use: No    Drug use: No     Family History   Problem Relation Age of Onset    Heart Disease Mother     Other Father         murdered     Colon Cancer Brother     Breast Cancer Maternal Aunt     Colon Cancer Maternal Grandmother     Cancer Other     Hypertension Other     Seizures Other      Past Surgical History:   Procedure Laterality Date    CATARACT EXTRACTION Bilateral      SECTION      x1    EMBOLECTOMY      EPIDURAL STEROID INJECTION Bilateral 2019    BILATERAL LUMBAR THREE, LUMBAR FOUR, LUMBAR FIVE RADIOFREQUENCY ABLATION SITE CONFIRMED BY FLUOROSCOPY performed by Kirk Hayes MD at [address] [facility] Right 08/15/2019    RIGHT SACROILIAC JOINT INJECTION SITE CONFIRMED BY

## 2023-09-22 NOTE — PATIENT INSTRUCTIONS
The National Suicide Prevention Lifeline is a [place] ([facility]) [facility]-based suicide prevention network of 161 crisis centers that provides a 24/7, toll-free hotline available to anyone in suicidal crisis or emotional distress. The number is: 6-276-820-544-926-4168. Spoke - [place] based resource for finding mental health resources and providers         [address], [address]  [phone] option 1

## 2023-09-25 RX ORDER — PRAVASTATIN SODIUM 20 MG
20 TABLET ORAL DAILY
Qty: 90 TABLET | Refills: 1 | Status: SHIPPED | OUTPATIENT
Start: 2023-09-25

## 2023-09-27 ENCOUNTER — TELEPHONE (OUTPATIENT)
Dept: VASCULAR SURGERY | Age: 83
End: 2023-09-27

## 2023-09-27 NOTE — TELEPHONE ENCOUNTER
LOUISM with daughter Ynes Simon to call office to schedule Ms. Crespo for appt, will need scan prior.

## 2023-09-29 ENCOUNTER — TELEPHONE (OUTPATIENT)
Dept: CARDIOLOGY CLINIC | Age: 83
End: 2023-09-29

## 2023-09-29 NOTE — TELEPHONE ENCOUNTER
Patient was referred by Milagros Baca np  to the [address] [place] [address] location with Dr. Manisha Mendez. Patient has been scheduled for 10/26 at 1:30pm (am/pm). Patient verbalized understanding of appointment instructions. Call complete.

## 2023-10-05 ENCOUNTER — OFFICE VISIT (OUTPATIENT)
Dept: PAIN MANAGEMENT | Age: 83
End: 2023-10-05
Payer: MEDICARE

## 2023-10-05 VITALS
TEMPERATURE: 96.8 F | SYSTOLIC BLOOD PRESSURE: 134 MMHG | DIASTOLIC BLOOD PRESSURE: 82 MMHG | HEIGHT: 60 IN | HEART RATE: 69 BPM | OXYGEN SATURATION: 97 % | BODY MASS INDEX: 25.91 KG/M2 | WEIGHT: 132 LBS

## 2023-10-05 DIAGNOSIS — M96.1 FAILED BACK SURGICAL SYNDROME: ICD-10-CM

## 2023-10-05 DIAGNOSIS — R07.81 RIB PAIN ON LEFT SIDE: ICD-10-CM

## 2023-10-05 DIAGNOSIS — G89.4 CHRONIC PAIN SYNDROME: ICD-10-CM

## 2023-10-05 DIAGNOSIS — M48.061 SPINAL STENOSIS OF LUMBAR REGION WITHOUT NEUROGENIC CLAUDICATION: ICD-10-CM

## 2023-10-05 DIAGNOSIS — M43.10 DEGENERATIVE SPONDYLOLISTHESIS: ICD-10-CM

## 2023-10-05 DIAGNOSIS — M47.816 LUMBAR FACET ARTHROPATHY: ICD-10-CM

## 2023-10-05 DIAGNOSIS — M51.36 DDD (DEGENERATIVE DISC DISEASE), LUMBAR: ICD-10-CM

## 2023-10-05 DIAGNOSIS — F33.42 RECURRENT MAJOR DEPRESSIVE DISORDER, IN FULL REMISSION (HCC): ICD-10-CM

## 2023-10-05 DIAGNOSIS — M50.90 CERVICAL DISC DISEASE: ICD-10-CM

## 2023-10-05 PROCEDURE — 1123F ACP DISCUSS/DSCN MKR DOCD: CPT | Performed by: NURSE PRACTITIONER

## 2023-10-05 PROCEDURE — 3074F SYST BP LT 130 MM HG: CPT | Performed by: NURSE PRACTITIONER

## 2023-10-05 PROCEDURE — 99213 OFFICE O/P EST LOW 20 MIN: CPT | Performed by: NURSE PRACTITIONER

## 2023-10-05 PROCEDURE — 3078F DIAST BP <80 MM HG: CPT | Performed by: NURSE PRACTITIONER

## 2023-10-05 RX ORDER — HYDROCODONE BITARTRATE AND ACETAMINOPHEN 7.5; 325 MG/1; MG/1
1 TABLET ORAL EVERY 8 HOURS PRN
Qty: 84 TABLET | Refills: 0 | Status: SHIPPED | OUTPATIENT
Start: 2023-10-05 | End: 2023-11-02

## 2023-10-05 RX ORDER — DULOXETIN HYDROCHLORIDE 60 MG/1
60 CAPSULE, DELAYED RELEASE ORAL DAILY
Qty: 90 CAPSULE | Refills: 0 | Status: SHIPPED | OUTPATIENT
Start: 2023-10-05 | End: 2024-01-03

## 2023-10-05 NOTE — PROGRESS NOTES
regimen. She was advised against drinking alcohol with the narcotic pain medicines, advised against driving or handling machinery while adjusting the dose of medicines or if having cognitive  issues related to the current medications. Risk of overdose and death, if medicines not taken as prescribed, were also discussed. If the patient develops new symptoms or if the symptoms worsen, the patient should call the office. While transcribing every attempt was made to maintain the accuracy of the note in terms of it's contents,there may have been some errors made inadvertently. Thank you for allowing me to participate in the care of this patient. Ladona Gosselin CNP.     Cc: Holley Love MD

## 2023-10-18 ENCOUNTER — ANTI-COAG VISIT (OUTPATIENT)
Dept: PHARMACY | Age: 83
End: 2023-10-18
Payer: MEDICARE

## 2023-10-18 LAB — INTERNATIONAL NORMALIZATION RATIO, POC: 2.3

## 2023-10-18 PROCEDURE — 85610 PROTHROMBIN TIME: CPT

## 2023-10-18 PROCEDURE — 99211 OFF/OP EST MAY X REQ PHY/QHP: CPT

## 2023-10-18 NOTE — PROGRESS NOTES
Ms. Hiram Carranza is here for management of anticoagulation for hx of DVT x 2 separate occasions. PMH also significant for HTN. She presents today w/out complaint. Pt verifies dosing regimen as listed above. Pt denies s/s bleeding/swelling/SOB. No missed doses. No changes in Rx/OTCs/Herbal medications. Occasional EtOH use and denies tobacco.        INR 2.3 is within therapeutic range of 2-3. Recommend to continue 5 mg Mon/Fri, 7.5 mg all other days  Pt has the 5 mg tablets. Will continue to monitor and check INR in 4 weeks. Dosing reminder card given with phone number, appointment date and time.    Return to clinic: 11/15/23 1045  Referring physician: Dr. Heather Miller, PharmD  10/18/2023 at 1:29 PM      For Pharmacy Admin Tracking Only    Intervention Detail:   Total # of Interventions Recommended: 0  Total # of Interventions Accepted: 0  Time Spent (min): 15

## 2023-10-26 ENCOUNTER — OFFICE VISIT (OUTPATIENT)
Dept: CARDIOLOGY CLINIC | Age: 83
End: 2023-10-26
Payer: MEDICARE

## 2023-10-26 VITALS
WEIGHT: 127.9 LBS | BODY MASS INDEX: 25.11 KG/M2 | OXYGEN SATURATION: 93 % | SYSTOLIC BLOOD PRESSURE: 102 MMHG | HEART RATE: 62 BPM | DIASTOLIC BLOOD PRESSURE: 62 MMHG | HEIGHT: 60 IN

## 2023-10-26 DIAGNOSIS — I34.2 NONRHEUMATIC MITRAL VALVE STENOSIS: ICD-10-CM

## 2023-10-26 DIAGNOSIS — I35.0 AORTIC VALVE STENOSIS, NONRHEUMATIC: Primary | ICD-10-CM

## 2023-10-26 DIAGNOSIS — I10 ESSENTIAL HYPERTENSION: ICD-10-CM

## 2023-10-26 DIAGNOSIS — R07.9 CHEST PAIN, UNSPECIFIED TYPE: ICD-10-CM

## 2023-10-26 DIAGNOSIS — I83.893 VARICOSE VEINS OF LEG WITH SWELLING, BILATERAL: Primary | ICD-10-CM

## 2023-10-26 DIAGNOSIS — E78.00 HYPERCHOLESTEROLEMIA: ICD-10-CM

## 2023-10-26 DIAGNOSIS — I51.7 ATRIAL SEPTAL HYPERTROPHY: ICD-10-CM

## 2023-10-26 PROCEDURE — 3078F DIAST BP <80 MM HG: CPT | Performed by: INTERNAL MEDICINE

## 2023-10-26 PROCEDURE — 1123F ACP DISCUSS/DSCN MKR DOCD: CPT | Performed by: INTERNAL MEDICINE

## 2023-10-26 PROCEDURE — 3074F SYST BP LT 130 MM HG: CPT | Performed by: INTERNAL MEDICINE

## 2023-10-26 PROCEDURE — 99214 OFFICE O/P EST MOD 30 MIN: CPT | Performed by: INTERNAL MEDICINE

## 2023-11-02 ENCOUNTER — OFFICE VISIT (OUTPATIENT)
Dept: PAIN MANAGEMENT | Age: 83
End: 2023-11-02
Payer: MEDICARE

## 2023-11-02 VITALS
DIASTOLIC BLOOD PRESSURE: 79 MMHG | SYSTOLIC BLOOD PRESSURE: 169 MMHG | WEIGHT: 132 LBS | OXYGEN SATURATION: 98 % | TEMPERATURE: 97.2 F | BODY MASS INDEX: 25.78 KG/M2 | HEART RATE: 53 BPM

## 2023-11-02 DIAGNOSIS — M51.36 DDD (DEGENERATIVE DISC DISEASE), LUMBAR: ICD-10-CM

## 2023-11-02 DIAGNOSIS — M47.816 LUMBAR FACET ARTHROPATHY: ICD-10-CM

## 2023-11-02 DIAGNOSIS — M96.1 FAILED BACK SURGICAL SYNDROME: ICD-10-CM

## 2023-11-02 DIAGNOSIS — F33.42 RECURRENT MAJOR DEPRESSIVE DISORDER, IN FULL REMISSION (HCC): ICD-10-CM

## 2023-11-02 DIAGNOSIS — R07.81 RIB PAIN ON LEFT SIDE: ICD-10-CM

## 2023-11-02 DIAGNOSIS — G89.4 CHRONIC PAIN SYNDROME: ICD-10-CM

## 2023-11-02 DIAGNOSIS — M43.10 DEGENERATIVE SPONDYLOLISTHESIS: ICD-10-CM

## 2023-11-02 DIAGNOSIS — M48.061 SPINAL STENOSIS OF LUMBAR REGION WITHOUT NEUROGENIC CLAUDICATION: ICD-10-CM

## 2023-11-02 DIAGNOSIS — M50.90 CERVICAL DISC DISEASE: ICD-10-CM

## 2023-11-02 PROCEDURE — 99213 OFFICE O/P EST LOW 20 MIN: CPT | Performed by: NURSE PRACTITIONER

## 2023-11-02 PROCEDURE — 3078F DIAST BP <80 MM HG: CPT | Performed by: NURSE PRACTITIONER

## 2023-11-02 PROCEDURE — 1123F ACP DISCUSS/DSCN MKR DOCD: CPT | Performed by: NURSE PRACTITIONER

## 2023-11-02 PROCEDURE — 3077F SYST BP >= 140 MM HG: CPT | Performed by: NURSE PRACTITIONER

## 2023-11-02 RX ORDER — HYDROCODONE BITARTRATE AND ACETAMINOPHEN 7.5; 325 MG/1; MG/1
1 TABLET ORAL EVERY 8 HOURS PRN
Qty: 84 TABLET | Refills: 0 | Status: SHIPPED | OUTPATIENT
Start: 2023-11-02 | End: 2023-11-30

## 2023-11-02 RX ORDER — DULOXETIN HYDROCHLORIDE 60 MG/1
60 CAPSULE, DELAYED RELEASE ORAL DAILY
Qty: 90 CAPSULE | Refills: 0 | Status: SHIPPED | OUTPATIENT
Start: 2023-11-02 | End: 2024-01-31

## 2023-11-02 NOTE — PROGRESS NOTES
develops new symptoms or if the symptoms worsen, the patient should call the office. While transcribing every attempt was made to maintain the accuracy of the note in terms of it's contents,there may have been some errors made inadvertently. Thank you for allowing me to participate in the care of this patient. Albert Bear CNP.     Cc: Baron Freddy MD

## 2023-11-15 ENCOUNTER — ANTI-COAG VISIT (OUTPATIENT)
Dept: PHARMACY | Age: 83
End: 2023-11-15
Payer: MEDICARE

## 2023-11-15 LAB — INTERNATIONAL NORMALIZATION RATIO, POC: 2.1

## 2023-11-15 PROCEDURE — 85610 PROTHROMBIN TIME: CPT | Performed by: INTERNAL MEDICINE

## 2023-11-15 PROCEDURE — 99211 OFF/OP EST MAY X REQ PHY/QHP: CPT | Performed by: INTERNAL MEDICINE

## 2023-11-15 NOTE — PROGRESS NOTES
Ms. Jose Ramon Stinson is here for management of anticoagulation for hx of DVT x 2 separate occasions. PMH also significant for HTN. She presents today w/out complaint. Pt verifies dosing regimen as listed above. Pt denies s/s bleeding/swelling/SOB. No missed doses. No changes in Rx/OTCs/Herbal medications. Occasional EtOH use and denies tobacco.        INR 2.1 is within therapeutic range of 2-3. Recommend to continue 5 mg Mon/Fri, 7.5 mg all other days  Pt has the 5 mg tablets. Will continue to monitor and check INR in 4 weeks. Dosing reminder card given with phone number, appointment date and time.    Return to clinic: 23 1115  Referring physician: Dr. Adam Baumann Only    Intervention Detail: Adherence Monitorin  Total # of Interventions Recommended: 1  Total # of Interventions Accepted: 1  Time Spent (min): 15

## 2023-11-16 ENCOUNTER — OFFICE VISIT (OUTPATIENT)
Dept: VASCULAR SURGERY | Age: 83
End: 2023-11-16
Payer: MEDICARE

## 2023-11-16 ENCOUNTER — PROCEDURE VISIT (OUTPATIENT)
Dept: VASCULAR SURGERY | Age: 83
End: 2023-11-16
Payer: MEDICARE

## 2023-11-16 VITALS
HEART RATE: 52 BPM | BODY MASS INDEX: 24.8 KG/M2 | WEIGHT: 127 LBS | DIASTOLIC BLOOD PRESSURE: 76 MMHG | SYSTOLIC BLOOD PRESSURE: 158 MMHG

## 2023-11-16 DIAGNOSIS — I83.893 VARICOSE VEINS OF LEG WITH SWELLING, BILATERAL: ICD-10-CM

## 2023-11-16 DIAGNOSIS — I99.8 VASCULAR INSUFFICIENCY: Primary | ICD-10-CM

## 2023-11-16 PROCEDURE — 3078F DIAST BP <80 MM HG: CPT | Performed by: SURGERY

## 2023-11-16 PROCEDURE — 3074F SYST BP LT 130 MM HG: CPT | Performed by: SURGERY

## 2023-11-16 PROCEDURE — 1123F ACP DISCUSS/DSCN MKR DOCD: CPT | Performed by: SURGERY

## 2023-11-16 PROCEDURE — 93970 EXTREMITY STUDY: CPT | Performed by: SURGERY

## 2023-11-16 PROCEDURE — 99204 OFFICE O/P NEW MOD 45 MIN: CPT | Performed by: SURGERY

## 2023-11-16 NOTE — PROGRESS NOTES
[address] [facility] Vascular Surgery  Kiana Mills MD, [facility], [facility]    OUTPATIENT CONSULTATION          Date of Consultation:  11/16/2023    PCP:  Anayeli Goodson MD       Chief Complaint: Leg pain    History of Present Illness:   Brandon Cooper a 82 y.o. female who presents today for leg pain. She reports that balance is frequently \"off\". Reports leg swelling. Reports cramping of calves when walking short distances. Has h/o DVT and IVC filter. Wears compression stockings intermittently. She is actually in pre-op workup for TAVR. Echo from June 2023 shows severe aortic stenosis and moderate pulmonary hypertension. She has chronic diastolic CHF. Walking limited by CANALES. No rest pain or ulcerations. Has never smoked. Recent duplex study notable for chronic phlebitic changes consistent with her prior DVT history, but no acute changes. I personally reviewed images of the following study:  VL Venous Reflux Insufficiency Bilateral 11/16/2023  Summary      RIGHT:   Chronic phlebitic changes   No acute superficial or deep venous thrombosis. Evidence of deep and superficial venous insufficiency. LEFT:   Chronic phlebitic changes   No acute superficial or deep venous thrombosis. Evidence of deep and superficial venous insufficiency.    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

## 2023-11-18 DIAGNOSIS — K21.9 GASTROESOPHAGEAL REFLUX DISEASE: ICD-10-CM

## 2023-11-18 DIAGNOSIS — R10.13 EPIGASTRIC ABDOMINAL PAIN: ICD-10-CM

## 2023-11-18 NOTE — TELEPHONE ENCOUNTER
Refill Request     CONFIRM preferred pharmacy with the patient.    If Mail Order Rx - Pend for 90 day refill.      Last Seen: Last Seen Department: 5/1/2023  Last Seen by PCP: 11/18/2022    Last Written: 8/21/2023 90 tab 0 refills    If no future appointment scheduled:  Review the last OV with PCP and review information for follow-up visit,  Route STAFF MESSAGE with patient name to the  Pool for scheduling with the following information:            -  Timing of next visit           -  Visit type ie Physical, OV, etc           -  Diagnoses/Reason ie. COPD, HTN - Do not use MEDICATION, Follow-up or CHECK UP - Give reason for visit      Next Appointment:   Future Appointments   Date Time Provider Department Center   11/28/2023  2:00 PM SCHEDULE, [facility] VASCULAR LAB 1 [facility]   11/30/2023 11:15 AM Kiana Mariano MD [facility]   12/4/2023  9:00 AM Aimee Molina, APRN - CNP R BANK PAIN [facility]   12/13/2023 11:15 AM [facility] ANTICOAGULATION CLINIC [facility]   4/4/2024 10:00 AM Stefany Manzo MD [facility]       Message sent to  to schedule appt with patient?  N/A      Requested Prescriptions     Pending Prescriptions Disp Refills    pantoprazole (PROTONIX) 40 MG tablet [Pharmacy Med Name: Pantoprazole Sodium 40 MG Oral Tablet Delayed Release] 90 tablet 3     Sig: TAKE 1 TABLET BY MOUTH DAILY

## 2023-11-20 DIAGNOSIS — F33.42 RECURRENT MAJOR DEPRESSIVE DISORDER, IN FULL REMISSION (HCC): ICD-10-CM

## 2023-11-20 DIAGNOSIS — R10.13 EPIGASTRIC ABDOMINAL PAIN: ICD-10-CM

## 2023-11-20 DIAGNOSIS — K21.9 GASTROESOPHAGEAL REFLUX DISEASE: ICD-10-CM

## 2023-11-20 DIAGNOSIS — G89.4 CHRONIC PAIN SYNDROME: ICD-10-CM

## 2023-11-20 RX ORDER — WARFARIN SODIUM 5 MG/1
TABLET ORAL
Qty: 143 TABLET | Refills: 3 | OUTPATIENT
Start: 2023-11-20

## 2023-11-20 RX ORDER — PANTOPRAZOLE SODIUM 40 MG/1
40 TABLET, DELAYED RELEASE ORAL DAILY
Qty: 90 TABLET | Refills: 0 | OUTPATIENT
Start: 2023-11-20

## 2023-11-20 RX ORDER — PANTOPRAZOLE SODIUM 40 MG/1
40 TABLET, DELAYED RELEASE ORAL DAILY
Qty: 30 TABLET | Refills: 0 | Status: SHIPPED | OUTPATIENT
Start: 2023-11-20

## 2023-11-20 NOTE — TELEPHONE ENCOUNTER
Poorly controlled GERD due to hiatal hernia. Doing well on PPI. Counseled on long-term risks of this medication, including but not limited to, nutrient deficiencies, increase osteoporosis risk, fractures, hepatic and renal impairment, pancreatitis and C, Diff risk and even possibly gastric cancer risk as we are continuing to learn. Previously referred to Gen Surg, provided number again today.

## 2023-11-21 DIAGNOSIS — I73.9 CLAUDICATION OF LOWER EXTREMITY (HCC): Primary | ICD-10-CM

## 2023-11-28 ENCOUNTER — PROCEDURE VISIT (OUTPATIENT)
Dept: VASCULAR SURGERY | Age: 83
End: 2023-11-28
Payer: MEDICARE

## 2023-11-28 DIAGNOSIS — I73.9 CLAUDICATION OF LOWER EXTREMITY (HCC): ICD-10-CM

## 2023-11-28 PROCEDURE — 93925 LOWER EXTREMITY STUDY: CPT | Performed by: SURGERY

## 2023-11-30 ENCOUNTER — OFFICE VISIT (OUTPATIENT)
Dept: VASCULAR SURGERY | Age: 83
End: 2023-11-30

## 2023-11-30 VITALS
HEART RATE: 99 BPM | SYSTOLIC BLOOD PRESSURE: 123 MMHG | DIASTOLIC BLOOD PRESSURE: 76 MMHG | BODY MASS INDEX: 25.97 KG/M2 | WEIGHT: 133 LBS

## 2023-11-30 DIAGNOSIS — I50.9 CONGESTIVE HEART FAILURE, UNSPECIFIED HF CHRONICITY, UNSPECIFIED HEART FAILURE TYPE (HCC): ICD-10-CM

## 2023-11-30 DIAGNOSIS — R60.0 PERIPHERAL EDEMA: Primary | ICD-10-CM

## 2023-11-30 DIAGNOSIS — I35.0 AORTIC VALVE STENOSIS, ETIOLOGY OF CARDIAC VALVE DISEASE UNSPECIFIED: ICD-10-CM

## 2023-11-30 NOTE — TELEPHONE ENCOUNTER
lisinopril (PRINIVIL;ZESTRIL) 10 MG tablet       PT's daughter sts must be a 90 day supply and sent to [facility].     LOV 9/22/23  NOV 12/4/23

## 2023-12-01 ENCOUNTER — TELEPHONE (OUTPATIENT)
Dept: CARDIOLOGY CLINIC | Age: 83
End: 2023-12-01

## 2023-12-01 RX ORDER — LISINOPRIL 10 MG/1
10 TABLET ORAL DAILY
Qty: 90 TABLET | Refills: 1 | Status: SHIPPED | OUTPATIENT
Start: 2023-12-01

## 2023-12-01 NOTE — TELEPHONE ENCOUNTER
Pts daughter presented herself in office today and was wondering next step on scheduling tavr procedure.  Stated pt is getting dental procedure on 12/05 and will then be cleared per dentist. Pts daughter wondering if pt needs to see srj prior, please advise

## 2023-12-04 ENCOUNTER — OFFICE VISIT (OUTPATIENT)
Dept: PAIN MANAGEMENT | Age: 83
End: 2023-12-04
Payer: MEDICARE

## 2023-12-04 VITALS
DIASTOLIC BLOOD PRESSURE: 89 MMHG | HEART RATE: 64 BPM | WEIGHT: 132 LBS | TEMPERATURE: 97.2 F | BODY MASS INDEX: 25.78 KG/M2 | OXYGEN SATURATION: 97 % | SYSTOLIC BLOOD PRESSURE: 165 MMHG

## 2023-12-04 DIAGNOSIS — M47.816 LUMBAR FACET ARTHROPATHY: ICD-10-CM

## 2023-12-04 DIAGNOSIS — M48.061 SPINAL STENOSIS OF LUMBAR REGION WITHOUT NEUROGENIC CLAUDICATION: ICD-10-CM

## 2023-12-04 DIAGNOSIS — M43.10 DEGENERATIVE SPONDYLOLISTHESIS: ICD-10-CM

## 2023-12-04 DIAGNOSIS — G89.4 CHRONIC PAIN SYNDROME: ICD-10-CM

## 2023-12-04 DIAGNOSIS — M51.36 DDD (DEGENERATIVE DISC DISEASE), LUMBAR: ICD-10-CM

## 2023-12-04 DIAGNOSIS — R07.81 RIB PAIN ON LEFT SIDE: ICD-10-CM

## 2023-12-04 DIAGNOSIS — M50.90 CERVICAL DISC DISEASE: ICD-10-CM

## 2023-12-04 DIAGNOSIS — F33.42 RECURRENT MAJOR DEPRESSIVE DISORDER, IN FULL REMISSION (HCC): ICD-10-CM

## 2023-12-04 DIAGNOSIS — M96.1 FAILED BACK SURGICAL SYNDROME: ICD-10-CM

## 2023-12-04 PROCEDURE — 3078F DIAST BP <80 MM HG: CPT | Performed by: NURSE PRACTITIONER

## 2023-12-04 PROCEDURE — 3074F SYST BP LT 130 MM HG: CPT | Performed by: NURSE PRACTITIONER

## 2023-12-04 PROCEDURE — 99214 OFFICE O/P EST MOD 30 MIN: CPT | Performed by: NURSE PRACTITIONER

## 2023-12-04 PROCEDURE — 1123F ACP DISCUSS/DSCN MKR DOCD: CPT | Performed by: NURSE PRACTITIONER

## 2023-12-04 RX ORDER — HYDROCODONE BITARTRATE AND ACETAMINOPHEN 7.5; 325 MG/1; MG/1
1 TABLET ORAL EVERY 8 HOURS PRN
Qty: 90 TABLET | Refills: 0 | Status: SHIPPED | OUTPATIENT
Start: 2023-12-04 | End: 2024-01-03

## 2023-12-04 RX ORDER — DULOXETIN HYDROCHLORIDE 60 MG/1
60 CAPSULE, DELAYED RELEASE ORAL DAILY
Qty: 90 CAPSULE | Refills: 0 | Status: SHIPPED | OUTPATIENT
Start: 2023-12-04 | End: 2024-03-03

## 2023-12-04 NOTE — PROGRESS NOTES
Kenneth Turpin  1940  [id number]    HISTORY OF PRESENT ILLNESS: Ms. Valeria Parekh is a 82 y.o. female returns for a follow up visit for pain management  She has a diagnosis of   1. Chronic pain syndrome    2. Cervical disc disease    3. DDD (degenerative disc disease), lumbar    4. Lumbar facet arthropathy    5. Spinal stenosis of lumbar region without neurogenic claudication    6. Degenerative spondylolisthesis    7. Failed back surgical syndrome    8. Rib pain on left side    9. Recurrent major depressive disorder, in full remission ([address])      As per Information Obtained from the PADT (Patient Assessment and Documentation Tool)    She complains of pain in the  lower mid back  She rates the pain 4/10 and describes it as aching, stabbing. Current treatment regimen has helped relieve about 40% of the pain. She denies any side effects from the current pain regimen. Patient reports that since the last follow up visit the physical functioning is unchanged, family/social relationships are unchanged, mood is unchanged sleep patterns are unchanged, and that the overall functioning is better. Patient denies misusing/abusing her narcotic pain medications or using any illegal drugs. Upon obtaining medical history from Ms. Valeria Parekh states that pain is manageable on current pain therapy. Takes the pain medications as prescribed. Mood/anxiety is stable. Sleep is fair with an average of 5-6 hours. Denies to having issues of constipation. Tolerating activities/house chores with moderate tenderness to the lower back. ALLERGIES: Patients list of allergies were reviewed     MEDICATIONS: Ms. Valeria Parekh list of medications were reviewed. Her current medications are   Outpatient Medications Prior to Visit   Medication Sig Dispense Refill    lisinopril (PRINIVIL;ZESTRIL) 10 MG tablet Take 1 tablet by mouth daily 90 tablet 1    pantoprazole (PROTONIX) 40 MG tablet TAKE 1 TABLET BY MOUTH DAILY 30 tablet 0    pravastatin (PRAVACHOL) 20

## 2023-12-05 ENCOUNTER — TELEPHONE (OUTPATIENT)
Dept: CARDIOLOGY CLINIC | Age: 83
End: 2023-12-05

## 2023-12-05 DIAGNOSIS — I35.0 NONRHEUMATIC AORTIC VALVE STENOSIS: Primary | ICD-10-CM

## 2023-12-05 NOTE — TELEPHONE ENCOUNTER
Kathleen/Lidya, can you schedule pt for a cath with Dr. Mirian Nash for TAVR workup? She has no dye allergy. She will need to hold her Coumadin per Dr. Bekah Cobb orders. She will also need a TAVR CTA at [facility] at least a week before or after her cath. She has no port and lives at home. Labs are ordered. Please call her daughter Brittni Aparicio to schedule.  Thanks, MEL Cerna RN

## 2023-12-07 RX ORDER — WARFARIN SODIUM 5 MG/1
TABLET ORAL
Qty: 143 TABLET | Refills: 3 | OUTPATIENT
Start: 2023-12-07

## 2023-12-07 RX ORDER — LISINOPRIL 10 MG/1
10 TABLET ORAL DAILY
Qty: 90 TABLET | Refills: 1 | Status: SHIPPED | OUTPATIENT
Start: 2023-12-07

## 2023-12-07 NOTE — TELEPHONE ENCOUNTER
Tried calling pt to remind her/let her know [employer] sent to us but no voicemail and no answer.  Unable to LM

## 2023-12-07 NOTE — TELEPHONE ENCOUNTER
Procedure:  [facility] (TAVR)  [facility]  Date:  1/10/23  Time:  9am  Arrival:  7:30am  Reps:  n/a  Anesthesia:  n/a      Spoke with patient. Please have patient arrive to the main entrance of [address] [place] [address] ([address] [place] [address], [facility]) and check in with the registration desk. They will be directed to the Cath Lab. Please call patient regarding medication instructions. Remind patient to be NPO after midnight (8 hours prior). Do not apply lotions/creams on skin the day of procedure. lopez Wang only.

## 2023-12-07 NOTE — TELEPHONE ENCOUNTER
TAVR Testing is scheduled:  1-10-24 / 7:30-9:00 am [facility] joe/Agusto @ [facility]  Prior to arrival:  We will obtain pre-certification for your procedure if this is required. If there is an issue, you will be contacted by our office prior to your procedure. You may receive a phone call from a member of the Cath Lab Staff the day before your procedure to confirm your appointment and address any questions. Do not apply lotions/creams on your skin the day of the procedure. DO NOT eat or drink anything after midnight the night before your procedure, except a small amount of water with your morning medications. We will contact you in advance with any medications that need to be held for the procedure. If pre-op medications are prescribed, please use as indicated by Physician. Please contact the office if experiencing a fever or illness prior to the procedure. What you can expect:                   You are asked to ARRIVE 1.5 hours prior to your procedure time. Expect to receive the following: IV's, lab work, groins shaved, and a 12 lead EKG. We will contact you in advance if any additional testing is needed. Your procedure is subject to cancellation based on unforeseen complications, i.e., Abnormal lab results, medications taken, or if eating and drinking has occurred after midnight (unless indicated otherwise). The doctor will either access the artery in your wrist or groin for your procedure. Upon arrival, the staff will assess your wrist to see if you are candidate for this type of approach. If a blockage is found, you may have to stay a night in the hospital depending on the treatment chosen. Procedure times usually average around 1 hour in length, however, the procedure may last longer. If you are discharged the day of your procedure, you WILL NOT BE PERMITTED to drive yourself home.   It is strongly recommended you have someone to stay with you 24 hours post-procedure to assist with activities outside of your

## 2023-12-27 RX ORDER — DULOXETIN HYDROCHLORIDE 60 MG/1
60 CAPSULE, DELAYED RELEASE ORAL DAILY
Qty: 90 CAPSULE | Refills: 3 | OUTPATIENT
Start: 2023-12-27 | End: 2024-03-26

## 2024-01-03 ENCOUNTER — OFFICE VISIT (OUTPATIENT)
Dept: PAIN MANAGEMENT | Age: 84
End: 2024-01-03
Payer: MEDICARE

## 2024-01-03 VITALS — OXYGEN SATURATION: 97 % | SYSTOLIC BLOOD PRESSURE: 144 MMHG | TEMPERATURE: 97 F | DIASTOLIC BLOOD PRESSURE: 84 MMHG

## 2024-01-03 DIAGNOSIS — M50.90 CERVICAL DISC DISEASE: ICD-10-CM

## 2024-01-03 DIAGNOSIS — F33.42 RECURRENT MAJOR DEPRESSIVE DISORDER, IN FULL REMISSION (HCC): ICD-10-CM

## 2024-01-03 DIAGNOSIS — M96.1 FAILED BACK SURGICAL SYNDROME: ICD-10-CM

## 2024-01-03 DIAGNOSIS — M43.10 DEGENERATIVE SPONDYLOLISTHESIS: ICD-10-CM

## 2024-01-03 DIAGNOSIS — M48.061 SPINAL STENOSIS OF LUMBAR REGION WITHOUT NEUROGENIC CLAUDICATION: ICD-10-CM

## 2024-01-03 DIAGNOSIS — M51.36 DDD (DEGENERATIVE DISC DISEASE), LUMBAR: ICD-10-CM

## 2024-01-03 DIAGNOSIS — G89.4 CHRONIC PAIN SYNDROME: ICD-10-CM

## 2024-01-03 DIAGNOSIS — M47.816 LUMBAR FACET ARTHROPATHY: ICD-10-CM

## 2024-01-03 PROCEDURE — 3079F DIAST BP 80-89 MM HG: CPT | Performed by: NURSE PRACTITIONER

## 2024-01-03 PROCEDURE — 1123F ACP DISCUSS/DSCN MKR DOCD: CPT | Performed by: NURSE PRACTITIONER

## 2024-01-03 PROCEDURE — 3077F SYST BP >= 140 MM HG: CPT | Performed by: NURSE PRACTITIONER

## 2024-01-03 PROCEDURE — 99214 OFFICE O/P EST MOD 30 MIN: CPT | Performed by: NURSE PRACTITIONER

## 2024-01-03 RX ORDER — DULOXETIN HYDROCHLORIDE 60 MG/1
60 CAPSULE, DELAYED RELEASE ORAL DAILY
Qty: 90 CAPSULE | Refills: 0 | Status: SHIPPED | OUTPATIENT
Start: 2024-01-03 | End: 2024-04-02

## 2024-01-03 RX ORDER — HYDROCODONE BITARTRATE AND ACETAMINOPHEN 7.5; 325 MG/1; MG/1
1 TABLET ORAL EVERY 8 HOURS PRN
Qty: 90 TABLET | Refills: 0 | Status: SHIPPED | OUTPATIENT
Start: 2024-01-03 | End: 2024-02-02

## 2024-01-03 NOTE — PROGRESS NOTES
Shayla Crespo  1940  [id number]      HISTORY OF PRESENT ILLNESS: Ms. Crespo is a 83 y.o. female returns for a follow up visit for pain management  She has a diagnosis of   1. Chronic pain syndrome    2. DDD (degenerative disc disease), lumbar    3. Spinal stenosis of lumbar region without neurogenic claudication    4. Lumbar facet arthropathy    5. Degenerative spondylolisthesis    6. Failed back surgical syndrome    7. Cervical disc disease    8. Recurrent major depressive disorder, in full remission (HCC)      As per Information Obtained from the PADT (Patient Assessment and Documentation Tool)    She complains of pain in the  lower back, lower hip  She rates the pain 4/10 and describes it as aching, burning. Current treatment regimen has helped relieve about 40% of the pain.  She denies any side effects from the current pain regimen. Patient reports that since the last follow up visit the physical functioning is unchanged, family/social relationships are unchanged, mood is unchanged sleep patterns are unchanged, and that the overall functioning is unchanged.  Patient denies misusing/abusing her narcotic pain medications or using any illegal drugs.      Upon obtaining medical history from Ms. Crespo states that pain is manageable on current pain therapy. Takes the pain medications as prescribed. Mood/anxiety is stable. Sleep is fair with an average of 5-6 hours. Denies to having issues of constipation. Tolerating activities/house chores with moderate tenderness to the lower back.     ALLERGIES: Patients list of allergies were reviewed     MEDICATIONS: Ms. Crespo list of medications were reviewed.Her current medications are   Outpatient Medications Prior to Visit   Medication Sig Dispense Refill    lisinopril (PRINIVIL;ZESTRIL) 10 MG tablet Take 1 tablet by mouth daily 90 tablet 1    pantoprazole (PROTONIX) 40 MG tablet TAKE 1 TABLET BY MOUTH DAILY 30 tablet 0    pravastatin (PRAVACHOL) 20 MG tablet Take 1

## 2024-01-10 ENCOUNTER — HOSPITAL ENCOUNTER (OUTPATIENT)
Dept: CARDIAC CATH/INVASIVE PROCEDURES | Age: 84
Discharge: HOME | End: 2024-01-10
Attending: INTERNAL MEDICINE | Admitting: INTERNAL MEDICINE
Payer: MEDICARE

## 2024-01-10 VITALS
OXYGEN SATURATION: 95 % | RESPIRATION RATE: 15 BRPM | DIASTOLIC BLOOD PRESSURE: 73 MMHG | HEART RATE: 70 BPM | SYSTOLIC BLOOD PRESSURE: 136 MMHG

## 2024-01-10 DIAGNOSIS — E78.2 MIXED HYPERLIPIDEMIA: Primary | ICD-10-CM

## 2024-01-10 DIAGNOSIS — Z79.899 MEDICATION MANAGEMENT: ICD-10-CM

## 2024-01-10 LAB
ANION GAP SERPL CALCULATED.3IONS-SCNC: 10 MMOL/L (ref 3–16)
BUN SERPL-MCNC: 27 MG/DL (ref 7–20)
CALCIUM SERPL-MCNC: 10 MG/DL (ref 8.3–10.6)
CHLORIDE SERPL-SCNC: 102 MMOL/L (ref 99–110)
CHOLEST SERPL-MCNC: 213 MG/DL (ref 0–199)
CO2 SERPL-SCNC: 28 MMOL/L (ref 21–32)
CREAT SERPL-MCNC: 0.6 MG/DL (ref 0.6–1.2)
DEPRECATED RDW RBC AUTO: 14.7 % (ref 12.4–15.4)
EKG ATRIAL RATE: 66 BPM
EKG DIAGNOSIS: NORMAL
EKG P AXIS: 54 DEGREES
EKG P-R INTERVAL: 178 MS
EKG Q-T INTERVAL: 436 MS
EKG QRS DURATION: 144 MS
EKG QTC CALCULATION (BAZETT): 457 MS
EKG R AXIS: -62 DEGREES
EKG T AXIS: 48 DEGREES
EKG VENTRICULAR RATE: 66 BPM
GFR SERPLBLD CREATININE-BSD FMLA CKD-EPI: >60 ML/MIN/{1.73_M2}
GLUCOSE SERPL-MCNC: 105 MG/DL (ref 70–99)
HCT VFR BLD AUTO: 41.3 % (ref 36–48)
HDLC SERPL-MCNC: 72 MG/DL (ref 40–60)
HGB BLD-MCNC: 13.7 G/DL (ref 12–16)
INR PPP: 1.16 (ref 0.84–1.16)
LDLC SERPL CALC-MCNC: 123 MG/DL
MCH RBC QN AUTO: 32.6 PG (ref 26–34)
MCHC RBC AUTO-ENTMCNC: 33.3 G/DL (ref 31–36)
MCV RBC AUTO: 97.9 FL (ref 80–100)
PLATELET # BLD AUTO: 205 K/UL (ref 135–450)
PMV BLD AUTO: 8.7 FL (ref 5–10.5)
POTASSIUM SERPL-SCNC: 3.7 MMOL/L (ref 3.5–5.1)
PROTHROMBIN TIME: 14.8 SEC (ref 11.5–14.8)
RBC # BLD AUTO: 4.22 M/UL (ref 4–5.2)
SODIUM SERPL-SCNC: 140 MMOL/L (ref 136–145)
TRIGL SERPL-MCNC: 89 MG/DL (ref 0–150)
VLDLC SERPL CALC-MCNC: 18 MG/DL
WBC # BLD AUTO: 5.9 K/UL (ref 4–11)

## 2024-01-10 PROCEDURE — 6360000002 HC RX W HCPCS

## 2024-01-10 PROCEDURE — 80061 LIPID PANEL: CPT

## 2024-01-10 PROCEDURE — 93005 ELECTROCARDIOGRAM TRACING: CPT | Performed by: INTERNAL MEDICINE

## 2024-01-10 PROCEDURE — 85347 COAGULATION TIME ACTIVATED: CPT

## 2024-01-10 PROCEDURE — C1725 CATH, TRANSLUMIN NON-LASER: HCPCS | Performed by: INTERNAL MEDICINE

## 2024-01-10 PROCEDURE — 93010 ELECTROCARDIOGRAM REPORT: CPT | Performed by: INTERNAL MEDICINE

## 2024-01-10 PROCEDURE — 2500000003 HC RX 250 WO HCPCS

## 2024-01-10 PROCEDURE — C1887 CATHETER, GUIDING: HCPCS | Performed by: INTERNAL MEDICINE

## 2024-01-10 PROCEDURE — 6370000000 HC RX 637 (ALT 250 FOR IP)

## 2024-01-10 PROCEDURE — 2709999900 HC NON-CHARGEABLE SUPPLY: Performed by: INTERNAL MEDICINE

## 2024-01-10 PROCEDURE — C1894 INTRO/SHEATH, NON-LASER: HCPCS | Performed by: INTERNAL MEDICINE

## 2024-01-10 PROCEDURE — 85027 COMPLETE CBC AUTOMATED: CPT

## 2024-01-10 PROCEDURE — C1769 GUIDE WIRE: HCPCS | Performed by: INTERNAL MEDICINE

## 2024-01-10 PROCEDURE — 80048 BASIC METABOLIC PNL TOTAL CA: CPT

## 2024-01-10 PROCEDURE — 85610 PROTHROMBIN TIME: CPT

## 2024-01-10 PROCEDURE — 93456 R HRT CORONARY ARTERY ANGIO: CPT

## 2024-01-10 RX ORDER — SODIUM CHLORIDE 0.9 % (FLUSH) 0.9 %
5-40 SYRINGE (ML) INJECTION PRN
Status: DISCONTINUED | OUTPATIENT
Start: 2024-01-10 | End: 2024-01-10 | Stop reason: HOSPADM

## 2024-01-10 RX ORDER — MIDAZOLAM HYDROCHLORIDE 1 MG/ML
INJECTION INTRAMUSCULAR; INTRAVENOUS
Status: COMPLETED | OUTPATIENT
Start: 2024-01-10 | End: 2024-01-10

## 2024-01-10 RX ORDER — SODIUM CHLORIDE 0.9 % (FLUSH) 0.9 %
5-40 SYRINGE (ML) INJECTION EVERY 12 HOURS SCHEDULED
Status: DISCONTINUED | OUTPATIENT
Start: 2024-01-10 | End: 2024-01-10 | Stop reason: HOSPADM

## 2024-01-10 RX ORDER — FENTANYL CITRATE 50 UG/ML
INJECTION, SOLUTION INTRAMUSCULAR; INTRAVENOUS
Status: COMPLETED | OUTPATIENT
Start: 2024-01-10 | End: 2024-01-10

## 2024-01-10 RX ORDER — ONDANSETRON 2 MG/ML
4 INJECTION INTRAMUSCULAR; INTRAVENOUS EVERY 6 HOURS PRN
Status: DISCONTINUED | OUTPATIENT
Start: 2024-01-10 | End: 2024-01-10 | Stop reason: HOSPADM

## 2024-01-10 RX ORDER — LORAZEPAM 0.5 MG/1
0.5 TABLET ORAL
Status: DISCONTINUED | OUTPATIENT
Start: 2024-01-10 | End: 2024-01-10 | Stop reason: HOSPADM

## 2024-01-10 RX ORDER — HEPARIN SODIUM 1000 [USP'U]/ML
INJECTION, SOLUTION INTRAVENOUS; SUBCUTANEOUS
Status: COMPLETED | OUTPATIENT
Start: 2024-01-10 | End: 2024-01-10

## 2024-01-10 RX ORDER — ASPIRIN 325 MG
325 TABLET ORAL ONCE
Status: COMPLETED | OUTPATIENT
Start: 2024-01-10 | End: 2024-01-10

## 2024-01-10 RX ORDER — PRAVASTATIN SODIUM 40 MG
40 TABLET ORAL NIGHTLY
Qty: 90 TABLET | Refills: 3 | Status: SHIPPED | OUTPATIENT
Start: 2024-01-10

## 2024-01-10 RX ORDER — SODIUM CHLORIDE 9 MG/ML
INJECTION, SOLUTION INTRAVENOUS PRN
Status: DISCONTINUED | OUTPATIENT
Start: 2024-01-10 | End: 2024-01-10 | Stop reason: HOSPADM

## 2024-01-10 RX ORDER — ACETAMINOPHEN 325 MG/1
650 TABLET ORAL EVERY 4 HOURS PRN
Status: DISCONTINUED | OUTPATIENT
Start: 2024-01-10 | End: 2024-01-10 | Stop reason: HOSPADM

## 2024-01-10 RX ADMIN — HEPARIN SODIUM 5000 UNITS: 1000 INJECTION, SOLUTION INTRAVENOUS; SUBCUTANEOUS at 09:52

## 2024-01-10 RX ADMIN — FENTANYL CITRATE 25 MCG: 50 INJECTION, SOLUTION INTRAMUSCULAR; INTRAVENOUS at 09:44

## 2024-01-10 RX ADMIN — MIDAZOLAM HYDROCHLORIDE 1 MG: 1 INJECTION INTRAMUSCULAR; INTRAVENOUS at 10:17

## 2024-01-10 RX ADMIN — FENTANYL CITRATE 25 MCG: 50 INJECTION, SOLUTION INTRAMUSCULAR; INTRAVENOUS at 10:17

## 2024-01-10 RX ADMIN — Medication 325 MG: at 08:23

## 2024-01-10 RX ADMIN — MIDAZOLAM HYDROCHLORIDE 1 MG: 1 INJECTION INTRAMUSCULAR; INTRAVENOUS at 09:44

## 2024-01-10 NOTE — FLOWSHEET NOTE
Patient/family given discharge instructions. Patient/family verbalize understanding of discharge instructions, all questions addressed, copy given to patient/family. Pt transferred to vehicle via wheelchair to be discharged home with family.

## 2024-01-10 NOTE — DISCHARGE INSTRUCTIONS
Cath Labs at  [facility]   Discharge Instructions        1/10/2024  Shayla Crespo   Date of Birth 1940       Activity:  No driving for 24 hours.  In 24 hours you may remove dressing and shower, wash site gently with soap and water and leave open to air  Avoid submerging your arm in sitting water for 5 days.  Do not use your {left/right:[id number]} hand for 24 hours, then  No lifting more than 5 pounds for 5 days.   No lotions, powders, or ointments near site for 5 days.   No work/school for 5 days unless instructed otherwise by your cardiologist.    Diet:   Resume previous diet, if a cardiac diet is specified you will receive a handout with  general guidelines.   Drink extra non-alcoholic/decaffienated fluids for first 24 hours after your procedure.    Arm Management:  If bleeding occurs from the site or a hematoma (lump) begins to increase in size, apply pressure directly over the site, call 911 to return to the hospital.    Special Instructions:  Report any coolness or numbness in the arm  Report any chills, fever, itching, red bumps or rash   Report any of the following to the MD: drainage from the site, redness and/or swelling at the site, increased tenderness at the site   If you are currently taking Metformin or Metformin combination medications for Diabetes, hold your dose for 48 hours after your procedure.  Consult your Cardiologist before taking any NSAIDS, vitamin supplements, estrogen, or estrogen plus progestin.  Do not stop taking Plavix, Brilinta or Effient, without first consulting your cardiologist.    Sedation Discharge Instructions:  For the next 24 hours do not drive a car, operate machinery, power tools or kitchen appliances.    Do not drink alcohol; including beer or wine.    Do not make any important decisions or sign any important papers.  For the next 24 hours you can expect drowsiness, light-headed or dizziness, nausea/ vomiting, inability to concentrate, fatigue and desire to

## 2024-01-10 NOTE — H&P
Brief Pre-Op Note/Sedation Assessment      Shayla Crespo  1940  [id number]  7:14 AM    Planned Procedure: Cardiac Catheterization Procedure  Post Procedure Plan: Return to same level of care  Consent: I have discussed with the patient and/or the patient representative the indication, alternatives, and the possible risks and/or complications of the planned procedure and the anesthesia methods. The patient and/or patient representative appear to understand and agree to proceed.    DISCUSSION OF CARDIAC CATHETERIZATION PROCEDURES: The procedures, indications, risks and alternatives have been discussed with the patient and, as appropriate, with the patient's guardian . Risks discussed included, but are not limited to, bleeding, development of blood clots/emboli, damage to blood vessels, renal failure, malignant cardiac arrhythmias, stroke, heart attack, emergent coronary bypass surgery, death, dye allergy.  The patient (and guardian as appropriate) expressed understanding of the aforementioned and wished to proceed.        Chief Complaint:   Anginal Equivalent  Dyspnea      Indications for Cath Procedure:  Presentation:  Valvular Disease - Aortic Stenosis; Stenosis Severity: Severe  2.  Anginal Classification within 2 weeks:  CCS III - Symptoms with everyday living activities, i.e., moderate limitation  3.  Angina Symptoms Assessment:  Atypical Chest Pain  4.  Heart Failure Class within last 2 weeks:  No symptoms  5.  Cardiovascular Instability:  No    Prior Ischemic Workup/Eval:  Pre-Procedural Medications: Yes: STATIN  2.   Stress Test Completed?  No    Does Patient need surgery?  Cath Valve Surgery:  Yes:  Aortic Stenosis; Stenosis Severity: Severe High Risk: Non-Vascular >= 4 METS with symptoms    Pre-Procedure Medical History:  Vital Signs:   128/70 HR 63    Allergies:    Allergies   Allergen Reactions    Imitrex [Sumatriptan] Anaphylaxis and Shortness Of Breath     Swelling in arm of injection cite, and

## 2024-01-10 NOTE — TELEPHONE ENCOUNTER
Called and spoke with david Barton per HIPAA relayed SRJ results. Labs ordered. VU to increase pravastatin 40 mg q hs. Pharmacy verified. Medication pending.

## 2024-01-10 NOTE — TELEPHONE ENCOUNTER
----- Message from Storm Liz MD sent at 1/10/2024  2:45 PM EST -----  Let patient know their lipid test is high, rec: incrs pravastatin to 40mg po qhs and get f/u lipids/lfts in 1-2mo.  Thanks.

## 2024-01-10 NOTE — PROCEDURES
CARDIAC CATHETERIZATION REPORT     Procedure Date:  1/10/2024  Patient Name: Shayla Crespo  MRN: [id number] : 1940      INDICATION     AS    PROCEDURES PERFORMED     Right heart cath  Coronary angiogam  Coronary cath  Monitoring of moderate conscious sedation        PROCEDURE DESCRIPTION   Risks/benefits/alternatives/outcomes were discussed with patient and/or family and informed consent was obtained.  Using the Barbeau scale, the patient's right radial artery was found to be a level B.  Patient was prepped draped in the usual sterile fashion.  Local anaesthetic was applied over puncture sites.  Prior to the procedure, right peripheral antecubital IV was placed and this was exchanged out using a micropuncture kit for a 6 Kazakh sheath.  6 Kazakh PA catheter taken and due to venous tortuosity a 0.018 inch Glidewire was ultimately utilized to facilitate placement and advancement of PA catheter.  Once right heart catheterization was completed the right heart equipment was removed and venous sheath was sutured in place for later removal.  Using a front wall technique, a 6 Setswana Terumo sheath was inserted into right radial artery.  Verapamil, nitroglycerin, cardene were administered through the sheath.  Heparin was administered.  Diagnostic 4 Kazakh 3 DRC, 5 Kazakh EBU 4 catheters were used for diagnostic angiograms.  There was brachiocephalic tortuosity which made coronary cannulation difficult.   As such, left heart catheterization was deferred.   at the conclusion of the procedure, a TR band was placed over the puncture site and hemostasis was obtained.  There were no immediate complications. I supervised sedation from 9:44 AM to 10:23 AM with versed 2 mg/fentanyl 50 mcg during the procedure. An independent trained observer pushed meds at my direction.  We monitored the patient's level of consciousness and vital signs/physiologic status throughout the procedure duration (see times listed

## 2024-01-11 ENCOUNTER — OFFICE VISIT (OUTPATIENT)
Dept: ENT CLINIC | Age: 84
End: 2024-01-11
Payer: MEDICARE

## 2024-01-11 VITALS
TEMPERATURE: 97.5 F | BODY MASS INDEX: 24.62 KG/M2 | WEIGHT: 125.4 LBS | DIASTOLIC BLOOD PRESSURE: 77 MMHG | SYSTOLIC BLOOD PRESSURE: 123 MMHG | HEART RATE: 81 BPM | HEIGHT: 60 IN

## 2024-01-11 DIAGNOSIS — H61.23 BILATERAL IMPACTED CERUMEN: ICD-10-CM

## 2024-01-11 DIAGNOSIS — J31.0 CHRONIC RHINITIS: Primary | ICD-10-CM

## 2024-01-11 LAB
POC ACT LR: 287 SEC
POC ACT LR: 304 SEC

## 2024-01-11 PROCEDURE — 99212 OFFICE O/P EST SF 10 MIN: CPT | Performed by: OTOLARYNGOLOGY

## 2024-01-11 PROCEDURE — 1123F ACP DISCUSS/DSCN MKR DOCD: CPT | Performed by: OTOLARYNGOLOGY

## 2024-01-11 PROCEDURE — 69210 REMOVE IMPACTED EAR WAX UNI: CPT | Performed by: OTOLARYNGOLOGY

## 2024-01-11 PROCEDURE — 3078F DIAST BP <80 MM HG: CPT | Performed by: OTOLARYNGOLOGY

## 2024-01-11 PROCEDURE — 3074F SYST BP LT 130 MM HG: CPT | Performed by: OTOLARYNGOLOGY

## 2024-01-11 RX ORDER — VITAMIN B COMPLEX
1 CAPSULE ORAL DAILY
COMMUNITY

## 2024-01-11 NOTE — PROGRESS NOTES
Otherwise, all other systems are reviewed and are negative    PHYSICAL EXAMINATION:   GENERAL: wdwn- no acute distress  RESPIRATORY: No stridor.  COMMUNICATION :  Normal voice  MENTAL STATUS: Alert and oriented x3  HEAD AND FACE: No skin lesions of the face.  EXTERNAL EARS AND NOSE: The external ears and nasal pyramid are normal.  FACIAL MUSCLES: All branches of the facial nerve intact.  FACE PALPATION: Zygomatic arches and orbital rims are intact.  No tenderness over the sinuses.  EXTRAOCULAR MUSCLES: Intact with full range of motion.  OTOSCOPY: Cerumen occludes both external auditory canals.  Cerumen removed from both external auditory canals.  Tympanic membranes and middle ear spaces are normal.  TUNING FORKS:  Rin[place] ++ [place] midline at 512 Hz  INTRANASAL:  Septum midline, turbinates normal, meati clear.  LIPS, TEETH, GINGIVA:  Normal mucosa  PHARYNX:  Normal  NECK:  No masses.  LYMPH NODES: No cervical lymphadenopathy.  SALIVARY GLANDS: Parotid and submandibular glands normal.  THYROID: No goiter or thyroid nodules palpable.    IMPRESSION: Nasal irritation.  I do not see any pathology in the left naris.    PLAN: I have given the patient a nasal/sinus .    FOLLOW-UP: As needed.

## 2024-01-17 ENCOUNTER — HOSPITAL ENCOUNTER (OUTPATIENT)
Dept: CT IMAGING | Age: 84
Discharge: HOME | End: 2024-01-17
Payer: MEDICARE

## 2024-01-17 ENCOUNTER — TELEPHONE (OUTPATIENT)
Age: 84
End: 2024-01-17

## 2024-01-17 ENCOUNTER — HOSPITAL ENCOUNTER (INPATIENT)
Age: 84
LOS: 2 days | Discharge: HOME | DRG: 175 | End: 2024-01-19
Attending: EMERGENCY MEDICINE | Admitting: INTERNAL MEDICINE
Payer: MEDICARE

## 2024-01-17 ENCOUNTER — TELEPHONE (OUTPATIENT)
Dept: CARDIOLOGY CLINIC | Age: 84
End: 2024-01-17

## 2024-01-17 DIAGNOSIS — I35.0 NONRHEUMATIC AORTIC VALVE STENOSIS: ICD-10-CM

## 2024-01-17 DIAGNOSIS — I26.09 ACUTE PULMONARY EMBOLISM WITH ACUTE COR PULMONALE, UNSPECIFIED PULMONARY EMBOLISM TYPE (HCC): Primary | ICD-10-CM

## 2024-01-17 DIAGNOSIS — Z51.81 SUBTHERAPEUTIC ANTICOAGULATION: ICD-10-CM

## 2024-01-17 DIAGNOSIS — Z79.01 SUBTHERAPEUTIC ANTICOAGULATION: ICD-10-CM

## 2024-01-17 PROBLEM — J84.112 IPF (IDIOPATHIC PULMONARY FIBROSIS) (HCC): Status: ACTIVE | Noted: 2024-01-17

## 2024-01-17 PROBLEM — I26.99 ACUTE PULMONARY EMBOLISM, UNSPECIFIED PULMONARY EMBOLISM TYPE, UNSPECIFIED WHETHER ACUTE COR PULMONALE PRESENT (HCC): Status: ACTIVE | Noted: 2024-01-17

## 2024-01-17 LAB
ALBUMIN SERPL-MCNC: 3.5 G/DL (ref 3.4–5)
ALBUMIN/GLOB SERPL: 1 {RATIO} (ref 1.1–2.2)
ALP SERPL-CCNC: 69 U/L (ref 40–129)
ALT SERPL-CCNC: 10 U/L (ref 10–40)
ANION GAP SERPL CALCULATED.3IONS-SCNC: 8 MMOL/L (ref 3–16)
ANTI-XA UNFRAC HEPARIN: 0.53 IU/ML (ref 0.3–0.7)
ANTI-XA UNFRAC HEPARIN: <0.1 IU/ML (ref 0.3–0.7)
APTT BLD: 36 SEC (ref 22.7–35.9)
AST SERPL-CCNC: 28 U/L (ref 15–37)
BASOPHILS # BLD: 0.1 K/UL (ref 0–0.2)
BASOPHILS NFR BLD: 1.2 %
BILIRUB SERPL-MCNC: 0.3 MG/DL (ref 0–1)
BUN SERPL-MCNC: 15 MG/DL (ref 7–20)
CALCIUM SERPL-MCNC: 8.6 MG/DL (ref 8.3–10.6)
CHLORIDE SERPL-SCNC: 104 MMOL/L (ref 99–110)
CO2 SERPL-SCNC: 28 MMOL/L (ref 21–32)
CREAT SERPL-MCNC: <0.5 MG/DL (ref 0.6–1.2)
DEPRECATED RDW RBC AUTO: 14.4 % (ref 12.4–15.4)
EKG ATRIAL RATE: 77 BPM
EKG DIAGNOSIS: NORMAL
EKG P AXIS: 48 DEGREES
EKG P-R INTERVAL: 194 MS
EKG Q-T INTERVAL: 410 MS
EKG QRS DURATION: 134 MS
EKG QTC CALCULATION (BAZETT): 463 MS
EKG R AXIS: -61 DEGREES
EKG T AXIS: 58 DEGREES
EKG VENTRICULAR RATE: 77 BPM
EOSINOPHIL # BLD: 0.1 K/UL (ref 0–0.6)
EOSINOPHIL NFR BLD: 2.5 %
GFR SERPLBLD CREATININE-BSD FMLA CKD-EPI: >60 ML/MIN/{1.73_M2}
GLUCOSE SERPL-MCNC: 83 MG/DL (ref 70–99)
HCT VFR BLD AUTO: 37.9 % (ref 36–48)
HGB BLD-MCNC: 12.5 G/DL (ref 12–16)
INR PPP: 1.64 (ref 0.84–1.16)
LYMPHOCYTES # BLD: 1.1 K/UL (ref 1–5.1)
LYMPHOCYTES NFR BLD: 27.6 %
MCH RBC QN AUTO: 32.5 PG (ref 26–34)
MCHC RBC AUTO-ENTMCNC: 33.1 G/DL (ref 31–36)
MCV RBC AUTO: 98.3 FL (ref 80–100)
MONOCYTES # BLD: 0.4 K/UL (ref 0–1.3)
MONOCYTES NFR BLD: 10.2 %
NEUTROPHILS # BLD: 2.4 K/UL (ref 1.7–7.7)
NEUTROPHILS NFR BLD: 58.5 %
NT-PROBNP SERPL-MCNC: 228 PG/ML (ref 0–449)
PLATELET # BLD AUTO: 169 K/UL (ref 135–450)
PMV BLD AUTO: 9.1 FL (ref 5–10.5)
POTASSIUM SERPL-SCNC: 4.3 MMOL/L (ref 3.5–5.1)
PROT SERPL-MCNC: 7.1 G/DL (ref 6.4–8.2)
PROTHROMBIN TIME: 19.3 SEC (ref 11.5–14.8)
RBC # BLD AUTO: 3.85 M/UL (ref 4–5.2)
SODIUM SERPL-SCNC: 140 MMOL/L (ref 136–145)
SPECIMEN SOURCE: NORMAL
TROPONIN, HIGH SENSITIVITY: 10 NG/L (ref 0–14)
TROPONIN, HIGH SENSITIVITY: 9 NG/L (ref 0–14)
WBC # BLD AUTO: 4.1 K/UL (ref 4–11)

## 2024-01-17 PROCEDURE — 83516 IMMUNOASSAY NONANTIBODY: CPT

## 2024-01-17 PROCEDURE — 99221 1ST HOSP IP/OBS SF/LOW 40: CPT | Performed by: SURGERY

## 2024-01-17 PROCEDURE — APPSS60 APP SPLIT SHARED TIME 46-60 MINUTES: Performed by: NURSE PRACTITIONER

## 2024-01-17 PROCEDURE — 6360000004 HC RX CONTRAST MEDICATION: Performed by: FAMILY MEDICINE

## 2024-01-17 PROCEDURE — 85730 THROMBOPLASTIN TIME PARTIAL: CPT

## 2024-01-17 PROCEDURE — 6370000000 HC RX 637 (ALT 250 FOR IP): Performed by: INTERNAL MEDICINE

## 2024-01-17 PROCEDURE — 85025 COMPLETE CBC W/AUTO DIFF WBC: CPT

## 2024-01-17 PROCEDURE — 81241 F5 GENE: CPT

## 2024-01-17 PROCEDURE — 85520 HEPARIN ASSAY: CPT

## 2024-01-17 PROCEDURE — 83880 ASSAY OF NATRIURETIC PEPTIDE: CPT

## 2024-01-17 PROCEDURE — 86147 CARDIOLIPIN ANTIBODY EA IG: CPT

## 2024-01-17 PROCEDURE — 93005 ELECTROCARDIOGRAM TRACING: CPT | Performed by: EMERGENCY MEDICINE

## 2024-01-17 PROCEDURE — 6360000002 HC RX W HCPCS: Performed by: EMERGENCY MEDICINE

## 2024-01-17 PROCEDURE — 85300 ANTITHROMBIN III ACTIVITY: CPT

## 2024-01-17 PROCEDURE — 81240 F2 GENE: CPT

## 2024-01-17 PROCEDURE — 36415 COLL VENOUS BLD VENIPUNCTURE: CPT

## 2024-01-17 PROCEDURE — 2580000003 HC RX 258: Performed by: INTERNAL MEDICINE

## 2024-01-17 PROCEDURE — 80053 COMPREHEN METABOLIC PANEL: CPT

## 2024-01-17 PROCEDURE — 85301 ANTITHROMBIN III ANTIGEN: CPT

## 2024-01-17 PROCEDURE — 84484 ASSAY OF TROPONIN QUANT: CPT

## 2024-01-17 PROCEDURE — 85610 PROTHROMBIN TIME: CPT

## 2024-01-17 PROCEDURE — 93010 ELECTROCARDIOGRAM REPORT: CPT | Performed by: INTERNAL MEDICINE

## 2024-01-17 PROCEDURE — 86431 RHEUMATOID FACTOR QUANT: CPT

## 2024-01-17 PROCEDURE — 99285 EMERGENCY DEPT VISIT HI MDM: CPT

## 2024-01-17 PROCEDURE — 2140000000 HC CCU INTERMEDIATE R&B

## 2024-01-17 PROCEDURE — 86038 ANTINUCLEAR ANTIBODIES: CPT

## 2024-01-17 PROCEDURE — APPNB30 APP NON BILLABLE TIME 0-30 MINS: Performed by: NURSE PRACTITIONER

## 2024-01-17 PROCEDURE — 99223 1ST HOSP IP/OBS HIGH 75: CPT | Performed by: INTERNAL MEDICINE

## 2024-01-17 PROCEDURE — 86200 CCP ANTIBODY: CPT

## 2024-01-17 PROCEDURE — 96365 THER/PROPH/DIAG IV INF INIT: CPT

## 2024-01-17 PROCEDURE — 74174 CTA ABD&PLVS W/CONTRAST: CPT

## 2024-01-17 RX ORDER — HEPARIN SODIUM 10000 [USP'U]/100ML
5-30 INJECTION, SOLUTION INTRAVENOUS CONTINUOUS
Status: DISCONTINUED | OUTPATIENT
Start: 2024-01-17 | End: 2024-01-19

## 2024-01-17 RX ORDER — ACETAMINOPHEN 325 MG/1
650 TABLET ORAL EVERY 6 HOURS PRN
Status: DISCONTINUED | OUTPATIENT
Start: 2024-01-17 | End: 2024-01-19 | Stop reason: HOSPADM

## 2024-01-17 RX ORDER — PANTOPRAZOLE SODIUM 40 MG/1
40 TABLET, DELAYED RELEASE ORAL DAILY
Status: DISCONTINUED | OUTPATIENT
Start: 2024-01-17 | End: 2024-01-19 | Stop reason: HOSPADM

## 2024-01-17 RX ORDER — HYDROCODONE BITARTRATE AND ACETAMINOPHEN 7.5; 325 MG/1; MG/1
1 TABLET ORAL EVERY 8 HOURS PRN
Status: DISCONTINUED | OUTPATIENT
Start: 2024-01-17 | End: 2024-01-19 | Stop reason: HOSPADM

## 2024-01-17 RX ORDER — HEPARIN SODIUM 1000 [USP'U]/ML
80 INJECTION, SOLUTION INTRAVENOUS; SUBCUTANEOUS PRN
Status: DISCONTINUED | OUTPATIENT
Start: 2024-01-17 | End: 2024-01-19

## 2024-01-17 RX ORDER — ACETAMINOPHEN 650 MG/1
650 SUPPOSITORY RECTAL EVERY 6 HOURS PRN
Status: DISCONTINUED | OUTPATIENT
Start: 2024-01-17 | End: 2024-01-19 | Stop reason: HOSPADM

## 2024-01-17 RX ORDER — LISINOPRIL 10 MG/1
10 TABLET ORAL DAILY
Status: DISCONTINUED | OUTPATIENT
Start: 2024-01-17 | End: 2024-01-19 | Stop reason: HOSPADM

## 2024-01-17 RX ORDER — SODIUM CHLORIDE 0.9 % (FLUSH) 0.9 %
5-40 SYRINGE (ML) INJECTION PRN
Status: DISCONTINUED | OUTPATIENT
Start: 2024-01-17 | End: 2024-01-19 | Stop reason: HOSPADM

## 2024-01-17 RX ORDER — SODIUM CHLORIDE 9 MG/ML
INJECTION, SOLUTION INTRAVENOUS PRN
Status: DISCONTINUED | OUTPATIENT
Start: 2024-01-17 | End: 2024-01-19 | Stop reason: HOSPADM

## 2024-01-17 RX ORDER — HEPARIN SODIUM 1000 [USP'U]/ML
40 INJECTION, SOLUTION INTRAVENOUS; SUBCUTANEOUS PRN
Status: DISCONTINUED | OUTPATIENT
Start: 2024-01-17 | End: 2024-01-19

## 2024-01-17 RX ORDER — POLYETHYLENE GLYCOL 3350 17 G/17G
17 POWDER, FOR SOLUTION ORAL DAILY PRN
Status: DISCONTINUED | OUTPATIENT
Start: 2024-01-17 | End: 2024-01-19 | Stop reason: HOSPADM

## 2024-01-17 RX ORDER — SODIUM CHLORIDE 0.9 % (FLUSH) 0.9 %
5-40 SYRINGE (ML) INJECTION EVERY 12 HOURS SCHEDULED
Status: DISCONTINUED | OUTPATIENT
Start: 2024-01-17 | End: 2024-01-19 | Stop reason: HOSPADM

## 2024-01-17 RX ORDER — HEPARIN SODIUM 1000 [USP'U]/ML
80 INJECTION, SOLUTION INTRAVENOUS; SUBCUTANEOUS ONCE
Status: COMPLETED | OUTPATIENT
Start: 2024-01-17 | End: 2024-01-17

## 2024-01-17 RX ORDER — DULOXETIN HYDROCHLORIDE 30 MG/1
60 CAPSULE, DELAYED RELEASE ORAL DAILY
Status: DISCONTINUED | OUTPATIENT
Start: 2024-01-17 | End: 2024-01-19 | Stop reason: HOSPADM

## 2024-01-17 RX ORDER — PRAVASTATIN SODIUM 40 MG
40 TABLET ORAL NIGHTLY
Status: DISCONTINUED | OUTPATIENT
Start: 2024-01-17 | End: 2024-01-19 | Stop reason: HOSPADM

## 2024-01-17 RX ADMIN — SODIUM CHLORIDE, PRESERVATIVE FREE 10 ML: 5 INJECTION INTRAVENOUS at 21:28

## 2024-01-17 RX ADMIN — LISINOPRIL 10 MG: 10 TABLET ORAL at 16:04

## 2024-01-17 RX ADMIN — HEPARIN SODIUM AND DEXTROSE 18 UNITS/KG/HR: 10000; 5 INJECTION INTRAVENOUS at 12:36

## 2024-01-17 RX ADMIN — IOPAMIDOL 150 ML: 755 INJECTION, SOLUTION INTRAVENOUS at 11:03

## 2024-01-17 RX ADMIN — DULOXETINE HYDROCHLORIDE 60 MG: 30 CAPSULE, DELAYED RELEASE ORAL at 16:04

## 2024-01-17 RX ADMIN — HEPARIN SODIUM 4560 UNITS: 1000 INJECTION INTRAVENOUS; SUBCUTANEOUS at 12:35

## 2024-01-17 RX ADMIN — PANTOPRAZOLE SODIUM 40 MG: 40 TABLET, DELAYED RELEASE ORAL at 16:04

## 2024-01-17 RX ADMIN — PRAVASTATIN SODIUM 40 MG: 40 TABLET ORAL at 21:28

## 2024-01-17 ASSESSMENT — LIFESTYLE VARIABLES
HOW MANY STANDARD DRINKS CONTAINING ALCOHOL DO YOU HAVE ON A TYPICAL DAY: PATIENT DOES NOT DRINK
HOW OFTEN DO YOU HAVE A DRINK CONTAINING ALCOHOL: NEVER
HOW MANY STANDARD DRINKS CONTAINING ALCOHOL DO YOU HAVE ON A TYPICAL DAY: PATIENT DOES NOT DRINK
HOW OFTEN DO YOU HAVE A DRINK CONTAINING ALCOHOL: NEVER

## 2024-01-17 ASSESSMENT — PAIN - FUNCTIONAL ASSESSMENT: PAIN_FUNCTIONAL_ASSESSMENT: NONE - DENIES PAIN

## 2024-01-17 NOTE — ED PROVIDER NOTES
The Ekg interpreted by me in the absence of a cardiologist shows.  Normal sinus rhythm with a ventricular rate of 77.  Right bundle branch block.  QTc appropriate.  No specific ST or T wave abnormality.  No significant change when compared to prior 1-.      I only performed EKG interpretation and was not otherwise involved in the care of this patient.       Julián Lopez MD  01/17/24 [id number]    
I personally saw the patient and made/approved the management plan and take responsibility for the patient management.    For further details of Shayla Crespo's emergency department encounter, please see the advanced practice provider's documentation.    I, Severo Winchester MD, am the primary physician provider of record.    CHIEF COMPLAINT  Chief Complaint   Patient presents with    Pulmonary Embolism     Pt sent over from ct, pt here for an outpt chest ct to prepare for a tavr. Pt states she has had some sob over the last couple of days, she thinks its because of the weather.  Pt recently went off the coumadin to have cardiac cath done, restarted coumadin last night.       Briefly, Shayla Crespo is a 83 y.o. female  who presents to the ED complaining of shortness of breath.  She was recently held back on her Coumadin to get a heart catheterization in preparation for TAVR coming up due to aortic disease.  She has felt short of breath like a smothering sensation in her chest.  However she has had no chest pains or syncope.  She did restart her Coumadin yesterday.  She thought she was short of breath because of the weather, has not had any fevers or cough or sputum.  Sent here because of an outpatient CTA performed showing pulmonary emboli.    FOCUSED PHYSICAL EXAMINATION  BP (!) 184/97   Pulse 76   Temp 98.2 °F (36.8 °C) (Oral)   Resp 20   Ht 1.524 m (5')   Wt 57 kg (125 lb 9.6 oz)   SpO2 95%   BMI 24.53 kg/m²    Focused physical examination notable for no acute distress, well-appearing, well-nourished, normal speech and mentation without obvious facial droop, no obvious rash.  No obvious cranial nerve deficits on my initial exam. RRR, CTAB, mild conversational dyspnea but no distress on room air.  Abdomen soft nontender nondistended.      EKG performed in ED:  The 12 lead EKG was interpreted by me independent of a cardiologist as follows:  Rate: normal with a rate of 77  Rhythm: sinus  Intervals: 
days.  Has been getting an evaluation as an outpatient for TAVR and states she had angiogram performed yesterday and due to this was off her Coumadin for 3 days and began again last night.  She still has subtherapeutic INR with INR 1.64.  She had a CTA of the chest abdomen pelvis performed as an outpatient was sent here immediately after CT and reviewing images reveals left-sided pulmonary embolus.  I did get a call from radiology stating that she does have right heart strain but has normal BNP and troponin.  Patient was started on heparin and due to CT imaging did discuss this with vascular surgery.  Attending discussed this with vascular surgery due to right heart strain.  She denies any pain and vitals are unremarkable.  Not requiring any oxygen here.  Patient was admitted for further workup and treatment of her PE with right heart strain    Disposition Considerations (tests considered but not done, Admit vs D/C, Shared Decision Making, Pt Expectation of Test or Tx.):        I am the Primary Clinician of Record.  FINAL IMPRESSION      1. Acute pulmonary embolism with acute cor pulmonale, unspecified pulmonary embolism type (HCC)    2. Subtherapeutic anticoagulation          DISPOSITION/PLAN     DISPOSITION Admitted 01/17/2024 01:15:26 PM      PATIENT REFERRED TO:  No follow-up provider specified.    DISCHARGE MEDICATIONS:  Current Discharge Medication List          DISCONTINUED MEDICATIONS:  Current Discharge Medication List                 (Please note that portions of this note were completed with a voice recognition program.  Efforts were made to edit the dictations but occasionally words are mis-transcribed.)    Roderick Godinez PA-C (electronically signed)        Roderick Godinez PA-C  01/17/24 [id number]

## 2024-01-17 NOTE — ED NOTES
ED TO INPATIENT SBAR HANDOFF    Patient Name: Shayla Crespo   :  1940  83 y.o.   MRN:  [id number]  Preferred Name  Shayla  ED Room #:  ED-0005/05  Family/Caregiver Present yes   Restraints no   Sitter no   Sepsis Risk Score Sepsis Risk Score: 0.83    Situation  Code Status: Prior No additional code details.    Allergies: Imitrex [sumatriptan]  Weight: Patient Vitals for the past 96 hrs (Last 3 readings):   Weight   24 1131 57 kg (125 lb 9.6 oz)     Arrived from: home  Chief Complaint:   Chief Complaint   Patient presents with    Pulmonary Embolism     Pt sent over from ct, pt here for an outpt chest ct to prepare for a tavr. Pt states she has had some sob over the last couple of days, she thinks its because of the weather.  Pt recently went off the coumadin to have cardiac cath done, restarted coumadin last night.     Hospital Problem/Diagnosis:  Principal Problem:    Acute pulmonary embolism, unspecified pulmonary embolism type, unspecified whether acute cor pulmonale present (HCC)  Resolved Problems:    * No resolved hospital problems. *    Imaging:   No orders to display     Abnormal labs:   Abnormal Labs Reviewed   CBC WITH AUTO DIFFERENTIAL - Abnormal; Notable for the following components:       Result Value    RBC 3.85 (*)     All other components within normal limits   COMPREHENSIVE METABOLIC PANEL W/ REFLEX TO MG FOR LOW K - Abnormal; Notable for the following components:    Creatinine <0.5 (*)     Albumin/Globulin Ratio 1.0 (*)     All other components within normal limits   PROTIME-INR - Abnormal; Notable for the following components:    Protime 19.3 (*)     INR 1.64 (*)     All other components within normal limits   APTT - Abnormal; Notable for the following components:    aPTT 36.0 (*)     All other components within normal limits     Critical values: no     Abnormal Assessment Findings: NA    Background  History:   Past Medical History:   Diagnosis Date    Allergic rhinitis     Arthritis

## 2024-01-17 NOTE — ED NOTES
Pt had 150mL of contrast for TAVR study, large PE confirmed by in house rad. Working on getting TAVR study reported asap

## 2024-01-17 NOTE — TELEPHONE ENCOUNTER
Patient calling they need to cancel appointment for  tomorrow 1/18/24 she had a CTA today which revealed a PE she is currently admitted.Daughter will also notify .

## 2024-01-17 NOTE — PROGRESS NOTES
Patient seen in ED, room 05.  Admission completed with the following exceptions:  Home Medications verbally reviewed with patient, but left 'In Process' due to unable to verify with pharmacy, in addition, 4 Eyes Assessment, Immunizations, Vaccines, Rights and Responsibilities, Orientation to room, Plan of Care, Education/Learning Assessment and Education, white board, height and weight, pain assessment and head to toe assessment.  Patient is alert and oriented X 4.  Patient lives in a house with her daughter and is being admitted for Pulmonary embolism.  Plan of care updated if indicated.  All questions answered.     Patient requests that her medication be reviewed once her daughter comes back to the hospital as she has a list.     placed as patient requesting a need for in home care and assistance with medication administration/set up

## 2024-01-17 NOTE — TELEPHONE ENCOUNTER
Canceled patient's appointment with Dr. Meeks due to patient being hospitalized for findings of a PE. Will reschedule if needed after discharge.     Tamica Khan RN

## 2024-01-17 NOTE — H&P
HOSPITALISTS HISTORY AND PHYSICAL    1/17/2024 1:33 PM    Patient Information:  ROBYN HOUSER is a 83 y.o. female [id number]  PCP:  Stefany Manzo MD (Tel: [phone] )    Chief complaint:    Chief Complaint   Patient presents with    Pulmonary Embolism     Pt sent over from ct, pt here for an outpt chest ct to prepare for a tavr. Pt states she has had some sob over the last couple of days, she thinks its because of the weather.  Pt recently went off the coumadin to have cardiac cath done, restarted coumadin last night.     History of Present Illness:  Robyn Houser is a 83 y.o. female who who is normally clinical history of blood clots had Coumadin held for 3 days last week for heart cath.  Restarted Coumadin patient had outpatient CT chest For valve replacement and was found to have acute PE patient does complain of shortness with 3 days no chest pain nausea vomiting fevers or chills or lightheadedness.  Patient does not smoke or drink    REVIEW OF SYSTEMS:   Constitutional: Negative for fever,chills or night sweats  ENT: Negative for rhinorrhea, epistaxis, hoarseness, sore throat.  Gastrointestinal: Negative for nausea, vomiting, diarrhea  Genitourinary: Negative for polyuria, dysuria   Hematologic/Lymphatic: Negative for bleeding tendency, easy bruising  Musculoskeletal: Negative for myalgias and arthralgias  Neurologic: Negative for confusion,dysarthria.  Skin: Negative for itching,rash  Psychiatric: Negative for depression,anxiety, agitation.  Endocrine: Negative for polydipsia,polyuria,heat /cold intolerance.    Past Medical History:   has a past medical history of Allergic rhinitis, Arthritis, Cancer ([facility]), Chronic back pain, Claudication ([facility]), Deep vein thrombosis (DVT) of proximal vein of both lower extremities ([facility]), Dementia ([facility]), Dental disease, Depression, Dizziness, GERD (gastroesophageal reflux  mouth daily      fluticasone (FLONASE) 50 MCG/ACT nasal spray 2 sprays by Each Nostril route daily (Patient taking differently: 2 sprays by Each Nostril route as needed) 1 Bottle 5    Misc. Devices MISC Compression stockings/open toe pantyhose with compression 40/50 2 each 2    Misc. Devices MISC George panty hose  Open toe Petite plus beige  2 pairs 2 each 1       Allergies:  Allergies   Allergen Reactions    Imitrex [Sumatriptan] Anaphylaxis and Shortness Of Breath     Swelling in arm of injection cite, and SOB        Social History:  Patient Lives at home   reports that she has never smoked. She has never used smokeless tobacco. She reports that she does not drink alcohol and does not use drugs.     Family History:  family history includes Breast Cancer in her maternal aunt; Cancer in an other family member; Colon Cancer in her brother and maternal grandmother; Heart Disease in her mother; Hypertension in an other family member; Other in her father; Seizures in an other family member. ,     Physical Exam:  BP (!) 184/97   Pulse 76   Temp 98.2 °F (36.8 °C) (Oral)   Resp 20   Ht 1.524 m (5')   Wt 57 kg (125 lb 9.6 oz)   SpO2 95%   BMI 24.53 kg/m²     General appearance:  Appears comfortable. AAOx3  HEENT: atraumatic, Pupils equal, muscous membranes moist, no masses appreciated  Cardiovascular: Regular rate and rhythm no murmurs appreciated  Respiratory: CTAB no wheezing  Gastrointestinal: Abdomen soft, non-tender, BS+  EXT: no edema  Neurology: no gross focal deficts  Psychiatry: Appropriate affect. Not agitated  Skin: Warm, dry, no rashes appreciated    Labs:  CBC:   Lab Results   Component Value Date/Time    WBC 4.1 01/17/2024 11:44 AM    RBC 3.85 01/17/2024 11:44 AM    HGB 12.5 01/17/2024 11:44 AM    HCT 37.9 01/17/2024 11:44 AM    MCV 98.3 01/17/2024 11:44 AM    MCH 32.5 01/17/2024 11:44 AM    MCHC 33.1 01/17/2024 11:44 AM    RDW 14.4 01/17/2024 11:44 AM     01/17/2024 11:44 AM    MPV 9.1 01/17/2024

## 2024-01-17 NOTE — ED NOTES
Patient oriented to room and ED throughput process.  Safety measures with ED bed locked in lowest position and call light in reach.  Patient educated on all orders, including any medications.  Patient educated on chief complaint/symptoms. Patient encouraged to ask questions regarding care, medications or treatment plan.  Patient aware of how to reach staff with questions/concerns. Family at bedside

## 2024-01-17 NOTE — TELEPHONE ENCOUNTER
Pts daughter Oralia called to update npdd. Pt went to get cta scan on 1/17 and they found a blood clot on lung and they are admitting pt. Verito.

## 2024-01-17 NOTE — PROGRESS NOTES
4 Eyes Skin Assessment     NAME:  Shayla Crespo  YOB: 1940  MEDICAL RECORD NUMBER:  [id number]    The patient is being assessed for  Admission    I agree that at least one RN has performed a thorough Head to Toe Skin Assessment on the patient. ALL assessment sites listed below have been assessed.      Areas assessed by both nurses:    Head, Face, Ears, Shoulders, Back, Chest, Arms, Elbows, Hands, Sacrum. Buttock, Coccyx, Ischium, Legs. Feet and Heels, and Under Medical Devices         Does the Patient have a Wound? No noted wound(s)       Hakan Prevention initiated by RN: No  Wound Care Orders initiated by RN: No    Pressure Injury (Stage 3,4, Unstageable, DTI, NWPT, and Complex wounds) if present, place Wound referral order by RN under : No    New Ostomies, if present place, Ostomy referral order under : No     Nurse 1 eSignature: Electronically signed by SOPHIE SUGGS RN on 1/17/24 at 6:06 PM EST    **SHARE this note so that the co-signing nurse can place an eSignature**    Nurse 2 eSignature: Electronically signed by Rachell Batista RN on 1/17/24 at 6:12 PM EST

## 2024-01-17 NOTE — CONSULTS
[facility]monary and Critical Care   Consult Note      Reason for Consult: PE and pulmonary fibrosis  Requesting Physician: Portia Nino    Subjective:   CHIEF COMPLAINT: Shortness of breath and incidental finding of PE on CTA chest     HPI: Patient recently has had workup towards TAVR.  RHC/LHC on 1/10-has been off Coumadin for 2 days prior to cardiac catheterization.  Was resumed postoperatively, but was noted to have a subtherapeutic INR of 1.6 and an incidental finding of large left main pulmonary artery with evidence of RV strain.  Hence patient has been hospitalized for further management.    Currently patient's only symptom is CANALES and some fatigue.  Denies any chest pain, cough or hemoptysis.    History of recurrent PE/DVT, has family history of factor V Leyden mutation.  Patient states that she has history of VTE with birth control therapy, after pregnancies on multiple occasions.  Has been on chronic Coumadin therapy, compliant.  Non-smoker, but significant secondhand smoke.  Patient's  was diagnosed with asbestos related lung disease.       The patient is a 83 y.o. female with significant past medical history of:      Diagnosis Date    Allergic rhinitis     Arthritis     Cancer (HCC)     squamous cell on nose     Chronic back pain     Claudication (HCC)     Deep vein thrombosis (DVT) of proximal vein of both lower extremities (HCC) 2017    Dementia (HCC) 2019    Dental disease     Depression     Dizziness     GERD (gastroesophageal reflux disease)     H/O blood clots     Headache     Hearing loss     Hiatal hernia     Hyperlipidemia     Hypertension     Lumbar stenosis with neurogenic claudication     Nephrolithiasis     Osteoarthritis of neck     Scoliosis     TMJ dysfunction         Past Surgical History:        Procedure Laterality Date    CATARACT EXTRACTION Bilateral     lens implanted     SECTION      x1    EMBOLECTOMY      EPIDURAL STEROID INJECTION Bilateral 2019     patient is clinically very stable-on room air with no significant tachycardia or hypotension.    Check for hypercoagulable and autoimmune workup.    Patient will benefit from complete pulmonary function test to evaluate for restriction along with 6-minute walk test.    Patient will need follow-up with pulmonology as an outpatient.  Also discussed with patient's daughter over the phone.    Terrell Burkett MD

## 2024-01-17 NOTE — CONSULTS
[facility]  Consultation Note    Chief Complaint / Reason for Consultation  PE    History of Present Illness  Patient is a 83 y.o. female with past medical history of CHF, aortic stenosis, HTN, HLD and DVTs on coumadin who presented to the ED after having CTA chest for TAVR workup that reported acute bilateral PEs.  Patient reports she has had SOB for about a month.  She has chronic intermittent leg swelling and wears compression stockings.  She has a history of recurrent DVTs initially during pregnancy and has been on coumadin for 60 years.  She has a family history of Factor V Leiden but she has never had a hypercoagulable workup.  She had a heart cath on 1/10/24  with Dr. Liz and she reports she stopped taking her Coumadin 3 days prior and resumed the day after her procedure.  Patient denies any chest pain.  She is hemodynamically stable on room air.  Her INR 1.64 on admission.  She was started on Heparin drip and we have been consulted for further evaluation and recommendations.     Review of Systems   + SOB, + chronic leg swelling.   Denies fevers, chills, chest pain, nausea, vomiting, hematemesis, diarrhea, constipation, melena, hematochezia, wt changes, vision problems, blindness, hearing problems, facial droop, slurred speech, extremity weakness, extremity numbness, dysuria.    Past Medical History:   Diagnosis Date    Allergic rhinitis     Arthritis     Cancer (HCC)     squamous cell on nose     Chronic back pain     Claudication (HCC)     Deep vein thrombosis (DVT) of proximal vein of both lower extremities (HCC) 04/11/2017    Dementia ([facility]) 12/2019    Dental disease     Depression     Dizziness     GERD (gastroesophageal reflux disease)     H/O blood clots     Headache     Hearing loss     Hiatal hernia     Hyperlipidemia     Hypertension     Lumbar stenosis with neurogenic claudication     Nephrolithiasis     Osteoarthritis of neck     Scoliosis     TMJ dysfunction   on plan of care and disease process.  All questions answered.        Electronically signed by LIZZY Dominguez CNP on 1/17/2024 at 2:23 PM    VASCULAR STAFF  Seen and discussed with A rFansico HOLT.  Agree with about history, exam, assessment and recommendations.  Greater than 50% of evaluation including exam, history, image review and medical decision making performed by this MD.  Pertinent findings and recommendations:  Comfortable without new, sudden onset of SOB.  Planning CTA for TVAR incidental finding of L pulmonary embolism.  Long h/o DVT on AC for 60 years by pt report. Transiently off AC for cardiac cath recently.  EXAM:  Comfortable on RA    Lungs clear    BLE without edema or tenderness    Normal pulses throughout.  CTA reviewed - L lower lobe PA PE only - nonocclusive    IMP: LLL PE - unclear age, nonocclusive without cardiac or pulmonary consequences (no tachycardia, hypotension or hypoxia)  REC: Agree with resumption of AC - heparin then oral until planned TVAR  Unlikely to need intervention for PE as well tolerated and location is more difficult for mechanical thrombectomy. Thrombolysis is an option but does carry risk of bleed in this elderly pt.  Discussed in detail with pt and all questions answered.  Will await ECHO for strain evaluation and discuss further with cardiology.  Will follow.  Time with pt in all aspects of evaluation 45 mins.    Herminio Schafer

## 2024-01-18 ENCOUNTER — TELEPHONE (OUTPATIENT)
Dept: PRIMARY CARE CLINIC | Age: 84
End: 2024-01-18

## 2024-01-18 LAB
ANION GAP SERPL CALCULATED.3IONS-SCNC: 7 MMOL/L (ref 3–16)
ANTI-XA UNFRAC HEPARIN: 0.56 IU/ML (ref 0.3–0.7)
ANTI-XA UNFRAC HEPARIN: 0.63 IU/ML (ref 0.3–0.7)
BASOPHILS # BLD: 0.1 K/UL (ref 0–0.2)
BASOPHILS NFR BLD: 1.5 %
BUN SERPL-MCNC: 17 MG/DL (ref 7–20)
CALCIUM SERPL-MCNC: 9.6 MG/DL (ref 8.3–10.6)
CHLORIDE SERPL-SCNC: 105 MMOL/L (ref 99–110)
CO2 SERPL-SCNC: 27 MMOL/L (ref 21–32)
CREAT SERPL-MCNC: <0.5 MG/DL (ref 0.6–1.2)
DEPRECATED RDW RBC AUTO: 14.5 % (ref 12.4–15.4)
EOSINOPHIL # BLD: 0.2 K/UL (ref 0–0.6)
EOSINOPHIL NFR BLD: 3.4 %
GFR SERPLBLD CREATININE-BSD FMLA CKD-EPI: >60 ML/MIN/{1.73_M2}
GLUCOSE SERPL-MCNC: 100 MG/DL (ref 70–99)
HCT VFR BLD AUTO: 39.7 % (ref 36–48)
HGB BLD-MCNC: 13.1 G/DL (ref 12–16)
LYMPHOCYTES # BLD: 1.7 K/UL (ref 1–5.1)
LYMPHOCYTES NFR BLD: 34.3 %
MCH RBC QN AUTO: 32.2 PG (ref 26–34)
MCHC RBC AUTO-ENTMCNC: 32.9 G/DL (ref 31–36)
MCV RBC AUTO: 97.8 FL (ref 80–100)
MONOCYTES # BLD: 0.5 K/UL (ref 0–1.3)
MONOCYTES NFR BLD: 10.8 %
NEUTROPHILS # BLD: 2.5 K/UL (ref 1.7–7.7)
NEUTROPHILS NFR BLD: 50 %
PLATELET # BLD AUTO: 169 K/UL (ref 135–450)
PMV BLD AUTO: 9 FL (ref 5–10.5)
POTASSIUM SERPL-SCNC: 3.9 MMOL/L (ref 3.5–5.1)
RBC # BLD AUTO: 4.06 M/UL (ref 4–5.2)
RHEUMATOID FACT SER IA-ACNC: 190 IU/ML
SODIUM SERPL-SCNC: 139 MMOL/L (ref 136–145)
WBC # BLD AUTO: 5 K/UL (ref 4–11)

## 2024-01-18 PROCEDURE — 99233 SBSQ HOSP IP/OBS HIGH 50: CPT | Performed by: INTERNAL MEDICINE

## 2024-01-18 PROCEDURE — 97535 SELF CARE MNGMENT TRAINING: CPT

## 2024-01-18 PROCEDURE — 97116 GAIT TRAINING THERAPY: CPT

## 2024-01-18 PROCEDURE — 2140000000 HC CCU INTERMEDIATE R&B

## 2024-01-18 PROCEDURE — 97110 THERAPEUTIC EXERCISES: CPT

## 2024-01-18 PROCEDURE — 97165 OT EVAL LOW COMPLEX 30 MIN: CPT

## 2024-01-18 PROCEDURE — 97530 THERAPEUTIC ACTIVITIES: CPT

## 2024-01-18 PROCEDURE — APPNB30 APP NON BILLABLE TIME 0-30 MINS: Performed by: NURSE PRACTITIONER

## 2024-01-18 PROCEDURE — 97161 PT EVAL LOW COMPLEX 20 MIN: CPT

## 2024-01-18 PROCEDURE — 99231 SBSQ HOSP IP/OBS SF/LOW 25: CPT | Performed by: SURGERY

## 2024-01-18 PROCEDURE — 2580000003 HC RX 258: Performed by: INTERNAL MEDICINE

## 2024-01-18 PROCEDURE — 6360000002 HC RX W HCPCS: Performed by: EMERGENCY MEDICINE

## 2024-01-18 PROCEDURE — 80048 BASIC METABOLIC PNL TOTAL CA: CPT

## 2024-01-18 PROCEDURE — 85025 COMPLETE CBC W/AUTO DIFF WBC: CPT

## 2024-01-18 PROCEDURE — 6370000000 HC RX 637 (ALT 250 FOR IP): Performed by: INTERNAL MEDICINE

## 2024-01-18 PROCEDURE — 85520 HEPARIN ASSAY: CPT

## 2024-01-18 PROCEDURE — 93306 TTE W/DOPPLER COMPLETE: CPT

## 2024-01-18 RX ORDER — WARFARIN SODIUM 7.5 MG/1
7.5 TABLET ORAL
Status: DISCONTINUED | OUTPATIENT
Start: 2024-01-18 | End: 2024-01-19

## 2024-01-18 RX ORDER — WARFARIN SODIUM 5 MG/1
5 TABLET ORAL
Status: DISCONTINUED | OUTPATIENT
Start: 2024-01-19 | End: 2024-01-19

## 2024-01-18 RX ADMIN — SODIUM CHLORIDE, PRESERVATIVE FREE 10 ML: 5 INJECTION INTRAVENOUS at 19:48

## 2024-01-18 RX ADMIN — PANTOPRAZOLE SODIUM 40 MG: 40 TABLET, DELAYED RELEASE ORAL at 09:51

## 2024-01-18 RX ADMIN — HEPARIN SODIUM AND DEXTROSE 18 UNITS/KG/HR: 10000; 5 INJECTION INTRAVENOUS at 13:24

## 2024-01-18 RX ADMIN — PRAVASTATIN SODIUM 40 MG: 40 TABLET ORAL at 19:48

## 2024-01-18 RX ADMIN — LISINOPRIL 10 MG: 10 TABLET ORAL at 09:51

## 2024-01-18 RX ADMIN — DULOXETINE HYDROCHLORIDE 60 MG: 30 CAPSULE, DELAYED RELEASE ORAL at 09:50

## 2024-01-18 RX ADMIN — WARFARIN SODIUM 7.5 MG: 7.5 TABLET ORAL at 18:09

## 2024-01-18 NOTE — TELEPHONE ENCOUNTER
Spoke to pt. She is still currently in the hospital. Has a blood clot in the lung they are trying to get to dissolve. Pt will keep us posted but she has not been discharged yet.

## 2024-01-18 NOTE — CARE COORDINATION
Chart reviewed at this time for discharge planning. Pt from home. Therapy pending. On heparin gtt at this time.     CM will follow for discharge plan and needs.     Iza Rodriguez RN, BSN  [phone]

## 2024-01-18 NOTE — PROGRESS NOTES
VASCULAR    Seen for PE. See consult dated 1/17/2024  Comfortable overnight. No SOB. No oxygen supplementation needed.    VSS  Afeb  O2 sat 96% on RA  Lungs clear    A/P: LLL PE - age indeterminate; nonocclusive. Well tolerated.   Continue AC with heparin.   Await ECHO but unlikely to require intervention for reasons noted in consultation (well tolerated, anatomic, risks/benefit).   Will discuss further with cardiology today.     Herminio Schafer

## 2024-01-18 NOTE — CONSULTS
Clinical Pharmacy Note: Pharmacy to Dose Warfarin    Pharmacy consulted by Dr. Nino to dose warfarin.    Shayla Crespo is a 83 y.o. female  is receiving warfarin for indication: Acute PE, hx of DVT.    INR Goal Range: 2-3  Prior to admission warfarin dosing regimen: 5 mg every Mon and Fri and 7.5 mg on all other days  INR today:   Lab Results   Component Value Date/Time    INR 1.64 01/17/2024 11:44 AM       Assessment/Plan:  INR is subtherapeutic on current inpatient dosing regimen.   Based on today's assessment, dose warfarin at home dose of 7.5 mg tonight.  Will continue heparin drip until INR >2  Would recommend patient follow up with [facility] clinic soon after discharge - appears last INR check was 11/15/2023  Daily INR is ordered. Pharmacy will continue to monitor and make adjustments to regimen as necessary.     Thank you for the consult,     Avani Angeles, PharmD, BCPS  Clinical Pharmacist  [id number]

## 2024-01-18 NOTE — PROGRESS NOTES
[facility] Pulmonary/CCM Progress note      Admit Date: 1/17/2024    Chief Complaint: Shortness of breath and incidental PE on CTA chest    Subjective:     Interval History: Patient feels the same, remained hemodynamically stable.  No significant dyspnea, cough or hemoptysis.    Scheduled Meds:   [START ON 1/19/2024] warfarin  5 mg Oral Once per day on Mon Fri    And    warfarin  7.5 mg Oral Once per day on Sun Tue Wed Thu Sat    DULoxetine  60 mg Oral Daily    lisinopril  10 mg Oral Daily    pravastatin  40 mg Oral Nightly    pantoprazole  40 mg Oral Daily    sodium chloride flush  5-40 mL IntraVENous 2 times per day     Continuous Infusions:   heparin (PORCINE) Infusion 18 Units/kg/hr (01/18/24 1324)    sodium chloride       PRN Meds:heparin (porcine), heparin (porcine), perflutren lipid microspheres, HYDROcodone-acetaminophen, sodium chloride flush, sodium chloride, polyethylene glycol, acetaminophen **OR** acetaminophen    Review of Systems  Constitutional: Fatigue  Ears, nose, mouth, throat: negative for ear drainage, epistaxis, hoarseness, nasal congestion, sore throat and voice change  Respiratory: negative for shortness of breath, cough, phlegm or pleurizy  Cardiovascular: negative for chest pain, chest pressure/discomfort, irregular heart beat, lower extremity edema and palpitations  Gastrointestinal: negative for abdominal pain, constipation, diarrhea, jaundice, melena, odynophagia, reflux symptoms and vomiting  Hematologic/lymphatic: negative for bleeding, easy bruising, lymphadenopathy and petechiae  Musculoskeletal:negative for arthralgias, bone pain, muscle weakness, neck pain and stiff joints  Neurological: negative for dizziness, gait problems, headaches, seizures, speech problems, tremors and weakness  Behavioral/Psych: negative for anxiety, behavior problems, depression, fatigue and sleep disturbance  Endocrine: negative for diabetic symptoms including none, neuropathy, polyphagia, polyuria, polydipsia,

## 2024-01-18 NOTE — ACP (ADVANCE CARE PLANNING)
Advanced Care Planning Note.    Purpose of Encounter: Advanced care planning in light of hospitalization  Parties In Attendance: Patient,    Decisional Capacity: Yes  Subjective: Patient  understand that this conversation is to address long term care goal  Objective: Admitted to the hospital with acute PE  Goals of Care Determination: Patient would pursue CPR and Intubation if required. No tracheostomy or long term ventilation if required.     Code Status: full code  Time spent on Advanced care Plannin minutes  Advanced Care Planning Documents: documented patient's wishes, would like children bonnie ko and clifton to make medical decisions if unable to make decisions    Portia Nino MD  2024 11:45 AM

## 2024-01-18 NOTE — PROGRESS NOTES
[facility] - Inpatient Rehabilitation Department   Phone: [phone]    Physical Therapy    [x] Initial Evaluation            [] Daily Treatment Note         [] Discharge Summary      Patient: Shayla Crespo   : 1940   MRN: [id number]   Date of Service:  2024  Admitting Diagnosis: Acute pulmonary embolism (HCC)  Current Admission Summary: per EMR \"Shayla Crespo is a 83 y.o. female who is normally clinical history of blood clots had Coumadin held for 3 days last week for heart cath.  Restarted Coumadin patient had outpatient CT chest For valve replacement and was found to have acute PE patient does complain of shortness with 3 days no chest pain nausea vomiting fevers or chills or lightheadedness.  Patient does not smoke or drink\"   - Heparin drip started at 1230 on     Past Medical History:  has a past medical history of Allergic rhinitis, Arthritis, Cancer (HCC), Chronic back pain, Claudication (HCC), Deep vein thrombosis (DVT) of proximal vein of both lower extremities (HCC), Dementia (HCC), Dental disease, Depression, Dizziness, GERD (gastroesophageal reflux disease), H/O blood clots, Headache, Hearing loss, Hiatal hernia, Hyperlipidemia, Hypertension, IPF (idiopathic pulmonary fibrosis) (HCC), Lumbar stenosis with neurogenic claudication, Nephrolithiasis, Osteoarthritis of neck, Scoliosis, and TMJ dysfunction.  Past Surgical History:  has a past surgical history that includes  section; Lithotripsy; other surgical history; Mohs surgery; embolectomy; laminectomy (2018); epidural steroid injection (Bilateral, 2019); Hysterectomy, total abdominal; epidural steroid injection (Right, 08/15/2019); and Cataract extraction (Bilateral).    Discharge Recommendations: Shayla Crespo scored a 19/24 on the AM-PAC short mobility form. Current research shows that an AM-PAC score of 18 or greater is typically associated with a discharge to the patient's home setting. Based on

## 2024-01-18 NOTE — PROGRESS NOTES
[facility] - Inpatient Rehabilitation Department   Phone: [phone]    Occupational Therapy    [x] Initial Evaluation            [] Daily Treatment Note         [] Discharge Summary      Patient: Shayla Crespo   : 1940   MRN: [id number]   Date of Service:  2024    Admitting Diagnosis:  Acute pulmonary embolism (HCC)  Current Admission Summary: per EMR \"Shayla Crespo is a 83 y.o. female who is normally clinical history of blood clots had Coumadin held for 3 days last week for heart cath.  Restarted Coumadin patient had outpatient CT chest For valve replacement and was found to have acute PE patient does complain of shortness with 3 days no chest pain nausea vomiting fevers or chills or lightheadedness.  Patient does not smoke or drink\"              - Heparin drip started at 1230 on   Past Medical History:  has a past medical history of Allergic rhinitis, Arthritis, Cancer (HCC), Chronic back pain, Claudication (HCC), Deep vein thrombosis (DVT) of proximal vein of both lower extremities (HCC), Dementia (HCC), Dental disease, Depression, Dizziness, GERD (gastroesophageal reflux disease), H/O blood clots, Headache, Hearing loss, Hiatal hernia, Hyperlipidemia, Hypertension, IPF (idiopathic pulmonary fibrosis) (HCC), Lumbar stenosis with neurogenic claudication, Nephrolithiasis, Osteoarthritis of neck, Scoliosis, and TMJ dysfunction.  Past Surgical History:  has a past surgical history that includes  section; Lithotripsy; other surgical history; Mohs surgery; embolectomy; laminectomy (2018); epidural steroid injection (Bilateral, 2019); Hysterectomy, total abdominal; epidural steroid injection (Right, 08/15/2019); and Cataract extraction (Bilateral).    Discharge Recommendations: Shayla Crespo scored a 21/24 on the AM-PAC ADL Inpatient form. Current research shows that an AM-PAC score of 18 or greater is typically associated with a discharge to the patient's home  positioning  Goals  Patient Goals: none stated   Short Term Goals:  Time Frame: upon d/c  Patient will complete lower body ADL at modified independent   Patient will complete toileting at modified independent   Patient will complete functional transfers at modified independent   Patient will complete functional mobility at modified independent   Patient will increase AMPA ADL score = to or > than 24/24    Above goals reviewed on 1/18/2024.  All goals are ongoing at this time unless indicated above.       Therapy Session Time     Individual Group Co-treatment   Time In    1322   Time Out    1350   Minutes    28        Timed Code Treatment Minutes:  Timed Code Treatment Minutes: 13 Minutes    Total Treatment Minutes:  28       Electronically Signed By: JOHN PAUL Sue, OTR/L, [id number] 1/18/2024 3:46 PM

## 2024-01-18 NOTE — PROGRESS NOTES
[facility]    1/18/2024 11:43 AM        Name: Shayla Crespo .              Admitted: 1/17/2024  Primary Care Provider: Stefany Manzo MD (Tel: [phone])      Subjective:    Lying bed denies any nausea vomiting no chest pain today    Reviewed interval ancillary notes    Current Medications  heparin (porcine) injection 4,560 Units, PRN  heparin (porcine) injection 2,280 Units, PRN  heparin 25,000 units in dextrose 5% 250 mL (premix) infusion, Continuous  perflutren lipid microspheres (DEFINITY) injection 1.5 mL, ONCE PRN  DULoxetine (CYMBALTA) extended release capsule 60 mg, Daily  HYDROcodone-acetaminophen (NORCO) 7.5-325 MG per tablet 1 tablet, Q8H PRN  lisinopril (PRINIVIL;ZESTRIL) tablet 10 mg, Daily  pravastatin (PRAVACHOL) tablet 40 mg, Nightly  pantoprazole (PROTONIX) tablet 40 mg, Daily  sodium chloride flush 0.9 % injection 5-40 mL, 2 times per day  sodium chloride flush 0.9 % injection 5-40 mL, PRN  0.9 % sodium chloride infusion, PRN  polyethylene glycol (GLYCOLAX) packet 17 g, Daily PRN  acetaminophen (TYLENOL) tablet 650 mg, Q6H PRN   Or  acetaminophen (TYLENOL) suppository 650 mg, Q6H PRN        Objective:  /86   Pulse 72   Temp 97.8 °F (36.6 °C) (Temporal)   Resp 15   Ht 1.524 m (5')   Wt 55.3 kg (121 lb 14.6 oz)   SpO2 96%   BMI 23.81 kg/m²     Intake/Output Summary (Last 24 hours) at 1/18/2024 1143  Last data filed at 1/18/2024 0400  Gross per 24 hour   Intake 720 ml   Output --   Net 720 ml      Wt Readings from Last 3 Encounters:   01/18/24 55.3 kg (121 lb 14.6 oz)   01/11/24 56.9 kg (125 lb 6.4 oz)   12/04/23 59.9 kg (132 lb)       General appearance:  Appears comfortable. AAOx3  HEENT: atraumatic, Pupils equal, muscous membranes moist, no masses appreciated  Cardiovascular: Regular rate and rhythm + murmur  Respiratory: CTAB no wheezing  Gastrointestinal: Abdomen soft, non-tender,

## 2024-01-19 ENCOUNTER — TELEPHONE (OUTPATIENT)
Dept: PRIMARY CARE CLINIC | Age: 84
End: 2024-01-19

## 2024-01-19 VITALS
DIASTOLIC BLOOD PRESSURE: 79 MMHG | WEIGHT: 115.96 LBS | BODY MASS INDEX: 22.77 KG/M2 | SYSTOLIC BLOOD PRESSURE: 122 MMHG | RESPIRATION RATE: 19 BRPM | OXYGEN SATURATION: 92 % | HEART RATE: 67 BPM | HEIGHT: 60 IN | TEMPERATURE: 97.2 F

## 2024-01-19 LAB
ANA SER QL IA: NEGATIVE
ANION GAP SERPL CALCULATED.3IONS-SCNC: 8 MMOL/L (ref 3–16)
ANTI-XA UNFRAC HEPARIN: 0.66 IU/ML (ref 0.3–0.7)
AT III ACT/NOR PPP CHRO: 101 % (ref 76–128)
AT III AG ACT/NOR PPP IA: 94 % (ref 82–136)
BASOPHILS # BLD: 0.1 K/UL (ref 0–0.2)
BASOPHILS NFR BLD: 1.4 %
BUN SERPL-MCNC: 20 MG/DL (ref 7–20)
CALCIUM SERPL-MCNC: 9.3 MG/DL (ref 8.3–10.6)
CARDIOLIPIN IGG SER IA-ACNC: <10 GPL
CARDIOLIPIN IGM SER IA-ACNC: <10 MPL
CCP IGG SERPL-ACNC: 24.7 U/ML (ref 0–2.9)
CHLORIDE SERPL-SCNC: 104 MMOL/L (ref 99–110)
CO2 SERPL-SCNC: 29 MMOL/L (ref 21–32)
CREAT SERPL-MCNC: 0.5 MG/DL (ref 0.6–1.2)
DEPRECATED RDW RBC AUTO: 14 % (ref 12.4–15.4)
EOSINOPHIL # BLD: 0.2 K/UL (ref 0–0.6)
EOSINOPHIL NFR BLD: 3.8 %
GFR SERPLBLD CREATININE-BSD FMLA CKD-EPI: >60 ML/MIN/{1.73_M2}
GLUCOSE SERPL-MCNC: 100 MG/DL (ref 70–99)
HCT VFR BLD AUTO: 37.8 % (ref 36–48)
HGB BLD-MCNC: 12.5 G/DL (ref 12–16)
INR PPP: 1.47 (ref 0.84–1.16)
LYMPHOCYTES # BLD: 1.7 K/UL (ref 1–5.1)
LYMPHOCYTES NFR BLD: 35 %
MCH RBC QN AUTO: 32.1 PG (ref 26–34)
MCHC RBC AUTO-ENTMCNC: 33 G/DL (ref 31–36)
MCV RBC AUTO: 97.3 FL (ref 80–100)
MONOCYTES # BLD: 0.6 K/UL (ref 0–1.3)
MONOCYTES NFR BLD: 12.3 %
NEUTROPHILS # BLD: 2.3 K/UL (ref 1.7–7.7)
NEUTROPHILS NFR BLD: 47.5 %
PLATELET # BLD AUTO: 163 K/UL (ref 135–450)
PMV BLD AUTO: 9.3 FL (ref 5–10.5)
POTASSIUM SERPL-SCNC: 3.8 MMOL/L (ref 3.5–5.1)
PROTHROMBIN TIME: 17.8 SEC (ref 11.5–14.8)
RBC # BLD AUTO: 3.88 M/UL (ref 4–5.2)
SODIUM SERPL-SCNC: 141 MMOL/L (ref 136–145)
WBC # BLD AUTO: 4.8 K/UL (ref 4–11)

## 2024-01-19 PROCEDURE — 6370000000 HC RX 637 (ALT 250 FOR IP): Performed by: INTERNAL MEDICINE

## 2024-01-19 PROCEDURE — 80048 BASIC METABOLIC PNL TOTAL CA: CPT

## 2024-01-19 PROCEDURE — 99231 SBSQ HOSP IP/OBS SF/LOW 25: CPT | Performed by: SURGERY

## 2024-01-19 PROCEDURE — 85520 HEPARIN ASSAY: CPT

## 2024-01-19 PROCEDURE — 6360000002 HC RX W HCPCS: Performed by: INTERNAL MEDICINE

## 2024-01-19 PROCEDURE — 85025 COMPLETE CBC W/AUTO DIFF WBC: CPT

## 2024-01-19 PROCEDURE — 85610 PROTHROMBIN TIME: CPT

## 2024-01-19 PROCEDURE — 99232 SBSQ HOSP IP/OBS MODERATE 35: CPT | Performed by: INTERNAL MEDICINE

## 2024-01-19 RX ORDER — ENOXAPARIN SODIUM 100 MG/ML
1 INJECTION SUBCUTANEOUS 2 TIMES DAILY
Status: DISCONTINUED | OUTPATIENT
Start: 2024-01-19 | End: 2024-01-19 | Stop reason: HOSPADM

## 2024-01-19 RX ORDER — WARFARIN SODIUM 5 MG/1
5 TABLET ORAL
Status: DISCONTINUED | OUTPATIENT
Start: 2024-01-20 | End: 2024-01-19 | Stop reason: HOSPADM

## 2024-01-19 RX ORDER — WARFARIN SODIUM 7.5 MG/1
7.5 TABLET ORAL
Status: DISCONTINUED | OUTPATIENT
Start: 2024-01-20 | End: 2024-01-19 | Stop reason: HOSPADM

## 2024-01-19 RX ORDER — ENOXAPARIN SODIUM 100 MG/ML
1 INJECTION SUBCUTANEOUS 2 TIMES DAILY
Qty: 8.4 ML | Refills: 0 | Status: SHIPPED | OUTPATIENT
Start: 2024-01-19 | End: 2024-01-26

## 2024-01-19 RX ORDER — WARFARIN SODIUM 7.5 MG/1
7.5 TABLET ORAL
Status: DISCONTINUED | OUTPATIENT
Start: 2024-01-19 | End: 2024-01-19 | Stop reason: HOSPADM

## 2024-01-19 RX ADMIN — LISINOPRIL 10 MG: 10 TABLET ORAL at 08:04

## 2024-01-19 RX ADMIN — PANTOPRAZOLE SODIUM 40 MG: 40 TABLET, DELAYED RELEASE ORAL at 08:04

## 2024-01-19 RX ADMIN — ENOXAPARIN SODIUM 50 MG: 100 INJECTION SUBCUTANEOUS at 13:11

## 2024-01-19 RX ADMIN — DULOXETINE HYDROCHLORIDE 60 MG: 30 CAPSULE, DELAYED RELEASE ORAL at 08:04

## 2024-01-19 NOTE — PROGRESS NOTES
Discharge:    IV and telemetry discontinued. Discharge information, medications, and follow-up instructions reviewed with pt. Time was allowed for discussion and questions, and pt verbalized understanding of instructions. Pt will make his own follow-up appointment with MD. Medications sent to preferred pharmacy. Pt left unit via wheelchair and is being discharged to private residence.    Electronically signed by Cassidy Sanabria RN on 1/19/2024 at 2:19 PM

## 2024-01-19 NOTE — PLAN OF CARE
Problem: ABCDS Injury Assessment  Goal: Absence of physical injury  Outcome: Progressing     Problem: Safety - Adult  Goal: Free from fall injury  Outcome: Progressing     Problem: Discharge Planning  Goal: Discharge to home or other facility with appropriate resources  Outcome: Progressing  Flowsheets (Taken 1/18/2024 2000)  Discharge to home or other facility with appropriate resources:   Identify barriers to discharge with patient and caregiver   Arrange for needed discharge resources and transportation as appropriate   Identify discharge learning needs (meds, wound care, etc)   Arrange for interpreters to assist at discharge as needed   Refer to discharge planning if patient needs post-hospital services based on physician order or complex needs related to functional status, cognitive ability or social support system     Problem: Chronic Conditions and Co-morbidities  Goal: Patient's chronic conditions and co-morbidity symptoms are monitored and maintained or improved  Outcome: Progressing

## 2024-01-19 NOTE — CARE COORDINATION
[facility]  has accepted referral for service. They will pull documents from [facility]. Start of care will be on Sunday 1/21.Electronically signed by Hemanth Myrick LPN on 1/19/24 at 2:20 PM EST

## 2024-01-19 NOTE — DISCHARGE SUMMARY
not apply route  Qty: 1 each, Refills: 0      cetirizine (ZYRTEC) 10 MG tablet Take 1 tablet by mouth daily      fluticasone (FLONASE) 50 MCG/ACT nasal spray 2 sprays by Each Nostril route daily  Qty: 1 Bottle, Refills: 5    Associated Diagnoses: Cough      !! Misc. Devices MISC Compression stockings/open toe pantyhose with compression 40/50  Qty: 2 each, Refills: 2    Associated Diagnoses: Chronic deep vein thrombosis (DVT) of proximal vein of both lower extremities (HCC)      !! Misc. Devices MISC George panty hose  Open toe Petite plus beige  2 pairs  Qty: 2 each, Refills: 1    Associated Diagnoses: Chronic deep vein thrombosis (DVT) of proximal vein of both lower extremities (HCC)       !! - Potential duplicate medications found. Please discuss with provider.        Current Discharge Medication List          Labs: For convenience and continuity at follow-up the following most recent labs are provided:    Lab Results   Component Value Date/Time    WBC 4.8 01/19/2024 04:20 AM    HGB 12.5 01/19/2024 04:20 AM    HCT 37.8 01/19/2024 04:20 AM    MCV 97.3 01/19/2024 04:20 AM     01/19/2024 04:20 AM     01/19/2024 04:20 AM    K 3.8 01/19/2024 04:20 AM     01/19/2024 04:20 AM    CO2 29 01/19/2024 04:20 AM    BUN 20 01/19/2024 04:20 AM    CREATININE 0.5 01/19/2024 04:20 AM    CALCIUM 9.3 01/19/2024 04:20 AM    PHOS 3.1 10/24/2018 12:36 PM    ALKPHOS 69 01/17/2024 11:44 AM    ALT 10 01/17/2024 11:44 AM    AST 28 01/17/2024 11:44 AM    BILITOT 0.3 01/17/2024 11:44 AM    LABALBU 3.5 01/17/2024 11:44 AM    LDLCALC 123 01/10/2024 07:50 AM    TRIG 89 01/10/2024 07:50 AM     Lab Results   Component Value Date    INR 1.47 (H) 01/19/2024    INR 1.64 (H) 01/17/2024    INR 1.16 01/10/2024       Radiology:  CTA CHEST ABDOMEN PELVIS W CONTRAST    Result Date: 1/18/2024  EXAMINATION: CTA OF THE CHEST, ABDOMEN AND PELVIS WITH CONTRAST 1/17/2024 11:04 am: TECHNIQUE: CTA of the chest, abdomen and pelvis was performed after  or acute mesenteric findings. Bones/Soft Tissues: Moderate multilevel degenerative disc disease.  Mild anterolisthesis of L4 over L5 without spondylolysis. CTA PELVIS: Aorta/Iliacs: The iliac and femoral circulation are widely patent with no evidence of aneurysm or dissection. Other: Urinary bladder appears unremarkable.  Status post hysterectomy. Bones/Soft Tissues: No acute abnormality.     1. Acute pulmonary embolism with right heart strain.  Prominent main pulmonary artery likely reflecting pulmonary arterial hypertension. 2. Hilar and mediastinal lymphadenopathy. 3. No evidence of thoracic or abdominal aortic aneurysm or dissection. Aortic valve calcification implicating aortic valve stenosis. 4. Moderate to severe lung fibrosis.  Areas of honeycombing are seen.  No evidence of superimposed CHF or pneumonia. 5. Large hiatal hernia. 6. Constipation with stool impaction in the rectum. 7. Status post hysterectomy. Findings discussed with CHANTEL MCCULLOUGH, 01/17/2024 12:54 p.m.         Signed:    Portia Nino MD   1/19/2024

## 2024-01-19 NOTE — PROGRESS NOTES
VASCULAR    No events last 24 hours. Sleeping.  VSS Afeb  No O2 supplementation required.    ECHO yesterday - RV normal size and function - no strain    A/P: PE - well tolerated   No plans for intervention.   Full anticoagulation per medical services.   Timing of TAVR per CV surgery & cardiology.   No further recommendation.   Will see as needed. Please call for any other questions or problems.     Herminio Schafer

## 2024-01-19 NOTE — PROGRESS NOTES
pending.  Patient has a family history of factor V Leyden mutation.    Incidental finding suggestive of IPF, will need outpatient workup with PFT and follow-up.  Incidental small mediastinal lymph nodes, most likely benign.    Patient has now recommenced Coumadin, continues on IV heparin drip.    Pulmonary will sign off, will follow-up patient in 1 month.    Terrell Burkett MD

## 2024-01-19 NOTE — PROGRESS NOTES
ONCOLOGY HEMATOLOGY CARE PROGRESS NOTE      SUBJECTIVE:  Feeling better.  Denies chest pain or shortness of breath.    ROS:     Constitutional:  No weight loss, No fever, No chills, No night sweats.  Energy level good.  Eyes:  No impairment or change in vision  ENT / Mouth:  No pain, abnormal ulceration, bleeding, nasal drip or change in voice or hearing  Cardiovascular:  No chest pain, palpitations, new edema, or calf discomfort  Respiratory:  No pain, hemoptysis, change to breathing  Breast:  No pain, discharge, change in appearance or texture  Gastrointestinal:  No pain, cramping, jaundice, change to eating and bowel habits  Urinary:  No pain, bleeding or change in continence  Genitalia: No pain, bleeding or discharge  Musculoskeletal:  No redness, pain, edema or weakness  Skin:  No pruritus, rash, change to nodules or lesions  Neurologic:  No discomfort, change in mental status, speech, sensory or motor activity  Psychiatric:  No change in concentration or change to affect or mood  Endocrine:  No hot flashes, increased thirst, or change to urine production  Hematologic: No petechiae, ecchymosis or bleeding  Lymphatic:  No lymphadenopathy or lymphedema  Allergy / Immunologic:  No eczema, hives, frequent or recurrent infections    OBJECTIVE        Physical    VITALS:  Patient Vitals for the past 24 hrs:   BP Temp Temp src Pulse Resp SpO2 Weight   01/19/24 0802 122/79 97.2 °F (36.2 °C) Temporal 67 19 92 % --   01/19/24 0521 -- -- -- -- -- -- 52.6 kg (115 lb 15.4 oz)   01/19/24 0415 123/74 -- -- 56 -- -- --   01/19/24 0411 -- 97.6 °F (36.4 °C) Temporal -- 16 91 % --   01/19/24 0000 119/73 97.5 °F (36.4 °C) Temporal 75 18 90 % --   01/18/24 1915 112/77 97.8 °F (36.6 °C) Temporal 76 16 95 % --   01/18/24 1553 120/82 -- -- 81 -- -- --   01/18/24 1347 128/81 -- -- (!) 103 -- -- --   01/18/24 0950 130/86 97.8 °F (36.6 °C) Temporal 72 15 96 % --       24HR INTAKE/OUTPUT:  No intake or

## 2024-01-19 NOTE — CARE COORDINATION
Dianna with [facility] accepted [phone] and she has epic access to get orders and discharge paperwork.    Pt updated and provided [facility] pamphlet.     Patient discharged 1/19/2024 to home with family and [facility] services.   All discharge needs met per case management.    Iza Rodriguez, RN, BSN  [phone]

## 2024-01-19 NOTE — PROGRESS NOTES
CLINICAL PHARMACY NOTE: MEDS TO BEDS    Total # of Prescriptions Filled: 1   The following medications were delivered to the patient:  ENOXAPARIN 60MG/0.6ML    Additional Documentation:  Patients son picked up in outpatient pharmacy = signed  Tamica Rudolph - Pharmacy Tech.

## 2024-01-19 NOTE — PROGRESS NOTES
Pharmacy to Dose Warfarin    Pharmacy consulted to dose warfarin for acute PE, hx of DVT.    INR Goal: 2-3    INR today: 1.47    Assessment/Plan:  - INR subtherapeutic today  - Will give warfarin 7.5 mg tonight which is a small increase from home dose of 5 mg   - Given that INR was subtherapeutic on admission, may need dose increase overall  - Will continue heparin drip to bridge until INR >2  - Could consider transition to lovenox 1 mg/kg BID to bridge     Pharmacy will continue to follow.    Avani Angeles, PharmD, BCPS  Clinical Pharmacist  [id number]

## 2024-01-19 NOTE — TELEPHONE ENCOUNTER
Care Transitions Initial Follow Up Call    Outreach made within 2 business days of discharge: Yes    Patient: Shayla Crespo Patient : 1940   MRN: [id number]  Reason for Admission: There are no discharge diagnoses documented for the most recent discharge.  Discharge Date: 1/10/24       Spoke with: Pt and is scheduled  @ 1pm    Discharge department/facility: [facility] Interactive Patient Contact:  Was patient able to fill all prescriptions: Yes  Was patient instructed to bring all medications to the follow-up visit: Yes  Is patient taking all medications as directed in the discharge summary? Yes  Does patient understand their discharge instructions: Yes  Does patient have questions or concerns that need addressed prior to 7-14 day follow up office visit: no    Scheduled appointment with PCP within 7-14 days    Follow Up  Future Appointments   Date Time Provider Department Center   2024 10:00 AM Aimee Molina, APRN - CNP [facility]   2024  1:30 PM Elba Deleon APRN - CNP D[facility]   2024 10:30 AM Stefany Manzo MD [facility]       Robyn Whalen LPN

## 2024-01-19 NOTE — DISCHARGE INSTR - COC
Continuity of Care Form    Patient Name: Shayla Serra   :  1940  MRN:  [id number]    Admit date:  2024  Discharge date:  2024    Code Status Order: Full Code   Advance Directives:     Admitting Physician:  Portia Nino MD  PCP: Stefany Manzo MD    Discharging Nurse: Cassidy  Discharging Hospital Unit/Room#: CVU-2914/2914-01  Discharging Unit Phone Number: [phone]    Emergency Contact:   Extended Emergency Contact Information  Primary Emergency Contact: MayFiorella  Address: [address] [place] [address] of [facility]  Home Phone: [phone]  Work Phone: [phone]  Mobile Phone: [phone]  Relation: Child  Secondary Emergency Contact: stefani serra  Home Phone: [phone]  Relation: Child    Past Surgical History:  Past Surgical History:   Procedure Laterality Date    CATARACT EXTRACTION Bilateral     lens implanted     SECTION      x1    EMBOLECTOMY      EPIDURAL STEROID INJECTION Bilateral 2019    BILATERAL LUMBAR THREE, LUMBAR FOUR, LUMBAR FIVE RADIOFREQUENCY ABLATION SITE CONFIRMED BY FLUOROSCOPY performed by Josef Muñiz MD at [facility] OR    EPIDURAL STEROID INJECTION Right 08/15/2019    RIGHT SACROILIAC JOINT INJECTION SITE CONFIRMED BY FLUOROSCOPY performed by Josef Muñiz MD at [facility] OR    HYSTERECTOMY, TOTAL ABDOMINAL (CERVIX REMOVED)      Not for malignancy    LAMINECTOMY  2018    MICROLUMBAR LAMINECTOMY L4-5    LITHOTRIPSY      MOHS SURGERY      nose for squamous cell carcinoma    OTHER SURGICAL HISTORY      blood clots- 1963       Immunization History:   Immunization History   Administered Date(s) Administered    COVID-19, PFIZER PURPLE top, DILUTE for use, (age 12 y+), 30mcg/0.3mL 2021, 2021    Influenza Virus Vaccine 2021    Influenza, FLUAD, (age 65 y+), Adjuvanted, 0.5mL 2021, 2022, 2022    Influenza, High Dose (Fluzone 65 yrs and older) 10/25/2017     Influenza, Triv, inactivated, subunit, adjuvanted, IM (Fluad 65 yrs and older) 10/10/2019    Pneumococcal, PCV-13, PREVNAR 13, (age 6w+), IM, 0.5mL 06/13/2017, 10/25/2017    Pneumococcal, PPSV23, PNEUMOVAX 23, (age 2y+), SC/IM, 0.5mL 12/12/2018    TDaP, ADACEL (age 10y-64y), BOOSTRIX (age 10y+), IM, 0.5mL 12/12/2018       Active Problems:  Patient Active Problem List   Diagnosis Code    Essential hypertension I10    Cervical disc disease M50.90    Degenerative spondylolisthesis M43.10    Balance problem R26.89    Hiatal hernia K44.9    Spinal stenosis of lumbar region M48.061    Lumbar facet arthropathy M47.816    Disc displacement, lumbar M51.26    Combined forms of age-related cataract of both eyes H25.813    Atrial septal hypertrophy I51.7    History of DVT of lower extremity Z86.718    Severe episode of recurrent major depressive disorder, 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    RBBB I45.10    Memory loss R41.3    Mixed hyperlipidemia E78.2    Urinary incontinence R32    Vascular insufficiency I99.8    Immunization due Z23    Acute pulmonary embolism ([facility]) I26.99    IPF (idiopathic pulmonary fibrosis) ([facility]) J84.112       Isolation/Infection:   Isolation            No Isolation          Patient Infection Status       None to display            Nurse Assessment:  Last Vital Signs: /79   Pulse 67   Temp 97.2 °F (36.2 °C) (Temporal)   Resp 19   Ht 1.524 m (5')   Wt 52.6 kg (115 lb 15.4 oz)   SpO2 92%   BMI 22.65 kg/m²     Last documented pain score (0-10 scale):    Last Weight:   Wt Readings from Last 1 Encounters:   01/19/24 52.6 kg (115 lb 15.4 oz)     Mental Status:  oriented and alert    IV Access:  - None    Nursing Mobility/ADLs:  Walking   Assisted  Transfer  Assisted  Bathing  Independent  Dressing  Independent  Toileting  Assisted  Feeding  Independent  Med Admin  Assisted  Med Delivery   whole    Wound Care Documentation and Therapy:  Incision 02/08/18 Back Lower (Active)   Number of days:

## 2024-01-19 NOTE — CARE COORDINATION
[facility]    Received referral for homecare services. [facility] reviewed and unable to staff in timely manner. CTN then called second choice of [facility] and they are unable to staff referral. Vicemail message left for casemgr..Electronically signed by Hemanth Myrick LPN on 1/19/24 at 2:00 PM DARNELL Myrick LPN  [facility] Care Transition Nurse  [phone]

## 2024-01-19 NOTE — CONSULTS
[facility]    Consult Note      Requesting Physician:  The hospitalist    CHIEF COMPLAINT:  Chest pain and shortness of breath      HISTORY OF PRESENT ILLNESS:    Ms. Crespo  is a 83 y.o. female we are seeing in consultation for Pulmonary embolism.  She developed PE associated with her pregnancy many years ago.  She has 3 children.  She has been on Coumadin since.  She is known to have valvular heart disease and pulmonary fibrosis.  Her Coumadin was held for 3 days prior to her cardiac cath.  Her CT of the chest for valve replacement 2 days after her cardiac cath showed acute pulmonary embolism.  She was placed on heparin.  Overall she is feeling better.  There is family history of thrombosis and factor V Leiden mutation.      ICD-10-CM    1. Acute pulmonary embolism with acute cor pulmonale, unspecified pulmonary embolism type (HCC)  I26.09       2. 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)     Deep vein thrombosis (DVT) of proximal vein of both lower extremities ([facility]) 2017    Dementia ([facility]) 2019    Dental disease     Depression     Dizziness     GERD (gastroesophageal reflux disease)     H/O blood clots     Headache     Hearing loss     Hiatal hernia     Hyperlipidemia     Hypertension     IPF (idiopathic pulmonary fibrosis) ([facility]) 2024    Lumbar stenosis with neurogenic claudication     Nephrolithiasis     Osteoarthritis of neck     Scoliosis     TMJ dysfunction        Past Surgical History:  Past Surgical History:   Procedure Laterality Date    CATARACT EXTRACTION Bilateral     lens implanted     SECTION      x1    EMBOLECTOMY      EPIDURAL STEROID INJECTION  (Non-Medical): No   Physical Activity: Inactive (9/22/2023)    Exercise Vital Sign     Days of Exercise per Week: 0 days     Minutes of Exercise per Session: 0 min   Stress: Not on file   Social Connections: Not on file   Intimate Partner Violence: Patient Declined (9/22/2023)    Humiliation, Afraid, Rape, and Kick questionnaire     Fear of Current or Ex-Partner: Patient declined     Emotionally Abused: Patient declined     Physically Abused: Patient declined     Sexually Abused: Patient declined   Housing Stability: Low Risk  (1/17/2024)    Housing Stability Vital Sign     Unable to Pay for Housing in the Last Year: No     Number of Places Lived in the Last Year: 1     Unstable Housing in the Last Year: No          Family History:  Family History   Problem Relation Age of Onset    Heart Disease Mother     Other Father         murdered     Colon Cancer Brother     Breast Cancer Maternal Aunt     Colon Cancer Maternal Grandmother     Cancer Other     Hypertension Other     Seizures Other        REVIEW OF SYSTEMS:      Constitutional: Denies fever, sweats, weight loss  Eyes: No visual changes or diplopia. No scleral icterus.  Ent: No headaches, hearing loss or vertigo.  No mouth sores or sore throat.  Cardiovascular: Chest pain and shortness of breath.  Palpitations.  Respiratory: Some cough over the last few days.  Denies hemoptysis.  Gastrointestinal: No abdominal pain, appetite loss, blood in stools.  No change in bowel habits.  Genitourinary: No dysuria, trouble voiding, or hematuria.  Musculoskeletal: No generalized weakness.  No joint complaints.  Integumentary: No rash or pruritus.  Neurological: No headache, diplopia.  No change in gait, balance, or coordination.  No paresthesias.  Endocrine: No temperature intolerance.  No excessive thirst, fluid intake, urination.  Hematologic/lymphatic: No abnormal bruising or ecchymosis, blood clots or swollen lymph nodes.  Allergic/immunologic: No nasal congestion or

## 2024-01-20 LAB
MYELOPEROXIDASE AB SER-ACNC: 0 AU/ML (ref 0–19)
PROTEINASE3 AB SER-ACNC: 0 AU/ML (ref 0–19)

## 2024-01-22 ENCOUNTER — CARE COORDINATION (OUTPATIENT)
Dept: CASE MANAGEMENT | Age: 84
End: 2024-01-22

## 2024-01-22 DIAGNOSIS — I26.99 ACUTE PULMONARY EMBOLISM, UNSPECIFIED PULMONARY EMBOLISM TYPE, UNSPECIFIED WHETHER ACUTE COR PULMONALE PRESENT (HCC): Primary | ICD-10-CM

## 2024-01-22 LAB
F2 C.20210G>A GENO BLD/T: NEGATIVE
F5 P.R506Q BLD/T QL: NEGATIVE
SPECIMEN SOURCE: NORMAL
SPECIMEN SOURCE: NORMAL

## 2024-01-22 PROCEDURE — 1111F DSCHRG MED/CURRENT MED MERGE: CPT | Performed by: FAMILY MEDICINE

## 2024-01-22 NOTE — CARE COORDINATION
Care Transitions Initial Follow Up Call    Call within 2 business days of discharge: Yes    Patient Current Location:  Home: [address]  [facility] [address]    Care Transition Nurse contacted the patient by telephone to perform post hospital discharge assessment. Verified name and  with patient as identifiers. Provided introduction to self, and explanation of the Care Transition Nurse role.     Patient: Shayla Crespo Patient : 1940   MRN: [id number]  Reason for Admission:   Discharge Date: 24 RARS: Readmission Risk Score: 10.9      Last Discharge Facility       Date Complaint Diagnosis Description Type Department Provider    24 Pulmonary Embolism Acute pulmonary embolism with acute cor pulmonale, unspecified pulmonary embolism type (HCC) ... ED to Hosp-Admission (Discharged) (ADMITTED) F CVICU Portia Nino MD; Rick Winchester...            Was this an external facility discharge? No Discharge Facility:     Challenges to be reviewed by the provider   Additional needs identified to be addressed with provider: No  none               Method of communication with provider: none.    Pt states doing well, no issues or concerns just an occasional cough. HC coming out today. F/U appts listed below. Agreed to more CTC f/u calls.      Care Transition Nurse reviewed discharge instructions with patient who verbalized understanding. The patient was given an opportunity to ask questions and does not have any further questions or concerns at this time. Were discharge instructions available to patient? Yes. Reviewed appropriate site of care based on symptoms and resources available to patient including: When to call 911. The patient agrees to contact the PCP office for questions related to their healthcare.     Advance Care Planning:   Does patient have an Advance Directive: reviewed and current.    Medication reconciliation was performed with patient, who verbalizes understanding of administration of

## 2024-01-23 NOTE — PROGRESS NOTES
Physician Progress Note      PATIENT:               ROBYN HOUSER  CSN #:                  [id number]  :                       1940  ADMIT DATE:       2024 11:27 AM  DISCH DATE:        2024 2:58 PM  RESPONDING  PROVIDER #:        Portia Nino MD          QUERY TEXT:    Pt noted to have a pulmonary embolism. If possible, please document in the   progress notes and discharge summary if you are evaluating and / or treating   any of the following:    The medical record reflects the following:  Risk Factors: Aortic stenosis, off coumadin for TAVR  Clinical Indicators: Pulmonary Note: 24 \" incidental finding of large   left main pulmonary artery with evidence of RV strain.\" ECHO Showing,   \"pulmonary artery systolic pressure is elevated at 45 mmHg\"  Treatment: CTA chest abd, ECHO, Vasc surg consult, Pulmonary consult  Options provided:  -- Pulmonary Embolism with Acute Cor Pulmonale  -- Pulmonary Embolism without Acute Cor Pulmonale  -- Other - I will add my own diagnosis  -- Disagree - Not applicable / Not valid  -- Disagree - Clinically unable to determine / Unknown  -- Refer to Clinical Documentation Reviewer    PROVIDER RESPONSE TEXT:    This patient has Pulmonary Embolism with acute cor pulmonale.    Query created by: Kristan Carver on 2024 11:14 AM      Electronically signed by:  Portia Nino MD 2024 11:33 AM

## 2024-01-24 ENCOUNTER — OFFICE VISIT (OUTPATIENT)
Dept: PRIMARY CARE CLINIC | Age: 84
End: 2024-01-24

## 2024-01-24 VITALS
RESPIRATION RATE: 14 BRPM | DIASTOLIC BLOOD PRESSURE: 68 MMHG | HEIGHT: 60 IN | HEART RATE: 67 BPM | TEMPERATURE: 97.2 F | OXYGEN SATURATION: 94 % | SYSTOLIC BLOOD PRESSURE: 132 MMHG | BODY MASS INDEX: 25.09 KG/M2 | WEIGHT: 127.8 LBS

## 2024-01-24 DIAGNOSIS — I26.99 OTHER ACUTE PULMONARY EMBOLISM WITHOUT ACUTE COR PULMONALE (HCC): Primary | ICD-10-CM

## 2024-01-24 DIAGNOSIS — J84.112 IPF (IDIOPATHIC PULMONARY FIBROSIS) (HCC): ICD-10-CM

## 2024-01-24 DIAGNOSIS — F33.2 SEVERE EPISODE OF RECURRENT MAJOR DEPRESSIVE DISORDER, WITHOUT PSYCHOTIC FEATURES (HCC): ICD-10-CM

## 2024-01-24 DIAGNOSIS — F11.20 OPIOID DEPENDENCE WITH CURRENT USE (HCC): ICD-10-CM

## 2024-01-24 ASSESSMENT — PATIENT HEALTH QUESTIONNAIRE - PHQ9
SUM OF ALL RESPONSES TO PHQ QUESTIONS 1-9: 16
3. TROUBLE FALLING OR STAYING ASLEEP: 3
8. MOVING OR SPEAKING SO SLOWLY THAT OTHER PEOPLE COULD HAVE NOTICED. OR THE OPPOSITE, BEING SO FIGETY OR RESTLESS THAT YOU HAVE BEEN MOVING AROUND A LOT MORE THAN USUAL: 0
SUM OF ALL RESPONSES TO PHQ QUESTIONS 1-9: 16
10. IF YOU CHECKED OFF ANY PROBLEMS, HOW DIFFICULT HAVE THESE PROBLEMS MADE IT FOR YOU TO DO YOUR WORK, TAKE CARE OF THINGS AT HOME, OR GET ALONG WITH OTHER PEOPLE: 1
4. FEELING TIRED OR HAVING LITTLE ENERGY: 3
7. TROUBLE CONCENTRATING ON THINGS, SUCH AS READING THE NEWSPAPER OR WATCHING TELEVISION: 3
5. POOR APPETITE OR OVEREATING: 2
SUM OF ALL RESPONSES TO PHQ QUESTIONS 1-9: 16
9. THOUGHTS THAT YOU WOULD BE BETTER OFF DEAD, OR OF HURTING YOURSELF: 0
1. LITTLE INTEREST OR PLEASURE IN DOING THINGS: 2
SUM OF ALL RESPONSES TO PHQ QUESTIONS 1-9: 16
SUM OF ALL RESPONSES TO PHQ9 QUESTIONS 1 & 2: 4
6. FEELING BAD ABOUT YOURSELF - OR THAT YOU ARE A FAILURE OR HAVE LET YOURSELF OR YOUR FAMILY DOWN: 1
2. FEELING DOWN, DEPRESSED OR HOPELESS: 2

## 2024-01-24 ASSESSMENT — ENCOUNTER SYMPTOMS
DIARRHEA: 0
ABDOMINAL PAIN: 0
COLOR CHANGE: 0
BLOOD IN STOOL: 0
SHORTNESS OF BREATH: 1
NAUSEA: 0
SORE THROAT: 0
COUGH: 0
VOMITING: 0

## 2024-01-24 NOTE — PATIENT INSTRUCTIONS
Please schedule with coumadin clinic at latest 1/26/2024 and again if needed on 01/29/2024.    The National Suicide Prevention Lifeline is a [facility]-based suicide prevention network of 161 crisis centers that provides a 24/7, toll-free hotline available to anyone in suicidal crisis or emotional distress. The number is: 2-788-102-8405.     [employer] - [place] based resource for finding mental health resources and providers

## 2024-01-24 NOTE — PROGRESS NOTES
respiratory distress.      Breath sounds: Normal breath sounds. No stridor. No wheezing, rhonchi or rales.   Chest:      Chest wall: No tenderness.   Abdominal:      Palpations: Abdomen is soft.   Skin:     General: Skin is warm and dry.      Capillary Refill: Capillary refill takes less than 2 seconds.   Neurological:      Mental Status: She is alert.   Psychiatric:         Mood and Affect: Mood normal.         Behavior: Behavior normal.      Comments: Depressed mood and congruent affect       Body mass index is 24.96 kg/m².    Labs:  Recent Results (from the past 672 hour(s))   CBC    Collection Time: 01/10/24  7:50 AM   Result Value Ref Range    WBC 5.9 4.0 - 11.0 K/uL    RBC 4.22 4.00 - 5.20 M/uL    Hemoglobin 13.7 12.0 - 16.0 g/dL    Hematocrit 41.3 36.0 - 48.0 %    MCV 97.9 80.0 - 100.0 fL    MCH 32.6 26.0 - 34.0 pg    MCHC 33.3 31.0 - 36.0 g/dL    RDW 14.7 12.4 - 15.4 %    Platelets 205 135 - 450 K/uL    MPV 8.7 5.0 - 10.5 fL   Basic Metabolic Panel w/ Reflex to MG    Collection Time: 01/10/24  7:50 AM   Result Value Ref Range    Sodium 140 136 - 145 mmol/L    Potassium reflex Magnesium 3.7 3.5 - 5.1 mmol/L    Chloride 102 99 - 110 mmol/L    CO2 28 21 - 32 mmol/L    Anion Gap 10 3 - 16    Glucose 105 (H) 70 - 99 mg/dL    BUN 27 (H) 7 - 20 mg/dL    Creatinine 0.6 0.6 - 1.2 mg/dL    Est, Glom Filt Rate >60 >60    Calcium 10.0 8.3 - 10.6 mg/dL   Lipid Panel    Collection Time: 01/10/24  7:50 AM   Result Value Ref Range    Cholesterol, Total 213 (H) 0 - 199 mg/dL    Triglycerides 89 0 - 150 mg/dL    HDL 72 (H) 40 - 60 mg/dL    LDL Calculated 123 (H) <100 mg/dL    VLDL Cholesterol Calculated 18 Not Established mg/dL   Protime-INR    Collection Time: 01/10/24  7:50 AM   Result Value Ref Range    Protime 14.8 11.5 - 14.8 sec    INR 1.16 0.84 - 1.16   Electrocardiogram, 12-lead     Collection Time: 01/10/24  8:12 AM   Result Value Ref Range    Ventricular Rate 66 BPM    Atrial Rate 66 BPM    P-R Interval 178 ms

## 2024-01-24 NOTE — ASSESSMENT & PLAN NOTE
Well controlled by sx - at least stable. Scheduled pulm f/u. Has cards f/u. Reviewed and reconciled medications. VSS. Call back/ED precautions discussed.

## 2024-01-24 NOTE — ASSESSMENT & PLAN NOTE
Poorly controlled. PHQ9 score of 16, WILMAN/MDQ deferred. Denied SI/HI today. Provided SI Hotline number as well as [email or web address] to establish care for this. Call back/ED precautions discussed.

## 2024-01-29 ENCOUNTER — CARE COORDINATION (OUTPATIENT)
Dept: CASE MANAGEMENT | Age: 84
End: 2024-01-29

## 2024-01-29 NOTE — CARE COORDINATION
[facility] Care Transitions Follow Up Call    2024    Patient: Shayla Crespo  Patient : 1940   MRN: [id number]  Reason for Admission: acute PE with right heart strain, pulmonary fibrosis  Discharge Date: 24 RARS: Readmission Risk Score: 10.9         Spoke with: valeria    [facility] attempted outreach.Unable to leave message because when dialed writer got a message that the mailbox is invalid. Writer attempted to call daughters Oralia and Fiorella and no answer. We will try patient again on another day.     Maryann Ji LPN  Care Coordinatior     Care Transitions Subsequent and Final Call    Subsequent and Final Calls  Care Transitions Interventions  Other Interventions:             Follow Up  Future Appointments   Date Time Provider Department Center   2024 10:00 AM Aimee Molina APRN - CNP R BANK PAIN [facility]   2024  1:00 PM [facility] ANTICOAGULATION CLINIC [facility]   2024  1:30 PM Elba Deleon APRN - CNP Dinesh Car [facility]   2024  9:30 AM Rosas Meeks MD AND CT SURG [facility]   2024  2:00 PM Terrell Burkett MD PULM & CC [facility]   2024  2:30 PM Stefany Manzo MD LOVE [facility]       Maryann Ji LPN

## 2024-01-31 ENCOUNTER — CARE COORDINATION (OUTPATIENT)
Dept: CARE COORDINATION | Age: 84
End: 2024-01-31

## 2024-01-31 ENCOUNTER — ANTI-COAG VISIT (OUTPATIENT)
Dept: PHARMACY | Age: 84
End: 2024-01-31
Payer: MEDICARE

## 2024-01-31 ENCOUNTER — OFFICE VISIT (OUTPATIENT)
Dept: CARDIOLOGY CLINIC | Age: 84
End: 2024-01-31
Payer: MEDICARE

## 2024-01-31 ENCOUNTER — OFFICE VISIT (OUTPATIENT)
Dept: PAIN MANAGEMENT | Age: 84
End: 2024-01-31
Payer: MEDICARE

## 2024-01-31 VITALS
OXYGEN SATURATION: 98 % | BODY MASS INDEX: 25 KG/M2 | TEMPERATURE: 97 F | DIASTOLIC BLOOD PRESSURE: 73 MMHG | WEIGHT: 128 LBS | SYSTOLIC BLOOD PRESSURE: 122 MMHG | HEART RATE: 63 BPM

## 2024-01-31 VITALS
WEIGHT: 130 LBS | DIASTOLIC BLOOD PRESSURE: 64 MMHG | HEIGHT: 60 IN | BODY MASS INDEX: 25.52 KG/M2 | SYSTOLIC BLOOD PRESSURE: 120 MMHG | OXYGEN SATURATION: 94 % | HEART RATE: 81 BPM

## 2024-01-31 DIAGNOSIS — M43.10 DEGENERATIVE SPONDYLOLISTHESIS: ICD-10-CM

## 2024-01-31 DIAGNOSIS — M96.1 FAILED BACK SURGICAL SYNDROME: ICD-10-CM

## 2024-01-31 DIAGNOSIS — I10 ESSENTIAL HYPERTENSION: ICD-10-CM

## 2024-01-31 DIAGNOSIS — I45.10 RBBB: ICD-10-CM

## 2024-01-31 DIAGNOSIS — M48.061 SPINAL STENOSIS OF LUMBAR REGION WITHOUT NEUROGENIC CLAUDICATION: ICD-10-CM

## 2024-01-31 DIAGNOSIS — M47.816 LUMBAR FACET ARTHROPATHY: ICD-10-CM

## 2024-01-31 DIAGNOSIS — I26.99 OTHER ACUTE PULMONARY EMBOLISM WITHOUT ACUTE COR PULMONALE (HCC): ICD-10-CM

## 2024-01-31 DIAGNOSIS — F33.42 RECURRENT MAJOR DEPRESSIVE DISORDER, IN FULL REMISSION (HCC): ICD-10-CM

## 2024-01-31 DIAGNOSIS — I82.4Y3 DEEP VEIN THROMBOSIS (DVT) OF PROXIMAL VEIN OF BOTH LOWER EXTREMITIES, UNSPECIFIED CHRONICITY (HCC): ICD-10-CM

## 2024-01-31 DIAGNOSIS — E78.2 MIXED HYPERLIPIDEMIA: ICD-10-CM

## 2024-01-31 DIAGNOSIS — I35.0 AORTIC VALVE STENOSIS, NONRHEUMATIC: Primary | ICD-10-CM

## 2024-01-31 DIAGNOSIS — G89.4 CHRONIC PAIN SYNDROME: ICD-10-CM

## 2024-01-31 DIAGNOSIS — Z86.718 HISTORY OF DVT OF LOWER EXTREMITY: Primary | ICD-10-CM

## 2024-01-31 DIAGNOSIS — I34.2 NONRHEUMATIC MITRAL VALVE STENOSIS: ICD-10-CM

## 2024-01-31 DIAGNOSIS — I51.7 ATRIAL SEPTAL HYPERTROPHY: ICD-10-CM

## 2024-01-31 DIAGNOSIS — M50.90 CERVICAL DISC DISEASE: ICD-10-CM

## 2024-01-31 DIAGNOSIS — M51.36 DDD (DEGENERATIVE DISC DISEASE), LUMBAR: ICD-10-CM

## 2024-01-31 LAB — INR BLD: 2.9

## 2024-01-31 PROCEDURE — 1123F ACP DISCUSS/DSCN MKR DOCD: CPT | Performed by: NURSE PRACTITIONER

## 2024-01-31 PROCEDURE — 85610 PROTHROMBIN TIME: CPT | Performed by: FAMILY MEDICINE

## 2024-01-31 PROCEDURE — 3074F SYST BP LT 130 MM HG: CPT | Performed by: NURSE PRACTITIONER

## 2024-01-31 PROCEDURE — 3078F DIAST BP <80 MM HG: CPT | Performed by: NURSE PRACTITIONER

## 2024-01-31 PROCEDURE — 99211 OFF/OP EST MAY X REQ PHY/QHP: CPT | Performed by: FAMILY MEDICINE

## 2024-01-31 PROCEDURE — 99214 OFFICE O/P EST MOD 30 MIN: CPT | Performed by: NURSE PRACTITIONER

## 2024-01-31 RX ORDER — HYDROCODONE BITARTRATE AND ACETAMINOPHEN 7.5; 325 MG/1; MG/1
1 TABLET ORAL EVERY 8 HOURS PRN
Qty: 90 TABLET | Refills: 0 | Status: SHIPPED | OUTPATIENT
Start: 2024-01-31 | End: 2024-03-01

## 2024-01-31 RX ORDER — DULOXETIN HYDROCHLORIDE 60 MG/1
60 CAPSULE, DELAYED RELEASE ORAL DAILY
Qty: 90 CAPSULE | Refills: 0 | Status: SHIPPED | OUTPATIENT
Start: 2024-01-31 | End: 2024-04-30

## 2024-01-31 NOTE — PATIENT INSTRUCTIONS
Stay hydrated  Stop the lisinopril  Monitor blood pressure couple times per week.  Let me know if systolic > 140 mmHg  Okay to fold laundry, stand at sink or stove. no lifting > 25 lbs.  No lifting over head.  Limited bending over (favor only if already sitting).   Decrease amount of salt in diet.    Follow up with Dr. Meeks next week  Rose or Dr. Arias will give you instruction on the coumadin and lovenox injections once surgery scheduled  Schedule a follow up appointment with me in 3 months (okay to reschedule depending on timing of surgery)

## 2024-01-31 NOTE — CARE COORDINATION
Care Transitions Outreach Attempt    Attempted to reach patient for transitions of care follow up. Unable to reach patient.    Patient: Shayla Crespo Patient : 1940 MRN: [id number]    Last Discharge Facility       Date Complaint Diagnosis Description Type Department Provider    24 Pulmonary Embolism Acute pulmonary embolism with acute cor pulmonale, unspecified pulmonary embolism type (HCC) ... ED to Hosp-Admission (Discharged) (ADMITTED) F CVICU Portia Nino MD; Rick Winchester...          Noted following upcoming appointments from discharge chart review:   [facility] follow up appointment(s):   Future Appointments   Date Time Provider Department Center   2024  1:00 PM [facility] ANTICOAGULATION CLINIC [facility]   2024  1:30 PM Elba Deleon, APRN - [facility] Car [facility]   2024  9:30 AM Rosas Meeks MD AND CT SURG [facility]   2024  2:00 PM Terrell Burkett MD PULM & CC [facility]   2024  2:30 PM Stefany Manzo MD [facility]     Non-[facility]  follow up appointment(s): NA

## 2024-01-31 NOTE — PROGRESS NOTES
Ms. Crespo is here for management of anticoagulation for hx of DVT x 2 separate occasions.    PMH also significant for HTN.   She presents today w/out complaint.  Pt verifies dosing regimen as listed above.  Pt denies s/s bleeding/swelling/SOB.  No missed doses.   No changes in Rx/OTCs/Herbal medications.   Occasional EtOH use and denies tobacco.    [facility] RN unable to draw INR, machine was faulty. Patient came into clinic today.     Has an appointment on  with Dr. Meeks for potential heart surgery  Was admitted to the hospital on  for a PE. INR was low at that time. Patient was discharged on lovenox shots, last dose was last week. Currently INR 2.9, as dose was increased to 10 mg Mon, 7.5 mg all other days. Will start on 7.5 mg daily until next wed and go from there.       INR 2.9 is within therapeutic range of 2-3.  Recommend to increase dose to 7.5 mg every day   Pt has the 5 mg tablets.  Will continue to monitor and check INR in 1 week  Dosing reminder card given with phone number, appointment date and time.   Return to clinic:  @ 945 am   Referring physician: Dr. Sylvia Mejía  Referral date: 3/20/23     Raj Martínez, PharmD 2024 1:50 PM    For Pharmacy Admin Tracking Only    Intervention Detail: Adherence Monitorin and Dose Adjustment: 1, reason: Therapy Optimization  Total # of Interventions Recommended: 1  Total # of Interventions Accepted: 1  Time Spent (min): 15

## 2024-01-31 NOTE — PROGRESS NOTES
[facility]   Cardiac Evaluation    Primary Care Doctor:  Stefany Manzo MD    Chief Complaint   Patient presents with    Follow-up     S/p cath    Hyperlipidemia    Hypertension    Irregular Heart Beat     RBBB    Other     Atrial septal hypertrophy    Chest Pain     Chest pressure- pt stated she \"feels smothery\"    Shortness of Breath    Dizziness        Assessment:    1. Aortic valve stenosis, nonrheumatic    2. Nonrheumatic mitral valve stenosis    3. Atrial septal hypertrophy    4. Essential hypertension    5. Mixed hyperlipidemia    6. RBBB    7. Deep vein thrombosis (DVT) of proximal vein of both lower extremities, unspecified chronicity (HCC)    8. Other acute pulmonary embolism without acute cor pulmonale (HCC)        Plan:   Stay hydrated  Stop the lisinopril  Monitor blood pressure couple times per week.  Let me know if systolic > 140 mmHg  Okay to fold laundry, stand at sink or stove. no lifting > 25 lbs.  No lifting over head.  Limited bending over (favor only if already sitting).   Decrease amount of salt in diet.    Follow up with Dr. Meeks next week  Rose or Dr. Arias will give you instruction on the coumadin and lovenox injections once surgery scheduled  Schedule a follow up appointment with me in 3 months (okay to reschedule depending on timing of surgery)    Vitals:    01/31/24 1336   BP: 120/64   Pulse: 81   SpO2: 94%   Weight: 59 kg (130 lb)   Height: 1.524 m (5')       Primary Cardiologist: Dr. Storm Liz     History of Present Illness:   I had the pleasure of seeing Shayla Crespo (83 y.o.) in follow up for atrial septal hypertrophy, moderate-severe aortic stenosis, mild-moderate mitral stenosis, chronic RBBB, hypertension, hyperlipidemia as well as history of DVT on chronic warfarin, scoliosis and DDD.  She held her warfarin for coronary angiogram which was completed on 1/10/2024.  She then had a CTA of the chest in preparation for TAVR and incidental finding of acute PE.  She

## 2024-01-31 NOTE — PROGRESS NOTES
EXAM:   Nursing note and vitals reviewed. /73   Pulse 63   Temp 97 °F (36.1 °C)   Wt 58.1 kg (128 lb)   SpO2 98%   BMI 25.00 kg/m²   Constitutional: She appears well-developed and well-nourished. No acute distress.   Skin: Skin is warm and dry, good turgor. No rash noted. She is not diaphoretic.  Cardiovascular: Normal rate, regular rhythm, normal heart sounds, and does not have murmur.     Pulmonary/Chest: Effort normal. No respiratory distress. She does not have wheezes in the lung fields. She has no rales.     Neurological/Psychiatric:She is alert and oriented to person, place, and time. Coordination is  normal.  Her mood isAppropriate and affect is Neutral/Euthymic(normal) .     Prescription pain medication monitoring:                  MEDD current = 22.50              ORT Score = 1 low risk              Other Risk factors - (mood) Depression              Date of Last Medication Agreement: 1/3/24              Date Naloxone prescribed: 9/7/23              UDT:                          Date of last UDT: 6/17/23                          Adverse report: No              OARRS:                          Checked today: Yes                          Adverse report: No     IMPRESSION:   1. Chronic pain syndrome    2. Cervical disc disease    3. DDD (degenerative disc disease), lumbar    4. Lumbar facet arthropathy    5. Degenerative spondylolisthesis    6. Spinal stenosis of lumbar region without neurogenic claudication    7. Failed back surgical syndrome    8. Recurrent major depressive disorder, in full remission (HCC)      PLAN:  Informed verbal consent was obtained:  -Patient's opioid therapy will be maintained at current dose  -Home exercises/Marii exercises recommended  -Patient has an appointment with cardiology today, reports not having had heart pain. She is on Lisinipril which may cause S/E of cough  -CBT techniques- relaxation therapies such as biofeedback, mindfulness based stress reduction,

## 2024-02-05 ENCOUNTER — CARE COORDINATION (OUTPATIENT)
Dept: CASE MANAGEMENT | Age: 84
End: 2024-02-05

## 2024-02-05 NOTE — CARE COORDINATION
Care Transitions Follow Up Call    Patient Current Location:  Home: [address] [place] [address]    LPN Care Coordinator contacted the patient by telephone to follow up after admission on 2024.  Verified name and  with patient as identifiers.    Patient: Shayla Crespo  Patient : 1940   MRN: <[id number]>  Reason for Admission: acute PE with right heart strain, pulmonary fibrosis   Discharge Date: 24 RARS: Readmission Risk Score: 10.9      Needs to be reviewed by the provider   Additional needs identified to be addressed with provider: No  none             Method of communication with provider: none.  Spoke to patient. She reports she is tired, has a cough, sob, swelling in her legs and dizziness. Patient sits for a bit until it goes away before moving. Wears thigh high compression stockings. Denies cp, palpitations, bleeding, bruises or ha. Home care is active with PT and a nurse coming. Nurse is due to come today. Appetite and fluid intake is good. No urinary problems.has constipation. Patient encouraged to use Miralax to help. Patient voiced understanding. Will continue to follow.      Addressed changes since last contact:  none  Discussed follow-up appointments. If no appointment was previously scheduled, appointment scheduling offered: No.   Is follow up appointment scheduled within 7 days of discharge? No.    Follow Up  Future Appointments   Date Time Provider Department Center   2024  9:45 AM [facility] ANTICOAGULATION CLINIC [facility]   2024  9:30 AM Rosas Meeks MD AND CT SURG [facility]   2024  2:00 PM Terrell Burkett MD PULM & CC [facility]   3/6/2024 11:00 AM Aimee Molina APRN - CNP R BANK PAIN [facility]   2024  2:30 PM Stefany Manzo MD LOVE PC [facility]   2024 10:30 AM Elba Deleon APRN - CNP [facility]     External follow up appointment(s):       [facility] Care Coordinator reviewed red flags with patient and discussed any barriers to care

## 2024-02-07 ENCOUNTER — ANTI-COAG VISIT (OUTPATIENT)
Dept: PHARMACY | Age: 84
End: 2024-02-07
Payer: MEDICARE

## 2024-02-07 DIAGNOSIS — Z86.718 HISTORY OF DVT OF LOWER EXTREMITY: Primary | ICD-10-CM

## 2024-02-07 LAB — INTERNATIONAL NORMALIZATION RATIO, POC: 3

## 2024-02-07 PROCEDURE — 85610 PROTHROMBIN TIME: CPT | Performed by: INTERNAL MEDICINE

## 2024-02-07 PROCEDURE — 99211 OFF/OP EST MAY X REQ PHY/QHP: CPT | Performed by: INTERNAL MEDICINE

## 2024-02-07 NOTE — PROGRESS NOTES
Ms. Crespo is here for management of anticoagulation for hx of DVT x 2 separate occasions.    PMH also significant for HTN.   She presents today w/out complaint.  Pt verifies dosing regimen as listed above.  Pt denies s/s bleeding/swelling/SOB.  No missed doses.   No changes in Rx/OTCs/Herbal medications.   Occasional EtOH use and denies tobacco.      Has an appointment on  with Dr. Meeks for potential heart surgery  Was admitted to the hospital on  for a PE. INR was low at that time. Will try to leave on the higher end of her goal range.    INR 3.0 is within therapeutic range of 2-3.  Recommend to continue 7.5 mg every day   Pt has the 5 mg tablets.  Will continue to monitor and check INR in 2 weeks.  Dosing reminder card given with phone number, appointment date and time.   Return to clinic:  @ 1015 am     Referring physician: Dr. Sylvia Mejía  Referral date: 3/20/23      For Pharmacy Admin Tracking Only    Intervention Detail: Adherence Monitorin  Total # of Interventions Recommended: 1  Total # of Interventions Accepted: 1  Time Spent (min): 15

## 2024-02-08 RX ORDER — LISINOPRIL 10 MG/1
10 TABLET ORAL DAILY
Qty: 100 TABLET | Refills: 2 | Status: SHIPPED | OUTPATIENT
Start: 2024-02-08 | End: 2024-02-09

## 2024-02-09 ENCOUNTER — OFFICE VISIT (OUTPATIENT)
Dept: PULMONOLOGY | Age: 84
End: 2024-02-09
Payer: MEDICARE

## 2024-02-09 VITALS
OXYGEN SATURATION: 96 % | HEART RATE: 71 BPM | BODY MASS INDEX: 25.32 KG/M2 | HEIGHT: 60 IN | SYSTOLIC BLOOD PRESSURE: 130 MMHG | DIASTOLIC BLOOD PRESSURE: 74 MMHG | WEIGHT: 129 LBS

## 2024-02-09 DIAGNOSIS — J84.10 PULMONARY FIBROSIS (HCC): Primary | ICD-10-CM

## 2024-02-09 DIAGNOSIS — I26.99 OTHER ACUTE PULMONARY EMBOLISM WITHOUT ACUTE COR PULMONALE (HCC): ICD-10-CM

## 2024-02-09 DIAGNOSIS — M06.9 RHEUMATOID ARTHRITIS, INVOLVING UNSPECIFIED SITE, UNSPECIFIED WHETHER RHEUMATOID FACTOR PRESENT (HCC): ICD-10-CM

## 2024-02-09 PROCEDURE — 3078F DIAST BP <80 MM HG: CPT | Performed by: INTERNAL MEDICINE

## 2024-02-09 PROCEDURE — 1123F ACP DISCUSS/DSCN MKR DOCD: CPT | Performed by: INTERNAL MEDICINE

## 2024-02-09 PROCEDURE — 99215 OFFICE O/P EST HI 40 MIN: CPT | Performed by: INTERNAL MEDICINE

## 2024-02-09 PROCEDURE — 3075F SYST BP GE 130 - 139MM HG: CPT | Performed by: INTERNAL MEDICINE

## 2024-02-09 RX ORDER — ALBUTEROL SULFATE 90 UG/1
2 AEROSOL, METERED RESPIRATORY (INHALATION) 4 TIMES DAILY PRN
Qty: 18 G | Refills: 5 | Status: SHIPPED | OUTPATIENT
Start: 2024-02-09

## 2024-02-09 RX ORDER — BUDESONIDE AND FORMOTEROL FUMARATE DIHYDRATE 160; 4.5 UG/1; UG/1
2 AEROSOL RESPIRATORY (INHALATION) 2 TIMES DAILY
Qty: 1 EACH | Refills: 3 | Status: SHIPPED | OUTPATIENT
Start: 2024-02-09

## 2024-02-09 ASSESSMENT — ENCOUNTER SYMPTOMS
COUGH: 1
SINUS PRESSURE: 0
BLOOD IN STOOL: 0
VOICE CHANGE: 0
CONSTIPATION: 0
SHORTNESS OF BREATH: 1
BACK PAIN: 0
COLOR CHANGE: 0
VOMITING: 0
SORE THROAT: 0
ABDOMINAL DISTENTION: 0
CHEST TIGHTNESS: 0
RHINORRHEA: 0
STRIDOR: 0
APNEA: 0
ABDOMINAL PAIN: 0
DIARRHEA: 0
CHOKING: 0
WHEEZING: 0

## 2024-02-09 NOTE — PROGRESS NOTES
taking differently: Take 1 tablet by mouth daily M 2 tablets, all other days 1.5 tablets) 143 tablet 3    Acetaminophen (TYLENOL ARTHRITIS EXT RELIEF PO) Take by mouth in the morning and at bedtime      Handicap Placard MISC by Does not apply route 1 each 0    cetirizine (ZYRTEC) 10 MG tablet Take 1 tablet by mouth daily      fluticasone (FLONASE) 50 MCG/ACT nasal spray 2 sprays by Each Nostril route daily (Patient taking differently: 2 sprays by Each Nostril route daily as needed) 1 Bottle 5    Misc. Devices MISC Compression stockings/open toe pantyhose with compression 40/50 2 each 2    Misc. Devices MISC George panty hose  Open toe Petite plus beige  2 pairs 2 each 1       Immunization History   Administered Date(s) Administered    COVID-19, PFIZER PURPLE top, DILUTE for use, (age 12 y+), 30mcg/0.3mL 03/30/2021, 04/20/2021    Influenza Virus Vaccine 02/22/2021    Influenza, FLUAD, (age 65 y+), Adjuvanted, 0.5mL 02/22/2021, 02/14/2022, 11/07/2022    Influenza, FLUZONE (age 65 y+), High Dose, 0.7mL 11/07/2023    Influenza, High Dose (Fluzone 65 yrs and older) 10/25/2017    Influenza, Triv, inactivated, subunit, adjuvanted, IM (Fluad 65 yrs and older) 10/10/2019    Pneumococcal, PCV-13, PREVNAR 13, (age 6w+), IM, 0.5mL 06/13/2017, 10/25/2017    Pneumococcal, PCV20, PREVNAR 20, (age 6w+), IM, 0.5mL 11/07/2023    Pneumococcal, PPSV23, PNEUMOVAX 23, (age 2y+), SC/IM, 0.5mL 12/12/2018    TDaP, ADACEL (age 10y-64y), BOOSTRIX (age 10y+), IM, 0.5mL 12/12/2018    Zoster Recombinant (Shingrix) 11/07/2023       Past Medical History:   Diagnosis Date    Allergic rhinitis     Arthritis     Cancer (HCC)     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

## 2024-02-12 ENCOUNTER — CARE COORDINATION (OUTPATIENT)
Dept: CARE COORDINATION | Age: 84
End: 2024-02-12

## 2024-02-12 NOTE — CARE COORDINATION
Care Transitions Follow Up Call    Patient Current Location:  Home: [address] [place] [address]    LPN Care Coordinator contacted the patient by telephone to follow up after admission on -.  Verified name and  with patient as identifiers.    Patient: Shayla Crespo  Patient : 1940   MRN: [id number]  Reason for Admission: acute PE with right heart strain, pulmonary fibrosis   Discharge Date: 24 RARS: Readmission Risk Score: 10.9      Needs to be reviewed by the provider   Additional needs identified to be addressed with provider: No  none             Method of communication with provider: none.    LPN CC spoke with patient's daughter, Oralia, HIPAA verified. States patient is doing OK. Still having a lot of SOB, \"smothering feeling.\" Providers are aware. They are in the process of getting a referral from providers. Some edema-noted patient also had phlebitis. Continues to wear compression stockings that are waist high. /74. PCP d/c lisinopril. Appetite good. Denies problems with bladder. Some constipation, they are using stool softeners & this helps. LPN CC also counseled about adequate hydration. Pulm prescribed albuterol & Symbicort, p/u today from pharmacy. Referred to rheumatology for RA & also to schedule a PFT. Graduated from PT, SN still coming with [facility]. Denies needs. Reviewed upcoming appts.   Stephanie Vo LPN CC  Care Transitions  [phone]    Addressed changes since last contact:  none  Discussed follow-up appointments. If no appointment was previously scheduled, appointment scheduling offered: Yes.   Is follow up appointment scheduled within 7 days of discharge? Yes.    Follow Up  Future Appointments   Date Time Provider Department Center   2024 10:15 AM [facility] ANTICOAGULATION CLINIC [facility]   3/6/2024 11:00 AM Aimee Molina APRN - CNP R BANK PAIN [facility]   3/8/2024 10:30 AM Terrell Burkett MD PULM & CC [facility]   2024 10:30 AM 
safe delivery of baby and mother

## 2024-02-19 ENCOUNTER — TELEPHONE (OUTPATIENT)
Dept: PULMONOLOGY | Age: 84
End: 2024-02-19

## 2024-02-19 ENCOUNTER — CARE COORDINATION (OUTPATIENT)
Dept: CASE MANAGEMENT | Age: 84
End: 2024-02-19

## 2024-02-19 RX ORDER — FLUTICASONE FUROATE AND VILANTEROL 100; 25 UG/1; UG/1
1 POWDER RESPIRATORY (INHALATION) DAILY
COMMUNITY

## 2024-02-19 NOTE — CARE COORDINATION
Care Transitions Follow Up Call    Patient: Shayla Crespo  Patient : 1940   MRN: <[id number]>  Reason for Admission: acute PE with right heart strain, pulmonary fibrosis    Discharge Date: 24 RARS: Readmission Risk Score: 10.9      Attempted to contact patient for follow up transition call. Left voicemail message to return call with an update on condition since discharge. Contact information provided. Will continue to follow up.      Follow Up  Future Appointments   Date Time Provider Department Center   2024 10:15 AM [place] ANTICOAGULATION CLINIC [facility]   3/6/2024 11:00 AM Aimee Molina, APRN - CNP R BANK PAIN [facility]   3/8/2024 10:30 AM Terrell Burkett MD PULM & CC [facility]   2024 10:30 AM Elba Deleon APRN - CNP [facility]       Care Transitions Subsequent and Final Call    Subsequent and Final Calls  Are you currently active with any services?: Home Health  Care Transitions Interventions  Other Interventions:             Chandni El LPN

## 2024-02-21 ENCOUNTER — ANTI-COAG VISIT (OUTPATIENT)
Dept: PHARMACY | Age: 84
End: 2024-02-21
Payer: MEDICARE

## 2024-02-21 ENCOUNTER — CARE COORDINATION (OUTPATIENT)
Dept: CARE COORDINATION | Age: 84
End: 2024-02-21

## 2024-02-21 DIAGNOSIS — Z86.718 HISTORY OF DVT OF LOWER EXTREMITY: Primary | ICD-10-CM

## 2024-02-21 LAB — INR BLD: 2.7

## 2024-02-21 PROCEDURE — 99211 OFF/OP EST MAY X REQ PHY/QHP: CPT | Performed by: FAMILY MEDICINE

## 2024-02-21 PROCEDURE — 85610 PROTHROMBIN TIME: CPT | Performed by: FAMILY MEDICINE

## 2024-02-21 NOTE — PROGRESS NOTES
Ms. Crespo is here for management of anticoagulation for hx of DVT x 2 separate occasions.    PMH also significant for HTN.   She presents today w/out complaint.  Pt verifies dosing regimen as listed above.  Pt denies s/s bleeding/swelling/SOB.  No missed doses.   No changes in Rx/OTCs/Herbal medications.   Occasional EtOH use and denies tobacco.        Now taking new inhalers, Breo ellipta and albuterol     INR 2.7 is within therapeutic range of 2-3.  Recommend to continue 7.5 mg every day   Pt has the 5 mg tablets.  Will continue to monitor and check INR in 4 weeks.  Dosing reminder card given with phone number, appointment date and time.   Return to clinic: 3/20 @ 1130 am     Referring physician: Dr. Sylvia Mejía  Referral date: 3/20/23      For Pharmacy Admin Tracking Only    Intervention Detail: Adherence Monitorin  Total # of Interventions Recommended: 1  Total # of Interventions Accepted: 1  Time Spent (min): 15

## 2024-02-21 NOTE — CARE COORDINATION
Care Transitions Outreach Attempt    Attempted to reach patient for transitions of care follow up. Unable to reach patient.    Patient: Shayla Crespo Patient : 1940 MRN: [id number]    Last Discharge Facility       Date Complaint Diagnosis Description Type Department Provider    24 Pulmonary Embolism Acute pulmonary embolism with acute cor pulmonale, unspecified pulmonary embolism type (HCC) ... ED to Hosp-Admission (Discharged) (ADMITTED) F CVICU Portia Nino MD; Rick Winchester...          Noted following upcoming appointments from discharge chart review:   [facility] follow up appointment(s):   Future Appointments   Date Time Provider Department Center   2024 10:15 AM [facility] ANTICOAGULATION CLINIC [facility]   3/6/2024 11:00 AM Aimee Molina, LIZZY - CNP R BANK PAIN [facility]   3/8/2024 10:30 AM Terrell Burkett MD PULM & CC [facility]   2024 10:30 AM Elba Deleon APRN - CNP [facility]     Non-[facility]  follow up appointment(s): NA

## 2024-03-01 ENCOUNTER — TELEPHONE (OUTPATIENT)
Dept: PRIMARY CARE CLINIC | Age: 84
End: 2024-03-01

## 2024-03-01 NOTE — TELEPHONE ENCOUNTER
Joshua, Home Health Care Provider, called stating that he wanted to give some updates about pt.  Joshua sts that pt was discharged from physical therapy 2 weeks ago.  Joshua sts pt now reports ear pain with dizziness but no falls, balance issues, difficulty breathing but pt sts the inhalers help, echo sounds in ears, cramps in both feet/legs, signs of depression/anxiety with 1 year anniversary of 's passing.  Joshua reports vital signs within normal limits, oxygen level is 93%.  Joshua sts he observes no signs of a stroke, pt can swallow and no difficulties with speech except for speaking in softer tones.    Advised Joshua that Dr Manzo is out of the office due to a family emergency today but will be back in the office on 3/5/24.  Joshua Manzo can feel free to call him with any questions.

## 2024-03-04 ENCOUNTER — TELEPHONE (OUTPATIENT)
Dept: CARDIOLOGY | Age: 84
End: 2024-03-04

## 2024-03-04 NOTE — TELEPHONE ENCOUNTER
Spoke to pts daughter, Oralia. Aware of TAVR on 4/2/24 (requested this date due to family obligations). Aware of PAT on 3/21/24 at 9:45am. Aware that we will bridge with lovenox while holding warfarin for TAVR. Alejandro SILVERMAN

## 2024-03-06 ENCOUNTER — OFFICE VISIT (OUTPATIENT)
Dept: PAIN MANAGEMENT | Age: 84
End: 2024-03-06
Payer: MEDICARE

## 2024-03-06 VITALS
WEIGHT: 129 LBS | BODY MASS INDEX: 25.19 KG/M2 | HEART RATE: 71 BPM | OXYGEN SATURATION: 95 % | TEMPERATURE: 97 F | DIASTOLIC BLOOD PRESSURE: 79 MMHG | SYSTOLIC BLOOD PRESSURE: 135 MMHG

## 2024-03-06 DIAGNOSIS — M47.816 LUMBAR FACET ARTHROPATHY: ICD-10-CM

## 2024-03-06 DIAGNOSIS — M96.1 FAILED BACK SURGICAL SYNDROME: ICD-10-CM

## 2024-03-06 DIAGNOSIS — M48.061 SPINAL STENOSIS OF LUMBAR REGION WITHOUT NEUROGENIC CLAUDICATION: ICD-10-CM

## 2024-03-06 DIAGNOSIS — G89.4 CHRONIC PAIN SYNDROME: ICD-10-CM

## 2024-03-06 DIAGNOSIS — M43.10 DEGENERATIVE SPONDYLOLISTHESIS: ICD-10-CM

## 2024-03-06 DIAGNOSIS — M50.90 CERVICAL DISC DISEASE: ICD-10-CM

## 2024-03-06 DIAGNOSIS — M51.36 DDD (DEGENERATIVE DISC DISEASE), LUMBAR: ICD-10-CM

## 2024-03-06 DIAGNOSIS — M05.7A RHEUMATOID ARTHRITIS OF OTHER SITE WITH POSITIVE RHEUMATOID FACTOR (HCC): ICD-10-CM

## 2024-03-06 DIAGNOSIS — F33.42 RECURRENT MAJOR DEPRESSIVE DISORDER, IN FULL REMISSION (HCC): ICD-10-CM

## 2024-03-06 PROCEDURE — 3075F SYST BP GE 130 - 139MM HG: CPT | Performed by: NURSE PRACTITIONER

## 2024-03-06 PROCEDURE — 3078F DIAST BP <80 MM HG: CPT | Performed by: NURSE PRACTITIONER

## 2024-03-06 PROCEDURE — 1123F ACP DISCUSS/DSCN MKR DOCD: CPT | Performed by: NURSE PRACTITIONER

## 2024-03-06 PROCEDURE — 99214 OFFICE O/P EST MOD 30 MIN: CPT | Performed by: NURSE PRACTITIONER

## 2024-03-06 RX ORDER — DULOXETIN HYDROCHLORIDE 60 MG/1
60 CAPSULE, DELAYED RELEASE ORAL DAILY
Qty: 90 CAPSULE | Refills: 0 | Status: SHIPPED | OUTPATIENT
Start: 2024-03-06 | End: 2024-06-04

## 2024-03-06 RX ORDER — HYDROCODONE BITARTRATE AND ACETAMINOPHEN 7.5; 325 MG/1; MG/1
1 TABLET ORAL 2 TIMES DAILY
Qty: 56 TABLET | Refills: 0 | Status: SHIPPED | OUTPATIENT
Start: 2024-03-06 | End: 2024-04-03

## 2024-03-06 NOTE — PROGRESS NOTES
Shayla Crespo  1940  [id number]    HISTORY OF PRESENT ILLNESS: Ms. Crespo is a 83 y.o. female returns for a follow up visit for pain management  She has a diagnosis of   1. Chronic pain syndrome    2. Cervical disc disease    3. DDD (degenerative disc disease), lumbar    4. Spinal stenosis of lumbar region without neurogenic claudication    5. Lumbar facet arthropathy    6. Degenerative spondylolisthesis    7. Failed back surgical syndrome    8. Rheumatoid arthritis of other site with positive rheumatoid factor (HCC)    9. Recurrent major depressive disorder, in full remission (HCC)      As per Information Obtained from the PADT (Patient Assessment and Documentation Tool)    She complains of pain in the  lower back  She rates the pain 4/10 and describes it as aching. Current treatment regimen has helped relieve about 20% of the pain.  She denies any side effects from the current pain regimen. Patient reports that since the last follow up visit the physical functioning is unchanged, family/social relationships are worse, mood is worse sleep patterns are unchanged, and that the overall functioning is unchanged.  Patient denies misusing/abusing her narcotic pain medications or using any illegal drugs.      Upon obtaining medical history from Ms. Crespo states that pain is manageable on current pain therapy. Takes the pain medications as prescribed. Was treated by cardiology aortic valve stenosis as well as pulmonology for pulmonary fibrosis. Mood/anxiety is stable. Sleep is fair with an average of 5-6 hours. Denies to having issues of constipation. Tolerating activities/house chores with moderate tenderness to the lower back.     ALLERGIES: Patients list of allergies were reviewed     MEDICATIONS: Ms. Crespo list of medications were reviewed.Her current medications are   Outpatient Medications Prior to Visit   Medication Sig Dispense Refill    fluticasone furoate-vilanterol (BREO ELLIPTA) 100-25 MCG/ACT inhaler

## 2024-03-07 ENCOUNTER — HOSPITAL ENCOUNTER (OUTPATIENT)
Dept: PULMONOLOGY | Age: 84
Discharge: HOME | End: 2024-03-07
Attending: INTERNAL MEDICINE
Payer: MEDICARE

## 2024-03-07 VITALS — OXYGEN SATURATION: 97 %

## 2024-03-07 DIAGNOSIS — J84.10 PULMONARY FIBROSIS (HCC): ICD-10-CM

## 2024-03-07 LAB
DLCO %PRED: 56 %
DLCO PRED: NORMAL
DLCO/VA %PRED: NORMAL
DLCO/VA PRED: NORMAL
DLCO/VA: NORMAL
DLCO: NORMAL
EXPIRATORY TIME-POST: NORMAL
EXPIRATORY TIME: NORMAL
FEF 25-75 %CHNG: NORMAL
FEF 25-75 POST %PRED: NORMAL
FEF 25-75% %PRED-PRE: NORMAL
FEF 25-75% PRED: NORMAL
FEF 25-75-POST: NORMAL
FEF 25-75-PRE: NORMAL
FEV1 %PRED-POST: 89 %
FEV1 %PRED-PRE: 92 %
FEV1 PRED: NORMAL
FEV1-POST: NORMAL
FEV1/FVC %PRED-POST: NORMAL
FEV1/FVC %PRED-PRE: NORMAL
FEV1/FVC PRED: NORMAL
FEV1/FVC-POST: 93 %
FVC %PRED-PRE: NORMAL
FVC PRED: NORMAL
FVC-POST: NORMAL
FVC-PRE: NORMAL
GAW %PRED: NORMAL
IC PRE %PRED: NORMAL
IC PRED: NORMAL
IC: NORMAL
MVV %PRED-PRE: NORMAL
MVV PRED: NORMAL
PEF %PRED-POST: NORMAL
PEF %PRED-PRE: NORMAL
PEF PRED: NORMAL
PEF%CHNG: NORMAL
PEF-POST: NORMAL
PEF-PRE: NORMAL
RAW %PRED: NORMAL
RAW PRED: NORMAL
RAW: NORMAL
RV PRE %PRED: NORMAL
RV: NORMAL
SVC %PRED: NORMAL
SVC PRED: NORMAL
SVC: NORMAL
TLC PRE %PRED: 61 %
TLC PRED: NORMAL
TLC: NORMAL
VA %PRED: NORMAL
VA PRED: NORMAL
VTG %PRED: NORMAL
VTG PRED: NORMAL
VTG: NORMAL

## 2024-03-07 PROCEDURE — 94729 DIFFUSING CAPACITY: CPT

## 2024-03-07 PROCEDURE — 6370000000 HC RX 637 (ALT 250 FOR IP): Performed by: INTERNAL MEDICINE

## 2024-03-07 PROCEDURE — 94640 AIRWAY INHALATION TREATMENT: CPT

## 2024-03-07 PROCEDURE — 94726 PLETHYSMOGRAPHY LUNG VOLUMES: CPT

## 2024-03-07 PROCEDURE — 94060 EVALUATION OF WHEEZING: CPT

## 2024-03-07 PROCEDURE — 94760 N-INVAS EAR/PLS OXIMETRY 1: CPT

## 2024-03-07 RX ORDER — ALBUTEROL SULFATE 90 UG/1
4 AEROSOL, METERED RESPIRATORY (INHALATION) ONCE
Status: COMPLETED | OUTPATIENT
Start: 2024-03-07 | End: 2024-03-07

## 2024-03-07 RX ADMIN — Medication 4 PUFF: at 10:17

## 2024-03-07 ASSESSMENT — PULMONARY FUNCTION TESTS
FEV1_PERCENT_PREDICTED_POST: 89
FEV1/FVC_POST: 93
FEV1_PERCENT_PREDICTED_PRE: 92

## 2024-03-08 ENCOUNTER — OFFICE VISIT (OUTPATIENT)
Dept: PULMONOLOGY | Age: 84
End: 2024-03-08
Payer: MEDICARE

## 2024-03-08 VITALS
DIASTOLIC BLOOD PRESSURE: 84 MMHG | HEIGHT: 60 IN | OXYGEN SATURATION: 94 % | HEART RATE: 70 BPM | WEIGHT: 127.4 LBS | SYSTOLIC BLOOD PRESSURE: 138 MMHG | BODY MASS INDEX: 25.01 KG/M2

## 2024-03-08 DIAGNOSIS — Z01.818 PREOPERATIVE CLEARANCE: ICD-10-CM

## 2024-03-08 DIAGNOSIS — M05.79 RHEUMATOID ARTHRITIS INVOLVING MULTIPLE SITES WITH POSITIVE RHEUMATOID FACTOR (HCC): ICD-10-CM

## 2024-03-08 DIAGNOSIS — J84.10 PULMONARY FIBROSIS (HCC): Primary | ICD-10-CM

## 2024-03-08 PROCEDURE — 1123F ACP DISCUSS/DSCN MKR DOCD: CPT | Performed by: INTERNAL MEDICINE

## 2024-03-08 PROCEDURE — 99215 OFFICE O/P EST HI 40 MIN: CPT | Performed by: INTERNAL MEDICINE

## 2024-03-08 PROCEDURE — 3075F SYST BP GE 130 - 139MM HG: CPT | Performed by: INTERNAL MEDICINE

## 2024-03-08 PROCEDURE — 3079F DIAST BP 80-89 MM HG: CPT | Performed by: INTERNAL MEDICINE

## 2024-03-08 ASSESSMENT — ENCOUNTER SYMPTOMS
COUGH: 1
VOICE CHANGE: 0
BACK PAIN: 0
STRIDOR: 0
DIARRHEA: 0
COLOR CHANGE: 0
ABDOMINAL PAIN: 0
CONSTIPATION: 0
SHORTNESS OF BREATH: 1
CHOKING: 0
SINUS PRESSURE: 0
VOMITING: 0
CHEST TIGHTNESS: 0
RHINORRHEA: 0
WHEEZING: 0
SORE THROAT: 0
BLOOD IN STOOL: 0
ABDOMINAL DISTENTION: 0
APNEA: 0

## 2024-03-08 NOTE — PROGRESS NOTES
Shayla Crespo    YOB: 1940     Date of Service:  3/8/2024     Chief Complaint   Patient presents with    Pulmonary fibrosis    Results     PFT       HPI patient has been accompanied by her daughter to our office today.  Dyspnea with exertion and cough with mucopurulent phlegm.  Denies chest pain.  Patient is here to discuss the results of pulmonary function test.  Patient has not yet been seen by rheumatology for evaluation of rheumatoid arthritis.    Allergies   Allergen Reactions    Imitrex [Sumatriptan] Anaphylaxis and Shortness Of Breath     Swelling in arm of injection cite, and SOB     Outpatient Medications Marked as Taking for the 3/8/24 encounter (Office Visit) with Terrell Burkett MD   Medication Sig Dispense Refill    DULoxetine (CYMBALTA) 60 MG extended release capsule Take 1 capsule by mouth daily 90 capsule 0    HYDROcodone-acetaminophen (NORCO) 7.5-325 MG per tablet Take 1 tablet by mouth 2 times daily for 28 days. Intended supply: 30 days 56 tablet 0    Lidocaine 1.8 % PTCH Apply 1 Film topically daily 30 patch 0    fluticasone furoate-vilanterol (BREO ELLIPTA) 100-25 MCG/ACT inhaler Inhale 1 puff into the lungs daily      albuterol sulfate HFA (VENTOLIN HFA) 108 (90 Base) MCG/ACT inhaler Inhale 2 puffs into the lungs 4 times daily as needed for Wheezing 18 g 5    b complex vitamins capsule Take 1 capsule by mouth daily      pravastatin (PRAVACHOL) 40 MG tablet Take 1 tablet by mouth at bedtime 90 tablet 3    pantoprazole (PROTONIX) 40 MG tablet TAKE 1 TABLET BY MOUTH DAILY 30 tablet 0    warfarin (COUMADIN) 5 MG tablet TAKE 2 TABLETS BY MOUTH  DAILY ON MONDAY AND 1 AND  1/2 TABLETS BY MOUTH DAILY  ON ALL OTHER DAYS, OR  DIRECTED BY COUMADIN CLINIC (Patient taking differently: Take 1 tablet by mouth daily M 2 tablets, all other days 1.5 tablets) 143 tablet 3    Acetaminophen (TYLENOL ARTHRITIS EXT RELIEF PO) Take by mouth in the morning and at bedtime      Handicap Placard

## 2024-03-09 NOTE — PROCEDURES
[address] [address]                           PULMONARY FUNCTION      PATIENT NAME: ROBYN HOUSER               : 1940  MED REC NO: [id number]                      ROOM:   ACCOUNT NO: [id number]                       ADMIT DATE: 2024  PROVIDER: Chris Jackson MD      DATE OF PROCEDURE:      INDICATION:  Fibrosis.    SPIROMETRY:  FVC is 72% predicted.  FEV1 is 1.42 L, which is 92% predicted.  The FEV1/FVC ratio is 0.94.  There is no response to bronchodilators.    LUNG VOLUMES BY PLETHYSMOGRAPHY:  Total lung capacity is 2.72 L, which is 61% predicted.    DIFFUSION CAPACITY:  Carbon monoxide is 56% predicted.    FLOW VOLUME LOOP:  Shows a restrictive pattern.    IMPRESSION:  This patient has a mild restrictive lung defect by FVC criteria.  Total lung capacity is moderately decreased.  The DLCO is moderately decreased.          CHRIS JACKSON MD      D:  2024 19:56:32     T:  2024 20:27:37     ANT/BROOK  Job #:  [id number]     Doc#:  [id number]

## 2024-03-15 NOTE — PROGRESS NOTES
[facility]  H+P  Consult  OP Visit  FU Visit   CC/HPI   CC Followup visit for cardiac conditions detailed in assessment and plan below.   Intervention None   General Doing fair.  Concerned with worsening symptoms.     Cardiac Sx -CP, -syncope, +SOB, +dizziness, +edema, +orthopnea, +pnd, +fatigue   HISTORY/ALLERGY/ROS   M/S/S/F Hx Reviewed in Epic and updated as appropriate   ALLERG Imitrex [sumatriptan]   ROS Full ROS obtained and negative except as mentioned in HPI   MEDICATIONS   Cardiac medications reviewed in [facility] during visit.   PHYSICAL EXAM   Vitals BP (!) 148/86 (Site: Left Upper Arm, Position: Sitting, Cuff Size: Medium Adult)   Pulse (!) 46   Ht 1.524 m (5')   Wt 57.7 kg (127 lb 4.8 oz)   SpO2 100%   BMI 24.86 kg/m²    Gen Alert, coop, no distress Heart  RRR, 3/6   Lung CTA-B, unlabored, no DTP Extrem Edema -Grade 1 (2mm)      COMPLIANCE   Discussed and counseled on diet, exercise, weight loss, smoking, alcohol, drugs.  All questions answered.   CODING   SCI ([id number]) - 30-39 mins spent reviewing hx/tests/consults, performing exam, counseling/educating, ordering meds/tests/procedures, referring/communicating w/PCP/consultants, documenting in EMR, interpreting results, communicating medical information and plan with family.   SCRIBE ATTESTATION   Nurse I, Heaven Brandon, RN, am scribing for and in the presence of Rafy Arias MD. 3/15/2024   Doctor Heaven Brandon is working as scribe for and in presence of me and may have prepopulated components of note with my historical intellectual property (IP) under my supervision.  Any additions to this IP performed in my presence and at my direction. Content and accuracy of this note reviewed by me (Rafy Arias MD).   ASSESSMENT AND PLAN   *AS/MS    Date EF Detail   Sx     As above   CHF Type     Acute on chronic diastolic   NYHA     III   FRAME  2M, 1M2m     Major: PND, orthopnea  Minor: CANALES, LE edema   TTE 6/23 1/24 55%  70% AS MG 39, PV 4.06, MS MG

## 2024-03-19 ENCOUNTER — TELEPHONE (OUTPATIENT)
Dept: CARDIOLOGY | Age: 84
End: 2024-03-19

## 2024-03-19 RX ORDER — WARFARIN SODIUM 5 MG/1
5 TABLET ORAL DAILY
Qty: 143 TABLET | Refills: 0 | OUTPATIENT
Start: 2024-03-19

## 2024-03-19 NOTE — TELEPHONE ENCOUNTER
Shayla Fuentes is scheduled for TAVR on 4/2/24. We want her to stop her coumadin 5 days prior (last dose 3/27/24). Per Dr. Arias, can you bridge her with lovenox (hx PE off of coumadin in prep for angiogram)?  She is aware that she will need to do this in prep for the TAVR procedure.   Thanks,  Rose Mcgowan RN  TAVR Coordinator  [phone]

## 2024-03-20 ENCOUNTER — ANTI-COAG VISIT (OUTPATIENT)
Dept: PHARMACY | Age: 84
End: 2024-03-20
Payer: MEDICARE

## 2024-03-20 DIAGNOSIS — Z86.718 HISTORY OF DVT OF LOWER EXTREMITY: Primary | ICD-10-CM

## 2024-03-20 LAB — INR BLD: 2.7

## 2024-03-20 PROCEDURE — 99211 OFF/OP EST MAY X REQ PHY/QHP: CPT | Performed by: HOME HEALTH

## 2024-03-20 PROCEDURE — 85610 PROTHROMBIN TIME: CPT | Performed by: HOME HEALTH

## 2024-03-20 NOTE — TELEPHONE ENCOUNTER
Eliza from [place] coumadin [facility] is asking if [facility] called in lovenox or are you wanting them to. Eliza states they are in office today until 2 pm and then will be back on Friday. Please call [phone]

## 2024-03-21 ENCOUNTER — ANESTHESIA EVENT (OUTPATIENT)
Dept: OPERATING ROOM | Age: 84
DRG: 266 | End: 2024-03-21
Payer: MEDICARE

## 2024-03-21 ENCOUNTER — HOSPITAL ENCOUNTER (OUTPATIENT)
Dept: PREADMISSION TESTING | Age: 84
Discharge: HOME | End: 2024-03-21
Payer: MEDICARE

## 2024-03-21 ENCOUNTER — OFFICE VISIT (OUTPATIENT)
Dept: CARDIOLOGY CLINIC | Age: 84
End: 2024-03-21
Payer: MEDICARE

## 2024-03-21 VITALS
BODY MASS INDEX: 25.32 KG/M2 | SYSTOLIC BLOOD PRESSURE: 159 MMHG | HEIGHT: 60 IN | DIASTOLIC BLOOD PRESSURE: 75 MMHG | OXYGEN SATURATION: 97 % | HEART RATE: 56 BPM | WEIGHT: 129 LBS | TEMPERATURE: 98.6 F

## 2024-03-21 VITALS
HEIGHT: 60 IN | SYSTOLIC BLOOD PRESSURE: 148 MMHG | DIASTOLIC BLOOD PRESSURE: 86 MMHG | OXYGEN SATURATION: 100 % | WEIGHT: 127.3 LBS | HEART RATE: 46 BPM | BODY MASS INDEX: 24.99 KG/M2

## 2024-03-21 DIAGNOSIS — E78.00 HYPERCHOLESTEROLEMIA: ICD-10-CM

## 2024-03-21 DIAGNOSIS — I34.2 NONRHEUMATIC MITRAL VALVE STENOSIS: ICD-10-CM

## 2024-03-21 DIAGNOSIS — I35.0 AORTIC VALVE STENOSIS, NONRHEUMATIC: Primary | ICD-10-CM

## 2024-03-21 DIAGNOSIS — I10 ESSENTIAL HYPERTENSION: ICD-10-CM

## 2024-03-21 LAB
ABO + RH BLD: NORMAL
ALBUMIN SERPL-MCNC: 4 G/DL (ref 3.4–5)
ALP SERPL-CCNC: 79 U/L (ref 40–129)
ALT SERPL-CCNC: 11 U/L (ref 10–40)
ANION GAP SERPL CALCULATED.3IONS-SCNC: 10 MMOL/L (ref 3–16)
AST SERPL-CCNC: 18 U/L (ref 15–37)
BACTERIA URNS QL MICRO: ABNORMAL /HPF
BASOPHILS # BLD: 0.1 K/UL (ref 0–0.2)
BASOPHILS NFR BLD: 1.1 %
BILIRUB DIRECT SERPL-MCNC: <0.2 MG/DL (ref 0–0.3)
BILIRUB INDIRECT SERPL-MCNC: NORMAL MG/DL (ref 0–1)
BILIRUB SERPL-MCNC: 0.3 MG/DL (ref 0–1)
BILIRUB UR QL STRIP.AUTO: NEGATIVE
BLD GP AB SCN SERPL QL: NORMAL
BUN SERPL-MCNC: 19 MG/DL (ref 7–20)
CALCIUM SERPL-MCNC: 9.4 MG/DL (ref 8.3–10.6)
CHLORIDE SERPL-SCNC: 103 MMOL/L (ref 99–110)
CLARITY UR: CLEAR
CO2 SERPL-SCNC: 28 MMOL/L (ref 21–32)
COLOR UR: YELLOW
CREAT SERPL-MCNC: 0.5 MG/DL (ref 0.6–1.2)
DEPRECATED RDW RBC AUTO: 14.4 % (ref 12.4–15.4)
EOSINOPHIL # BLD: 0.2 K/UL (ref 0–0.6)
EOSINOPHIL NFR BLD: 4.2 %
EPI CELLS #/AREA URNS AUTO: 0 /HPF (ref 0–5)
GFR SERPLBLD CREATININE-BSD FMLA CKD-EPI: >60 ML/MIN/{1.73_M2}
GLUCOSE SERPL-MCNC: 87 MG/DL (ref 70–99)
GLUCOSE UR STRIP.AUTO-MCNC: NEGATIVE MG/DL
HCT VFR BLD AUTO: 39 % (ref 36–48)
HGB BLD-MCNC: 12.8 G/DL (ref 12–16)
HGB UR QL STRIP.AUTO: ABNORMAL
HYALINE CASTS #/AREA URNS AUTO: 0 /LPF (ref 0–8)
INR PPP: 2.21 (ref 0.84–1.16)
KETONES UR STRIP.AUTO-MCNC: NEGATIVE MG/DL
LEUKOCYTE ESTERASE UR QL STRIP.AUTO: NEGATIVE
LYMPHOCYTES # BLD: 0.8 K/UL (ref 1–5.1)
LYMPHOCYTES NFR BLD: 16.5 %
MAGNESIUM SERPL-MCNC: 2 MG/DL (ref 1.8–2.4)
MCH RBC QN AUTO: 32.4 PG (ref 26–34)
MCHC RBC AUTO-ENTMCNC: 32.9 G/DL (ref 31–36)
MCV RBC AUTO: 98.5 FL (ref 80–100)
MONOCYTES # BLD: 0.5 K/UL (ref 0–1.3)
MONOCYTES NFR BLD: 9.3 %
NEUTROPHILS # BLD: 3.5 K/UL (ref 1.7–7.7)
NEUTROPHILS NFR BLD: 68.9 %
NITRITE UR QL STRIP.AUTO: NEGATIVE
PH UR STRIP.AUTO: 7 [PH] (ref 5–8)
PHOSPHATE SERPL-MCNC: 2.8 MG/DL (ref 2.5–4.9)
PLATELET # BLD AUTO: 192 K/UL (ref 135–450)
PMV BLD AUTO: 9.6 FL (ref 5–10.5)
POTASSIUM SERPL-SCNC: 4.4 MMOL/L (ref 3.5–5.1)
PROT SERPL-MCNC: 7.6 G/DL (ref 6.4–8.2)
PROT UR STRIP.AUTO-MCNC: NEGATIVE MG/DL
PROTHROMBIN TIME: 24.4 SEC (ref 11.5–14.8)
RBC # BLD AUTO: 3.96 M/UL (ref 4–5.2)
RBC CLUMPS #/AREA URNS AUTO: 20 /HPF (ref 0–4)
SODIUM SERPL-SCNC: 141 MMOL/L (ref 136–145)
SP GR UR STRIP.AUTO: 1.01 (ref 1–1.03)
UA DIPSTICK W REFLEX MICRO PNL UR: YES
URN SPEC COLLECT METH UR: ABNORMAL
UROBILINOGEN UR STRIP-ACNC: 0.2 E.U./DL
WBC # BLD AUTO: 5.1 K/UL (ref 4–11)
WBC #/AREA URNS AUTO: 0 /HPF (ref 0–5)

## 2024-03-21 PROCEDURE — 86850 RBC ANTIBODY SCREEN: CPT

## 2024-03-21 PROCEDURE — 3077F SYST BP >= 140 MM HG: CPT | Performed by: INTERNAL MEDICINE

## 2024-03-21 PROCEDURE — 80069 RENAL FUNCTION PANEL: CPT

## 2024-03-21 PROCEDURE — 93005 ELECTROCARDIOGRAM TRACING: CPT | Performed by: INTERNAL MEDICINE

## 2024-03-21 PROCEDURE — 87086 URINE CULTURE/COLONY COUNT: CPT

## 2024-03-21 PROCEDURE — 86900 BLOOD TYPING SEROLOGIC ABO: CPT

## 2024-03-21 PROCEDURE — 3079F DIAST BP 80-89 MM HG: CPT | Performed by: INTERNAL MEDICINE

## 2024-03-21 PROCEDURE — 86901 BLOOD TYPING SEROLOGIC RH(D): CPT

## 2024-03-21 PROCEDURE — 85025 COMPLETE CBC W/AUTO DIFF WBC: CPT

## 2024-03-21 PROCEDURE — 99214 OFFICE O/P EST MOD 30 MIN: CPT | Performed by: INTERNAL MEDICINE

## 2024-03-21 PROCEDURE — 80076 HEPATIC FUNCTION PANEL: CPT

## 2024-03-21 PROCEDURE — 85610 PROTHROMBIN TIME: CPT

## 2024-03-21 PROCEDURE — 81001 URINALYSIS AUTO W/SCOPE: CPT

## 2024-03-21 PROCEDURE — 1123F ACP DISCUSS/DSCN MKR DOCD: CPT | Performed by: INTERNAL MEDICINE

## 2024-03-21 PROCEDURE — 83735 ASSAY OF MAGNESIUM: CPT

## 2024-03-21 PROCEDURE — 36415 COLL VENOUS BLD VENIPUNCTURE: CPT

## 2024-03-21 NOTE — ANESTHESIA PRE PROCEDURE
01/19/2024 04:20 AM    RDW 14.0 01/19/2024 04:20 AM     01/19/2024 04:20 AM       CMP:   Lab Results   Component Value Date/Time     01/19/2024 04:20 AM    K 3.8 01/19/2024 04:20 AM     01/19/2024 04:20 AM    CO2 29 01/19/2024 04:20 AM    BUN 20 01/19/2024 04:20 AM    CREATININE 0.5 01/19/2024 04:20 AM    GFRAA >60 06/17/2022 02:07 PM    AGRATIO 1.0 01/17/2024 11:44 AM    LABGLOM >60 01/19/2024 04:20 AM    GLUCOSE 100 01/19/2024 04:20 AM    PROT 7.1 01/17/2024 11:44 AM    CALCIUM 9.3 01/19/2024 04:20 AM    BILITOT 0.3 01/17/2024 11:44 AM    ALKPHOS 69 01/17/2024 11:44 AM    AST 28 01/17/2024 11:44 AM    ALT 10 01/17/2024 11:44 AM       POC Tests: No results for input(s): \"POCGLU\", \"POCNA\", \"POCK\", \"POCCL\", \"POCBUN\", \"POCHEMO\", \"POCHCT\" in the last 72 hours.    Coags:   Lab Results   Component Value Date/Time    PROTIME 17.8 01/19/2024 04:20 AM    INR 2.7 03/20/2024 01:42 PM    APTT 36.0 01/17/2024 11:44 AM       HCG (If Applicable): No results found for: \"PREGTESTUR\", \"PREGSERUM\", \"HCG\", \"HCGQUANT\"     ABGs: No results found for: \"PHART\", \"PO2ART\", \"JZK1TZX\", \"ZEN2NUN\", \"BEART\", \"T2XBLNVN\"     Type & Screen (If Applicable):  No results found for: \"LABABO\", \"LABRH\"    Anesthesia Evaluation  Patient summary reviewed and Nursing notes reviewed   no history of anesthetic complications:   Airway: Mallampati: I  TM distance: >3 FB   Neck ROM: full  Mouth opening: > = 3 FB   Dental: normal exam         Pulmonary:   (+)   shortness of breath:                                    Cardiovascular:  Exercise tolerance: poor (<4 METS)  (+) hypertension:, valvular problems/murmurs: AS        Rhythm: regular                   ROS comment: Date: 01/18/2024 Start: 09:09 AM     Study Location: [facility] - Echo Lab  Technical Quality: Limited visualization due to poor acoustical window.     Additional Indications:PE.     Patient Status: Routine     Height: 60 inches Weight: 125 pounds BSA: 1.53 m2 BMI:

## 2024-03-21 NOTE — PROGRESS NOTES
Pt here for PAT visit.  Consents for procedure and blood signed by pt and in chart.  Labs drawn and urine collected and sent to lab for testing as ordered.  Pt seen by Dr. Esaclante from Anesthesia and questions answered.  Vitals stable and documented in flowsheet.  Pt instructed to be NPO after midnight prior to procedure and states understanding.  Pt provided with hibiclens and instructed to shower with entire bottle the night prior to procedure.  Pt instructed to take the following medications the morning of procedure with a small sip of water: Protonix and use Albuterol inhaler.  Pt instructed to stop taking Coumadin on 3/27/24 prior to procedure on 4/2/24 and states understanding. Instructed on Lovenox bridging..  EKG completed and results in chart.  Pt instructed to arrive to the hospital the morning of procedure at 0730 on 4/2/24.   Pt in good condition and okay for discharge.  Pt denies further questions at time of discharge.  Pt instructed to call Dr. Arias's office with any medical concerns or the heart office at [phone] with any further questions between now and day of procedure.

## 2024-03-22 LAB
BACTERIA UR CULT: NORMAL
EKG ATRIAL RATE: 64 BPM
EKG DIAGNOSIS: NORMAL
EKG P AXIS: 45 DEGREES
EKG P-R INTERVAL: 182 MS
EKG Q-T INTERVAL: 430 MS
EKG QRS DURATION: 146 MS
EKG QTC CALCULATION (BAZETT): 443 MS
EKG R AXIS: -56 DEGREES
EKG T AXIS: 11 DEGREES
EKG VENTRICULAR RATE: 64 BPM

## 2024-03-22 PROCEDURE — 93010 ELECTROCARDIOGRAM REPORT: CPT | Performed by: INTERNAL MEDICINE

## 2024-03-28 ENCOUNTER — OFFICE VISIT (OUTPATIENT)
Dept: PRIMARY CARE CLINIC | Age: 84
End: 2024-03-28

## 2024-03-28 VITALS
RESPIRATION RATE: 17 BRPM | HEIGHT: 60 IN | SYSTOLIC BLOOD PRESSURE: 150 MMHG | OXYGEN SATURATION: 92 % | BODY MASS INDEX: 24.97 KG/M2 | DIASTOLIC BLOOD PRESSURE: 82 MMHG | WEIGHT: 127.2 LBS | HEART RATE: 66 BPM | TEMPERATURE: 97.4 F

## 2024-03-28 DIAGNOSIS — R51.9 FACIAL PAIN: Primary | ICD-10-CM

## 2024-03-28 RX ORDER — ENOXAPARIN SODIUM 100 MG/ML
INJECTION SUBCUTANEOUS
COMMUNITY
Start: 2024-03-25

## 2024-03-28 ASSESSMENT — ENCOUNTER SYMPTOMS
SORE THROAT: 0
COLOR CHANGE: 0
ABDOMINAL PAIN: 0
NAUSEA: 0
RHINORRHEA: 1
VOMITING: 0
DIARRHEA: 0
COUGH: 0
SHORTNESS OF BREATH: 1
BLOOD IN STOOL: 0

## 2024-03-28 NOTE — PATIENT INSTRUCTIONS
As we discussed, you can try:    - Mucinex - twice daily - please be sure nothing else is in this just the active ingredient: Guaifenesin  - Use a humidifier when your heater is in use at home - even when not sick  - Flonase Sensimist - two sprays each nostril, once daily  - Hydration - 64 ounces of water daily  - Honey (1 tsp per day - do not mix with lemon or water or anything).

## 2024-03-28 NOTE — ASSESSMENT & PLAN NOTE
Poorly controlled. DDx includes otitis (early evolving), TMJ on left or eustachian tube dysfunction. Possible evolving dental issue as well. Will treat for congestion and f/u tomorrow (reminder set), if pain worse or not improved will send in otic abx drops. She was amenable to this plan. Call back/ED precautions discussed.

## 2024-03-28 NOTE — PROGRESS NOTES
Shayla Crespo is a 83 y.o. year old female here for:    Chief Complaint:    Chief Complaint   Patient presents with    Facial Pain     Subjective:    Today, her current concerns include:    HPI:  #Ear/face pain  - Onset: 2 days ago  - Context: Unsure if ear pain or tooth pain. Last dental visit was 3 months ago, had abscessed tooth on the lower right  - Location: Left ear/face area  - Quality: Sharp  - Radiation: None  - Severity: At worst pain is 10/10, currently 3/10  - Timing/Duration: Intermittent and when present lasts 1 hour or so  - Inciting Event: Denied falls, or injury or trauma to that area  - Progression: Improved and resumed again  - Modifiers: Worse when swallowing and moving, warmth helped it. Nothing else known makes it better or worse  - Associated Symptoms: Per ROS as otherwise stated in this note  - Previous occurrence: Ear infections as a child but not like this before.    Past Medical History:   Diagnosis Date    Allergic rhinitis     Arthritis     Cancer ([facility])     squamous cell on nose     Chronic back pain     Claudication ([facility])     Deep vein thrombosis (DVT) of proximal vein of both lower extremities ([facility]) 04/11/2017    Dementia ([facility]) 12/2019    Dental disease     Depression     Dizziness     GERD (gastroesophageal reflux disease)     H/O blood clots     Headache     Hearing loss     Hiatal hernia     Hyperlipidemia     Hypertension     IPF (idiopathic pulmonary fibrosis) ([facility]) 01/17/2024    Lumbar stenosis with neurogenic claudication     Nephrolithiasis     Osteoarthritis of neck     Scoliosis     TMJ dysfunction      Social History     Tobacco Use    Smoking status: Never     Passive exposure: Never    Smokeless tobacco: Never   Vaping Use    Vaping Use: Never used   Substance Use Topics    Alcohol use: No    Drug use: No     Family History   Problem Relation Age of Onset    Heart Disease Mother     Other Father         murdered     Colon Cancer Brother     Breast Cancer Maternal Aunt

## 2024-03-29 ENCOUNTER — TELEPHONE (OUTPATIENT)
Dept: PRIMARY CARE CLINIC | Age: 84
End: 2024-03-29

## 2024-03-29 NOTE — TELEPHONE ENCOUNTER
----- Message from Stefany Manzo MD sent at 3/28/2024  8:57 AM EDT -----  Call to check in on left ear/face and if not improved I will send in abx

## 2024-03-29 NOTE — TELEPHONE ENCOUNTER
Pt's daughter, Oralia, returned call.  Oralia sts she gave her mom Mucinex and sts mom reports her ear is feeling all better today.  Advised Oralia if they need anything else to let us know.   I will START or STAY ON the medications listed below when I get home from the hospital:    acetaminophen 325 mg oral tablet  -- 2 tab(s) by mouth every 6 hours, As Needed -for mild pain - for moderate pain   -- This product contains acetaminophen.  Do not use  with any other product containing acetaminophen to prevent possible liver damage.    -- Indication: For Pain    ibuprofen 600 mg oral tablet  -- 1 tab(s) by mouth every 6 hours, As Needed -for mild pain - for moderate pain   -- Do not take this drug if you are pregnant.  It is very important that you take or use this exactly as directed.  Do not skip doses or discontinue unless directed by your doctor.  May cause drowsiness or dizziness.  Obtain medical advice before taking any non-prescription drugs as some may affect the action of this medication.  Take with food or milk.    -- Indication: For Pain

## 2024-04-02 ENCOUNTER — HOSPITAL ENCOUNTER (INPATIENT)
Age: 84
LOS: 1 days | Discharge: HOME | DRG: 266 | End: 2024-04-03
Attending: INTERNAL MEDICINE | Admitting: INTERNAL MEDICINE
Payer: MEDICARE

## 2024-04-02 ENCOUNTER — ANESTHESIA (OUTPATIENT)
Dept: OPERATING ROOM | Age: 84
DRG: 266 | End: 2024-04-02
Payer: MEDICARE

## 2024-04-02 ENCOUNTER — HOSPITAL ENCOUNTER (OUTPATIENT)
Dept: CARDIAC CATH/INVASIVE PROCEDURES | Age: 84
Setting detail: SURGERY ADMIT
Discharge: HOME | DRG: 266 | End: 2024-04-02
Payer: MEDICARE

## 2024-04-02 VITALS
SYSTOLIC BLOOD PRESSURE: 142 MMHG | RESPIRATION RATE: 24 BRPM | OXYGEN SATURATION: 95 % | DIASTOLIC BLOOD PRESSURE: 87 MMHG | HEART RATE: 97 BPM

## 2024-04-02 PROBLEM — I35.0 AORTIC STENOSIS, SEVERE: Status: ACTIVE | Noted: 2024-04-02

## 2024-04-02 PROBLEM — I35.0 NONRHEUMATIC AORTIC VALVE STENOSIS: Status: ACTIVE | Noted: 2024-04-02

## 2024-04-02 LAB
ABO + RH BLD: NORMAL
ACTIVATED CLOTTING TIME: 102 SEC (ref 99–130)
ACTIVATED CLOTTING TIME: 117 SEC (ref 99–130)
ACTIVATED CLOTTING TIME: 203 SEC (ref 99–130)
ACTIVATED CLOTTING TIME: 224 SEC (ref 99–130)
BASE EXCESS BLDA CALC-SCNC: 3 MMOL/L (ref -3–3)
BLD GP AB SCN SERPL QL: NORMAL
BLOOD BANK DISPENSE STATUS: NORMAL
BLOOD BANK PRODUCT CODE: NORMAL
BPU ID: NORMAL
CA-I BLD-SCNC: 1.22 MMOL/L (ref 1.12–1.32)
CO2 BLDA-SCNC: 32 MMOL/L
DESCRIPTION BLOOD BANK: NORMAL
EKG ATRIAL RATE: 82 BPM
EKG DIAGNOSIS: NORMAL
EKG P AXIS: 63 DEGREES
EKG P-R INTERVAL: 212 MS
EKG Q-T INTERVAL: 410 MS
EKG QRS DURATION: 142 MS
EKG QTC CALCULATION (BAZETT): 479 MS
EKG R AXIS: -66 DEGREES
EKG T AXIS: 88 DEGREES
EKG VENTRICULAR RATE: 82 BPM
GLUCOSE BLD-MCNC: 89 MG/DL (ref 70–99)
HCO3 BLDA-SCNC: 30.2 MMOL/L (ref 21–29)
HCT VFR BLD AUTO: 34 % (ref 36–48)
HGB BLD CALC-MCNC: 11.4 GM/DL (ref 12–16)
INR PPP: 1.06 (ref 0.84–1.16)
LACTATE BLD-SCNC: 0.39 MMOL/L (ref 0.4–2)
NT-PROBNP SERPL-MCNC: 295 PG/ML (ref 0–449)
PCO2 BLDA: 64.9 MM HG (ref 35–45)
PERFORMED ON: ABNORMAL
PH BLDA: 7.28 [PH] (ref 7.35–7.45)
PO2 BLDA: 62.4 MM HG (ref 75–108)
POC SAMPLE TYPE: ABNORMAL
POTASSIUM BLD-SCNC: 3.7 MMOL/L (ref 3.5–5.1)
PROTHROMBIN TIME: 13.8 SEC (ref 11.5–14.8)
SAO2 % BLDA: 87 % (ref 93–100)
SODIUM BLD-SCNC: 145 MMOL/L (ref 136–145)
TROPONIN, HIGH SENSITIVITY: 13 NG/L (ref 0–14)

## 2024-04-02 PROCEDURE — 84484 ASSAY OF TROPONIN QUANT: CPT

## 2024-04-02 PROCEDURE — 36415 COLL VENOUS BLD VENIPUNCTURE: CPT

## 2024-04-02 PROCEDURE — 86900 BLOOD TYPING SEROLOGIC ABO: CPT

## 2024-04-02 PROCEDURE — 93010 ELECTROCARDIOGRAM REPORT: CPT | Performed by: INTERNAL MEDICINE

## 2024-04-02 PROCEDURE — 2700000000 HC OXYGEN THERAPY PER DAY

## 2024-04-02 PROCEDURE — 6370000000 HC RX 637 (ALT 250 FOR IP): Performed by: INTERNAL MEDICINE

## 2024-04-02 PROCEDURE — C1760 CLOSURE DEV, VASC: HCPCS

## 2024-04-02 PROCEDURE — 83605 ASSAY OF LACTIC ACID: CPT

## 2024-04-02 PROCEDURE — 3600000007 HC SURGERY HYBRID BASE

## 2024-04-02 PROCEDURE — 83880 ASSAY OF NATRIURETIC PEPTIDE: CPT

## 2024-04-02 PROCEDURE — 02RF38N REPLACEMENT OF AORTIC VALVE WITH ZOOPLASTIC TISSUE, USING RAPID DEPLOYMENT TECHNIQUE, PERCUTANEOUS APPROACH: ICD-10-PCS | Performed by: INTERNAL MEDICINE

## 2024-04-02 PROCEDURE — 93005 ELECTROCARDIOGRAM TRACING: CPT | Performed by: INTERNAL MEDICINE

## 2024-04-02 PROCEDURE — 82330 ASSAY OF CALCIUM: CPT

## 2024-04-02 PROCEDURE — 86923 COMPATIBILITY TEST ELECTRIC: CPT

## 2024-04-02 PROCEDURE — 85014 HEMATOCRIT: CPT

## 2024-04-02 PROCEDURE — 84295 ASSAY OF SERUM SODIUM: CPT

## 2024-04-02 PROCEDURE — 6360000004 HC RX CONTRAST MEDICATION: Performed by: INTERNAL MEDICINE

## 2024-04-02 PROCEDURE — 82803 BLOOD GASES ANY COMBINATION: CPT

## 2024-04-02 PROCEDURE — 82947 ASSAY GLUCOSE BLOOD QUANT: CPT

## 2024-04-02 PROCEDURE — 7100000011 HC PHASE II RECOVERY - ADDTL 15 MIN

## 2024-04-02 PROCEDURE — C1769 GUIDE WIRE: HCPCS

## 2024-04-02 PROCEDURE — 84132 ASSAY OF SERUM POTASSIUM: CPT

## 2024-04-02 PROCEDURE — 6360000002 HC RX W HCPCS: Performed by: ANESTHESIOLOGY

## 2024-04-02 PROCEDURE — 2709999900 HC NON-CHARGEABLE SUPPLY: Performed by: INTERNAL MEDICINE

## 2024-04-02 PROCEDURE — 86850 RBC ANTIBODY SCREEN: CPT

## 2024-04-02 PROCEDURE — C1894 INTRO/SHEATH, NON-LASER: HCPCS

## 2024-04-02 PROCEDURE — 85610 PROTHROMBIN TIME: CPT

## 2024-04-02 PROCEDURE — 33361 REPLACE AORTIC VALVE PERQ: CPT | Performed by: INTERNAL MEDICINE

## 2024-04-02 PROCEDURE — 3700000001 HC ADD 15 MINUTES (ANESTHESIA): Performed by: INTERNAL MEDICINE

## 2024-04-02 PROCEDURE — C1889 IMPLANT/INSERT DEVICE, NOC: HCPCS

## 2024-04-02 PROCEDURE — 2580000003 HC RX 258: Performed by: INTERNAL MEDICINE

## 2024-04-02 PROCEDURE — 86901 BLOOD TYPING SEROLOGIC RH(D): CPT

## 2024-04-02 PROCEDURE — 33361 REPLACE AORTIC VALVE PERQ: CPT | Performed by: STUDENT IN AN ORGANIZED HEALTH CARE EDUCATION/TRAINING PROGRAM

## 2024-04-02 PROCEDURE — 2060000000 HC ICU INTERMEDIATE R&B

## 2024-04-02 PROCEDURE — 94150 VITAL CAPACITY TEST: CPT

## 2024-04-02 PROCEDURE — 7100000010 HC PHASE II RECOVERY - FIRST 15 MIN

## 2024-04-02 PROCEDURE — 85347 COAGULATION TIME ACTIVATED: CPT

## 2024-04-02 PROCEDURE — 2709999900 HC NON-CHARGEABLE SUPPLY

## 2024-04-02 PROCEDURE — 94640 AIRWAY INHALATION TREATMENT: CPT

## 2024-04-02 PROCEDURE — 3700000000 HC ANESTHESIA ATTENDED CARE: Performed by: INTERNAL MEDICINE

## 2024-04-02 PROCEDURE — 3600000017 HC SURGERY HYBRID ADDL 15MIN

## 2024-04-02 PROCEDURE — 6360000002 HC RX W HCPCS: Performed by: INTERNAL MEDICINE

## 2024-04-02 RX ORDER — 0.9 % SODIUM CHLORIDE 0.9 %
500 INTRAVENOUS SOLUTION INTRAVENOUS PRN
Status: DISCONTINUED | OUTPATIENT
Start: 2024-04-02 | End: 2024-04-03 | Stop reason: HOSPADM

## 2024-04-02 RX ORDER — NITROGLYCERIN 20 MG/100ML
5 INJECTION INTRAVENOUS CONTINUOUS
Status: DISCONTINUED | OUTPATIENT
Start: 2024-04-02 | End: 2024-04-03 | Stop reason: HOSPADM

## 2024-04-02 RX ORDER — PROTAMINE SULFATE 10 MG/ML
INJECTION, SOLUTION INTRAVENOUS PRN
Status: DISCONTINUED | OUTPATIENT
Start: 2024-04-02 | End: 2024-04-02 | Stop reason: SDUPTHER

## 2024-04-02 RX ORDER — SODIUM CHLORIDE 0.9 % (FLUSH) 0.9 %
5-40 SYRINGE (ML) INJECTION EVERY 12 HOURS SCHEDULED
Status: DISCONTINUED | OUTPATIENT
Start: 2024-04-02 | End: 2024-04-03 | Stop reason: HOSPADM

## 2024-04-02 RX ORDER — SODIUM CHLORIDE 9 MG/ML
INJECTION, SOLUTION INTRAVENOUS PRN
Status: COMPLETED | OUTPATIENT
Start: 2024-04-02 | End: 2024-04-02

## 2024-04-02 RX ORDER — DOCUSATE SODIUM 100 MG/1
100 CAPSULE, LIQUID FILLED ORAL DAILY
Status: DISCONTINUED | OUTPATIENT
Start: 2024-04-02 | End: 2024-04-03 | Stop reason: HOSPADM

## 2024-04-02 RX ORDER — PANTOPRAZOLE SODIUM 40 MG/1
40 TABLET, DELAYED RELEASE ORAL DAILY
Status: DISCONTINUED | OUTPATIENT
Start: 2024-04-03 | End: 2024-04-03 | Stop reason: HOSPADM

## 2024-04-02 RX ORDER — ALBUTEROL SULFATE 90 UG/1
2 AEROSOL, METERED RESPIRATORY (INHALATION) 4 TIMES DAILY PRN
Status: DISCONTINUED | OUTPATIENT
Start: 2024-04-02 | End: 2024-04-03 | Stop reason: HOSPADM

## 2024-04-02 RX ORDER — WARFARIN SODIUM 5 MG/1
5 TABLET ORAL DAILY
Status: DISCONTINUED | OUTPATIENT
Start: 2024-04-02 | End: 2024-04-02

## 2024-04-02 RX ORDER — DULOXETIN HYDROCHLORIDE 60 MG/1
60 CAPSULE, DELAYED RELEASE ORAL DAILY
Status: DISCONTINUED | OUTPATIENT
Start: 2024-04-02 | End: 2024-04-03 | Stop reason: HOSPADM

## 2024-04-02 RX ORDER — ASPIRIN 81 MG/1
81 TABLET ORAL DAILY
Status: DISCONTINUED | OUTPATIENT
Start: 2024-04-02 | End: 2024-04-03 | Stop reason: HOSPADM

## 2024-04-02 RX ORDER — WARFARIN SODIUM 5 MG/1
10 TABLET ORAL ONCE
Status: COMPLETED | OUTPATIENT
Start: 2024-04-02 | End: 2024-04-02

## 2024-04-02 RX ORDER — ACETAMINOPHEN 325 MG/1
650 TABLET ORAL EVERY 4 HOURS PRN
Status: DISCONTINUED | OUTPATIENT
Start: 2024-04-02 | End: 2024-04-03 | Stop reason: HOSPADM

## 2024-04-02 RX ORDER — ONDANSETRON 2 MG/ML
4 INJECTION INTRAMUSCULAR; INTRAVENOUS EVERY 6 HOURS PRN
Status: DISCONTINUED | OUTPATIENT
Start: 2024-04-02 | End: 2024-04-03 | Stop reason: HOSPADM

## 2024-04-02 RX ORDER — PROPOFOL 10 MG/ML
INJECTION, EMULSION INTRAVENOUS PRN
Status: DISCONTINUED | OUTPATIENT
Start: 2024-04-02 | End: 2024-04-02 | Stop reason: SDUPTHER

## 2024-04-02 RX ORDER — LIDOCAINE 4 G/G
1 PATCH TOPICAL DAILY
Status: DISCONTINUED | OUTPATIENT
Start: 2024-04-02 | End: 2024-04-03 | Stop reason: HOSPADM

## 2024-04-02 RX ORDER — CLOPIDOGREL BISULFATE 75 MG/1
75 TABLET ORAL DAILY
Status: DISCONTINUED | OUTPATIENT
Start: 2024-04-02 | End: 2024-04-03 | Stop reason: HOSPADM

## 2024-04-02 RX ORDER — HYDRALAZINE HYDROCHLORIDE 20 MG/ML
10 INJECTION INTRAMUSCULAR; INTRAVENOUS EVERY 6 HOURS PRN
Status: DISCONTINUED | OUTPATIENT
Start: 2024-04-02 | End: 2024-04-03 | Stop reason: HOSPADM

## 2024-04-02 RX ORDER — ATROPINE SULFATE 1 MG/ML
0.5 INJECTION, SOLUTION INTRAVENOUS
Status: DISPENSED | OUTPATIENT
Start: 2024-04-02 | End: 2024-04-03

## 2024-04-02 RX ORDER — SODIUM CHLORIDE 9 MG/ML
INJECTION, SOLUTION INTRAVENOUS PRN
Status: DISCONTINUED | OUTPATIENT
Start: 2024-04-02 | End: 2024-04-03 | Stop reason: HOSPADM

## 2024-04-02 RX ORDER — HEPARIN SODIUM 1000 [USP'U]/ML
INJECTION, SOLUTION INTRAVENOUS; SUBCUTANEOUS PRN
Status: DISCONTINUED | OUTPATIENT
Start: 2024-04-02 | End: 2024-04-02 | Stop reason: SDUPTHER

## 2024-04-02 RX ORDER — HYDROCODONE BITARTRATE AND ACETAMINOPHEN 7.5; 325 MG/1; MG/1
1 TABLET ORAL EVERY 12 HOURS PRN
Status: DISCONTINUED | OUTPATIENT
Start: 2024-04-02 | End: 2024-04-03 | Stop reason: HOSPADM

## 2024-04-02 RX ORDER — SODIUM CHLORIDE 0.9 % (FLUSH) 0.9 %
5-40 SYRINGE (ML) INJECTION PRN
Status: DISCONTINUED | OUTPATIENT
Start: 2024-04-02 | End: 2024-04-03 | Stop reason: HOSPADM

## 2024-04-02 RX ADMIN — SODIUM CHLORIDE, PRESERVATIVE FREE 10 ML: 5 INJECTION INTRAVENOUS at 21:06

## 2024-04-02 RX ADMIN — PROPOFOL 100 MCG/KG/MIN: 10 INJECTION, EMULSION INTRAVENOUS at 09:01

## 2024-04-02 RX ADMIN — ACETAMINOPHEN 650 MG: 325 TABLET ORAL at 17:57

## 2024-04-02 RX ADMIN — IOPAMIDOL 74 ML: 755 INJECTION, SOLUTION INTRAVENOUS at 10:04

## 2024-04-02 RX ADMIN — ACETAMINOPHEN 650 MG: 325 TABLET ORAL at 21:28

## 2024-04-02 RX ADMIN — DULOXETINE HYDROCHLORIDE 60 MG: 60 CAPSULE, DELAYED RELEASE ORAL at 15:56

## 2024-04-02 RX ADMIN — DOCUSATE SODIUM 100 MG: 100 CAPSULE, LIQUID FILLED ORAL at 15:56

## 2024-04-02 RX ADMIN — ASPIRIN 81 MG: 81 TABLET, COATED ORAL at 15:56

## 2024-04-02 RX ADMIN — WARFARIN SODIUM 10 MG: 5 TABLET ORAL at 22:11

## 2024-04-02 RX ADMIN — PROPOFOL 25 MG: 10 INJECTION, EMULSION INTRAVENOUS at 09:00

## 2024-04-02 RX ADMIN — PROTAMINE SULFATE 70 MG: 10 INJECTION, SOLUTION INTRAVENOUS at 10:04

## 2024-04-02 RX ADMIN — SODIUM CHLORIDE: 9 INJECTION, SOLUTION INTRAVENOUS at 09:00

## 2024-04-02 RX ADMIN — HEPARIN SODIUM 9000 UNITS: 1000 INJECTION INTRAVENOUS; SUBCUTANEOUS at 09:46

## 2024-04-02 RX ADMIN — CLOPIDOGREL BISULFATE 75 MG: 75 TABLET ORAL at 15:56

## 2024-04-02 RX ADMIN — CEFAZOLIN 2000 MG: 2 INJECTION, POWDER, FOR SOLUTION INTRAMUSCULAR; INTRAVENOUS at 09:00

## 2024-04-02 RX ADMIN — ACETAMINOPHEN 650 MG: 325 TABLET ORAL at 15:56

## 2024-04-02 ASSESSMENT — PAIN SCALES - GENERAL
PAINLEVEL_OUTOF10: 0
PAINLEVEL_OUTOF10: 0
PAINLEVEL_OUTOF10: 3
PAINLEVEL_OUTOF10: 0

## 2024-04-02 ASSESSMENT — PAIN DESCRIPTION - LOCATION
LOCATION: HEAD

## 2024-04-02 ASSESSMENT — PAIN - FUNCTIONAL ASSESSMENT: PAIN_FUNCTIONAL_ASSESSMENT: NONE - DENIES PAIN

## 2024-04-02 ASSESSMENT — PAIN DESCRIPTION - DESCRIPTORS: DESCRIPTORS: ACHING

## 2024-04-02 NOTE — H&P
[facility]  H+P  Consult  OP Visit  FU Visit   CC/HPI   CC Followup visit for cardiac conditions detailed in assessment and plan below.   Intervention None   General Doing fair.  Concerned with worsening symptoms, especially since last OV    Cardiac Sx -CP, -syncope, +SOB, +dizziness, +edema, +orthopnea, +pnd, +fatigue   HISTORY/ALLERGY/ROS   M/S/S/F Hx Reviewed in Epic and updated as appropriate   ALLERG Amitriptyline and Imitrex [sumatriptan]   ROS Full ROS obtained and negative except as mentioned in HPI   MEDICATIONS   Cardiac medications reviewed in [facility] during visit.   PHYSICAL EXAM   Vitals There were no vitals taken for this visit.   Gen Alert, coop, no distress Heart  RRR, 3/6   Lung CTA-B, unlabored, no DTP Extrem Edema -Grade 1 (2mm)      ASSESSMENT AND PLAN   *AS/MS    Date EF Detail   Sx     As above   CHF Type     Acute on chronic diastolic   NYHA     III   FRAME  2M, 1M2m     Major: PND, orthopnea  Minor: CANALES, LE edema   TTE 6/23 1/24 55%  70% AS MG 39, PV 4.06, MS MG 8, mod PHTN SPAP 55  AS MG 33, PV 3.7, LVOT 2.0, MS MG 4, mod TR   Plan     Comprehensive discussion regarding options including Med v TAVR v SAVR  TAVR, R/B/O discussed  Conservative management of moderate MS  Appreciate referral from Dr Liz   *HTN  Status Advice Plan   Controlled Diet/salt/xcise/wt counseling Continue meds at doses above   *CHOL  LDL Advice Plan   61, 5/23 Diet/salt/xcise/wt counseling Continue HI/MT statin   *Hx DVT  Status Plan   On coumadin for hx of blood clots in legs for last 56 years Per PCP   *FOLLOWUP  After procedure

## 2024-04-02 NOTE — PROGRESS NOTES
Pt from cath lab to room 5910 after TAVR. Left fem vein and left fem artery closed with manual pressure. Right artery perclosed. All three site CDI. Bilat LE pulses +1. Pt alllowed to sit up. Can get OOB 1600. VSS on 2L O2 NC (SBP 150s2  Pt oriented to room and updated on POC. Pt understands and has no further questions. Call light and bedside table within reach. Safety socks on and  bed in lowest position with 2/4 siderails up. bed alarm on.Report from cath nurse. Pt allowed to lay on sides and bend knees. Supine restrictions complete; vital signs/monitoring completed in cath lab. Telemetry verified.

## 2024-04-02 NOTE — PROGRESS NOTES
Pt verified information regarding surgery, name, birth date, surgeon, procedure and allergies: see mar. Patient transferred to [facility] for surgery. Appropriate antibiotics ordered: ancef. Beta blocker: na. DVT prophyaxis: scd/selma Lab work within normal limits, 4 units of RBC's on call to OR, vital signs stable, Mepilex sacral border applied and 2% chlorhexidine gluconate skin prep given. Patient and family educated about surgery and pain management.To surgery per bed.

## 2024-04-02 NOTE — PROGRESS NOTES
Incentive Spirometry education and demonstration completed by Respiratory Therapy Yes      Response to education: Excellent     Teaching Time: 15 minutes    Minimum Predicted Vital Capacity - 455 mL.  Patient's Actual Vital Capacity - 1000 mL. Turning over to Nursing for routine follow-up Yes.    Comments: IS goal met    Electronically signed by Marky Wisdom RCP on 4/2/2024 at 4:13 PM

## 2024-04-02 NOTE — OP NOTE
[facility]  TAVR Operative Note     PROCEDURE SUMMARY    Procedure Replacement of aortic valve with zooplastic tissue, percutaneous (46DX10B)    Valve Type Gregory Conrado 3 Ultra - Resilia 23mm with 0additional cc of volume.    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    Start Time 0921    Stop Time 1008    Contrast 74cc    Flouro Time 9.9min    EBL <50mL    Complications None    Specimens None    Bleed Risk Low    Sedation Sedation provided entirely by anesthesia.  See record for details.      PROCEDURE DETAILS    Time Out TO performed.  MT verbalized agreement with operative plan. 124   Access Site Access site prep assessed and deemed appropriate for procedure by MT. 124   Pre-TTE Pre-TTE findings reviewed by MT.     124   Access RCFA, LCFA, RCFV accessed 18G needle and sheaths inserted/flushed.  Access level and hemostasis confirmed with all sheaths by MT.   1  124   Preclose Delivery side perclose devices inserted and deployed.  Wires managed and clipped loosely to drape.  MT present confirmed device integrity. 1  4  124   TVP Insertion TVP inserted via CFV into RV.   Placement, thresholds, capture confirmed by MT present. 1  124   Angiography Pigtail inserted through CFA and into RCC.     Angiography used to align cusps by MT. 1  124   Delivery Sheath Delivery sheath advanced over lunderquist wire into descending aorta.  MT present confirmed placement of sheath in ascending aorta. 1  124   AV Crossing AL1 and J/Straight wire used to cross AV.  Amplatz curled in LV apex.  Intravalvular crossing and apical positioning confirmed by MT. 1  124   Team Attendance All members of MT present, table-side, scrubbed and engaged in procedure.   124   BAV BAV was not performed after careful consideration by the MT. 124   Valve Prep Valve prepped by Gregory Rep and  [facility]

## 2024-04-02 NOTE — PROGRESS NOTES
Pt. Sheath removed from Left  Femoral  Vein. Manual pressure held for 10 minutes. No bleeding, hematoma, or other complications noted. Quick clot and dressing applied. Pulses unchanged.

## 2024-04-02 NOTE — ANESTHESIA POSTPROCEDURE EVALUATION
Department of Anesthesiology  Postprocedure Note    Patient: Shayla Crespo  MRN: [id number]  YOB: 1940  Date of evaluation: 4/2/2024    Procedure Summary       Date: 04/02/24 Room / Location: [facility]    Anesthesia Start: 0843 Anesthesia Stop: 1020    Procedures:       TRANSCATHETER AORTIC VALVE REPLACEMENT FEMORAL APPROACH      TRANSCATHETER AORTIC VALVE REPLACEMENT FEMORAL APPROACH Diagnosis:       Aortic valve stenosis, etiology of cardiac valve disease unspecified      (Aortic valve stenosis, etiology of cardiac valve disease unspecified [I35.0])    Surgeons: Rafy Arias MD; La Monteiro MD Responsible Provider: Marquis Ramirez MD    Anesthesia Type: MAC ASA Status: 4            Anesthesia Type: No value filed.    Christen Phase I: Christen Score: 10    Christen Phase II:      Anesthesia Post Evaluation    Patient location during evaluation: PACU  Patient participation: complete - patient participated  Level of consciousness: awake and alert  Airway patency: patent  Nausea & Vomiting: no nausea and no vomiting  Cardiovascular status: blood pressure returned to baseline  Respiratory status: acceptable  Hydration status: euvolemic  Multimodal analgesia pain management approach  Pain management: adequate    No notable events documented.

## 2024-04-02 NOTE — RT PROTOCOL NOTE
RT Nebulizer Bronchodilator Protocol Note    There is a bronchodilator order in the chart from a provider indicating to follow the RT Bronchodilator Protocol and there is an “Initiate RT Bronchodilator Protocol” order as well (see protocol at bottom of note).    CXR Findings:  No results found.    The findings from the last RT Protocol Assessment were as follows:  Smoking: Chronic pulmonary disease  Respiratory Pattern: Regular pattern and RR 12-20 bpm  Breath Sounds: Clear breath sounds  Cough: Strong, spontaneous, non-productive  Indication for Bronchodilator Therapy:    Bronchodilator Assessment Score: 2    Aerosolized bronchodilator medication orders have been revised according to the RT Nebulizer Bronchodilator Protocol below.    Respiratory Therapist to perform RT Therapy Protocol Assessment initially then follow the protocol.  Repeat RT Therapy Protocol Assessment PRN for score 0-3 or on second treatment, BID, and PRN for scores above 3.    No Indications - adjust the frequency to every 6 hours PRN wheezing or bronchospasm, if no treatments needed after 48 hours then discontinue using Per Protocol order mode.     If indication present, adjust the RT bronchodilator orders based on the Bronchodilator Assessment Score as indicated below.  If a patient is on this medication at home then do not decrease Frequency below that used at home.    0-3 - enter or revise RT bronchodilator order(s) to equivalent RT Bronchodilator order with Frequency of every 4 hours PRN for wheezing or increased work of breathing using Per Protocol order mode.       4-6 - enter or revise RT Bronchodilator order(s) to two equivalent RT bronchodilator orders with one order with BID Frequency and one order with Frequency of every 4 hours PRN wheezing or increased work of breathing using Per Protocol order mode.         7-10 - enter or revise RT Bronchodilator order(s) to two equivalent RT bronchodilator orders with one order with TID  Frequency and one order with Frequency of every 4 hours PRN wheezing or increased work of breathing using Per Protocol order mode.       11-13 - enter or revise RT Bronchodilator order(s) to one equivalent RT bronchodilator order with QID Frequency and an Albuterol order with Frequency of every 4 hours PRN wheezing or increased work of breathing using Per Protocol order mode.      Greater than 13 - enter or revise RT Bronchodilator order(s) to one equivalent RT bronchodilator order with every 4 hours Frequency and an Albuterol order with Frequency of every 2 hours PRN wheezing or increased work of breathing using Per Protocol order mode.     RT to enter RT Home Evaluation for COPD & MDI Assessment order using Per Protocol order mode.    Electronically signed by Marky Wisdom RCP on 4/2/2024 at 3:56 PM

## 2024-04-02 NOTE — PROGRESS NOTES
Pt arrived to pre/post area from TAVR. Pt a/o x4. VSS, pulses palpable in both feet. EKG obtained. Pt unable to urinate on bedpan, rojas catheter inserted using sterile technique. Right groin WNL. 5 fr sheath, arteria,l in left groin WNL. 6 Fr venous sheath in left groin WNL. Will continue to monitor.

## 2024-04-02 NOTE — PROGRESS NOTES
Pt febrile 100.5, 100.4 orally. Dr. Arias was called. He instructed this nurse to give another 650 mg tylenol now and keep him informed of her fever trend.

## 2024-04-02 NOTE — PROGRESS NOTES
Pt. Sheath removed from Left  Femoral  Artery. Manual pressure held for 20 minutes. No bleeding, hematoma, or other complications noted. Quick clot and dressing applied. Pulses unchanged.

## 2024-04-02 NOTE — OP NOTE
Cardiothoracic TAVR Operative Report    Patient Name: Shayla Crespo  Patient MRN: [id number]  Patient : 1940    Service date: 2024     1: Rafy Arias MD   2: La Monteiro MD  Anesthesiologist: Marquis Ramirez MD     Pre-operative diagnosis:   1. Severe symptomatic aortic stenosis  2. Hx PE  3. Hx DVT on AC  4. Idiopathic pulmonary fibrosis  5. Hypertension  6. Hyperlipidemia    Post-operative diagnosis:  Same    Procedure/description:  1. Transcatheter aortic valve replacement with a 23mm valve via right transfemoral approach; CPT code 42745    Findings:  1. Transfemoral access obtained percutaneously without difficulty  2. Post-operatively we noted no echocardiographic evidence of perivalvular leak    Implants:  23mm S3 Gregory Bovine Pericardial Tissue valve serial #: [id number]    Estimated Blood Loss:  Minimal    Drains:  None    Wires:  None    Complications:  None    Condition of Patient at Transfer:  stable    Operative Indications:  Shayla Crespo is a 83 y.o. female with the co-morbidities documented above, who noted progressive shortness of breath and dyspnea on exertion. Echocardiography demonstrated severe aortic stenosis with a relatively preserved ejection fraction. She underwent coronary angiography which demonstrated clean coronaries. Preoperative CT angiography demonstrated appropriate size of the right ileo-femoral system for transfemoral access, and evaluation of the aortic root suggested that it could accommodate a 23mm valve. The risks and benefits of and alternatives to a transcatheter aortic valve replacement were discussed with the patient, and she wished to proceed with the procedure.    Procedure in Detail:   The patient was taken to the operating room and laid supine on the operating table. Defibrillator pads were positioned on the patient. Conscious sedation was administered. The patient was prepped and draped from the chin to the ankles in the

## 2024-04-03 ENCOUNTER — TELEPHONE (OUTPATIENT)
Dept: CARDIOLOGY CLINIC | Age: 84
End: 2024-04-03

## 2024-04-03 VITALS
DIASTOLIC BLOOD PRESSURE: 60 MMHG | RESPIRATION RATE: 16 BRPM | HEIGHT: 60 IN | SYSTOLIC BLOOD PRESSURE: 141 MMHG | OXYGEN SATURATION: 91 % | TEMPERATURE: 98.7 F | BODY MASS INDEX: 25.84 KG/M2 | HEART RATE: 80 BPM | WEIGHT: 131.61 LBS

## 2024-04-03 DIAGNOSIS — Z95.2 S/P TAVR (TRANSCATHETER AORTIC VALVE REPLACEMENT): Primary | ICD-10-CM

## 2024-04-03 LAB
ANION GAP SERPL CALCULATED.3IONS-SCNC: 7 MMOL/L (ref 3–16)
BUN SERPL-MCNC: 14 MG/DL (ref 7–20)
CALCIUM SERPL-MCNC: 9.3 MG/DL (ref 8.3–10.6)
CHLORIDE SERPL-SCNC: 101 MMOL/L (ref 99–110)
CO2 SERPL-SCNC: 29 MMOL/L (ref 21–32)
CREAT SERPL-MCNC: <0.5 MG/DL (ref 0.6–1.2)
DEPRECATED RDW RBC AUTO: 14.1 % (ref 12.4–15.4)
GFR SERPLBLD CREATININE-BSD FMLA CKD-EPI: >90 ML/MIN/{1.73_M2}
GLUCOSE SERPL-MCNC: 104 MG/DL (ref 70–99)
HCT VFR BLD AUTO: 36.6 % (ref 36–48)
HGB BLD-MCNC: 12 G/DL (ref 12–16)
INR PPP: 1.05 (ref 0.84–1.16)
MCH RBC QN AUTO: 31.9 PG (ref 26–34)
MCHC RBC AUTO-ENTMCNC: 32.7 G/DL (ref 31–36)
MCV RBC AUTO: 97.6 FL (ref 80–100)
PLATELET # BLD AUTO: 125 K/UL (ref 135–450)
PMV BLD AUTO: 9.4 FL (ref 5–10.5)
POTASSIUM SERPL-SCNC: 4 MMOL/L (ref 3.5–5.1)
PROTHROMBIN TIME: 13.7 SEC (ref 11.5–14.8)
RBC # BLD AUTO: 3.75 M/UL (ref 4–5.2)
SODIUM SERPL-SCNC: 137 MMOL/L (ref 136–145)
WBC # BLD AUTO: 6.2 K/UL (ref 4–11)

## 2024-04-03 PROCEDURE — 85027 COMPLETE CBC AUTOMATED: CPT

## 2024-04-03 PROCEDURE — 2700000000 HC OXYGEN THERAPY PER DAY

## 2024-04-03 PROCEDURE — 36415 COLL VENOUS BLD VENIPUNCTURE: CPT

## 2024-04-03 PROCEDURE — 93306 TTE W/DOPPLER COMPLETE: CPT

## 2024-04-03 PROCEDURE — 6370000000 HC RX 637 (ALT 250 FOR IP): Performed by: INTERNAL MEDICINE

## 2024-04-03 PROCEDURE — 85610 PROTHROMBIN TIME: CPT

## 2024-04-03 PROCEDURE — 99239 HOSP IP/OBS DSCHRG MGMT >30: CPT | Performed by: INTERNAL MEDICINE

## 2024-04-03 PROCEDURE — 94761 N-INVAS EAR/PLS OXIMETRY MLT: CPT

## 2024-04-03 PROCEDURE — 80048 BASIC METABOLIC PNL TOTAL CA: CPT

## 2024-04-03 PROCEDURE — 2580000003 HC RX 258: Performed by: INTERNAL MEDICINE

## 2024-04-03 PROCEDURE — 94640 AIRWAY INHALATION TREATMENT: CPT

## 2024-04-03 RX ORDER — WARFARIN SODIUM 5 MG/1
10 TABLET ORAL ONCE
Status: DISCONTINUED | OUTPATIENT
Start: 2024-04-03 | End: 2024-04-03 | Stop reason: HOSPADM

## 2024-04-03 RX ORDER — CLOPIDOGREL BISULFATE 75 MG/1
75 TABLET ORAL DAILY
Qty: 3 TABLET | Refills: 0 | Status: SHIPPED | OUTPATIENT
Start: 2024-04-04 | End: 2024-04-07

## 2024-04-03 RX ORDER — ASPIRIN 81 MG/1
81 TABLET ORAL DAILY
Qty: 3 TABLET | Refills: 0 | Status: SHIPPED | OUTPATIENT
Start: 2024-04-04 | End: 2024-04-07

## 2024-04-03 RX ADMIN — DULOXETINE HYDROCHLORIDE 60 MG: 60 CAPSULE, DELAYED RELEASE ORAL at 09:05

## 2024-04-03 RX ADMIN — ASPIRIN 81 MG: 81 TABLET, COATED ORAL at 09:05

## 2024-04-03 RX ADMIN — PANTOPRAZOLE SODIUM 40 MG: 40 TABLET, DELAYED RELEASE ORAL at 09:06

## 2024-04-03 RX ADMIN — DOCUSATE SODIUM 100 MG: 100 CAPSULE, LIQUID FILLED ORAL at 09:06

## 2024-04-03 RX ADMIN — SODIUM CHLORIDE, PRESERVATIVE FREE 10 ML: 5 INJECTION INTRAVENOUS at 09:06

## 2024-04-03 RX ADMIN — CLOPIDOGREL BISULFATE 75 MG: 75 TABLET ORAL at 09:05

## 2024-04-03 ASSESSMENT — PAIN SCALES - GENERAL
PAINLEVEL_OUTOF10: 0

## 2024-04-03 NOTE — PROGRESS NOTES
Pt's PO temperature 100.8. Given a dose of PO tylenol    O2 in the high 70s low 80s on RA. Placed on 2L O2 via nasal cannula. Respiratory gave pt her inhaler. Pt now 97% on 1L O2 via nasal cannula

## 2024-04-03 NOTE — PROGRESS NOTES
CLINICAL PHARMACY NOTE: MEDS TO BEDS    Total # of Prescriptions Filled: 2   The following medications were delivered to the patient:  ASPIRIN 81MG  CLOPIDOGREL BISULFATE 75MG    Additional Documentation:  Patients son picked up in outpatient pharmacy = signed  Tamica Rudolph  Pharmacy Tech.

## 2024-04-03 NOTE — PROGRESS NOTES
Data- discharge order received, pt verbalized agreement to discharge, disposition to previous residence, no needs for HHC/DME.     Action- discharge instructions prepared and given to pt, pt verbalized understanding. Medication information packet given r/t NEW and/or CHANGED prescriptions emphasizing name/purpose/side effects, pt verbalized understanding. Discharge instruction summary: Diet- genl, Activity- as faviola, Primary Care Physician as follows: Stefany Manzo -[phone] f/u appointment , immunizations reviewed, prescription medications filled meds to beds. Inpatient surgical procedure precautions reviewed: post TAVR site care.     1. WEIGHT: Admit Weight - Scale: 58.5 kg (129 lb) (04/02/24 0807)        Today  Weight - Scale: 59.7 kg (131 lb 9.8 oz) (04/03/24 0012)       2. O2 SAT.: SpO2: 91 % (04/03/24 1108)    Response- Pt belongings gathered, IV removed. Disposition is home (no HHC/DME needs), transported with son, taken to lobby via w/c w/ this nurse, no complications.

## 2024-04-03 NOTE — PROGRESS NOTES
Pharmacy to Dose Warfarin    Pharmacy consulted to dose warfarin for Hx of PE.    INR Goal: 2-3    INR today: 1.05    Assessment/Plan:  - INR is subtherapeutic. Only received 1 dose of warfarin since resuming post TAVR  -Boost with 10 mg today  Pharmacy will continue to follow.    Adair Campa [facility]  [id number]

## 2024-04-03 NOTE — TELEPHONE ENCOUNTER
Kathleen, can you  schedule an echo and OV with Dr. Arias in [place] on 5/13/24 for her 1 month post TAVR check? No need to call her. Alejandro SILVERMAN

## 2024-04-03 NOTE — DISCHARGE INSTRUCTIONS
PERIPHERAL ANGIOGRAM    Care of your puncture sites:  Remove bandage when you get home  May shower when you get home but do not sit in a bathtub/pool of water for 5 days or until the wound is healed.  Inspect the site daily and gently clean using soap and water while standing in the shower.  Dry thoroughly and apply a Band-Aid that covers the entire site. Do not apply powder or lotion.    Normal Observations:  Soreness or tenderness which may last one week.  Mild oozing from the incision site.  Possible bruising that could last 2 weeks.    Activity:  You may resume driving   You may resume normal activity in 5 days or after the wound heals.  Avoid lifting more than 10 pounds for 5 days or until the wound heals.  Avoid strenuous exercise or activity for 1 week.      Nutrition:  Regular diet      Call your cardiologist immediately if your condition worsens, for any other concerns, for a follow-up appointment or if you experience any of the following:  Significant bleeding that does not stop after 10 minutes of applying firm pressure on the puncture site.  Increased swelling on the groin or leg.  Unusual pain, numbness, or tingling of the groin or down the leg.  Any signs of infection such as: redness, yellow drainage at the site, swelling or pain.

## 2024-04-03 NOTE — CARE COORDINATION
Discharge Planning Note:  Chart reviewed for discharge needs  and it appears that patient has minimal needs for discharge at this time.   Risk Score 12 %     Primary Care Physician is Stefany Manzo MD   Primary insurance is [facility] Medicare    Please notify case management if any discharge needs are identified.      Case management will continue to follow progress and update discharge plan as needed.

## 2024-04-03 NOTE — CARE COORDINATION
04/03/24 1401   IMM Letter   IMM Letter given to Patient/Family/Significant other/Guardian/POA/by: IMM Letter given to patient by CM   IMM Letter date given: 04/03/24   IMM Letter time given: 1330  (Patient in agreement with not having the four hours notice)

## 2024-04-04 ENCOUNTER — TELEPHONE (OUTPATIENT)
Dept: PRIMARY CARE CLINIC | Age: 84
End: 2024-04-04

## 2024-04-04 ENCOUNTER — CARE COORDINATION (OUTPATIENT)
Dept: CASE MANAGEMENT | Age: 84
End: 2024-04-04

## 2024-04-04 DIAGNOSIS — I35.0 AORTIC STENOSIS, SEVERE: Primary | ICD-10-CM

## 2024-04-04 NOTE — TELEPHONE ENCOUNTER
Care Transitions Initial Follow Up Call    Outreach made within 2 business days of discharge: Yes    Patient: Shayla Crespo Patient : 1940   MRN: [id number]  Reason for Admission: TAVR  Discharge Date: 4/3/24       Spoke with: Daughter Oralia    Discharge department/facility: [facility] Interactive Patient Contact:  Was patient able to fill all prescriptions: Yes  Was patient instructed to bring all medications to the follow-up visit: Yes  Is patient taking all medications as directed in the discharge summary? Yes  Does patient understand their discharge instructions: Yes  Does patient have questions or concerns that need addressed prior to 7-14 day follow up office visit: no    Scheduled appointment with PCP within 7-14 days    Follow Up  Future Appointments   Date Time Provider Department Center   2024  9:00 AM Stefany Manzo MD [facility]   2024 11:30 AM [facility] ANTICOAGULATION CLINIC [facility]   4/10/2024 10:00 AM Aimee oMlina, APRN - CNP R BANK PAIN [facility]   2024 10:30 AM Elba Deleon APRN - CNP [facility]   2024  2:00 PM Stefany Manzo MD [facility]   2024  7:30 AM SCHEDULE, [facility] ECHO [facility]   2024  8:30 AM Rafy Arias MD [facility]   2024 11:15 AM Terrell Burkett MD [facility]       Robyn Whalen LPN

## 2024-04-04 NOTE — CARE COORDINATION
Care Transitions Initial Follow Up Call    Call within 2 business days of discharge: Yes    Patient Current Location:  Home: [facility] [address]  [place] [address]    Care Transition Nurse contacted the patient by telephone to perform post hospital discharge assessment. Verified name and  with patient as identifiers. Provided introduction to self, and explanation of the Care Transition Nurse role.     Patient: Shayla Crespo Patient : 1940   MRN: [id number]  Reason for Admission:   Discharge Date: 4/3/24 RARS: Readmission Risk Score: 11.8      Last Discharge Facility       Date Complaint Diagnosis Description Type Department Provider    24   Admission (Discharged) F 5C Rafy Arias MD            Was this an external facility discharge? No Discharge Facility:     Challenges to be reviewed by the provider   Additional needs identified to be addressed with provider: No  none               Method of communication with provider: none.    Pt states doing well, no issues or concerns.Dressing is CDI, denies CP, SOB, fever. F/U appts listed below. Will send message to PCP office to schedule a hospital f/u visit.  Agreed to more CTC f/u calls.      Care Transition Nurse reviewed discharge instructions with patient who verbalized understanding. The patient was given an opportunity to ask questions and does not have any further questions or concerns at this time. Were discharge instructions available to patient? Yes. Reviewed appropriate site of care based on symptoms and resources available to patient including: When to call 911. The patient agrees to contact the PCP office for questions related to their healthcare.     Advance Care Planning:   Does patient have an Advance Directive: reviewed and current.    Medication reconciliation was performed with patient, who verbalizes understanding of administration of home medications. Medications reviewed, 1111F entered: yes    Was patient discharged with a

## 2024-04-05 NOTE — DISCHARGE SUMMARY
MRG   Head NC, AT, no abnorm Abd  Soft, NT, +BS, no mass, no OM   Eyes PER, conj/corn clear Ext  No C/C, Grade 1 (2mm)   Nose Nares nl, no drain, NT Pulse 2+ and symmetric   Throat Lips, mucosa, tongue nl Skin Col/text/turg nl, no vis rash/les   Neck S/S, TM, NT, no JVD Psych Nl mood and affect   Lung CTA-B, unlab, no DTP Lymph   No cervical or axillary LA   Ch wall NT, no deform Neuro  Nl gross M/S exam      Studies/Labs Reviewed, please see Epic for specific details   Disposition home   Discharge Medications    Medication List        START taking these medications      aspirin 81 MG EC tablet  Take 1 tablet by mouth daily for 3 days  Notes to patient: Using this to bridge with warfarin instead of lovenox  Use: prevents blood clots  Side effects: bruising, bleeding        clopidogrel 75 MG tablet  Commonly known as: PLAVIX  Take 1 tablet by mouth daily for 3 days  Notes to patient: Using to bridge with warfarin instead of lovenox  Use: blood thinner reduces risk for blood clots, heart attack and stroke  Side effects: bruising, bleeding               CONTINUE taking these medications      albuterol sulfate  (90 Base) MCG/ACT inhaler  Commonly known as: Ventolin HFA  Inhale 2 puffs into the lungs 4 times daily as needed for Wheezing     b complex vitamins capsule     Breo Ellipta 100-25 MCG/ACT inhaler  Generic drug: fluticasone furoate-vilanterol     cetirizine 10 MG tablet  Commonly known as: ZYRTEC     DULoxetine 60 MG extended release capsule  Commonly known as: Cymbalta  Take 1 capsule by mouth daily     Handicap Placard Misc  by Does not apply route     Lidocaine 1.8 % Ptch  Apply 1 Film topically daily     * Misc. Devices Misc  George panty hose  Open toe Petite plus beige  2 pairs     * Misc. Devices Misc  Compression stockings/open toe pantyhose with compression 40/50     pantoprazole 40 MG tablet  Commonly known as: PROTONIX  TAKE 1 TABLET BY MOUTH DAILY     pravastatin 40 MG tablet  Commonly known as:  PRAVACHOL  Take 1 tablet by mouth at bedtime     TYLENOL ARTHRITIS EXT RELIEF PO     warfarin 5 MG tablet  Commonly known as: COUMADIN  Take as directed. If you are unsure how to take this medication, talk to your nurse or doctor.  Original instructions: TAKE 2 TABLETS BY MOUTH  DAILY ON MONDAY AND 1 AND  1/2 TABLETS BY MOUTH DAILY  ON ALL OTHER DAYS, OR  DIRECTED BY COUMADIN CLINIC           * This list has 2 medication(s) that are the same as other medications prescribed for you. Read the directions carefully, and ask your doctor or other care provider to review them with you.                STOP taking these medications      enoxaparin 60 MG/0.6ML  Commonly known as: LOVENOX            ASK your doctor about these medications      fluticasone 50 MCG/ACT nasal spray  Commonly known as: FLONASE  2 sprays by Each Nostril route daily     HYDROcodone-acetaminophen 7.5-325 MG per tablet  Commonly known as: Norco  Take 1 tablet by mouth 2 times daily for 28 days. Intended supply: 30 days  Ask about: Should I take this medication?               Where to Get Your Medications        These medications were sent to [address] - P [phone] - F [phone]  [address] [facility] [address]      Phone: [phone]   aspirin 81 MG EC tablet  clopidogrel 75 MG tablet        Summary Patients is stable from cardiac standpoint.  Tobacco, diet, salt, activity restrictions/recommendation discussed in detail  [facility] not indicated for AS or for TAVR procedure.  Resume only w/ other indications.   Antiplatelet and Anticoagulation Indication for AC - ASA 81+AC  No indication for AC + indication for DAPT - ASA 81 + Plavix 75  No indication for AC or DAPT - ASA 81  (N Engl J Med. 2020;383(15):1447. Epub 2020 Aug 30)   Followup [facility] TAVR clinic in 1 week     Signed:  LISSY PENG MD, MD, 4/5/2024, 8:54 AM  Time spent on discharge of patient: >31 minutes

## 2024-04-08 ENCOUNTER — CARE COORDINATION (OUTPATIENT)
Dept: CASE MANAGEMENT | Age: 84
End: 2024-04-08

## 2024-04-08 ENCOUNTER — ANTI-COAG VISIT (OUTPATIENT)
Dept: PHARMACY | Age: 84
End: 2024-04-08
Payer: MEDICARE

## 2024-04-08 ENCOUNTER — OFFICE VISIT (OUTPATIENT)
Dept: PRIMARY CARE CLINIC | Age: 84
End: 2024-04-08

## 2024-04-08 VITALS
BODY MASS INDEX: 24.85 KG/M2 | OXYGEN SATURATION: 92 % | SYSTOLIC BLOOD PRESSURE: 126 MMHG | RESPIRATION RATE: 14 BRPM | TEMPERATURE: 98.5 F | HEART RATE: 64 BPM | DIASTOLIC BLOOD PRESSURE: 74 MMHG | HEIGHT: 60 IN | WEIGHT: 126.6 LBS

## 2024-04-08 DIAGNOSIS — Z86.718 HISTORY OF DVT OF LOWER EXTREMITY: Primary | ICD-10-CM

## 2024-04-08 DIAGNOSIS — J84.112 IPF (IDIOPATHIC PULMONARY FIBROSIS) (HCC): ICD-10-CM

## 2024-04-08 DIAGNOSIS — Z95.2 STATUS POST TRANSCATHETER AORTIC VALVE REPLACEMENT: Primary | ICD-10-CM

## 2024-04-08 DIAGNOSIS — F11.20 OPIOID DEPENDENCE WITH CURRENT USE (HCC): ICD-10-CM

## 2024-04-08 DIAGNOSIS — Z91.81 AT HIGH RISK FOR FALLS: ICD-10-CM

## 2024-04-08 PROBLEM — D69.6 THROMBOCYTOPENIA, UNSPECIFIED (HCC): Status: ACTIVE | Noted: 2024-04-08

## 2024-04-08 PROBLEM — I26.99 ACUTE PULMONARY EMBOLISM (HCC): Status: RESOLVED | Noted: 2024-01-17 | Resolved: 2024-04-08

## 2024-04-08 PROBLEM — I35.0 NONRHEUMATIC AORTIC VALVE STENOSIS: Status: RESOLVED | Noted: 2024-04-02 | Resolved: 2024-04-08

## 2024-04-08 PROBLEM — D69.6 THROMBOCYTOPENIA, UNSPECIFIED (HCC): Status: RESOLVED | Noted: 2024-04-08 | Resolved: 2024-04-08

## 2024-04-08 LAB — INTERNATIONAL NORMALIZATION RATIO, POC: 1.8

## 2024-04-08 PROCEDURE — 99211 OFF/OP EST MAY X REQ PHY/QHP: CPT | Performed by: INTERNAL MEDICINE

## 2024-04-08 PROCEDURE — 85610 PROTHROMBIN TIME: CPT | Performed by: INTERNAL MEDICINE

## 2024-04-08 ASSESSMENT — ENCOUNTER SYMPTOMS
NAUSEA: 0
DIARRHEA: 0
VOMITING: 0
ABDOMINAL PAIN: 1
BLOOD IN STOOL: 0

## 2024-04-08 NOTE — PROGRESS NOTES
Ms. Crespo is here for management of anticoagulation for hx of DVT x 2 separate occasions.    PMH also significant for HTN.   She presents today w/out complaint.  Pt verifies dosing regimen as listed above.  Pt denies s/s bleeding/swelling/SOB.  No missed doses.   No changes in Rx/OTCs/Herbal medications.   Occasional EtOH use and denies tobacco.    Pt had a procedure on 4/2, finished with lovenox bridge.    INR 1.8 is within the acceptable therapeutic range of 2-3.  Recommend to continue 7.5 mg every day.   Pt has the 5 mg tablets.  Will continue to monitor and check INR in 4 weeks.   Dosing reminder card given with phone number, appointment date and time.   Return to clinic: 5/8 @ 3805    Referring physician: Dr. Liz  Referral date: 3/20/23    For Pharmacy Admin Tracking Only    Intervention Detail:   Total # of Interventions Recommended: 1  Total # of Interventions Accepted: 1  Time Spent (min): 15

## 2024-04-08 NOTE — ASSESSMENT & PLAN NOTE
She will ask cardiac team for info about cardiac rehab. We will let me know if she needs PT for this. Fall/call back/ED precautions discussed.

## 2024-04-08 NOTE — CARE COORDINATION
Care Transitions Outreach Attempt    Per [facility], pt had a PCP f/u appt today, this nurse will follow up at a later date.      Patient: Shayla Crespo Patient : 1940 MRN: [id number]    Last Discharge Facility       Date Complaint Diagnosis Description Type Department Provider    24   Admission (Discharged) Rafy Dunne MD              Noted following upcoming appointments from discharge chart review:   [facility] follow up appointment(s):   Future Appointments   Date Time Provider Department [place]   4/10/2024 10:00 AM Aimee Molina, APRN - CNP R BANK PAIN [facility]   2024 10:30 AM Elba Deleon, APRN - CNP Dinesh Car [facility]   2024  2:00 PM Stefany Manzo MD [facility] PC [facility]   2024 11:30 AM [facility] ANTICOAGULATION CLINIC [facility] SHERRI [facility]   2024  7:30 AM SCHEDULE, MHCX [facility] ECHO [facility]   2024  8:30 AM Rafy Arias MD [facility]   2024 11:15 AM Terrell Burkett MD PULM & CC [facility]     Non-[facility]  follow up appointment(s):

## 2024-04-08 NOTE — ASSESSMENT & PLAN NOTE
Stable, well controlled. She will f/u with cardiac team as planned. Will discuss cardiac rehab with them. For bruising, appears stable, not worsening and she is on anticoagulation - short f/u in a few weeks and can eval at annual for progression. Strict call back/ED precautions discussed with teach-back.

## 2024-04-08 NOTE — PROGRESS NOTES
Shayla Crespo is a 83 y.o. year old female here for:    Chief Complaint:    Chief Complaint   Patient presents with    TAVR f/u    Follow-Up from Hospital      Post-Discharge Transitional Care  Follow Up    Date of Office Visit:  4/8/2024  Date of Hospital Admission: 4/2/24  Date of Hospital Discharge: 4/3/24  Risk of hospital readmission (high >=14%. Medium >=10%) :Readmission Risk Score: 11.8    Care management risk score Rising risk (score 2-5) and Complex Care (Scores >=6): No Risk Score On File     Non face to face  following discharge, date last encounter closed (first attempt may have been earlier): 04/04/2024    Call initiated 2 business days of discharge: Yes    Subjective:     HPI:   #S/p TAVR  - Context: Stable after TAVR - meds reconciled. She has some bruising where they went in at femorals - L>R. No increased pain or bloating or increased back pain - she can tell it is there 3/10 for severity.  - Progression: Stable  - Modifiers: No new meds currently so far - due to f/u with coumadin clinic today  - Associated Symptoms: Per ROS as otherwise stated in this note    Inpatient course: Discharge summary reviewed- see chart.    Patient Active Problem List   Diagnosis    Essential hypertension    Cervical disc disease    Degenerative spondylolisthesis    Balance problem    Hiatal hernia    Spinal stenosis of lumbar region    Lumbar facet arthropathy    Disc displacement, lumbar    Combined forms of age-related cataract of both eyes    Atrial septal hypertrophy    History of DVT of lower extremity    Severe episode of recurrent major depressive disorder, without psychotic features (HCC)    RBBB    Memory loss    Mixed hyperlipidemia    Urinary incontinence    Vascular insufficiency    Immunization due    IPF (idiopathic pulmonary fibrosis) (HCC)    Opioid dependence with current use (HCC)    Facial pain    Aortic stenosis, severe    At high risk for falls    S/P TAVR (transcatheter aortic valve

## 2024-04-10 ENCOUNTER — OFFICE VISIT (OUTPATIENT)
Dept: PAIN MANAGEMENT | Age: 84
End: 2024-04-10

## 2024-04-10 VITALS
TEMPERATURE: 96.8 F | DIASTOLIC BLOOD PRESSURE: 79 MMHG | OXYGEN SATURATION: 96 % | HEART RATE: 70 BPM | WEIGHT: 122 LBS | BODY MASS INDEX: 23.83 KG/M2 | SYSTOLIC BLOOD PRESSURE: 159 MMHG

## 2024-04-10 DIAGNOSIS — M05.7A RHEUMATOID ARTHRITIS OF OTHER SITE WITH POSITIVE RHEUMATOID FACTOR (HCC): ICD-10-CM

## 2024-04-10 DIAGNOSIS — M43.10 DEGENERATIVE SPONDYLOLISTHESIS: ICD-10-CM

## 2024-04-10 DIAGNOSIS — F33.42 RECURRENT MAJOR DEPRESSIVE DISORDER, IN FULL REMISSION (HCC): ICD-10-CM

## 2024-04-10 DIAGNOSIS — M48.061 SPINAL STENOSIS OF LUMBAR REGION WITHOUT NEUROGENIC CLAUDICATION: ICD-10-CM

## 2024-04-10 DIAGNOSIS — E66.09 CLASS 1 OBESITY DUE TO EXCESS CALORIES IN ADULT, UNSPECIFIED BMI, UNSPECIFIED WHETHER SERIOUS COMORBIDITY PRESENT: ICD-10-CM

## 2024-04-10 DIAGNOSIS — F32.A DEPRESSION, UNSPECIFIED DEPRESSION TYPE: ICD-10-CM

## 2024-04-10 DIAGNOSIS — M47.816 LUMBAR FACET ARTHROPATHY: ICD-10-CM

## 2024-04-10 DIAGNOSIS — M51.36 DDD (DEGENERATIVE DISC DISEASE), LUMBAR: ICD-10-CM

## 2024-04-10 DIAGNOSIS — M48.062 LUMBAR STENOSIS WITH NEUROGENIC CLAUDICATION: ICD-10-CM

## 2024-04-10 DIAGNOSIS — M50.90 CERVICAL DISC DISEASE: ICD-10-CM

## 2024-04-10 DIAGNOSIS — G89.4 CHRONIC PAIN SYNDROME: ICD-10-CM

## 2024-04-10 DIAGNOSIS — M96.1 FAILED BACK SURGICAL SYNDROME: ICD-10-CM

## 2024-04-10 RX ORDER — DULOXETIN HYDROCHLORIDE 60 MG/1
60 CAPSULE, DELAYED RELEASE ORAL DAILY
Qty: 90 CAPSULE | Refills: 0 | Status: SHIPPED | OUTPATIENT
Start: 2024-04-10 | End: 2024-07-09

## 2024-04-10 RX ORDER — HYDROCODONE BITARTRATE AND ACETAMINOPHEN 7.5; 325 MG/1; MG/1
1 TABLET ORAL EVERY 8 HOURS PRN
Qty: 90 TABLET | Refills: 0 | Status: SHIPPED | OUTPATIENT
Start: 2024-04-10 | End: 2024-05-10

## 2024-04-10 NOTE — PROGRESS NOTES
monitor BP, f/u with PCP if it continues to stay elevated or she becomes symptomatic with SOB, CP, syncopy. Advised to maintain compliance with prescribed antihypertensives. she is currently asymptomatic   -CBT techniques- relaxation therapies such as biofeedback, mindfulness based stress reduction, imagery, cognitive restructuring, problem solving discussed with patient   -Last UDS 6/17/23 consistent  -Return in about 4 weeks (around 5/8/2024).   -[place] was obtained and reviewed  for the last one year and no indicators of drug misuse  were found. Any other controlled substance prescriptions  seen on the record have been accounted for, I am aware of the patient receiving these medications. . [place] record will be rechecked as part of office protocol.       Analgesic Plan:             Continue present regimen: Yes: Norco 7.5-325 mg tabs orally q8h prn              Adjust dose of present analgesic: No              Switch analgesics: No              Add/Adjust concomitant therapy: No: continue with ZTlido, Cymbalta, Narcan    I will continue her current medication regimen  which is part of the above treatment schedule. It has been helping with Ms. Crespo's chronic  medical problems which for this visit include:   Diagnoses of Chronic pain syndrome, Cervical disc disease, DDD (degenerative disc disease), lumbar, Spinal stenosis of lumbar region without neurogenic claudication, Lumbar facet arthropathy, Degenerative spondylolisthesis, Failed back surgical syndrome, Rheumatoid arthritis of other site with positive rheumatoid factor (HCC), Recurrent major depressive disorder, in full remission (HCC), Class 1 obesity due to excess calories in adult, unspecified BMI, unspecified whether serious comorbidity present, Depression, unspecified depression type, and Lumbar stenosis with neurogenic claudication were pertinent to this visit.   Risks and benefits of the medications and other alternative treatments  including no

## 2024-04-11 ENCOUNTER — CARE COORDINATION (OUTPATIENT)
Dept: CASE MANAGEMENT | Age: 84
End: 2024-04-11

## 2024-04-11 NOTE — CARE COORDINATION
Care Transitions Follow Up Call    Patient Current Location:  Home: [address] [place] [address]    Care Transition Nurse contacted the patient by telephone to follow up after admission.  Verified name and  with patient as identifiers.    Patient: Shayla Crespo  Patient : 1940   MRN: [id number]  Reason for Admission:   Discharge Date: 4/3/24 RARS: Readmission Risk Score: 11.8      Needs to be reviewed by the provider   Additional needs identified to be addressed with provider: No  none             Method of communication with provider: none.    Pt states doing well, no issues or concerns. F/U appts went well, next ones listed below. Agreed to more CTC f/u calls.      Addressed changes since last contact:  none    Follow Up  Future Appointments   Date Time Provider Department Center   2024 10:30 AM lEba Deleon, APRN - CNP Dinesh Car [facility]   2024  2:00 PM Stefany Manzo MD [facility] PC [facility]   2024 11:30 AM [facility] ANTICOAGULATION CLINIC [facility]   2024  1:00 PM Aimee Molina, APRN - CNP R BANK PAIN [facility]   2024  7:30 AM SCHEDULE, MHCX [facility] ECHO [facility]   2024  8:30 AM Rafy Arias MD [facility]   2024 11:15 AM Terrell Burkett MD PULM & CC [facility]         Care Transition Nurse reviewed medical action plan with patient and discussed any barriers to care and/or understanding of plan of care after discharge. Discussed appropriate site of care based on symptoms and resources available to patient including: When to call 911. The patient agrees to contact the PCP office for questions related to their healthcare.     Advance Care Planning:   reviewed and current.     Offered patient enrollment in the Remote Patient Monitoring (RPM) program for in-home monitoring: no qualifying diagnosis.     Care Transitions Subsequent and Final Call    Subsequent and Final Calls  Do you have any ongoing symptoms?: No  Have your medications

## 2024-04-12 ENCOUNTER — TELEPHONE (OUTPATIENT)
Dept: PRIMARY CARE CLINIC | Age: 84
End: 2024-04-12

## 2024-04-12 NOTE — TELEPHONE ENCOUNTER
Pt's daughter called stating that yesterday and today, pt has had episodes of double vision with nausea and headaches.  Daughter sts that the episodes last for a couple minutes and then fades.  Daughter is just wondering if it is from the procedure her mom had done or something else.  Daughter is wondering if this is something they should be concerned about.    Advised daughter that Dr Manzo is out of the office until Monday, 4/15/24 but if messages are reviewed before end of day 4/12/24, we will call her.        Message was reviewed with Dr Manzo.  As per discussion with Dr Manzo, advised Oralia (Daughter) to call Dr Arias's office and relay this information to them in case it is related to the TAVR procedure.  Daughter is amendable to plan and agrees to call our office with any further concerns or questions.

## 2024-04-12 NOTE — TELEPHONE ENCOUNTER
Called and spoke with daughter.  Daughter sts the cardiology office was already closed and was not able to leave a message.  As per discussion with Dr Manzo, advised daughter to take pt to urgent care or ED if no improvement.  Daughter amendable to plan.  Advised daughter that we will check on pt next week.

## 2024-04-15 ENCOUNTER — APPOINTMENT (OUTPATIENT)
Dept: CT IMAGING | Age: 84
End: 2024-04-15
Payer: MEDICARE

## 2024-04-15 ENCOUNTER — HOSPITAL ENCOUNTER (INPATIENT)
Age: 84
LOS: 4 days | Discharge: HOME | End: 2024-04-19
Attending: EMERGENCY MEDICINE | Admitting: FAMILY MEDICINE
Payer: MEDICARE

## 2024-04-15 ENCOUNTER — APPOINTMENT (OUTPATIENT)
Dept: GENERAL RADIOLOGY | Age: 84
End: 2024-04-15
Payer: MEDICARE

## 2024-04-15 DIAGNOSIS — R42 DIZZINESS: Primary | ICD-10-CM

## 2024-04-15 PROBLEM — J18.9 PNEUMONIA OF RIGHT UPPER LOBE DUE TO INFECTIOUS ORGANISM: Status: ACTIVE | Noted: 2024-04-15

## 2024-04-15 LAB
ALBUMIN SERPL-MCNC: 3.6 G/DL (ref 3.4–5)
ALBUMIN/GLOB SERPL: 1 {RATIO} (ref 1.1–2.2)
ALP SERPL-CCNC: 74 U/L (ref 40–129)
ALT SERPL-CCNC: 9 U/L (ref 10–40)
ANION GAP SERPL CALCULATED.3IONS-SCNC: 10 MMOL/L (ref 3–16)
AST SERPL-CCNC: 22 U/L (ref 15–37)
BASOPHILS # BLD: 0.1 K/UL (ref 0–0.2)
BASOPHILS NFR BLD: 1.6 %
BILIRUB SERPL-MCNC: 0.4 MG/DL (ref 0–1)
BUN SERPL-MCNC: 21 MG/DL (ref 7–20)
CALCIUM SERPL-MCNC: 9.4 MG/DL (ref 8.3–10.6)
CHLORIDE SERPL-SCNC: 101 MMOL/L (ref 99–110)
CO2 SERPL-SCNC: 27 MMOL/L (ref 21–32)
CREAT SERPL-MCNC: 0.6 MG/DL (ref 0.6–1.2)
DEPRECATED RDW RBC AUTO: 15 % (ref 12.4–15.4)
EKG ATRIAL RATE: 87 BPM
EKG DIAGNOSIS: NORMAL
EKG P AXIS: 45 DEGREES
EKG P-R INTERVAL: 186 MS
EKG Q-T INTERVAL: 392 MS
EKG QRS DURATION: 140 MS
EKG QTC CALCULATION (BAZETT): 471 MS
EKG R AXIS: -65 DEGREES
EKG T AXIS: 60 DEGREES
EKG VENTRICULAR RATE: 87 BPM
EOSINOPHIL # BLD: 0.2 K/UL (ref 0–0.6)
EOSINOPHIL NFR BLD: 3.6 %
GFR SERPLBLD CREATININE-BSD FMLA CKD-EPI: 90 ML/MIN/{1.73_M2}
GLUCOSE SERPL-MCNC: 136 MG/DL (ref 70–99)
HCT VFR BLD AUTO: 33.5 % (ref 36–48)
HGB BLD-MCNC: 11 G/DL (ref 12–16)
INR PPP: 1.81 (ref 0.85–1.15)
LYMPHOCYTES # BLD: 1 K/UL (ref 1–5.1)
LYMPHOCYTES NFR BLD: 16.6 %
MCH RBC QN AUTO: 33 PG (ref 26–34)
MCHC RBC AUTO-ENTMCNC: 32.9 G/DL (ref 31–36)
MCV RBC AUTO: 100.2 FL (ref 80–100)
MONOCYTES # BLD: 0.6 K/UL (ref 0–1.3)
MONOCYTES NFR BLD: 9.2 %
NEUTROPHILS # BLD: 4.3 K/UL (ref 1.7–7.7)
NEUTROPHILS NFR BLD: 69 %
NT-PROBNP SERPL-MCNC: 602 PG/ML (ref 0–449)
PLATELET # BLD AUTO: 172 K/UL (ref 135–450)
PMV BLD AUTO: 8.6 FL (ref 5–10.5)
POTASSIUM SERPL-SCNC: 3.8 MMOL/L (ref 3.5–5.1)
PROCALCITONIN SERPL IA-MCNC: 0.04 NG/ML (ref 0–0.15)
PROT SERPL-MCNC: 7.1 G/DL (ref 6.4–8.2)
PROTHROMBIN TIME: 21.1 SEC (ref 11.9–14.9)
RBC # BLD AUTO: 3.35 M/UL (ref 4–5.2)
SODIUM SERPL-SCNC: 138 MMOL/L (ref 136–145)
TROPONIN, HIGH SENSITIVITY: 15 NG/L (ref 0–14)
TROPONIN, HIGH SENSITIVITY: 15 NG/L (ref 0–14)
WBC # BLD AUTO: 6.3 K/UL (ref 4–11)

## 2024-04-15 PROCEDURE — 6360000002 HC RX W HCPCS: Performed by: NURSE PRACTITIONER

## 2024-04-15 PROCEDURE — 2580000003 HC RX 258: Performed by: NURSE PRACTITIONER

## 2024-04-15 PROCEDURE — 70450 CT HEAD/BRAIN W/O DYE: CPT

## 2024-04-15 PROCEDURE — 85025 COMPLETE CBC W/AUTO DIFF WBC: CPT

## 2024-04-15 PROCEDURE — 6360000002 HC RX W HCPCS: Performed by: FAMILY MEDICINE

## 2024-04-15 PROCEDURE — 93010 ELECTROCARDIOGRAM REPORT: CPT | Performed by: INTERNAL MEDICINE

## 2024-04-15 PROCEDURE — 6370000000 HC RX 637 (ALT 250 FOR IP): Performed by: FAMILY MEDICINE

## 2024-04-15 PROCEDURE — 96365 THER/PROPH/DIAG IV INF INIT: CPT

## 2024-04-15 PROCEDURE — 84145 PROCALCITONIN (PCT): CPT

## 2024-04-15 PROCEDURE — 93005 ELECTROCARDIOGRAM TRACING: CPT | Performed by: EMERGENCY MEDICINE

## 2024-04-15 PROCEDURE — 80053 COMPREHEN METABOLIC PANEL: CPT

## 2024-04-15 PROCEDURE — 2580000003 HC RX 258: Performed by: FAMILY MEDICINE

## 2024-04-15 PROCEDURE — 84484 ASSAY OF TROPONIN QUANT: CPT

## 2024-04-15 PROCEDURE — 1200000000 HC SEMI PRIVATE

## 2024-04-15 PROCEDURE — 2700000000 HC OXYGEN THERAPY PER DAY

## 2024-04-15 PROCEDURE — 71045 X-RAY EXAM CHEST 1 VIEW: CPT

## 2024-04-15 PROCEDURE — 85610 PROTHROMBIN TIME: CPT

## 2024-04-15 PROCEDURE — 83880 ASSAY OF NATRIURETIC PEPTIDE: CPT

## 2024-04-15 PROCEDURE — 94761 N-INVAS EAR/PLS OXIMETRY MLT: CPT

## 2024-04-15 PROCEDURE — 2580000003 HC RX 258: Performed by: EMERGENCY MEDICINE

## 2024-04-15 PROCEDURE — 99285 EMERGENCY DEPT VISIT HI MDM: CPT

## 2024-04-15 RX ORDER — HYDROCODONE BITARTRATE AND ACETAMINOPHEN 7.5; 325 MG/1; MG/1
1 TABLET ORAL EVERY 8 HOURS PRN
Status: DISCONTINUED | OUTPATIENT
Start: 2024-04-15 | End: 2024-04-19 | Stop reason: HOSPADM

## 2024-04-15 RX ORDER — SODIUM CHLORIDE 9 MG/ML
1000 INJECTION, SOLUTION INTRAVENOUS CONTINUOUS
Status: DISCONTINUED | OUTPATIENT
Start: 2024-04-15 | End: 2024-04-16 | Stop reason: ALTCHOICE

## 2024-04-15 RX ORDER — CETIRIZINE HYDROCHLORIDE 10 MG/1
10 TABLET ORAL DAILY
Status: DISCONTINUED | OUTPATIENT
Start: 2024-04-16 | End: 2024-04-19 | Stop reason: HOSPADM

## 2024-04-15 RX ORDER — DULOXETIN HYDROCHLORIDE 60 MG/1
60 CAPSULE, DELAYED RELEASE ORAL DAILY
Status: DISCONTINUED | OUTPATIENT
Start: 2024-04-16 | End: 2024-04-19 | Stop reason: HOSPADM

## 2024-04-15 RX ORDER — ALBUTEROL SULFATE 90 UG/1
2 AEROSOL, METERED RESPIRATORY (INHALATION) 4 TIMES DAILY PRN
Status: DISCONTINUED | OUTPATIENT
Start: 2024-04-15 | End: 2024-04-19 | Stop reason: HOSPADM

## 2024-04-15 RX ORDER — PANTOPRAZOLE SODIUM 40 MG/1
40 TABLET, DELAYED RELEASE ORAL DAILY
Status: DISCONTINUED | OUTPATIENT
Start: 2024-04-16 | End: 2024-04-19 | Stop reason: HOSPADM

## 2024-04-15 RX ORDER — ACETAMINOPHEN 650 MG/1
650 SUPPOSITORY RECTAL EVERY 6 HOURS PRN
Status: DISCONTINUED | OUTPATIENT
Start: 2024-04-15 | End: 2024-04-19 | Stop reason: HOSPADM

## 2024-04-15 RX ORDER — SODIUM CHLORIDE 0.9 % (FLUSH) 0.9 %
5-40 SYRINGE (ML) INJECTION PRN
Status: DISCONTINUED | OUTPATIENT
Start: 2024-04-15 | End: 2024-04-19 | Stop reason: HOSPADM

## 2024-04-15 RX ORDER — ONDANSETRON 2 MG/ML
4 INJECTION INTRAMUSCULAR; INTRAVENOUS EVERY 6 HOURS PRN
Status: DISCONTINUED | OUTPATIENT
Start: 2024-04-15 | End: 2024-04-16 | Stop reason: SDUPTHER

## 2024-04-15 RX ORDER — ONDANSETRON 4 MG/1
4 TABLET, ORALLY DISINTEGRATING ORAL EVERY 8 HOURS PRN
Status: DISCONTINUED | OUTPATIENT
Start: 2024-04-15 | End: 2024-04-19 | Stop reason: HOSPADM

## 2024-04-15 RX ORDER — ACETAMINOPHEN 325 MG/1
650 TABLET ORAL EVERY 6 HOURS PRN
Status: DISCONTINUED | OUTPATIENT
Start: 2024-04-15 | End: 2024-04-19 | Stop reason: HOSPADM

## 2024-04-15 RX ORDER — SODIUM CHLORIDE 9 MG/ML
INJECTION, SOLUTION INTRAVENOUS PRN
Status: DISCONTINUED | OUTPATIENT
Start: 2024-04-15 | End: 2024-04-19 | Stop reason: HOSPADM

## 2024-04-15 RX ORDER — IPRATROPIUM BROMIDE AND ALBUTEROL SULFATE 2.5; .5 MG/3ML; MG/3ML
1 SOLUTION RESPIRATORY (INHALATION)
Status: DISCONTINUED | OUTPATIENT
Start: 2024-04-16 | End: 2024-04-16

## 2024-04-15 RX ORDER — POLYETHYLENE GLYCOL 3350 17 G/17G
17 POWDER, FOR SOLUTION ORAL DAILY PRN
Status: DISCONTINUED | OUTPATIENT
Start: 2024-04-15 | End: 2024-04-19 | Stop reason: HOSPADM

## 2024-04-15 RX ORDER — ONDANSETRON 4 MG/1
4 TABLET, ORALLY DISINTEGRATING ORAL EVERY 8 HOURS PRN
Status: DISCONTINUED | OUTPATIENT
Start: 2024-04-15 | End: 2024-04-16 | Stop reason: SDUPTHER

## 2024-04-15 RX ORDER — SODIUM CHLORIDE 0.9 % (FLUSH) 0.9 %
5-40 SYRINGE (ML) INJECTION EVERY 12 HOURS SCHEDULED
Status: DISCONTINUED | OUTPATIENT
Start: 2024-04-15 | End: 2024-04-19 | Stop reason: HOSPADM

## 2024-04-15 RX ORDER — ONDANSETRON 2 MG/ML
4 INJECTION INTRAMUSCULAR; INTRAVENOUS EVERY 6 HOURS PRN
Status: DISCONTINUED | OUTPATIENT
Start: 2024-04-15 | End: 2024-04-19 | Stop reason: HOSPADM

## 2024-04-15 RX ORDER — FLUTICASONE PROPIONATE 50 MCG
2 SPRAY, SUSPENSION (ML) NASAL DAILY
Status: DISCONTINUED | OUTPATIENT
Start: 2024-04-16 | End: 2024-04-19 | Stop reason: HOSPADM

## 2024-04-15 RX ORDER — PRAVASTATIN SODIUM 40 MG
40 TABLET ORAL NIGHTLY
Status: DISCONTINUED | OUTPATIENT
Start: 2024-04-15 | End: 2024-04-19 | Stop reason: HOSPADM

## 2024-04-15 RX ADMIN — PRAVASTATIN SODIUM 40 MG: 40 TABLET ORAL at 23:41

## 2024-04-15 RX ADMIN — SODIUM CHLORIDE 1000 ML: 9 INJECTION, SOLUTION INTRAVENOUS at 20:29

## 2024-04-15 RX ADMIN — WARFARIN SODIUM 7.5 MG: 5 TABLET ORAL at 23:41

## 2024-04-15 RX ADMIN — VANCOMYCIN HYDROCHLORIDE 1250 MG: 10 INJECTION, POWDER, LYOPHILIZED, FOR SOLUTION INTRAVENOUS at 22:00

## 2024-04-15 RX ADMIN — CEFTRIAXONE SODIUM 1000 MG: 1 INJECTION, POWDER, FOR SOLUTION INTRAMUSCULAR; INTRAVENOUS at 23:48

## 2024-04-15 RX ADMIN — CEFEPIME 2000 MG: 2 INJECTION, POWDER, FOR SOLUTION INTRAVENOUS at 21:25

## 2024-04-15 ASSESSMENT — PAIN SCALES - GENERAL
PAINLEVEL_OUTOF10: 0
PAINLEVEL_OUTOF10: 0

## 2024-04-15 ASSESSMENT — LIFESTYLE VARIABLES
HOW MANY STANDARD DRINKS CONTAINING ALCOHOL DO YOU HAVE ON A TYPICAL DAY: PATIENT DOES NOT DRINK
HOW OFTEN DO YOU HAVE A DRINK CONTAINING ALCOHOL: NEVER

## 2024-04-15 ASSESSMENT — PAIN - FUNCTIONAL ASSESSMENT: PAIN_FUNCTIONAL_ASSESSMENT: 0-10

## 2024-04-15 NOTE — ED PROVIDER NOTES
I independently performed a history and physical on Shayla Crespo.   All diagnostic, treatment, and disposition decisions were made by myself in conjunction with the advanced practice provider.     For further details of Shayla Crespo's emergency department encounter, please see Mitch Nicholas NP's documentation.      Chief complaint: Dizziness (EMS reports pt she had a valve replaced beginning of  April. States dizziness, and SOB starting last night. )      Patient presents with feeling weak and dizzy and short of breath that came on during dinner tonight.  She denies any chest pain, pressure or heaviness.  Patient had a TAVR performed 2 weeks ago at [facility].  No fevers, chills or sweats.  On exam heart regular rate and rhythm and abdomen benign.  History From: History from : Patient      EKG  The Ekg interpreted by me shows  normal sinus rhythm with a rate of 87  Axis is   Left axis deviation  QTc is  normal  LAFB, RBBB  ST Segments: no acute change  No significant change from prior EKG dated 2 apr 2024    Labs Reviewed   CBC WITH AUTO DIFFERENTIAL - Abnormal; Notable for the following components:       Result Value    RBC 3.35 (*)     Hemoglobin 11.0 (*)     Hematocrit 33.5 (*)     .2 (*)     All other components within normal limits   COMPREHENSIVE METABOLIC PANEL - Abnormal; Notable for the following components:    Glucose 136 (*)     BUN 21 (*)     Albumin/Globulin Ratio 1.0 (*)     ALT 9 (*)     All other components within normal limits   TROPONIN - Abnormal; Notable for the following components:    Troponin, High Sensitivity 15 (*)     All other components within normal limits   TROPONIN - Abnormal; Notable for the following components:    Troponin, High Sensitivity 15 (*)     All other components within normal limits   BRAIN NATRIURETIC PEPTIDE - Abnormal; Notable for the following components:    Pro- (*)     All other components within normal limits   PROTIME-INR - Abnormal;  dysfunction        Chronic Conditions: TAVR, dementia, GERD    CONSULTS: (Who and What was discussed)  PHARMACY TO DOSE WARFARIN    CC/HPI Summary, DDx, ED Course, and Reassessment: Patient has pneumonia on x-ray and with her recent procedure would meet healthcare-associated pneumonia criteria for this reason admitting her to the hospital.  I do believe she will benefit from IV antibiotics and further evaluation.       Differential diagnostic considerations: Sepsis, pneumonia, PE, ACS      I am the Primary Physician of Record.     Pito Tolbert MD  04/15/24 4610     Universal Safety Interventions

## 2024-04-15 NOTE — TELEPHONE ENCOUNTER
Pt's daughter, Oralia, called to let Dr Manzo know that pt is doing better and has not had any further complaints of double vision.

## 2024-04-16 PROBLEM — E44.0 MODERATE MALNUTRITION (HCC): Status: ACTIVE | Noted: 2024-04-16

## 2024-04-16 LAB
25(OH)D3 SERPL-MCNC: 11 NG/ML
ALBUMIN SERPL-MCNC: 3.1 G/DL (ref 3.4–5)
ALBUMIN/GLOB SERPL: 1 {RATIO} (ref 1.1–2.2)
ALP SERPL-CCNC: 60 U/L (ref 40–129)
ALT SERPL-CCNC: 8 U/L (ref 10–40)
ANION GAP SERPL CALCULATED.3IONS-SCNC: 9 MMOL/L (ref 3–16)
AST SERPL-CCNC: 20 U/L (ref 15–37)
BASOPHILS # BLD: 0.1 K/UL (ref 0–0.2)
BASOPHILS NFR BLD: 1.3 %
BILIRUB SERPL-MCNC: <0.2 MG/DL (ref 0–1)
BUN SERPL-MCNC: 16 MG/DL (ref 7–20)
CALCIUM SERPL-MCNC: 8.5 MG/DL (ref 8.3–10.6)
CHLORIDE SERPL-SCNC: 107 MMOL/L (ref 99–110)
CO2 SERPL-SCNC: 26 MMOL/L (ref 21–32)
CREAT SERPL-MCNC: <0.5 MG/DL (ref 0.6–1.2)
CRP SERPL-MCNC: 9.5 MG/L (ref 0–5.1)
DEPRECATED RDW RBC AUTO: 14.5 % (ref 12.4–15.4)
EOSINOPHIL # BLD: 0.2 K/UL (ref 0–0.6)
EOSINOPHIL NFR BLD: 4.4 %
GFR SERPLBLD CREATININE-BSD FMLA CKD-EPI: >90 ML/MIN/{1.73_M2}
GLUCOSE SERPL-MCNC: 89 MG/DL (ref 70–99)
HCT VFR BLD AUTO: 29.3 % (ref 36–48)
HGB BLD-MCNC: 9.9 G/DL (ref 12–16)
INR PPP: 1.91 (ref 0.85–1.15)
LYMPHOCYTES # BLD: 1.2 K/UL (ref 1–5.1)
LYMPHOCYTES NFR BLD: 22.8 %
MCH RBC QN AUTO: 33.7 PG (ref 26–34)
MCHC RBC AUTO-ENTMCNC: 33.9 G/DL (ref 31–36)
MCV RBC AUTO: 99.5 FL (ref 80–100)
MONOCYTES # BLD: 0.6 K/UL (ref 0–1.3)
MONOCYTES NFR BLD: 10.5 %
NEUTROPHILS # BLD: 3.3 K/UL (ref 1.7–7.7)
NEUTROPHILS NFR BLD: 61 %
PLATELET # BLD AUTO: 147 K/UL (ref 135–450)
PMV BLD AUTO: 8.7 FL (ref 5–10.5)
POTASSIUM SERPL-SCNC: 3.8 MMOL/L (ref 3.5–5.1)
PROT SERPL-MCNC: 6.1 G/DL (ref 6.4–8.2)
PROTHROMBIN TIME: 21.9 SEC (ref 11.9–14.9)
RBC # BLD AUTO: 2.95 M/UL (ref 4–5.2)
SODIUM SERPL-SCNC: 142 MMOL/L (ref 136–145)
WBC # BLD AUTO: 5.3 K/UL (ref 4–11)

## 2024-04-16 PROCEDURE — 97535 SELF CARE MNGMENT TRAINING: CPT

## 2024-04-16 PROCEDURE — 2700000000 HC OXYGEN THERAPY PER DAY

## 2024-04-16 PROCEDURE — 93320 DOPPLER ECHO COMPLETE: CPT

## 2024-04-16 PROCEDURE — 85025 COMPLETE CBC W/AUTO DIFF WBC: CPT

## 2024-04-16 PROCEDURE — 1200000000 HC SEMI PRIVATE

## 2024-04-16 PROCEDURE — 2580000003 HC RX 258: Performed by: FAMILY MEDICINE

## 2024-04-16 PROCEDURE — 6360000002 HC RX W HCPCS: Performed by: FAMILY MEDICINE

## 2024-04-16 PROCEDURE — 94761 N-INVAS EAR/PLS OXIMETRY MLT: CPT

## 2024-04-16 PROCEDURE — 94640 AIRWAY INHALATION TREATMENT: CPT

## 2024-04-16 PROCEDURE — 80053 COMPREHEN METABOLIC PANEL: CPT

## 2024-04-16 PROCEDURE — 97166 OT EVAL MOD COMPLEX 45 MIN: CPT

## 2024-04-16 PROCEDURE — 82306 VITAMIN D 25 HYDROXY: CPT

## 2024-04-16 PROCEDURE — 86140 C-REACTIVE PROTEIN: CPT

## 2024-04-16 PROCEDURE — 6370000000 HC RX 637 (ALT 250 FOR IP): Performed by: FAMILY MEDICINE

## 2024-04-16 PROCEDURE — 85610 PROTHROMBIN TIME: CPT

## 2024-04-16 PROCEDURE — 93308 TTE F-UP OR LMTD: CPT

## 2024-04-16 PROCEDURE — 6370000000 HC RX 637 (ALT 250 FOR IP): Performed by: INTERNAL MEDICINE

## 2024-04-16 RX ORDER — IPRATROPIUM BROMIDE AND ALBUTEROL SULFATE 2.5; .5 MG/3ML; MG/3ML
1 SOLUTION RESPIRATORY (INHALATION) EVERY 4 HOURS PRN
Status: DISCONTINUED | OUTPATIENT
Start: 2024-04-16 | End: 2024-04-19 | Stop reason: HOSPADM

## 2024-04-16 RX ADMIN — Medication 2 PUFF: at 08:47

## 2024-04-16 RX ADMIN — PANTOPRAZOLE SODIUM 40 MG: 40 TABLET, DELAYED RELEASE ORAL at 08:24

## 2024-04-16 RX ADMIN — DULOXETINE HYDROCHLORIDE 60 MG: 60 CAPSULE, DELAYED RELEASE ORAL at 08:24

## 2024-04-16 RX ADMIN — AZITHROMYCIN MONOHYDRATE 500 MG: 500 INJECTION, POWDER, LYOPHILIZED, FOR SOLUTION INTRAVENOUS at 00:43

## 2024-04-16 RX ADMIN — CEFTRIAXONE SODIUM 1000 MG: 1 INJECTION, POWDER, FOR SOLUTION INTRAMUSCULAR; INTRAVENOUS at 23:23

## 2024-04-16 RX ADMIN — SODIUM CHLORIDE: 9 INJECTION, SOLUTION INTRAVENOUS at 23:22

## 2024-04-16 RX ADMIN — Medication 10 ML: at 20:01

## 2024-04-16 RX ADMIN — Medication 2 PUFF: at 20:35

## 2024-04-16 RX ADMIN — FLUTICASONE PROPIONATE 2 SPRAY: 50 SPRAY, METERED NASAL at 08:24

## 2024-04-16 RX ADMIN — Medication 10 ML: at 08:25

## 2024-04-16 RX ADMIN — Medication 2 PUFF: at 08:49

## 2024-04-16 RX ADMIN — CETIRIZINE HYDROCHLORIDE 10 MG: 10 TABLET, FILM COATED ORAL at 08:24

## 2024-04-16 RX ADMIN — PRAVASTATIN SODIUM 40 MG: 40 TABLET ORAL at 20:00

## 2024-04-16 RX ADMIN — WARFARIN SODIUM 7.5 MG: 5 TABLET ORAL at 18:21

## 2024-04-16 ASSESSMENT — PAIN DESCRIPTION - DESCRIPTORS: DESCRIPTORS: OTHER (COMMENT)

## 2024-04-16 ASSESSMENT — PAIN DESCRIPTION - LOCATION: LOCATION: ABDOMEN

## 2024-04-16 ASSESSMENT — PAIN DESCRIPTION - PAIN TYPE: TYPE: ACUTE PAIN

## 2024-04-16 ASSESSMENT — PAIN SCALES - GENERAL
PAINLEVEL_OUTOF10: 1
PAINLEVEL_OUTOF10: 0
PAINLEVEL_OUTOF10: 0

## 2024-04-16 NOTE — CARE COORDINATION
Case Management Assessment  Initial Evaluation    Date/Time of Evaluation: 4/16/2024 1:14 PM  Assessment Completed by: ELOISA CALVERT RN    If patient is discharged prior to next notation, then this note serves as note for discharge by case management.    Patient Name: Shayla Serra                   YOB: 1940  Diagnosis: Dizziness [R42]  Pneumonia of right upper lobe due to infectious organism [J18.9]                   Date / Time: 4/15/2024  6:40 PM    Patient Admission Status: Inpatient   Readmission Risk (Low < 19, Mod (19-27), High > 27): Readmission Risk Score: 19.3    Current PCP: Stefany Manzo MD  PCP verified by CM? Yes    Chart Reviewed: Yes      History Provided by: Patient, Medical Record  Patient Orientation: Alert and Oriented    Patient Cognition: Alert    Hospitalization in the last 30 days (Readmission):  No    If yes, Readmission Assessment in CM Navigator will be completed.    Advance Directives:      Code Status: Full Code   Patient's Primary Decision Maker is: Named in Scanned ACP Document    Primary Decision Maker: Fiorella Sesay - Child - [phone]    Primary Decision Maker: stefani serra - Child - [phone]    Primary Decision Maker: [email or web address] - Child - [phone]    Discharge Planning:    Patient lives with: Children Type of Home: House  Primary Care Giver: Self  Patient Support Systems include: Children, Family Members   Current Financial resources: Medicare  Current community resources: None  Current services prior to admission: None            Current DME:              Type of Home Care services:  OT, Nursing Services    ADLS  Prior functional level: Independent in ADLs/IADLs  Current functional level: Assistance with the following:, Bathing, Dressing, Toileting, Cooking, Housework, Shopping, Mobility    PT AM-PAC:   /24  OT AM-PAC:   /24    Family can provide assistance at DC: Yes  Would you like Case Management to discuss the discharge plan with any other

## 2024-04-16 NOTE — PROGRESS NOTES
Spent long time in patients room related to family having a lot of questions about the plan of care. Shared with them all info I had, and passed info on to the doctor to see if he could come bedside to answer some questions.     Family concerned with INR level of 1.91 being elevated, explained that is seems elevated, but for a patient on warfarin, it should be between 2-3. Family understood this.     Patient family stated that the patient had a PE in January diagnosed at [facility], but it was never treated (patient is on warfarin long term therapy). Also stated that she has been having abdominal pain (when asking patient if she was having abdominal pain, she denied). Family thinks this pain is caused by a GI bleed due to her INR being 1.91 today. Explained that her therapeutic level is 2.0-3.0 again, and that pharmacy keeps an eye on these levels to dose their warfarin dosage. I let them know that the dosage may change based on this level, and that she would have to follow up out patient like she normally does when discharged for this to be checked and adjusted. Reassured family and patient that H/H has been checked, and is stable at this time at 9.9/29.3, and patient having no S/S of GI bleed (no emesis, bloody stool, etc), but would pass info on to MD to voice their concerns. Family also concerned with her assessment from this morning that her lungs are somewhat diminnished and that there were crackles heard. I stated that this is an assessment we do daily or multiple times a day depending on condition of patient, and reassured them that she is no longer on supplemental oxygen therapy and her O2 levels remain stable. Getting up and moving around and not laying in the bed would help with this as well. IS provided and respiratory is on board with patient for scheduled respiratory therapy.     Patient did not share any conversation related to these concerns - she is alert and oriented, and denied abd pain, SOB,  chest pain, or any S/S of bleeding. She did verbalize understanding that if there is any change in her status, or if she begins to exhibit any signs or symptoms of  any of these to call immediately.     Perfect serve to Dr. Short to please come bedside to address other concerns, stated he will be up at 1530. I let family in the room know this information.

## 2024-04-16 NOTE — PLAN OF CARE
Problem: Discharge Planning  Goal: Discharge to home or other facility with appropriate resources  4/16/2024 0904 by Philomena Sumner RN  Outcome: Progressing  4/16/2024 0004 by Lakshmi Appiah RN  Outcome: Progressing     Problem: Pain  Goal: Verbalizes/displays adequate comfort level or baseline comfort level  4/16/2024 0904 by Philomena Sumner RN  Outcome: Progressing  4/16/2024 0004 by Lakshmi Appiah RN  Outcome: Progressing     Problem: Safety - Adult  Goal: Free from fall injury  4/16/2024 0904 by Philomena Sumner RN  Outcome: Progressing  4/16/2024 0004 by Lakshmi Appiah RN  Outcome: Progressing     Problem: ABCDS Injury Assessment  Goal: Absence of physical injury  Outcome: Progressing     Problem: Respiratory - Adult  Goal: Achieves optimal ventilation and oxygenation  4/16/2024 0904 by Philomena Sumner RN  Outcome: Progressing  4/16/2024 0004 by Lakshmi Appiah RN  Outcome: Progressing     Problem: Cardiovascular - Adult  Goal: Maintains optimal cardiac output and hemodynamic stability  4/16/2024 0904 by Philomena Sumner RN  Outcome: Progressing  4/16/2024 0004 by Lakshmi Appiah RN  Outcome: Progressing     Problem: Musculoskeletal - Adult  Goal: Return mobility to safest level of function  4/16/2024 0904 by Philomena Sumner RN  Outcome: Progressing  4/16/2024 0004 by Lakshmi Appiah RN  Outcome: Progressing

## 2024-04-16 NOTE — CONSULTS
[facility]   CONSULTATION  [phone]      Attending Physician: Helen Short MD  Reason for Consultation/Chief Complaint: Shortness of breath    Subjective   History of Present Illness:  Shayla Crespo is a 83 y.o. patient who presented to the hospital with complaints of shortness of breath and chest pressure, patient noticed over the last few days, she presented to the hospital overnight, she was found to have possible pneumonia, she been treated with antibiotics, feels better, and in the course evaluation, high since her troponin level is mildly elevated at 15, proBNP level 602.  It was noted that patient is status post recent TAVR on April 2, 2024.  Course was unremarkable however follow-up echocardiogram done on the day after TAVR raise concern of a small echodensity in the roof of the right atrium.  It was thought that this may require further imaging such as LUIS.    Of note, patient also had a CT scan in anticipation of TAVR in January 2024, she had held anticoagulation in anticipation of TAVR and that CT scan incidentally showed PE, she had to be admitted to the hospital to treat that before TAVR could be completed.    Patient has a cardiac history which dates back to establishing care as a new patient in 2019, she was seen at that time for chest pain and shortness of breath, and at that time, she was referred for the symptoms, bradycardia as well as an echocardiogram that was felt to be abnormal with possible mass on tricuspid valve as well as calcification of the mitral annulus.  It was noted that she has chronic history of DVT, has been on chronic anticoagulation with Coumadin.  To follow-up on this, she had a cardiac MRI in 2019,And this showed lipomatous hypertrophy and showed possible small mass in the right atrial base.  Additional, serial evaluation and follow-up in our office had shown progressive aortic stenosis which led to TAVR as noted above.    Past Medical History:   has a  past medical history of Acute pulmonary embolism (HCC), Allergic rhinitis, Arthritis, Cancer (HCC), Chronic back pain, Claudication (HCC), Deep vein thrombosis (DVT) of proximal vein of both lower extremities (HCC), Dementia (HCC), Dental disease, Depression, Dizziness, GERD (gastroesophageal reflux disease), H/O blood clots, Headache, Hearing loss, Hiatal hernia, Hyperlipidemia, Hypertension, IPF (idiopathic pulmonary fibrosis) (HCC), Lumbar stenosis with neurogenic claudication, Nephrolithiasis, Nonrheumatic aortic valve stenosis, Osteoarthritis of neck, Scoliosis, Thrombocytopenia, unspecified (HCC), and TMJ dysfunction.    Surgical History:   has a past surgical history that includes  section; Lithotripsy; other surgical history; Mohs surgery; embolectomy; laminectomy (2018); epidural steroid injection (Bilateral, 2019); Hysterectomy, total abdominal; epidural steroid injection (Right, 08/15/2019); Cataract extraction (Bilateral); Cardiac surgery (N/A, 2024); and Cardiac surgery (N/A, 2024).     Social History:   reports that she has never smoked. She has never been exposed to tobacco smoke. She has never used smokeless tobacco. She reports that she does not drink alcohol and does not use drugs.     Family History:  family history includes Breast Cancer in her maternal aunt; Cancer in an other family member; Colon Cancer in her brother and maternal grandmother; Heart Disease in her mother; Hypertension in an other family member; Other in her father; Seizures in an other family member.      Home Medications:  Were reviewed and are listed in nursing record and/or below  Prior to Admission medications    Medication Sig Start Date End Date Taking? Authorizing Provider   DULoxetine (CYMBALTA) 60 MG extended release capsule Take 1 capsule by mouth daily 4/10/24 7/9/24  Aimee Molina, APRN - CNP   Lidocaine 1.8 % PTCH Apply 1 Film topically daily  Patient taking differently: Apply 1 Film

## 2024-04-16 NOTE — CONSULTS
Consult Placed     Who: [facility]   Date:  Time:     Electronically signed by Darlene Alex on 4/16/2024 at 12:29 PM

## 2024-04-16 NOTE — PROGRESS NOTES
4 Eyes Skin Assessment     NAME:  Shayla Crespo  YOB: 1940  MEDICAL RECORD NUMBER:  [id number]    The patient is being assessed for  Admission    I agree that at least one RN has performed a thorough Head to Toe Skin Assessment on the patient. ALL assessment sites listed below have been assessed.      Areas assessed by both nurses:    Head, Face, Ears, Shoulders, Back, Chest, Arms, Elbows, Hands, Sacrum. Buttock, Coccyx, Ischium, Legs. Feet and Heels, and Under Medical Devices         Does the Patient have a Wound? No noted wound(s)       Hakan Prevention initiated by RN: No  Wound Care Orders initiated by RN: No    Pressure Injury (Stage 3,4, Unstageable, DTI, NWPT, and Complex wounds) if present, place Wound referral order by RN under : No    New Ostomies, if present place, Ostomy referral order under : No     Nurse 1 eSignature: Electronically signed by Lakshmi Appiah RN on 4/15/24 at 11:57 PM EDT    **SHARE this note so that the co-signing nurse can place an eSignature**    Nurse 2 eSignature: {Esignature:[id number]}

## 2024-04-16 NOTE — PLAN OF CARE
Problem: Discharge Planning  Goal: Discharge to home or other facility with appropriate resources  Outcome: Progressing     Problem: Pain  Goal: Verbalizes/displays adequate comfort level or baseline comfort level  Outcome: Progressing     Problem: Safety - Adult  Goal: Free from fall injury  Outcome: Progressing     Problem: Respiratory - Adult  Goal: Achieves optimal ventilation and oxygenation  Outcome: Progressing     Problem: Cardiovascular - Adult  Goal: Maintains optimal cardiac output and hemodynamic stability  Outcome: Progressing     Problem: Musculoskeletal - Adult  Goal: Return mobility to safest level of function  Outcome: Progressing

## 2024-04-16 NOTE — TELEPHONE ENCOUNTER
Pt's daughter, Oralia, called to let Dr Manzo know that an Ultrasound was performed and no internal bleeding.  Oralia sierra pt did not have a mini stroke but just has pneumonia.  Oralia sierra pt's INR is 1.9 and for anemia- 9.9.  Oralia just wanted to let our office know.

## 2024-04-16 NOTE — H&P
Hospital Medicine History & Physical      Date of Admission: 4/15/2024    Date of Service:  Pt seen/examined on 4/15/2024    [x]Admitted to Inpatient with expected LOS greater than two midnights due to medical therapy.  []Placed in Observation status.    Chief Admission Complaint: Shortness of breath    Presenting Admission History:      83 y.o. female with past medical history of idiopathic pulmonary fibrosis, hypertension, hyperlipidemia, severe aortic stenosis status post TAVR, which was completed on 4/2/2024 presents to the ER complaining of feeling nauseated, clammy and warm all over her body like as if she was getting a fever.  She also reported dizziness with mild shortness of breath.  She reports symptoms have been going on over several hours. Patient denies chest pain, abdominal pain or dysuria.  Patient was afebrile in the ER, workup also included CT scan of the head which showed no acute abnormality, chest x-ray showed few patchy airspace infiltrates in the right upper lobe which could represent pneumonia in the appropriate clinical setting.    Assessment/Plan:    Dyspnea, suspect bronchitis, rule out pneumonia  Reactive airway disease  Status post TAVR  Hyperlipidemia  GERD  Depressive disorder    Plan  Bacterial bronchitis: ER was concerned for possible pneumonia and loaded patient on IV vancomycin and cefepime.  I added procalcitonin afterwards which came back negative. I will treat patient for bronchitis with IV Rocephin/Zithromax and also with prn Duonebs.  Wean oxygen to room air as tolerated.  Monitor for improvement.    Reactive airway disease: Patient has long history of secondhand exposure to tobacco.  Continue home dose of Breo and albuterol.    Status post TAVR: Stable, continue Coumadin.  Pharmacy to dose.    Hyperlipidemia: Resume statin.    GERD: Resume Protonix.    Depressive disorder: Resume Cymbalta.      Discussed management and the need for Hospitalization of the patient w/ the

## 2024-04-16 NOTE — CONSULTS
Pharmacy Note  Warfarin Consult  Dx: Hx DVT  Goal INR range 2-3   Home Warfarin dose: 7.5 mg daily    Date  INR  Warfarin  4/15                1.81                   7.5 mg    Recommend Warfarin 7.5 mg tonight x1.  Daily INR ordered.  Rx will continue to manage therapy per consult order.  Rosas Rivas, Pharm D.4/15/2024 11:29 PM

## 2024-04-16 NOTE — RT PROTOCOL NOTE
RT Inhaler-Nebulizer Bronchodilator Protocol Note    There is a bronchodilator order in the chart from a provider indicating to follow the RT Bronchodilator Protocol and there is an “Initiate RT Inhaler-Nebulizer Bronchodilator Protocol” order as well (see protocol at bottom of note).    CXR Findings:  XR CHEST PORTABLE    Result Date: 4/15/2024  There are few patchy airspace infiltrates in the right upper lobe which could represent pneumonia in the appropriate clinical setting       The findings from the last RT Protocol Assessment were as follows:   History Pulmonary Disease: Chronic pulmonary disease  Respiratory Pattern: Regular pattern and RR 12-20 bpm  Breath Sounds: Clear breath sounds  Cough: Strong, spontaneous, non-productive  Indication for Bronchodilator Therapy: On home bronchodilators  Bronchodilator Assessment Score: 2    Aerosolized bronchodilator medication orders have been revised according to the RT Inhaler-Nebulizer Bronchodilator Protocol below.    Respiratory Therapist to perform RT Therapy Protocol Assessment initially then follow the protocol.  Repeat RT Therapy Protocol Assessment PRN for score 0-3 or on second treatment, BID, and PRN for scores above 3.    No Indications - adjust the frequency to every 6 hours PRN wheezing or bronchospasm, if no treatments needed after 48 hours then discontinue using Per Protocol order mode.     If indication present, adjust the RT bronchodilator orders based on the Bronchodilator Assessment Score as indicated below.  Use Inhaler orders unless patient has one or more of the following: on home nebulizer, not able to hold breath for 10 seconds, is not alert and oriented, cannot activate and use MDI correctly, or respiratory rate 25 breaths per minute or more, then use the equivalent nebulizer order(s) with same Frequency and PRN reasons based on the score.  If a patient is on this medication at home then do not decrease Frequency below that used at  home.    0-3 - enter or revise RT bronchodilator order(s) to equivalent RT Bronchodilator order with Frequency of every 4 hours PRN for wheezing or increased work of breathing using Per Protocol order mode.        4-6 - enter or revise RT Bronchodilator order(s) to two equivalent RT bronchodilator orders with one order with BID Frequency and one order with Frequency of every 4 hours PRN wheezing or increased work of breathing using Per Protocol order mode.        7-10 - enter or revise RT Bronchodilator order(s) to two equivalent RT bronchodilator orders with one order with TID Frequency and one order with Frequency of every 4 hours PRN wheezing or increased work of breathing using Per Protocol order mode.       11-13 - enter or revise RT Bronchodilator order(s) to one equivalent RT bronchodilator order with QID Frequency and an Albuterol order with Frequency of every 4 hours PRN wheezing or increased work of breathing using Per Protocol order mode.      Greater than 13 - enter or revise RT Bronchodilator order(s) to one equivalent RT bronchodilator order with every 4 hours Frequency and an Albuterol order with Frequency of every 2 hours PRN wheezing or increased work of breathing using Per Protocol order mode.     RT to enter RT Home Evaluation for COPD & MDI Assessment order using Per Protocol order mode.    Electronically signed by Elise Solorzano RCP on 4/16/2024 at 12:00 AM

## 2024-04-16 NOTE — PROGRESS NOTES
Occupational Therapy  Facility/Department: [place] [id number] - MED SURG/ORTHO  Occupational Therapy Initial Assessment & treatment    Name: Shayla Crespo  : 1940  MRN: [id number]  Date of Service: 2024    Discharge Recommendations:  Home with Home health OT, 24 hour supervision or assist (initial  SPV)  OT Equipment Recommendations  Equipment Needed: No       Patient Diagnosis(es): The encounter diagnosis was Dizziness.  Past Medical History:  has a past medical history of Acute pulmonary embolism (HCC), Allergic rhinitis, Arthritis, Cancer (HCC), Chronic back pain, Claudication (HCC), Deep vein thrombosis (DVT) of proximal vein of both lower extremities (HCC), Dementia (HCC), Dental disease, Depression, Dizziness, GERD (gastroesophageal reflux disease), H/O blood clots, Headache, Hearing loss, Hiatal hernia, Hyperlipidemia, Hypertension, IPF (idiopathic pulmonary fibrosis) (HCC), Lumbar stenosis with neurogenic claudication, Nephrolithiasis, Nonrheumatic aortic valve stenosis, Osteoarthritis of neck, Scoliosis, Thrombocytopenia, unspecified (HCC), and TMJ dysfunction.  Past Surgical History:  has a past surgical history that includes  section; Lithotripsy; other surgical history; Mohs surgery; embolectomy; laminectomy (2018); epidural steroid injection (Bilateral, 2019); Hysterectomy, total abdominal; epidural steroid injection (Right, 08/15/2019); Cataract extraction (Bilateral); Cardiac surgery (N/A, 2024); and Cardiac surgery (N/A, 2024).           Assessment   Performance deficits / Impairments: Decreased functional mobility ;Decreased ADL status;Decreased strength;Decreased safe awareness;Decreased balance;Decreased endurance  Assessment: Pt pleasant and agreeable 82 yo female admitted to [facility] with dizziness and SOB. PTA pt IND with ADLs and most IADLs (excluding medication management). Today, pt requires CGA for ambulation without AD, SBA for grooming in stance at sink,  Normal Limits  Orientation Level: Oriented X4     Education Given To: Patient  Education Provided: Role of Therapy;Plan of Care;Precautions;ADL Adaptive Strategies;Transfer Training;Fall Prevention Strategies  Education Provided Comments: Disease Specific Education: Pt educated on importance of OOB mobility, prevention of complications of bedrest, and general safety during hospitalization including use of call light/RED button for nurse. Pt verbalized understanding   Education Method: Verbal  Education Outcome: Continued education needed;Verbalized understanding          AM-PAC - ADL  AM-PAC Daily Activity - Inpatient   How much help is needed for putting on and taking off regular lower body clothing?: A Little  How much help is needed for bathing (which includes washing, rinsing, drying)?: A Little  How much help is needed for toileting (which includes using toilet, bedpan, or urinal)?: A Little  How much help is needed for putting on and taking off regular upper body clothing?: A Little  How much help is needed for taking care of personal grooming?: A Little  How much help for eating meals?: None  AM-PAC Inpatient Daily Activity Raw Score: 19  AM-PAC Inpatient ADL T-Scale Score : 40.22  ADL Inpatient CMS 0-100% Score: 42.8  ADL Inpatient CMS G-Code Modifier : CK      Goals  Short Term Goals  Time Frame for Short Term Goals: 1 week 4/23  Short Term Goal 1: Pt will perform LB dressing with mod I  Short Term Goal 2: Pt will perform toilet transfer mod I  Short Term Goal 3: Pt will perform toileting mod I  Short Term Goal 4: Pt will perform 5 x 20 reps BUE ex to incr strength for fxl transfers and ADLs by 419  Patient Goals   Patient goals : \"go home\"       Therapy Time   Individual Concurrent Group Co-treatment   Time In 1256         Time Out 1327         Minutes 31         Timed Code Treatment Minutes: 16 Minutes (15 min eval)      If pt is unable to be seen after this session, please let this note serve as

## 2024-04-16 NOTE — ED PROVIDER NOTES
[facility] C5 - MED SURG/ORTHO  EMERGENCY DEPARTMENT ENCOUNTER        Pt Name: Shayla Crespo  MRN: [id number]  Birthdate 1940  Date of evaluation: 4/15/2024  Provider: LIZZY Sullivan - CNP  PCP: Stefany Manzo MD  Note Started: 11:36 PM EDT 4/15/24      AKIL. I have evaluated this patient.        CHIEF COMPLAINT       Chief Complaint   Patient presents with    Dizziness     EMS reports pt she had a valve replaced beginning of  April. States dizziness, and SOB starting last night.        HISTORY OF PRESENT ILLNESS: 1 or more Elements     History From: the patient  Limitations to history : None    Shayla Crespo is a 83 y.o. female who presents to the emergency room today with complaints of dizziness, patient states that she recently had a TAVR procedure, states that she has been dizzy and short of breath states that it started last night.  Patient states that she is unsure if she is still dizzy.  She denies any anette chest pain, denied any pain in the extremities, denied any tingling or numbness.    Nursing Notes were all reviewed and agreed with or any disagreements were addressed in the HPI.    REVIEW OF SYSTEMS :      Review of Systems    Positives and Pertinent negatives as per HPI.     SURGICAL HISTORY     Past Surgical History:   Procedure Laterality Date    CARDIAC SURGERY N/A 2024    TRANSCATHETER AORTIC VALVE REPLACEMENT FEMORAL APPROACH performed by Rafy Arias MD at [facility]    CARDIAC SURGERY N/A 2024    TRANSCATHETER AORTIC VALVE REPLACEMENT FEMORAL APPROACH performed by La Monteiro MD at [facility]    CATARACT EXTRACTION Bilateral     lens implanted     SECTION      x1    EMBOLECTOMY      EPIDURAL STEROID INJECTION Bilateral 2019    BILATERAL LUMBAR THREE, LUMBAR FOUR, LUMBAR FIVE RADIOFREQUENCY ABLATION SITE CONFIRMED BY FLUOROSCOPY performed by Josef Muñiz MD at [facility] OR    EPIDURAL STEROID INJECTION Right 08/15/2019    RIGHT SACROILIAC  daily      pravastatin (PRAVACHOL) 40 MG tablet Take 1 tablet by mouth at bedtime  Qty: 90 tablet, Refills: 3    Associated Diagnoses: Mixed hyperlipidemia      pantoprazole (PROTONIX) 40 MG tablet TAKE 1 TABLET BY MOUTH DAILY  Qty: 30 tablet, Refills: 0    Comments: Please send a replace/new response with 100-Day Supply if appropriate to maximize member benefit. Requesting 1 year supply.  Associated Diagnoses: Epigastric abdominal pain; Gastroesophageal reflux disease      warfarin (COUMADIN) 5 MG tablet TAKE 2 TABLETS BY MOUTH  DAILY ON MONDAY AND 1 AND  1/2 TABLETS BY MOUTH DAILY  ON ALL OTHER DAYS, OR  DIRECTED BY COUMADIN CLINIC  Qty: 143 tablet, Refills: 3    Comments: Requesting 1 year supply      Acetaminophen (TYLENOL ARTHRITIS EXT RELIEF PO) Take by mouth in the morning and at bedtime      Handicap Placard MISC by Does not apply route  Qty: 1 each, Refills: 0      cetirizine (ZYRTEC) 10 MG tablet Take 1 tablet by mouth daily      fluticasone (FLONASE) 50 MCG/ACT nasal spray 2 sprays by Each Nostril route daily  Qty: 1 Bottle, Refills: 5    Associated Diagnoses: Cough      !! Misc. Devices MISC Compression stockings/open toe pantyhose with compression 40/50  Qty: 2 each, Refills: 2    Associated Diagnoses: Chronic deep vein thrombosis (DVT) of proximal vein of both lower extremities (HCC)      !! Misc. Devices MISC George panty hose  Open toe Petite plus beige  2 pairs  Qty: 2 each, Refills: 1    Associated Diagnoses: Chronic deep vein thrombosis (DVT) of proximal vein of both lower extremities (HCC)       !! - Potential duplicate medications found. Please discuss with provider.          ALLERGIES     Amitriptyline and Imitrex [sumatriptan]    FAMILYHISTORY       Family History   Problem Relation Age of Onset    Heart Disease Mother     Other Father         murdered     Colon Cancer Brother     Breast Cancer Maternal Aunt     Colon Cancer Maternal Grandmother     Cancer Other     Hypertension Other

## 2024-04-16 NOTE — PROGRESS NOTES
Hospital Medicine Progress Note      Date of Admission: 4/15/2024  Hospital Day: 2    Chief Admission Complaint:   Shortness of breath        Subjective: She is sitting up in bed, looks weak and tired.  States she is feeling a little better and shortness of breath is improved from the time of admission.  Denies any chest pain    Presenting Admission History:       83 y.o. female with past medical history of idiopathic pulmonary fibrosis, hypertension, hyperlipidemia, DVTs, pulmonary embolisms in the past on long-term anticoagulation with Coumadin, severe aortic stenosis status post TAVR, which was completed on 4/2/2024.  She presented to the ER complaining of feeling nauseated, clammy and warm all over her body like as if she was getting a fever.  She also reported dizziness with mild shortness of breath.  She reports symptoms have been going on over several hours. Patient denies chest pain, abdominal pain or dysuria.  Patient was afebrile in the ER, workup also included CT scan of the head which showed no acute abnormality, chest x-ray showed few patchy airspace infiltrates in the right upper lobe which could represent pneumonia in the appropriate clinical setting.  She was admitted for further evaluation management.  She has been started on IV antibiotics.        Assessment/Plan:      Current Principal Problem:  Pneumonia of right upper lobe due to infectious organism    Pneumonia: At time admission there was concern for possible pneumonia.  She has been started on IV antibiotics.  She is currently receiving ceftriaxone and azithromycin.  Started 4/16/2024.  He did receive 1 dose of IV vancomycin.    Monitor for improvement.     Reactive airway disease: Patient has long history of secondhand exposure to tobacco.  Continue home dose of Breo and albuterol.     Status post TAVR: Stable, continue Coumadin.  Pharmacy to dose.  This was done to [facility] early April.  Will ask cardiology to see for  follow-up.    H/O DVTs and pulmonary embolism : She has been on Coumadin for many years due to this history.  Continue during this hospitalization pharmacy to dose       HyperLipidemia - normally controlled on home Statin. Continued.  F/U w/ PCP outpt for medication initiation/adjustment as needed.       GERD - w/out active signs/sxs of dysphagia/odynophagia. No evidence of active PUD or hx of GI bleed. Controlled on home PPI - continue.        Depressive disorder: Resume Cymbalta.  Mood appears stable at this time       Physical Exam Performed:      General appearance: Elderly female lying in bed looks weak and tired but in no apparent distress  Respiratory: Bilateral breath sounds, decreased at both bases, no rales rhonchi noted, normal respiratory effort.   Cardiovascular: S1-2 S2 regular rate and rhythm.  Abdomen: Positive bowel sound soft, non-tender, non-distended.  Musculoskeletal:  No edema  Neurologic:  Non-focal  Psychiatric:  Alert and oriented    /70   Pulse 58   Temp 97.6 °F (36.4 °C) (Oral)   Resp 16   Ht 1.524 m (5')   Wt 55.3 kg (122 lb)   SpO2 100%   BMI 23.83 kg/m²     Diet: ADULT DIET; Regular  ADULT ORAL NUTRITION SUPPLEMENT; Breakfast, Lunch, Dinner; Standard High Calorie/High Protein Oral Supplement  DVT Prophylaxis: []PPx LMWH  []SQ Heparin  []IPC/SCDs  [x]Eliquis  []Xarelto  []Coumadin  []Other -      Code status: Full Code  PT/OT Eval Status:   []NOT yet ordered  [x]Ordered and Pending   []Seen with Recommendations for:  []Home independently  []Home w/ assist  []HHC  []SNF  []Acute Rehab    Anticipated Discharge Day/Date: 2 to 3-day    Anticipated Discharge Location: [x]Home  []HHC  []SNF  []Acute Rehab  []ECF  []LTAC  []Hospice  []Other -      Consults:      PHARMACY TO DOSE WARFARIN  IP CONSULT TO CARDIOLOGY      This patient has a high likelihood of being discharged tomorrow and is appropriate for A1 Discharge Unit in AM pending clinical course overnight: []Yes

## 2024-04-16 NOTE — PROGRESS NOTES
Pharmacy Note  Warfarin Consult      Dx: Hx DVT  Goal INR range 2-3   Home Warfarin dose: 7.5 mg daily    Date  INR  Warfarin  4/15                1.81                   7.5 mg  4/16                1.91                   7.5 mg    Recommend Warfarin 7.5 mg tonight x1.  Daily INR ordered.  Rx will continue to manage therapy per consult order.    Chitra Don, PharmD 4/16/2024  8:30 AM   Fever in Children: Care Instructions  Your Care Instructions  A fever is a high body temperature. It is one way the body fights illness. Children with a fever often have an infection caused by a virus, such as a cold or the flu. Infections caused by bacteria, such as strep throat or an ear infection, also can cause a fever. Look at symptoms and how your child acts when deciding whether your child needs to see a doctor. The care your child needs depends on what is causing the fever. In many cases, a fever means that your child is fighting a minor illness. The doctor has checked your child carefully, but problems can develop later. If you notice any problems or new symptoms, get medical treatment right away. Follow-up care is a key part of your child's treatment and safety. Be sure to make and go to all appointments, and call your doctor if your child is having problems. It's also a good idea to know your child's test results and keep a list of the medicines your child takes. How can you care for your child at home? · Look at how your child acts, rather than using temperature alone, to see how sick your child is. If your child is comfortable and alert, eating well, drinking enough fluids, urinating normally, and seems to be getting better, care at home is usually all that is needed. · Give your child extra fluids or frozen fruit pops to suck on. This may help prevent dehydration. · Dress your child in light clothes or pajamas. Do not wrap him or her in blankets. · Give acetaminophen (Tylenol) or ibuprofen (Advil, Motrin) for fever, pain, or fussiness. Read and follow all instructions on the label. Do not give aspirin to anyone younger than 20. It has been linked to Reye syndrome, a serious illness. When should you call for help? Call 911 anytime you think your child may need emergency care. For example, call if:  · Your child passes out (loses consciousness).   · Your child has severe trouble breathing. Call your doctor now or seek immediate medical care if:  · Your child is younger than 3 months and has a fever of 100.4°F or higher. · Your child is 3 months or older and has a fever of 105°F or higher. · Your child's fever occurs with any new symptoms, such as trouble breathing, ear pain, stiff neck, or rash. · Your child is very sick or has trouble staying awake or being woken up. · Your child is not acting normally. Watch closely for changes in your child's health, and be sure to contact your doctor if:  · Your child is not getting better as expected. · Your child is younger than 3 months and has a fever that has not gone down after 1 day (24 hours). · Your child is 3 months or older and has a fever that has not gone down after 2 days (48 hours). Where can you learn more? Go to http://alida-luc.info/. Enter T984 in the search box to learn more about \"Fever in Children: Care Instructions. \"  Current as of: May 27, 2016  Content Version: 11.1  © 1383-1925 [employer]. Care instructions adapted under license by [employer] (which disclaims liability or warranty for this information). If you have questions about a medical condition or this instruction, always ask your healthcare professional. [place] [address] any warranty or liability for your use of this information. Headache in Children: Care Instructions  Your Care Instructions  Headaches have many possible causes. Most headaches are not a sign of a more serious problem, and they will get better on their own. Home treatment may help your child feel better soon. If your child's headaches continue, get worse, or occur along with new symptoms, your child may need more testing and treatment. Watch for changes in your child's pain and other symptoms. These may be signs of a more serious problem. The doctor has checked your child carefully, but problems can develop later.  If you notice any problems or new symptoms, get medical treatment right away. Follow-up care is a key part of your child's treatment and safety. Be sure to make and go to all appointments, and call your doctor if your child is having problems. It's also a good idea to know your child's test results and keep a list of the medicines your child takes. How can you care for your child at home? · Have your child rest in a quiet, dark room until the headache is gone. It is best for your child to close his or her eyes and try to relax or go to sleep. Tell your child not to watch TV or read. · Put a cold, moist cloth or cold pack on the painful area for 10 to 20 minutes at a time. Put a thin cloth between the cold pack and your child's skin. · Heat can help relax your child's muscles. Place a warm, moist towel on tight shoulder and neck muscles. · Gently massage your child's neck and shoulders. · Be safe with medicines. Give pain medicines exactly as directed. ¨ If the doctor gave your child a prescription medicine for pain, give it as prescribed. ¨ If your child is not taking a prescription pain medicine, ask your doctor if your child can take an over-the-counter medicine. · Be careful not to give your child pain medicine more often than the instructions allow, because this can cause worse or more frequent headaches when the medicine wears off. · Do not ignore new symptoms that occur with a headache, such as a fever, weakness or numbness, vision changes, vomiting (especially if it happens in the morning), or confusion. These may be signs of a more serious problem. To prevent headaches  · If your child gets frequent headaches, keep a headache diary so you can figure out what triggers your child's headaches. Avoiding triggers may help prevent headaches. Record when each headache began, how long it lasted, and what the pain was like (throbbing, aching, stabbing, or dull).  Write down any other symptoms your child had with the headache, such as nausea, flashing lights or dark spots, or sensitivity to bright light or loud noise. List anything that might have triggered the headache, such as certain foods (chocolate or cheese) or odors, smoke, bright light, stress, or lack of sleep. If your child is a girl, note if the headache occurred near her period. · Find healthy ways to help your child manage stress. Do not let your child's schedule get too busy or filled with stressful events. · Encourage your child to get plenty of exercise, without overdoing it. · Make sure that your child gets plenty of sleep and keeps a regular sleep schedule. Most children need to sleep 8 to 10 hours each night. · Make sure that your child does not skip meals. Provide regular, healthy meals. · Limit the amount of time your child spends in front of the TV and computer. · Keep your child away from smoke. Do not smoke or let anyone else smoke around your child or in your house. When should you call for help? Call 911 anytime you think your child may need emergency care. For example, call if:  · Your child seems very sick or is hard to wake up. Call your doctor now or seek immediate medical care if:  · Your child's headache gets much worse. · Your child has new symptoms, such as fever, vomiting, or a stiff neck. · Your child has tingling, weakness, or numbness in any part of the body. Watch closely for changes in your child's health, and be sure to contact your doctor if:  · Your child does not get better as expected. Where can you learn more? Go to http://alida-luc.info/. Enter E335 in the search box to learn more about \"Headache in Children: Care Instructions. \"  Current as of: February 19, 2016  Content Version: 11.1  © 2398-6708 [employer]. Care instructions adapted under license by [employer] (which disclaims liability or warranty for this information).  If you have questions about a medical condition or this instruction, always ask your healthcare professional. [address] any warranty or liability for your use of this information. Sore Throat in Children: Care Instructions  Your Care Instructions  Infection by bacteria or a virus causes most sore throats. Cigarette smoke, dry air, air pollution, allergies, or yelling also can cause a sore throat. Sore throats can be painful and annoying. Fortunately, most sore throats go away on their own. Home treatment may help your child feel better sooner. Antibiotics are not needed unless your child has a strep infection. Follow-up care is a key part of your child's treatment and safety. Be sure to make and go to all appointments, and call your doctor if your child is having problems. It's also a good idea to know your child's test results and keep a list of the medicines your child takes. How can you care for your child at home? · If the doctor prescribed antibiotics for your child, give them as directed. Do not stop using them just because your child feels better. Your child needs to take the full course of antibiotics. · If your child is old enough to do so, have him or her gargle with warm salt water at least once each hour to help reduce swelling and relieve discomfort. Use 1 teaspoon of salt mixed in 8 ounces of warm water. Most children can gargle when they are 10to 6years old. · Give acetaminophen (Tylenol) or ibuprofen (Advil, Motrin) for pain. Read and follow all instructions on the label. Do not give aspirin to anyone younger than 20. It has been linked to Reye syndrome, a serious illness. · Try an over-the-counter anesthetic throat spray or throat lozenges, which may help relieve throat pain. Do not give lozenges to children younger than age 3. If your child is younger than age 3, ask your doctor if you can give your child numbing medicines.   · Have your child drink plenty of fluids, enough so that his or her urine is light yellow or clear like water. Drinks such as warm water or warm lemonade may ease throat pain. Frozen ice treats, ice cream, scrambled eggs, gelatin dessert, and sherbet can also soothe the throat. If your child has kidney, heart, or liver disease and has to limit fluids, talk with your doctor before you increase the amount of fluids your child drinks. · Keep your child away from smoke. Do not smoke or let anyone else smoke around your child or in your house. Smoke irritates the throat. · Place a humidifier by your child's bed or close to your child. This may make it easier for your child to breathe. Follow the directions for cleaning the machine. When should you call for help? Call 911 anytime you think your child may need emergency care. For example, call if:  · Your child is confused, does not know where he or she is, or is extremely sleepy or hard to wake up. Call your doctor now or seek immediate medical care if:  · Your child has a new or higher fever. · Your child has a fever with a stiff neck or a severe headache. · Your child has any trouble breathing. · Your child cannot swallow or cannot drink enough because of throat pain. · Your child coughs up discolored or bloody mucus. Watch closely for changes in your child's health, and be sure to contact your doctor if:  · Your child has any new symptoms, such as a rash, an earache, vomiting, or nausea. · Your child is not getting better as expected. Where can you learn more? Go to http://alida-luc.info/. Enter W741 in the search box to learn more about \"Sore Throat in Children: Care Instructions. \"  Current as of: July 29, 2016  Content Version: 11.1  © 4020-9121 [employer]. Care instructions adapted under license by [employer] (which disclaims liability or warranty for this information).  If you have questions about a medical condition or this instruction, always ask your healthcare professional. [employer] disclaims any warranty or liability for your use of this information.

## 2024-04-16 NOTE — ACP (ADVANCE CARE PLANNING)
Advance Care Planning     General Advance Care Planning (ACP) Conversation    Date of Conversation: 4/16/2024  Conducted with: Patient with Decision Making Capacity    Healthcare Decision Maker:    Primary Decision Maker: Fiorella Sesay - Child - [phone]    Primary Decision Maker: stefani serra - Child - [phone]    Primary Decision Maker: clifton young - Child - [phone]  Click here to complete Healthcare Decision Makers including selection of the Healthcare Decision Maker Relationship (ie \"Primary\").   Today we documented Decision Maker(s) consistent with ACP documents on file.    Content/Action Overview:  Has ACP document(s) on file - reflects the patient's care preferences  Reviewed DNR/DNI and patient elects Full Code (Attempt Resuscitation)        Length of Voluntary ACP Conversation in minutes:  <16 minutes (Non-Billable)    ELOISA CALVERT RN

## 2024-04-16 NOTE — PROGRESS NOTES
04/16/24 0015   Breath Sounds   Breath Sounds Bilateral Diminished   Right Upper Lobe Clear   Right Middle Lobe Diminished   Right Lower Lobe Diminished   Left Upper Lobe Clear;Diminished   Left Lower Lobe Crackles

## 2024-04-16 NOTE — PROGRESS NOTES
Comprehensive Nutrition Assessment    Type and Reason for Visit:  Initial, Positive Nutrition Screen    Nutrition Recommendations/Plan:   Continue regular diet and encourage PO intake   Ensure w/ meals, likes chocolate   RD to order new updated weight   Monitor nutrition adequacy, pertinent labs, bowel habits, wt changes, and clinical progress     Malnutrition Assessment:  Malnutrition Status:  Moderate malnutrition (04/16/24 1156)    Context:  Acute Illness     Findings of the 6 clinical characteristics of malnutrition:  Energy Intake:  75% or less of estimated energy requirements for 7 or more days  Body Fat Loss:  Mild body fat loss Triceps, Orbital   Muscle Mass Loss:  Moderate muscle mass loss Clavicles (pectoralis & deltoids), Temples (temporalis)    Nutrition Assessment:    Positive nutrition screen for wt loss and poor intake: 83 y.o. f w/ PMH of idiopathic pulmonary fibrosis, HTN, hyperlipidemia, severe aortic stenosis s/p TAVR admitted w/ nausea, fever and Bacterial bronchitis. On regular diet. Pt reports poor intakes and 30 lb wt loss over the past 2 months. POor intake paritally d/t hiatal hernia. Reports GZZ=419 lb prior to wt loss. ELI wt changes d/t stated weight, RD to order new updated weight. Pt reports trying to eat heart healthy, RD enouraged pt to prioritize nutrition and protein, pt compliant. Ensure added, pt reports fair acceptance- drank 1 today. Continue ONS. Encouraged protein intake, pt likes chicken and tuna. Continue to encourage PO intake, will continue to monitor.    Nutrition Related Findings:    No BM. Labs reviewed. Reports nausea previously, zofran helped. Wound Type: None       Current Nutrition Intake & Therapies:    Average Meal Intake: 26-50%  Average Supplements Intake: 26-50%  ADULT DIET; Regular  ADULT ORAL NUTRITION SUPPLEMENT; Breakfast, Lunch, Dinner; Standard High Calorie/High Protein Oral Supplement    Anthropometric Measures:  Height: 152.4 cm (5')  Ideal Body Weight

## 2024-04-17 LAB
ANION GAP SERPL CALCULATED.3IONS-SCNC: 8 MMOL/L (ref 3–16)
BUN SERPL-MCNC: 11 MG/DL (ref 7–20)
CALCIUM SERPL-MCNC: 9.3 MG/DL (ref 8.3–10.6)
CHLORIDE SERPL-SCNC: 103 MMOL/L (ref 99–110)
CO2 SERPL-SCNC: 27 MMOL/L (ref 21–32)
CREAT SERPL-MCNC: <0.5 MG/DL (ref 0.6–1.2)
DEPRECATED RDW RBC AUTO: 15.1 % (ref 12.4–15.4)
GFR SERPLBLD CREATININE-BSD FMLA CKD-EPI: >90 ML/MIN/{1.73_M2}
GLUCOSE SERPL-MCNC: 81 MG/DL (ref 70–99)
HCT VFR BLD AUTO: 34.5 % (ref 36–48)
HGB BLD-MCNC: 11.3 G/DL (ref 12–16)
INR PPP: 1.88 (ref 0.85–1.15)
MAGNESIUM SERPL-MCNC: 1.9 MG/DL (ref 1.8–2.4)
MCH RBC QN AUTO: 32.9 PG (ref 26–34)
MCHC RBC AUTO-ENTMCNC: 32.6 G/DL (ref 31–36)
MCV RBC AUTO: 101 FL (ref 80–100)
PLATELET # BLD AUTO: 160 K/UL (ref 135–450)
PLATELET BLD QL SMEAR: ADEQUATE
PMV BLD AUTO: 9.6 FL (ref 5–10.5)
POTASSIUM SERPL-SCNC: 3.6 MMOL/L (ref 3.5–5.1)
PROTHROMBIN TIME: 21.7 SEC (ref 11.9–14.9)
RBC # BLD AUTO: 3.42 M/UL (ref 4–5.2)
REASON FOR REJECTION: NORMAL
REJECTED TEST: NORMAL
SLIDE REVIEW: ABNORMAL
SODIUM SERPL-SCNC: 138 MMOL/L (ref 136–145)
WBC # BLD AUTO: 6 K/UL (ref 4–11)

## 2024-04-17 PROCEDURE — 6360000002 HC RX W HCPCS: Performed by: FAMILY MEDICINE

## 2024-04-17 PROCEDURE — 97110 THERAPEUTIC EXERCISES: CPT

## 2024-04-17 PROCEDURE — 97162 PT EVAL MOD COMPLEX 30 MIN: CPT

## 2024-04-17 PROCEDURE — 97116 GAIT TRAINING THERAPY: CPT

## 2024-04-17 PROCEDURE — 80048 BASIC METABOLIC PNL TOTAL CA: CPT

## 2024-04-17 PROCEDURE — 83735 ASSAY OF MAGNESIUM: CPT

## 2024-04-17 PROCEDURE — 85610 PROTHROMBIN TIME: CPT

## 2024-04-17 PROCEDURE — 36415 COLL VENOUS BLD VENIPUNCTURE: CPT

## 2024-04-17 PROCEDURE — 94640 AIRWAY INHALATION TREATMENT: CPT

## 2024-04-17 PROCEDURE — 97535 SELF CARE MNGMENT TRAINING: CPT

## 2024-04-17 PROCEDURE — 1200000000 HC SEMI PRIVATE

## 2024-04-17 PROCEDURE — 94669 MECHANICAL CHEST WALL OSCILL: CPT

## 2024-04-17 PROCEDURE — 6370000000 HC RX 637 (ALT 250 FOR IP): Performed by: FAMILY MEDICINE

## 2024-04-17 PROCEDURE — 6370000000 HC RX 637 (ALT 250 FOR IP): Performed by: INTERNAL MEDICINE

## 2024-04-17 PROCEDURE — 97530 THERAPEUTIC ACTIVITIES: CPT

## 2024-04-17 PROCEDURE — 2580000003 HC RX 258: Performed by: FAMILY MEDICINE

## 2024-04-17 PROCEDURE — 85027 COMPLETE CBC AUTOMATED: CPT

## 2024-04-17 RX ORDER — POTASSIUM CHLORIDE 20 MEQ/1
20 TABLET, EXTENDED RELEASE ORAL ONCE
Status: COMPLETED | OUTPATIENT
Start: 2024-04-17 | End: 2024-04-17

## 2024-04-17 RX ADMIN — PANTOPRAZOLE SODIUM 40 MG: 40 TABLET, DELAYED RELEASE ORAL at 08:26

## 2024-04-17 RX ADMIN — Medication 2 PUFF: at 19:31

## 2024-04-17 RX ADMIN — POTASSIUM CHLORIDE 20 MEQ: 1500 TABLET, EXTENDED RELEASE ORAL at 11:45

## 2024-04-17 RX ADMIN — Medication 2 PUFF: at 08:56

## 2024-04-17 RX ADMIN — DULOXETINE HYDROCHLORIDE 60 MG: 60 CAPSULE, DELAYED RELEASE ORAL at 08:26

## 2024-04-17 RX ADMIN — Medication 10 ML: at 08:25

## 2024-04-17 RX ADMIN — AZITHROMYCIN MONOHYDRATE 500 MG: 500 INJECTION, POWDER, LYOPHILIZED, FOR SOLUTION INTRAVENOUS at 00:22

## 2024-04-17 RX ADMIN — PRAVASTATIN SODIUM 40 MG: 40 TABLET ORAL at 20:42

## 2024-04-17 RX ADMIN — Medication 10 ML: at 20:42

## 2024-04-17 RX ADMIN — CETIRIZINE HYDROCHLORIDE 10 MG: 10 TABLET, FILM COATED ORAL at 08:25

## 2024-04-17 RX ADMIN — WARFARIN SODIUM 8 MG: 5 TABLET ORAL at 19:12

## 2024-04-17 RX ADMIN — FLUTICASONE PROPIONATE 2 SPRAY: 50 SPRAY, METERED NASAL at 08:26

## 2024-04-17 ASSESSMENT — PAIN SCALES - GENERAL: PAINLEVEL_OUTOF10: 0

## 2024-04-17 NOTE — PROGRESS NOTES
Hospital Medicine Progress Note      Date of Admission: 4/15/2024  Hospital Day: 3    Chief Admission Complaint:  SOB     Subjective:  She is up in her room. States she is feeling a little better and SOB seems to be improving. Is eating and drinking well.     Presenting Admission History:       83 y.o. female with past medical history of idiopathic pulmonary fibrosis, hypertension, hyperlipidemia, DVTs, pulmonary embolisms in the past on long-term anticoagulation with Coumadin, severe aortic stenosis status post TAVR, which was completed on 4/2/2024 with [facility].   She presented to the ER complaining of feeling nauseated, clammy and warm all over her body like as if she was getting a fever.  She also reported dizziness with mild shortness of breath.  She reports symptoms had been going on over several hours. Patient denied chest pain, abdominal pain or dysuria.  Patient was afebrile in the ER, workup also included CT scan of the head which showed no acute abnormality, chest x-ray showed few patchy airspace infiltrates in the right upper lobe which could represent pneumonia in the appropriate clinical setting.  She was admitted for further evaluation management.  She has been started on IV antibiotics.     Assessment/Plan:      Current Principal Problem:  Pneumonia of right upper lobe due to infectious organism    Pneumonia: At time admission there was concern for possible pneumonia.  She has been started on IV antibiotics.  She is currently receiving ceftriaxone and azithromycin.  Started 4/16/2024.  She seems to be slowly improving..     Reactive airway disease: Patient has long history of secondhand exposure to tobacco.  Continue home dose of Breo and albuterol.     Status post TAVR: Stable, continue Coumadin.  Pharmacy to dose.  This was done to [facility] early April.   Cardiology did see in consultation this admission and appreciate their input.       H/O DVTs and pulmonary embolism : She has  azithromycin  500 mg IntraVENous Q24H    warfarin placeholder: dosing by pharmacy   Other RX Placeholder     PRN Meds: ipratropium 0.5 mg-albuterol 2.5 mg, perflutren lipid microspheres, albuterol sulfate HFA, HYDROcodone-acetaminophen, sodium chloride flush, sodium chloride, polyethylene glycol, acetaminophen **OR** acetaminophen, ondansetron **OR** ondansetron     Labs:  Personally reviewed and interpreted for clinical significance.     Recent Labs     04/15/24  1915 04/16/24  0636 04/17/24  0614   WBC 6.3 5.3 6.0   HGB 11.0* 9.9* 11.3*   HCT 33.5* 29.3* 34.5*    147 160     Recent Labs     04/15/24  1915 04/16/24  0636 04/17/24  0614    142 138   K 3.8 3.8 3.6    107 103   CO2 27 26 27   BUN 21* 16 11   CREATININE 0.6 <0.5* <0.5*   CALCIUM 9.4 8.5 9.3   MG  --   --  1.90     Recent Labs     04/15/24  1915 04/15/24  2019   PROBNP  --  602*   TROPHS 15* 15*     No results for input(s): \"LABA1C\" in the last 72 hours.  Recent Labs     04/15/24  1915 04/16/24  0636   AST 22 20   ALT 9* 8*   BILITOT 0.4 <0.2   ALKPHOS 74 60     Recent Labs     04/15/24  1915 04/16/24  0636   INR 1.81* 1.91*       Urine Cultures:   Lab Results   Component Value Date/Time    LABURIN  03/21/2024 11:30 AM     <10,000 CFU/ml mixed skin/urogenital emily. No further workup     Blood Cultures: No results found for: \"BC\"  No results found for: \"BLOODCULT2\"  Organism: No results found for: \"ORG\"      MOUNA DELGADO MD

## 2024-04-17 NOTE — PROGRESS NOTES
Occupational Therapy  Facility/Department: [facility] C5 - MED SURG/ORTHO  Daily Treatment Note  NAME: Shayla Crespo  : 1940  MRN: [id number]    Date of Service: 2024    Discharge Recommendations:  Home with Home health OT, 24 hour supervision or assist  OT Equipment Recommendations  Equipment Needed: No      Patient Diagnosis(es): The encounter diagnosis was Dizziness.     Assessment    Assessment: Pt seen for follow up OT session this date. Pt completed grooming tasks at sink with good tolerance and SBA. Pt completed UE HEP 20x reps each. Pt  complete further mobility out in hallway without AD, expressed some SOB with further distance, SpO2 90% upon returning to EOB. Pt would continue to benefit from acute OT at this time to improve functional mobility and ADL independence. D/c recs for home with PRN A, HHOT  when medically ready.     Discharge Recommendations: Home with Home health OT;24 hour supervision or assist  Equipment Needed: No      Plan   Occupational Therapy Plan  Times Per Week: 3-5x/week  Current Treatment Recommendations: Strengthening;Self-Care / ADL;Endurance training;Functional mobility training;Safety education & training;Equipment evaluation, education, & procurement     Restrictions  Restrictions/Precautions  Restrictions/Precautions: Up as Tolerated;General Precautions  Required Braces or Orthoses?: No  Position Activity Restriction  Other position/activity restrictions: IV    Subjective   Subjective  Subjective: Pt agreeable to OT session this date.  Pain: denies pain at rest  Orientation  Overall Orientation Status: Within Normal Limits  Orientation Level: Oriented X4  Cognition  Overall Cognitive Status: WFL        Objective    Vitals  Vitals  Pulse: 75  SpO2: 92 %  O2 Device: None (Room air)  Bed Mobility Training  Bed Mobility Training: Yes  Overall Level of Assistance: Supervision  Supine to Sit: Supervision  Sit to Supine: Supervision  Balance  Sitting: Intact  Standing:

## 2024-04-17 NOTE — PROGRESS NOTES
shower, Shower chair without back  Bathroom Equipment: Grab bars in shower, Grab bars around toilet, Toilet raiser, Hand-held shower  Bathroom Accessibility: Accessible  Home Equipment: Rollator, Wheelchair-manual, Reacher  Has the patient had two or more falls in the past year or any fall with injury in the past year?: Yes (2 times)  ADL Assistance: Independent  Homemaking Assistance: Independent  Homemaking Responsibilities: Yes (daughter performs medication management)  Ambulation Assistance: Independent (has been using rollator in home d/t lightheadedness, but typically ambulates without AD)  Transfer Assistance: Independent  Active : Yes (daughter drives to [employer])  Leisure & Hobbies: spend time with family  Vision/Hearing  Vision  Vision: Impaired  Vision Exceptions: Wears glasses for reading  Hearing  Hearing: Within functional limits    Cognition   Orientation  Overall Orientation Status: Within Normal Limits  Orientation Level: Oriented X4  Cognition  Overall Cognitive Status: WFL     Objective   Temp: 98.8 °F (37.1 °C)  Pulse: 97  Heart Rate Source: Monitor  Respirations: 15  SpO2: 92 %  O2 Device: None (Room air)  BP: 122/72  MAP (Calculated): 89  BP Location: Left upper arm  BP Method: Automatic     Observation/Palpation  Posture: Good  Gross Assessment  AROM: Within functional limits  Strength: Generally decreased, functional  Sensation: Impaired (occasional numbness in B feet)                 Bed Mobility Training  Bed Mobility Training: Yes  Overall Level of Assistance: Supervision  Interventions: Verbal cues;Safety awareness training  Supine to Sit: Supervision  Sit to Supine: Other (comment) (pt left in chair, not assessed this date)  Scooting: Supervision  Balance  Sitting: Intact  Standing: Impaired (with no AD)  Standing - Static: Good  Standing - Dynamic: Fair  Transfer Training  Transfer Training: Yes  Overall Level of Assistance: Supervision  Interventions: Verbal cues;Safety awareness  Given To: Patient  Education Provided: Role of Therapy;Plan of Care;Transfer Training;Fall Prevention Strategies;Energy Conservation;Home Exercise Program  Education Provided Comments: role of PT, importance of mobility, therex to perform at home and outside of therapy  Education Method: Demonstration;Verbal  Barriers to Learning: None  Education Outcome: Verbalized understanding;Continued education needed      Therapy Time   Individual Concurrent Group Co-treatment   Time In 1337         Time Out 1410         Minutes 33         Timed Code Treatment Minutes: 23 Minutes (10 min eval)       If pt is unable to be seen after this session, please let this note serve as discharge summary.  Please see case management note for discharge disposition.  Thank you.    João Timmons, PT, DPT

## 2024-04-17 NOTE — PROGRESS NOTES
Pharmacy Note  Warfarin Consult      Dx: Hx DVT  Goal INR range 2-3   Home Warfarin dose: 7.5 mg daily  ** Potential DDI w/ Azithromycin (started 4/16, last dose scheduled for 4/18)    Date  INR  Warfarin  4/15                1.81                   7.5 mg  4/16                1.91                   7.5 mg  4/17                1.88                      8 mg    Recommend Warfarin 7.5 mg tonight x1.  Daily INR ordered.  Rx will continue to manage therapy per consult order.    Chitra Don, PharmD 4/16/2024  8:30 AM

## 2024-04-17 NOTE — PLAN OF CARE
Problem: Discharge Planning  Goal: Discharge to home or other facility with appropriate resources  4/16/2024 2300 by Lakshmi Appiah RN  Outcome: Progressing     Problem: Pain  Goal: Verbalizes/displays adequate comfort level or baseline comfort level  4/16/2024 2300 by Lakshmi Appiah RN  Outcome: Progressing     Problem: Safety - Adult  Goal: Free from fall injury  4/16/2024 2300 by Lakshmi Appiah RN  Outcome: Progressing     Problem: ABCDS Injury Assessment  Goal: Absence of physical injury  4/16/2024 2300 by Lakshmi Appiah RN  Outcome: Progressing     Problem: Respiratory - Adult  Goal: Achieves optimal ventilation and oxygenation  4/16/2024 2300 by Lakshmi Appiah RN  Outcome: Progressing     Problem: Musculoskeletal - Adult  Goal: Return mobility to safest level of function  4/16/2024 2300 by Lakshmi Appiah, RN  Outcome: Progressing

## 2024-04-17 NOTE — CARE COORDINATION
Chart reviewed day 2. Pt is followed by IM and Cards. Improving SOB and Po intake adequate. Receiving IV abx for PNA. Pt from home w/dtr. [facility] accepted if services needed. Will follow for needs as they arise. Electronically signed by ELOISA CALVERT RN on 4/17/2024 at 1:10 PM

## 2024-04-18 ENCOUNTER — APPOINTMENT (OUTPATIENT)
Dept: GENERAL RADIOLOGY | Age: 84
End: 2024-04-18
Payer: MEDICARE

## 2024-04-18 LAB
INR PPP: 1.79 (ref 0.85–1.15)
PROTHROMBIN TIME: 20.9 SEC (ref 11.9–14.9)

## 2024-04-18 PROCEDURE — 97535 SELF CARE MNGMENT TRAINING: CPT

## 2024-04-18 PROCEDURE — 36415 COLL VENOUS BLD VENIPUNCTURE: CPT

## 2024-04-18 PROCEDURE — 94669 MECHANICAL CHEST WALL OSCILL: CPT

## 2024-04-18 PROCEDURE — 85610 PROTHROMBIN TIME: CPT

## 2024-04-18 PROCEDURE — 1200000000 HC SEMI PRIVATE

## 2024-04-18 PROCEDURE — 6370000000 HC RX 637 (ALT 250 FOR IP): Performed by: INTERNAL MEDICINE

## 2024-04-18 PROCEDURE — 2580000003 HC RX 258: Performed by: FAMILY MEDICINE

## 2024-04-18 PROCEDURE — 6370000000 HC RX 637 (ALT 250 FOR IP): Performed by: FAMILY MEDICINE

## 2024-04-18 PROCEDURE — 94640 AIRWAY INHALATION TREATMENT: CPT

## 2024-04-18 PROCEDURE — 6360000002 HC RX W HCPCS: Performed by: FAMILY MEDICINE

## 2024-04-18 PROCEDURE — 73630 X-RAY EXAM OF FOOT: CPT

## 2024-04-18 RX ORDER — IBUPROFEN 400 MG/1
400 TABLET ORAL EVERY 8 HOURS PRN
Status: DISCONTINUED | OUTPATIENT
Start: 2024-04-18 | End: 2024-04-19 | Stop reason: HOSPADM

## 2024-04-18 RX ORDER — IBUPROFEN 400 MG/1
400 TABLET ORAL ONCE
Status: COMPLETED | OUTPATIENT
Start: 2024-04-18 | End: 2024-04-18

## 2024-04-18 RX ADMIN — Medication 10 ML: at 09:00

## 2024-04-18 RX ADMIN — FLUTICASONE PROPIONATE 2 SPRAY: 50 SPRAY, METERED NASAL at 08:55

## 2024-04-18 RX ADMIN — PRAVASTATIN SODIUM 40 MG: 40 TABLET ORAL at 20:38

## 2024-04-18 RX ADMIN — CETIRIZINE HYDROCHLORIDE 10 MG: 10 TABLET, FILM COATED ORAL at 08:55

## 2024-04-18 RX ADMIN — Medication 2 PUFF: at 19:45

## 2024-04-18 RX ADMIN — HYDROCODONE BITARTRATE AND ACETAMINOPHEN 1 TABLET: 7.5; 325 TABLET ORAL at 06:59

## 2024-04-18 RX ADMIN — WARFARIN SODIUM 9 MG: 5 TABLET ORAL at 18:57

## 2024-04-18 RX ADMIN — AZITHROMYCIN MONOHYDRATE 500 MG: 500 INJECTION, POWDER, LYOPHILIZED, FOR SOLUTION INTRAVENOUS at 00:43

## 2024-04-18 RX ADMIN — IBUPROFEN 400 MG: 400 TABLET, FILM COATED ORAL at 08:55

## 2024-04-18 RX ADMIN — DULOXETINE HYDROCHLORIDE 60 MG: 60 CAPSULE, DELAYED RELEASE ORAL at 08:55

## 2024-04-18 RX ADMIN — Medication 2 PUFF: at 08:35

## 2024-04-18 RX ADMIN — PANTOPRAZOLE SODIUM 40 MG: 40 TABLET, DELAYED RELEASE ORAL at 08:55

## 2024-04-18 RX ADMIN — CEFTRIAXONE SODIUM 1000 MG: 1 INJECTION, POWDER, FOR SOLUTION INTRAMUSCULAR; INTRAVENOUS at 23:46

## 2024-04-18 RX ADMIN — Medication 10 ML: at 20:39

## 2024-04-18 RX ADMIN — CEFTRIAXONE SODIUM 1000 MG: 1 INJECTION, POWDER, FOR SOLUTION INTRAMUSCULAR; INTRAVENOUS at 00:10

## 2024-04-18 RX ADMIN — HYDROCODONE BITARTRATE AND ACETAMINOPHEN 1 TABLET: 7.5; 325 TABLET ORAL at 20:38

## 2024-04-18 ASSESSMENT — PAIN SCALES - GENERAL
PAINLEVEL_OUTOF10: 4
PAINLEVEL_OUTOF10: 4

## 2024-04-18 ASSESSMENT — PAIN DESCRIPTION - DESCRIPTORS: DESCRIPTORS: DISCOMFORT

## 2024-04-18 ASSESSMENT — PAIN DESCRIPTION - ORIENTATION: ORIENTATION: RIGHT

## 2024-04-18 ASSESSMENT — PAIN DESCRIPTION - LOCATION: LOCATION: LEG;FOOT

## 2024-04-18 NOTE — PROGRESS NOTES
Occupational Therapy  Facility/Department: [facility] C5 - MED SURG/ORTHO  Daily Treatment Note  NAME: Shayla Crespo  : 1940  MRN: [id number]    Date of Service: 2024    Discharge Recommendations:  Home with Home health OT, 24 hour supervision or assist  OT Equipment Recommendations  Equipment Needed: No      Patient Diagnosis(es): The encounter diagnosis was Dizziness.     Assessment     Pt complete full shower and dressing this session with grossly SUP-CGA. OT educated pt on benefits of sitting while showering and while completing LB dressing. Pt verbalized understanding. Pt tolerated full ADL session well on RA. O2 @ 90% after showering and dressing; increasing to 92% within 1-2 min seated rest. Pt continues to benefit from skilled OT services; continue POC.    Activity Tolerance: Patient tolerated treatment well  Discharge Recommendations: Home with Home health OT;24 hour supervision or assist  Equipment Needed: No      Plan   Occupational Therapy Plan  Times Per Week: 3-5x/week  Current Treatment Recommendations: Strengthening;Self-Care / ADL;Endurance training;Functional mobility training;Safety education & training;Equipment evaluation, education, & procurement     Restrictions   Restrictions/Precautions  Restrictions/Precautions: Up as Tolerated;General Precautions  Required Braces or Orthoses?: No  Position Activity Restriction  Other position/activity restrictions: IV    Subjective   Subjective  Subjective: Pt in bed upon OT arrival. Agreeable to tx. Reports pain in R groin.  Orientation  Overall Orientation Status: Within Normal Limits  Orientation Level: Oriented X4    Objective    Vitals  Vitals  Pulse: 63  SpO2: 94 %  O2 Device: None (Room air)  BP: 118/73  MAP (Calculated): 88  BP Location: Left upper arm  BP Method: Automatic    Bed Mobility Training  Bed Mobility Training: Yes  Supine to Sit: Modified independent  Sit to Supine: Modified independent  Scooting: Independent  Transfer

## 2024-04-18 NOTE — CARE COORDINATION
Chart reviewed day 3. Pt is followed by IM. Pt w/c/o R foot pain and cont admin of IV abx. Per IM anticiapte d/c tomorrow. Pt from home w/dtr. Pt w/recs for [facility] PT/OT;[facility] has accepted and pt is open to this. Will follow for needs as they arise. Electronically signed by ELOISA CALVERT RN on 4/18/2024 at 12:45 PM

## 2024-04-18 NOTE — PROGRESS NOTES
Hospital Medicine Progress Note      Date of Admission: 4/15/2024  Hospital Day: 4    Chief Admission Complaint: Shortness of breath     Subjective: She is lying in bed.  She denies any shortness of breath and states this is improved.    However this morning she does report pain in her right foot.  Present in the ball of the right foot..    Presenting Admission History:       83 y.o. female with past medical history of idiopathic pulmonary fibrosis, hypertension, hyperlipidemia, DVTs, pulmonary embolisms in the past on long-term anticoagulation with Coumadin, severe aortic stenosis status post [facility], [facility]   She presented to the ER complaining of feeling nauseated, clammy and warm all over her body like as if she was getting a fever.  She also reported dizziness with mild shortness of breath.  She reports symptoms had been going on over several hours. Patient denied chest pain, abdominal pain or dysuria.  Patient was afebrile in the ER, workup also included CT scan of the head which showed no acute abnormality, chest x-ray showed few patchy airspace infiltrates in the right upper lobe which could represent pneumonia in the appropriate clinical setting.  She was admitted for further evaluation management.  She is on IV antibiotics.    Assessment/Plan:      Current Principal Problem:  Pneumonia of right upper lobe due to infectious organism    Pneumonia: At time admission there was concern for pneumonia.  She is on IV antibiotics.  She is currently receiving ceftriaxone and azithromycin.  Started 4/16/2024.  She is afebrile, clinically seems seems to be improving.  Continue antibiotics.     Reactive airway disease: Patient has long history of secondhand exposure to tobacco.  Continue home dose of Breo and albuterol.     Status post TAVR: Stable, continue Coumadin.  Pharmacy to dose.  This was done to [facility] Montiel early April.   Cardiology did see in consultation this  admission and appreciate their input.       H/O DVTs and pulmonary embolism : She has been on Coumadin for many years due to this history.  Continue Coumadin during this hospitalization pharmacy to dose        HyperLipidemia - normally controlled on home Statin. Continued.  F/U w/ PCP outpt for medication initiation/adjustment as needed.       GERD - w/out active signs/sxs of dysphagia/odynophagia. No evidence of active PUD or hx of GI bleed. Controlled on home PPI - continue.         Depressive disorder: Resume Cymbalta.  Mood appears stable at this time     Right foot pain: As noted above she is complaining of this today.  She is not sure if she hit or struck her right foot during the night.  On examination ball right foot mildly swollen.  But is not red and does not appear infected.  Will check x-ray right foot to further evaluate.    Physical Exam Performed:      General appearance: Elderly female lying in bed looks weak and tired but in no apparent distress  Respiratory: Bilateral breath sounds, decreased at both bases, no rales rhonchi noted, normal respiratory effort.   Cardiovascular: S1-2 S2 regular rate and rhythm.  Abdomen: Positive bowel sound soft, non-tender, non-distended.  Musculoskeletal:  No edema  Neurologic:  Non-focal  Psychiatric:  Alert and oriented       BP (!) 151/85   Pulse 77   Temp 98.4 °F (36.9 °C) (Oral)   Resp 16   Ht 1.524 m (5')   Wt 55.3 kg (122 lb)   SpO2 96%   BMI 23.83 kg/m²     Diet: ADULT DIET; Regular  ADULT ORAL NUTRITION SUPPLEMENT; Breakfast, Lunch, Dinner; Standard High Calorie/High Protein Oral Supplement  DVT Prophylaxis: []PPx LMWH  []SQ Heparin  []IPC/SCDs  []Eliquis  []Xarelto  [x]Coumadin  []Other -      Code status: Full Code  PT/OT Eval Status:   []NOT yet ordered  []Ordered and Pending   [x]Seen with Recommendations for:  []Home independently  [x]Home w/ assist  []HHC  []SNF  []Acute Rehab    Anticipated Discharge Day/Date: In a.m. if she is remaining

## 2024-04-18 NOTE — PLAN OF CARE
Problem: Discharge Planning  Goal: Discharge to home or other facility with appropriate resources  Outcome: Progressing     Problem: Pain  Goal: Verbalizes/displays adequate comfort level or baseline comfort level  Outcome: Progressing     Problem: Safety - Adult  Goal: Free from fall injury  4/18/2024 1029 by Rosario Mac RN  Outcome: Progressing

## 2024-04-18 NOTE — PROGRESS NOTES
Comprehensive Nutrition Assessment    Type and Reason for Visit:  Reassess    Nutrition Recommendations/Plan:   Continue regular diet and encourage PO intake   Continue ensure with meals   RD to order new updated weight   Monitor nutrition adequacy, pertinent labs, bowel habits, wt changes, and clinical progress     Malnutrition Assessment:  Malnutrition Status:  Moderate malnutrition (04/16/24 1156)    Context:  Acute Illness     Findings of the 6 clinical characteristics of malnutrition:  Energy Intake:  75% or less of estimated energy requirements for 7 or more days  Weight Loss:  Unable to assess     Body Fat Loss:  Mild body fat loss Triceps, Orbital   Muscle Mass Loss:  Moderate muscle mass loss Clavicles (pectoralis & deltoids), Temples (temporalis)    Nutrition Assessment:    Follow up: Pt continues on regular diet, PO intakes 26-76% of meals in EMR. Will order new updated weight d/t stated weight. Ensure added w/ meals. Possible d/c tomorrow. Continue to encourage PO intake, will continue to monitor.    Nutrition Related Findings:    BLE trace edema. No updated labs today. No BM documented. Wound Type: None       Current Nutrition Intake & Therapies:    Average Meal Intake: 1-25%, 26-50%, 51-75%  Average Supplements Intake: 26-50%  ADULT DIET; Regular  ADULT ORAL NUTRITION SUPPLEMENT; Breakfast, Lunch, Dinner; Standard High Calorie/High Protein Oral Supplement    Anthropometric Measures:  Height: 152.4 cm (5')  Ideal Body Weight (IBW): 100 lbs (45 kg)       Current Body Weight: 55.3 kg (122 lb), 122 % IBW. Weight Source: Stated  Current BMI (kg/m2): 23.8        Weight Adjustment For: No Adjustment                 BMI Categories: Normal Weight (BMI 18.5-24.9)    Estimated Daily Nutrient Needs:  Energy Requirements Based On: Kcal/kg (25-30 kcals/kg)  Weight Used for Energy Requirements: Current (55 kg)  Energy (kcal/day): 0766-7202  Weight Used for Protein Requirements: Current (1-1.2 g/kg)  Protein (g/day):

## 2024-04-18 NOTE — PROGRESS NOTES
Pharmacy Note  Warfarin Consult        Dx: Hx DVT  Goal INR range 2-3   Home Warfarin dose: 7.5 mg daily  ** Potential DDI w/ Azithromycin (started 4/16, last dose scheduled for 4/18)     Date                 INR                  Warfarin  4/15                1.81                   7.5 mg  4/16                1.91                   7.5 mg  4/17                1.88                      8 mg  4/18                1.79                      9 mg      Recommend Warfarin 9 mg tonight x1.  Daily INR ordered.  Rx will continue to manage therapy per consult order.  Raj Martínez, PharmD 4/18/2024 7:50 AM

## 2024-04-18 NOTE — PLAN OF CARE
Bed in lowest position, wheels locked, 2/4 side rails up, nonskid footwear on. Call light within reach. Pt instructed to call out when needing assistance. Pt stated understanding. Nurse will continue to monitor.

## 2024-04-19 VITALS
RESPIRATION RATE: 16 BRPM | HEART RATE: 65 BPM | TEMPERATURE: 98.9 F | OXYGEN SATURATION: 93 % | HEIGHT: 60 IN | DIASTOLIC BLOOD PRESSURE: 76 MMHG | BODY MASS INDEX: 25.17 KG/M2 | WEIGHT: 128.2 LBS | SYSTOLIC BLOOD PRESSURE: 152 MMHG

## 2024-04-19 LAB
INR PPP: 1.93 (ref 0.85–1.15)
PROTHROMBIN TIME: 22.1 SEC (ref 11.9–14.9)

## 2024-04-19 PROCEDURE — 94640 AIRWAY INHALATION TREATMENT: CPT

## 2024-04-19 PROCEDURE — 36415 COLL VENOUS BLD VENIPUNCTURE: CPT

## 2024-04-19 PROCEDURE — 6370000000 HC RX 637 (ALT 250 FOR IP): Performed by: FAMILY MEDICINE

## 2024-04-19 PROCEDURE — 6370000000 HC RX 637 (ALT 250 FOR IP): Performed by: INTERNAL MEDICINE

## 2024-04-19 PROCEDURE — 85610 PROTHROMBIN TIME: CPT

## 2024-04-19 PROCEDURE — 2580000003 HC RX 258: Performed by: FAMILY MEDICINE

## 2024-04-19 PROCEDURE — 94669 MECHANICAL CHEST WALL OSCILL: CPT

## 2024-04-19 RX ORDER — LEVOFLOXACIN 500 MG/1
500 TABLET, FILM COATED ORAL DAILY
Qty: 3 TABLET | Refills: 0 | Status: SHIPPED | OUTPATIENT
Start: 2024-04-19 | End: 2024-04-22

## 2024-04-19 RX ORDER — LEVOFLOXACIN 500 MG/1
500 TABLET, FILM COATED ORAL DAILY
Status: DISCONTINUED | OUTPATIENT
Start: 2024-04-19 | End: 2024-04-19 | Stop reason: HOSPADM

## 2024-04-19 RX ADMIN — FLUTICASONE PROPIONATE 2 SPRAY: 50 SPRAY, METERED NASAL at 08:24

## 2024-04-19 RX ADMIN — Medication 10 ML: at 08:24

## 2024-04-19 RX ADMIN — DULOXETINE HYDROCHLORIDE 60 MG: 60 CAPSULE, DELAYED RELEASE ORAL at 08:21

## 2024-04-19 RX ADMIN — CETIRIZINE HYDROCHLORIDE 10 MG: 10 TABLET, FILM COATED ORAL at 08:22

## 2024-04-19 RX ADMIN — LEVOFLOXACIN 500 MG: 500 TABLET, FILM COATED ORAL at 12:04

## 2024-04-19 RX ADMIN — Medication 2 PUFF: at 07:42

## 2024-04-19 RX ADMIN — PANTOPRAZOLE SODIUM 40 MG: 40 TABLET, DELAYED RELEASE ORAL at 08:22

## 2024-04-19 NOTE — DISCHARGE INSTR - COC
Patient Infection Status       None to display            Nurse Assessment:  Last Vital Signs: BP (!) 152/76   Pulse 65   Temp 98.9 °F (37.2 °C)   Resp 16   Ht 1.524 m (5')   Wt 58.2 kg (128 lb 3.2 oz)   SpO2 93%   BMI 25.04 kg/m²     Last documented pain score (0-10 scale): Pain Level: 4  Last Weight:   Wt Readings from Last 1 Encounters:   04/18/24 58.2 kg (128 lb 3.2 oz)     Mental Status:  {IP PT MENTAL STATUS:20030}    IV Access:  { DUSTIN IV ACCESS:078284617}    Nursing Mobility/ADLs:  Walking   {CHP DME ADLs:701872942}  Transfer  {CHP DME ADLs:593416344}  Bathing  {CHP DME ADLs:091071848}  Dressing  {CHP DME ADLs:962651190}  Toileting  {CHP DME ADLs:691563037}  Feeding  {CHP DME ADLs:878941699}  Med Admin  {P DME ADLs:119289476}  Med Delivery   { DUSTIN MED Delivery:029584841}    Wound Care Documentation and Therapy:  Incision 02/08/18 Back Lower (Active)   Number of days: 2261       Incision 05/23/19 Back (Active)   Number of days: 1792       Incision 08/15/19 Back Right (Active)   Number of days: 1708        Elimination:  Continence:   Bowel: {YES / NO:19727}  Bladder: {YES / NO:19727}  Urinary Catheter: {Urinary Catheter:737678147}   Colostomy/Ileostomy/Ileal Conduit: {YES / NO:19727}       Date of Last BM: ***  No intake or output data in the 24 hours ending 04/19/24 1030  No intake/output data recorded.    Safety Concerns:     { DUSTIN Safety Concerns:633049775}    Impairments/Disabilities:      { DUSTIN Impairments/Disabilities:444876535}    Nutrition Therapy:  Current Nutrition Therapy:   { DUSTIN Diet List:430591526}    Routes of Feeding: {CHP DME Other Feedings:518157030}  Liquids: {Slp liquid thickness:50950}  Daily Fluid Restriction: {CHP DME Yes amt example:533295996}  Last Modified Barium Swallow with Video (Video Swallowing Test): {Done Not Done Date:304088012}    Treatments at the Time of Hospital Discharge:   Respiratory Treatments: ***  Oxygen Therapy:  {Therapy; copd  oxygen:27086}  Ventilator:    {[facility] Vent List:772519257}    Rehab Therapies: {THERAPEUTIC INTERVENTION:1741382325}  Weight Bearing Status/Restrictions: {[facility] Weight Bearin}  Other Medical Equipment (for information only, NOT a DME order):  {EQUIPMENT:428881873}  Other Treatments: ***    Patient's personal belongings (please select all that are sent with patient):  {CHP DME Belongings:368112395}    RN SIGNATURE:  {Esignature:421981440}    CASE MANAGEMENT/SOCIAL WORK SECTION    Inpatient Status Date: 4/15/24    Readmission Risk Assessment Score:  Readmission Risk              Risk of Unplanned Readmission:  16           Discharging to Facility/ Agency   [facility]    / signature: Electronically signed by ELOISA CALVERT RN on 24 at 10:34 AM EDT    PHYSICIAN SECTION    Prognosis: Good    Condition at Discharge: Stable    Rehab Potential (if transferring to Rehab): Good    Recommended Labs or Other Treatments After Discharge: Follow-up at Coumadin clinic with [facility].  On .  Follow-up with PCP within the next 1 to 2 weeks      Physician Certification: I certify the above information and transfer of Shayla Crespo  is necessary for the continuing treatment of the diagnosis listed and that she requires Home Care for less 30 days.     Update Admission H&P: No change in H&P    PHYSICIAN SIGNATURE:  Electronically signed by MOUNA DELGADO MD on 24 at 10:33 AM EDT

## 2024-04-19 NOTE — PLAN OF CARE
Problem: Discharge Planning  Goal: Discharge to home or other facility with appropriate resources  4/19/2024 1157 by Traci Dasilva RN  Outcome: Adequate for Discharge  4/19/2024 1156 by Traci Dasilva RN  Outcome: Progressing     Problem: Pain  Goal: Verbalizes/displays adequate comfort level or baseline comfort level  4/19/2024 1157 by Traci Dasilva RN  Outcome: Adequate for Discharge  4/19/2024 1156 by Traci Dasilva RN  Outcome: Progressing     Problem: Safety - Adult  Goal: Free from fall injury  4/19/2024 1157 by Traci Dasilva RN  Outcome: Adequate for Discharge  4/19/2024 1156 by Traci Dasilva RN  Outcome: Progressing     Problem: ABCDS Injury Assessment  Goal: Absence of physical injury  4/19/2024 1157 by Traci Dasilva RN  Outcome: Adequate for Discharge  4/19/2024 1156 by Traci Dasilva RN  Outcome: Progressing     Problem: Respiratory - Adult  Goal: Achieves optimal ventilation and oxygenation  4/19/2024 1157 by Traci Dasilva RN  Outcome: Adequate for Discharge  4/19/2024 1156 by Traci Dasilva RN  Outcome: Progressing     Problem: Cardiovascular - Adult  Goal: Maintains optimal cardiac output and hemodynamic stability  4/19/2024 1157 by Traci Dasilva RN  Outcome: Adequate for Discharge  4/19/2024 1156 by Traci Dasilva RN  Outcome: Progressing     Problem: Musculoskeletal - Adult  Goal: Return mobility to safest level of function  4/19/2024 1157 by Traci Dasilva RN  Outcome: Adequate for Discharge  4/19/2024 1156 by Traci Dasilva RN  Outcome: Progressing     Problem: Nutrition Deficit:  Goal: Optimize nutritional status  4/19/2024 1157 by Traci Dasilva RN  Outcome: Adequate for Discharge  4/19/2024 1156 by Traci Dasilva RN  Outcome: Progressing     Problem: Neurosensory - Adult  Goal: Achieves stable or improved neurological status  4/19/2024 1157 by Traci Dasilva RN  Outcome: Adequate for Discharge  4/19/2024 1156 by Traci Dasilva RN  Outcome: Progressing     Problem: Genitourinary -

## 2024-04-19 NOTE — PROGRESS NOTES
Patient IV removed, no complications. Discharge instructions given and all questions answered. Patient wheeled to main entrance with family for discharge

## 2024-04-19 NOTE — PROGRESS NOTES
Pharmacy Note  Warfarin Consult     Dx: Hx DVT  Goal INR range 2-3   Home Warfarin dose: 7.5 mg daily  ** Potential DDI w/ Azithromycin (started 4/16, last dose 4/18)     Date                 INR                  Warfarin   4/15                 1.81                   7.5 mg  4/16                 1.91                   7.5 mg  4/17                 1.88                    8 mg  4/18                 1.79                    9 mg   4/19                 1.93                    8 mg     Recommend Warfarin 8 mg tonight x1.  Daily INR ordered.  Rx will continue to manage therapy per consult order.    Bill Montiel, PharmD    4/19/2024 8:16 AM

## 2024-04-19 NOTE — DISCHARGE SUMMARY
Hospital Medicine Discharge Summary    Patient: Shayla Crespo   : 1940     Admit Date: 4/15/2024   Discharge Date: 2024    Disposition:  [x]Home   []HHC  []SNF  []ECF  []Acute Rehab  []LTAC  []Hospice  Code status:  [x]Full  []DNR/CCA  []Limited (DNR/CCA with Do Not Intubate)  []DNRCC  Condition at Discharge: Stable  Primary Care Provider: Stefany Manzo MD    Admitting Provider: Dmitriy Yanez MD  Discharge Provider: MOUNA DELGADO MD     Discharge Diagnoses:      Active Hospital Problems    Diagnosis     Moderate malnutrition (HCC) [E44.0]     Pneumonia of right upper lobe due to infectious organism [J18.9]        Presenting Admission History:      83 y.o. female with past medical history of idiopathic pulmonary fibrosis, hypertension, hyperlipidemia, DVTs, pulmonary embolisms in the past on long-term anticoagulation with Coumadin, severe aortic stenosis status post TAVR, which was completed on 2024 with [facility].   She presented to the ER complaining of feeling nauseated, clammy and warm all over her body like as if she was getting a fever.  She also reported dizziness with mild shortness of breath.  She reports symptoms had been going on over several hours. Patient denied chest pain, abdominal pain or dysuria.  Patient was afebrile in the ER, workup also included CT scan of the head which showed no acute abnormality, chest x-ray showed few patchy airspace infiltrates in the right upper lobe which could represent pneumonia in the appropriate clinical setting.  She was admitted for further evaluation management.  She was treated with IV antibiotics.  Her condition did improve and she was stable for discharge home with continued outpatient follow-up      Assessment/Plan:      Pneumonia: At time admission there was concern for pneumonia.  She was on IV antibiotics.  She did receive ceftriaxone and azithromycin.  Started 2024.  Her condition did improve, she was afebrile and  4/15/2024  EXAMINATION: CT OF THE HEAD WITHOUT CONTRAST  4/15/2024 7:44 pm TECHNIQUE: CT of the head was performed without the administration of intravenous contrast. Automated exposure control, iterative reconstruction, and/or weight based adjustment of the mA/kV was utilized to reduce the radiation dose to as low as reasonably achievable. COMPARISON: None. HISTORY: ORDERING SYSTEM PROVIDED HISTORY: dizziness TECHNOLOGIST PROVIDED HISTORY: Reason for exam:->dizziness Has a \"code stroke\" or \"stroke alert\" been called?->No Decision Support Exception - unselect if not a suspected or confirmed emergency medical condition->Emergency Medical Condition (MA) Reason for Exam: HA, double vision, sees something out of the right side of eyes, dizziness balance is off Relevant Medical/Surgical History: Recent TAVR FINDINGS: BRAIN/VENTRICLES: Chronic microvascular ischemic changes in the periventricular white matter. There is no acute intracranial hemorrhage, mass effect or midline shift.  No abnormal extra-axial fluid collection.  The gray-white differentiation is maintained without evidence of an acute infarct.  There is no evidence of hydrocephalus. ORBITS: The visualized portion of the orbits demonstrate no acute abnormality. SINUSES: The visualized paranasal sinuses and mastoid air cells demonstrate no acute abnormality. SOFT TISSUES/SKULL:  No acute abnormality of the visualized skull or soft tissues.     No acute intracranial abnormality.     XR CHEST PORTABLE    Result Date: 4/15/2024  EXAMINATION: ONE XRAY VIEW OF THE CHEST 4/15/2024 7:09 pm COMPARISON: None. HISTORY: ORDERING SYSTEM PROVIDED HISTORY: pain or SOB TECHNOLOGIST PROVIDED HISTORY: Reason for exam:->pain or SOB Reason for Exam: sob FINDINGS: Normal cardiomediastinal silhouette.  No pneumothorax or pleural effusion. There are few patchy infiltrates in the right upper lobe     There are few patchy airspace infiltrates in the right upper lobe which could

## 2024-04-19 NOTE — CARE COORDINATION
CASE MANAGEMENT DISCHARGE SUMMARY      Discharge to: Home w/dtr. [facility] PT/OT/SN.    IMM given: 4/19/24    New Durable Medical Equipment ordered/agency: n/a    Transportation:    Family/car:private    Confirmed discharge plan with:     Patient: yes     Family: Fiorella May [phone]     Facility/Agency, name:  [facility] DUSTIN/AVS faxed. Call placed to Dianna SILVERMAN, name: Philomena     Note: Discharging nurse to complete DUSTIN, reconcile AVS, and place final copy with patient's discharge packet.

## 2024-04-19 NOTE — PLAN OF CARE
Problem: Discharge Planning  Goal: Discharge to home or other facility with appropriate resources  4/19/2024 1157 by Traci Dasilva RN  Outcome: Adequate for Discharge  4/19/2024 1157 by Traci Dasilva RN  Outcome: Adequate for Discharge  4/19/2024 1156 by Traci Dasilva RN  Outcome: Progressing     Problem: Pain  Goal: Verbalizes/displays adequate comfort level or baseline comfort level  4/19/2024 1157 by Traci Dasilva RN  Outcome: Adequate for Discharge  4/19/2024 1157 by Traci Dasilva RN  Outcome: Adequate for Discharge  4/19/2024 1156 by Traci Dasilva RN  Outcome: Progressing     Problem: Safety - Adult  Goal: Free from fall injury  4/19/2024 1157 by Traci Dasilva RN  Outcome: Adequate for Discharge  4/19/2024 1157 by Traci Dasilva RN  Outcome: Adequate for Discharge  4/19/2024 1156 by Traci Dasilva RN  Outcome: Progressing     Problem: ABCDS Injury Assessment  Goal: Absence of physical injury  4/19/2024 1157 by Traci Dasilva RN  Outcome: Adequate for Discharge  4/19/2024 1157 by Traci Dasilva RN  Outcome: Adequate for Discharge  4/19/2024 1156 by Traci Dasilva RN  Outcome: Progressing     Problem: Respiratory - Adult  Goal: Achieves optimal ventilation and oxygenation  4/19/2024 1157 by Traci Dasilva RN  Outcome: Adequate for Discharge  4/19/2024 1157 by Traci Dasilva RN  Outcome: Adequate for Discharge  4/19/2024 1156 by Traci Dasilva RN  Outcome: Progressing     Problem: Cardiovascular - Adult  Goal: Maintains optimal cardiac output and hemodynamic stability  4/19/2024 1157 by Traci Dasilva RN  Outcome: Adequate for Discharge  4/19/2024 1157 by Traci Dasilva RN  Outcome: Adequate for Discharge  4/19/2024 1156 by Traci Dasilva RN  Outcome: Progressing     Problem: Musculoskeletal - Adult  Goal: Return mobility to safest level of function  4/19/2024 1157 by Traci Dasilva RN  Outcome: Adequate for Discharge  4/19/2024 1157 by Traci Dasilva RN  Outcome: Adequate for Discharge  4/19/2024 1156 by

## 2024-04-22 ENCOUNTER — CARE COORDINATION (OUTPATIENT)
Dept: CASE MANAGEMENT | Age: 84
End: 2024-04-22

## 2024-04-22 ENCOUNTER — TELEPHONE (OUTPATIENT)
Dept: PRIMARY CARE CLINIC | Age: 84
End: 2024-04-22

## 2024-04-22 DIAGNOSIS — R11.0 NAUSEA: Primary | ICD-10-CM

## 2024-04-22 RX ORDER — ONDANSETRON 4 MG/1
4 TABLET, ORALLY DISINTEGRATING ORAL 2 TIMES DAILY PRN
Qty: 4 TABLET | Refills: 0 | Status: SHIPPED | OUTPATIENT
Start: 2024-04-22 | End: 2024-04-24

## 2024-04-22 NOTE — CARE COORDINATION
Monitoring (RPM) program for in-home monitoring:  unable to discuss on initial call .    Care Transitions 24 Hour Call    Do you have all of your prescriptions and are they filled?: Yes  Do you have support at home?: Alone  Are you an active caregiver in your home?: No  Care Transitions Interventions         Discussed follow-up appointments. If no appointment was previously scheduled, appointment scheduling offered: Yes.   Is follow up appointment scheduled within 7 days of discharge? Yes.    Follow Up  Future Appointments   Date Time Provider Department [place]   4/25/2024 10:30 AM Elba Deleon APRN - CNP [facility]   4/26/2024  2:00 PM Stefany Manzo MD [facility]   5/8/2024 11:30 AM [facility] ANTICOAGULATION CLINIC [facility]   5/8/2024  1:00 PM Aimee Molina APRN - CNP R BANK PAIN [facility]   5/13/2024  7:30 AM SCHEDULE, [facility]X [facility] ECHO [facility]   5/13/2024  8:30 AM Rafy Arias MD [facility]   6/11/2024 11:15 AM Terrell Burkett MD PULM & CC [facility]       Care Transition Nurse provided contact information.  Plan for follow-up call in 5-7 days based on severity of symptoms and risk factors.  Plan for next call: self management-     Bianca Vo RN

## 2024-04-22 NOTE — TELEPHONE ENCOUNTER
Care Transitions Initial Follow Up Call    Outreach made within 2 business days of discharge: Yes    Patient: Shayla Crespo Patient : 1940   MRN: [id number]  Reason for Admission: There are no discharge diagnoses documented for the most recent discharge.  Discharge Date: 24       Spoke with: Win Barton    Discharge department/facility: [facility] Interactive Patient Contact:  Was patient able to fill all prescriptions: Yes  Was patient instructed to bring all medications to the follow-up visit: Yes  Is patient taking all medications as directed in the discharge summary? Yes  Does patient understand their discharge instructions: Yes  Does patient have questions or concerns that need addressed prior to 7-14 day follow up office visit: yes - 24    Scheduled appointment with PCP within 7-14 days, pt having lots of nausea started yesterday. She was prescribed levofloxacin 500mg tablets x 3 days (today is last day)-anything they can do for nausea?    Follow Up  Future Appointments   Date Time Provider Department Center   2024 10:30 AM Elba Deleon APRN - JONATHON [facility]   2024  2:00 PM Stefany Manzo MD [facility]   2024 11:30 AM [facility] ANTICOAGUL[facility] CLINIC [facility]   2024  1:00 PM Aimee Molina APRN - CNP R BANK PAIN [facility]   2024  7:30 AM SCHEDULE, [facility] ECHO [facility]   2024  8:30 [facility]   2024 11:15 AM Terrell Burkett MD PULM & CC [facility]       Robyn Whalen LPN

## 2024-04-24 LAB — INR BLD: 4.5

## 2024-04-25 ENCOUNTER — HOSPITAL ENCOUNTER (OUTPATIENT)
Age: 84
Discharge: HOME | End: 2024-04-25
Payer: MEDICARE

## 2024-04-25 ENCOUNTER — ANTI-COAG VISIT (OUTPATIENT)
Dept: PHARMACY | Age: 84
End: 2024-04-25

## 2024-04-25 ENCOUNTER — OFFICE VISIT (OUTPATIENT)
Dept: CARDIOLOGY CLINIC | Age: 84
End: 2024-04-25
Payer: MEDICARE

## 2024-04-25 ENCOUNTER — TELEPHONE (OUTPATIENT)
Dept: CARDIOLOGY CLINIC | Age: 84
End: 2024-04-25

## 2024-04-25 VITALS
DIASTOLIC BLOOD PRESSURE: 70 MMHG | OXYGEN SATURATION: 97 % | BODY MASS INDEX: 25.32 KG/M2 | WEIGHT: 129 LBS | SYSTOLIC BLOOD PRESSURE: 120 MMHG | HEART RATE: 69 BPM | HEIGHT: 60 IN

## 2024-04-25 DIAGNOSIS — R06.09 DYSPNEA ON EXERTION: ICD-10-CM

## 2024-04-25 DIAGNOSIS — I35.0 AORTIC STENOSIS, SEVERE: ICD-10-CM

## 2024-04-25 DIAGNOSIS — Z86.718 HISTORY OF DVT OF LOWER EXTREMITY: ICD-10-CM

## 2024-04-25 DIAGNOSIS — Z95.2 S/P TAVR (TRANSCATHETER AORTIC VALVE REPLACEMENT): ICD-10-CM

## 2024-04-25 DIAGNOSIS — J84.112 IPF (IDIOPATHIC PULMONARY FIBROSIS) (HCC): ICD-10-CM

## 2024-04-25 DIAGNOSIS — Z86.711 HX PULMONARY EMBOLISM: ICD-10-CM

## 2024-04-25 DIAGNOSIS — I10 ESSENTIAL HYPERTENSION: ICD-10-CM

## 2024-04-25 DIAGNOSIS — E78.2 MIXED HYPERLIPIDEMIA: ICD-10-CM

## 2024-04-25 DIAGNOSIS — I45.10 RBBB: ICD-10-CM

## 2024-04-25 DIAGNOSIS — I35.0 AORTIC STENOSIS, SEVERE: Primary | ICD-10-CM

## 2024-04-25 DIAGNOSIS — Z95.2 S/P TAVR (TRANSCATHETER AORTIC VALVE REPLACEMENT): Primary | ICD-10-CM

## 2024-04-25 DIAGNOSIS — Z86.718 HISTORY OF DVT OF LOWER EXTREMITY: Primary | ICD-10-CM

## 2024-04-25 DIAGNOSIS — I51.7 ATRIAL SEPTAL HYPERTROPHY: ICD-10-CM

## 2024-04-25 LAB
ALBUMIN SERPL-MCNC: 3.9 G/DL (ref 3.4–5)
ALBUMIN/GLOB SERPL: 1.2 {RATIO} (ref 1.1–2.2)
ALP SERPL-CCNC: 73 U/L (ref 40–129)
ALT SERPL-CCNC: 10 U/L (ref 10–40)
ANION GAP SERPL CALCULATED.3IONS-SCNC: 6 MMOL/L (ref 3–16)
AST SERPL-CCNC: 20 U/L (ref 15–37)
BILIRUB SERPL-MCNC: 0.3 MG/DL (ref 0–1)
BUN SERPL-MCNC: 19 MG/DL (ref 7–20)
CALCIUM SERPL-MCNC: 9.5 MG/DL (ref 8.3–10.6)
CHLORIDE SERPL-SCNC: 103 MMOL/L (ref 99–110)
CO2 SERPL-SCNC: 33 MMOL/L (ref 21–32)
CREAT SERPL-MCNC: 0.6 MG/DL (ref 0.6–1.2)
DEPRECATED RDW RBC AUTO: 14.4 % (ref 12.4–15.4)
GFR SERPLBLD CREATININE-BSD FMLA CKD-EPI: 90 ML/MIN/{1.73_M2}
GLUCOSE SERPL-MCNC: 107 MG/DL (ref 70–99)
HCT VFR BLD AUTO: 33.1 % (ref 36–48)
HGB BLD-MCNC: 11.1 G/DL (ref 12–16)
INR PPP: 2.45 (ref 0.85–1.15)
MCH RBC QN AUTO: 32.9 PG (ref 26–34)
MCHC RBC AUTO-ENTMCNC: 33.5 G/DL (ref 31–36)
MCV RBC AUTO: 98.1 FL (ref 80–100)
NT-PROBNP SERPL-MCNC: 324 PG/ML (ref 0–449)
PLATELET # BLD AUTO: 135 K/UL (ref 135–450)
PMV BLD AUTO: 9.6 FL (ref 5–10.5)
POTASSIUM SERPL-SCNC: 4.2 MMOL/L (ref 3.5–5.1)
PROT SERPL-MCNC: 7.2 G/DL (ref 6.4–8.2)
PROTHROMBIN TIME: 26.5 SEC (ref 11.9–14.9)
RBC # BLD AUTO: 3.37 M/UL (ref 4–5.2)
SODIUM SERPL-SCNC: 142 MMOL/L (ref 136–145)
WBC # BLD AUTO: 4.5 K/UL (ref 4–11)

## 2024-04-25 PROCEDURE — 80053 COMPREHEN METABOLIC PANEL: CPT

## 2024-04-25 PROCEDURE — 85610 PROTHROMBIN TIME: CPT

## 2024-04-25 PROCEDURE — 3074F SYST BP LT 130 MM HG: CPT | Performed by: NURSE PRACTITIONER

## 2024-04-25 PROCEDURE — 83880 ASSAY OF NATRIURETIC PEPTIDE: CPT

## 2024-04-25 PROCEDURE — 1123F ACP DISCUSS/DSCN MKR DOCD: CPT | Performed by: NURSE PRACTITIONER

## 2024-04-25 PROCEDURE — 99214 OFFICE O/P EST MOD 30 MIN: CPT | Performed by: NURSE PRACTITIONER

## 2024-04-25 PROCEDURE — 85027 COMPLETE CBC AUTOMATED: CPT

## 2024-04-25 PROCEDURE — 36415 COLL VENOUS BLD VENIPUNCTURE: CPT

## 2024-04-25 PROCEDURE — 3078F DIAST BP <80 MM HG: CPT | Performed by: NURSE PRACTITIONER

## 2024-04-25 RX ORDER — ERGOCALCIFEROL 1.25 MG/1
50000 CAPSULE ORAL WEEKLY
Qty: 14.286 CAPSULE | Refills: 0 | Status: SHIPPED | OUTPATIENT
Start: 2024-04-25

## 2024-04-25 NOTE — PATIENT INSTRUCTIONS
STAT PT/ INR at the lab today - will call you with results later this afternoon  Will do other blood work as well - let family doctor know it was drawn  No new heart medicines - stay on the warfarin and pravastatin  Follow up with family doctor tomorrow for the abdominal discomfort and nausea  Keep appointment with Dr. Arias in May as scheduled with echocardiogram  Follow up with me in 2 months

## 2024-04-25 NOTE — PROGRESS NOTES
[facility]   Cardiac Evaluation    Primary Care Doctor:  Stefany Manzo MD    Chief Complaint   Patient presents with    Follow-up    Hypertension    Hyperlipidemia        Assessment:    1. Aortic stenosis, severe    2. S/P TAVR (transcatheter aortic valve replacement)    3. Atrial septal hypertrophy    4. Mixed hyperlipidemia    5. Essential hypertension    6. RBBB    7. Hx pulmonary embolism    8. History of DVT of lower extremity    9. IPF (idiopathic pulmonary fibrosis) ([facility])        Plan:   STAT PT/ INR at the lab today - will call you with results later this afternoon  Will do other blood work as well - let family doctor know it was drawn  No new heart medicines - stay on the warfarin and pravastatin  Follow up with family doctor tomorrow for the abdominal discomfort and nausea  Keep appointment with Dr. Arias in May as scheduled with echocardiogram  Follow up with me in 2 months   Start Vitamin D 50,000 units once weekly for 12 weeks then will plan to recheck and redose the vitamin D    Vitals:    04/25/24 1049   BP: 120/70   Pulse: 69   SpO2: 97%   Weight: 58.5 kg (129 lb)   Height: 1.524 m (5')       Primary Cardiologist: Dr. Storm Liz/ Dr. Rafy Arias    History of Present Illness:   I had the pleasure of seeing Shayla Crespo (83 y.o.) in follow up for atrial septal hypertrophy, severe aortic stenosis s/p TAVR 4/2/24, mild-moderate mitral stenosis, chronic RBBB, hypertension, hyperlipidemia as well as history of DVT and PE on chronic warfarin, scoliosis and DDD.  She underwent TAVR on 4/2/24 without any complications.  She was then admitted with pneumonia on 4/15/24.      She is feeling weak.   Her home nurse checked INR yesterday and was 4.5, was told not to take warfarin last pm.    They are nervous about not taking warfarin due to hx of PE when off warfarin.   Lots of back pain which is chronic.   Still shortness of breath/ smothery.  + cough but only at night.   Her SaO2 is much

## 2024-04-25 NOTE — TELEPHONE ENCOUNTER
----- Message from LIZZY Powell - CNP sent at 4/25/2024  1:17 PM EDT -----  Please call and let patient and daughter know the INR is 2.45. Go back to her warfarin 7.5 mg daily.  Repeat NR in 1 week.  She can either have this done by RN or at the coumadin clinic where she normally goes.   The remainder of labs are still pending.   Thank you, Elba

## 2024-04-25 NOTE — PROGRESS NOTES
Ms. Crespo is here for management of anticoagulation for hx of DVT x 2 separate occasions.    PMH also significant for HTN.   She presents today w/out complaint.  Pt verifies dosing regimen as listed above.  Pt denies s/s bleeding/swelling/SOB.  No missed doses.   No changes in Rx/OTCs/Herbal medications.   Occasional EtOH use and denies tobacco.    Pt has [facility] after recent hospitalization    [facility] saw patient last night and INR was 4.5.  was after clinic hours so he had her hold dose last night and called clinic today.  Shayla was seen at cardio office today and INR was 2.45 today.    She was discharged on 3 days of Levaquin which likely did increase INR.  Will reduce weekly dose slightly.    INR 4.5 is above the acceptable therapeutic range of 2-3.  Recommend to reduce dose to 7.5 mg every day except 5 mg Q Sat  Pt has the 5 mg tablets.  Will continue to monitor and check INR in 1 weeks.   Dosing reminder card given with phone number, appointment date and time.   Return to clinic: [facility] to call ~    Referring physician: Dr. Liz  Referral date: 3/20/23    For Pharmacy Admin Tracking Only    Intervention Detail: Adherence Monitorin and Dose Adjustment: 1, reason: Therapy Optimization  Total # of Interventions Recommended: 2  Total # of Interventions Accepted: 2  Time Spent (min): 15

## 2024-04-26 ENCOUNTER — TELEPHONE (OUTPATIENT)
Dept: PRIMARY CARE CLINIC | Age: 84
End: 2024-04-26

## 2024-04-26 ENCOUNTER — OFFICE VISIT (OUTPATIENT)
Dept: PRIMARY CARE CLINIC | Age: 84
End: 2024-04-26

## 2024-04-26 ENCOUNTER — TELEPHONE (OUTPATIENT)
Dept: CARDIOLOGY CLINIC | Age: 84
End: 2024-04-26

## 2024-04-26 VITALS
HEART RATE: 67 BPM | WEIGHT: 128.8 LBS | OXYGEN SATURATION: 92 % | SYSTOLIC BLOOD PRESSURE: 128 MMHG | RESPIRATION RATE: 17 BRPM | HEIGHT: 60 IN | TEMPERATURE: 98.1 F | BODY MASS INDEX: 25.29 KG/M2 | DIASTOLIC BLOOD PRESSURE: 62 MMHG

## 2024-04-26 DIAGNOSIS — R11.0 NAUSEA: Primary | ICD-10-CM

## 2024-04-26 RX ORDER — POLYETHYLENE GLYCOL 3350 17 G/17G
17 POWDER, FOR SOLUTION ORAL DAILY PRN
COMMUNITY

## 2024-04-26 RX ORDER — ONDANSETRON 4 MG/1
4 TABLET, FILM COATED ORAL EVERY 12 HOURS PRN
Qty: 14 TABLET | Refills: 0 | Status: SHIPPED | OUTPATIENT
Start: 2024-04-26

## 2024-04-26 ASSESSMENT — ENCOUNTER SYMPTOMS
CONSTIPATION: 1
COUGH: 1
SHORTNESS OF BREATH: 0
BLOOD IN STOOL: 0
VOMITING: 0
ABDOMINAL PAIN: 1
NAUSEA: 1
COLOR CHANGE: 0
RHINORRHEA: 0
DIARRHEA: 0

## 2024-04-26 NOTE — ASSESSMENT & PLAN NOTE
Poorly controlled. Abd pain improved and TTP only over femoral region from previous procedure, bruising improved, no TTP at abdomen. ?Nausea due to constipation, dehydration and on opioids which worsen these. 7 days of BID PRN Zofran provided - advised to hydrate (small sips often of water) and try Miralax (advised can take 3 days to work). Also hiatal hernia - wonder if this is contributing to low appetite and nausea. Dizziness in setting of dehydration. Advised to walk with cane and we went to her car to get this for her. Fall/call back/ED precautions discussed. Close f/u in 1 week.

## 2024-04-26 NOTE — TELEPHONE ENCOUNTER
Home Health OT called to let us know they are continuing OT twice a week for 3 more weeks, orders will be faxed over.

## 2024-04-26 NOTE — PROGRESS NOTES
Shayla Crespo is a 83 y.o. year old female here for:    Chief Complaint:    Chief Complaint   Patient presents with    Follow-Up from Hospital    Abdominal Pain    Dizziness    Nausea      Post-Discharge Transitional Care  Follow Up    Date of Office Visit:  4/26/2024  Date of Hospital Admission: 4/15/24  Date of Hospital Discharge: 4/19/24  Risk of hospital readmission (high >=14%. Medium >=10%) :Readmission Risk Score: 16.1    Care management risk score Rising risk (score 2-5) and Complex Care (Scores >=6): No Risk Score On File     Non face to face  following discharge, date last encounter closed (first attempt may have been earlier): 04/22/2024    Call initiated 2 business days of discharge: Yes    Subjective:     HPI:   #Dizziness, Abd Pain, Nausea  - Context: Went to the ED for dizziness as well as feeling clammy. In ED was Dx with possible CAP - treated with Levaquin. Recently s/p TAVR. On discharge, INR found to be super-therapeutic and warfarin was decreased by clinic. Has been using incentive spirometer 10X/hour. Appetite has been low, constipated - last BM this morning and small.  - Quality: Nausea but improved  - Severity: Currently abd pain is 2/10  - Inciting Event: Denied falls, recent TAVR as above  - Progression: Stable - no change  - Modifiers: Zofran has been helping, has not taken not taken anything for the constipation  - Associated Symptoms: Per ROS as otherwise stated in this note  - Previous occurrence:    Inpatient course: Discharge summary reviewed- see chart.    Patient Active Problem List   Diagnosis    Essential hypertension    Cervical disc disease    Degenerative spondylolisthesis    Balance problem    Hiatal hernia    Spinal stenosis of lumbar region    Lumbar facet arthropathy    Disc displacement, lumbar    Combined forms of age-related cataract of both eyes    Atrial septal hypertrophy    History of DVT of lower extremity    Severe episode of recurrent major depressive disorder,

## 2024-04-26 NOTE — TELEPHONE ENCOUNTER
----- Message from LIZZY Powell - CNP sent at 4/25/2024  5:25 PM EDT -----  Kidney and liver function panel stable.  Electrolytes okay. Blood count stable.  BNP \"heart failure number\" improved/ stable. This does not explain her shortness of breath, smothery feeling. Follow up with PCP tomorrow as planned.   No other new recommendations.   Thank you, Elba

## 2024-04-29 ENCOUNTER — CARE COORDINATION (OUTPATIENT)
Dept: CASE MANAGEMENT | Age: 84
End: 2024-04-29

## 2024-04-29 NOTE — CARE COORDINATION
[place]   5/6/2024 10:30 AM Stefany Manzo MD [facility]   5/8/2024 11:30 AM [facility] ANTICOAGULATION CLINIC [facility]   5/8/2024  1:00 PM Aimee Molina, APRN - CNP R BANK PAIN [facility]   5/13/2024  7:30 AM SCHEDULE, MHCX [facility] ECHO [facility]   5/13/2024  8:30 AM Rafy Arias MD [facility]   6/11/2024 11:15 AM Terrell Burkett MD PULM & CC [facility]   7/2/2024  1:00 PM Elba Deleon, LIZZY - CNP [facility]     External follow up appointment(s):     Care Transition Nurse reviewed medical action plan and red flags with patient and family and discussed any barriers to care and/or understanding of plan of care after discharge. Discussed appropriate site of care based on symptoms and resources available to patient including: PCP  Specialist  Home health  When to call 911. The patient and family agrees to contact the PCP office for questions related to their healthcare.     Advance Care Planning:   reviewed and current.     Patients top risk factors for readmission: functional physical ability  Interventions to address risk factors: Education of patient/family/caregiver/guardian to support self-management-     Offered patient enrollment in the Remote Patient Monitoring (RPM) program for in-home monitoring: did not discuss     Care Transitions Subsequent and Final Call    Subsequent and Final Calls  Care Transitions Interventions  Other Interventions:             Care Transition Nurse provided contact information for future needs. Plan for follow-up call in 7-10 days based on severity of symptoms and risk factors.  Plan for next call: self management-     Bianca Vo RN

## 2024-05-06 ENCOUNTER — OFFICE VISIT (OUTPATIENT)
Dept: PRIMARY CARE CLINIC | Age: 84
End: 2024-05-06
Payer: MEDICARE

## 2024-05-06 ENCOUNTER — TELEPHONE (OUTPATIENT)
Dept: PHARMACY | Age: 84
End: 2024-05-06

## 2024-05-06 VITALS
HEIGHT: 60 IN | SYSTOLIC BLOOD PRESSURE: 152 MMHG | WEIGHT: 127.4 LBS | DIASTOLIC BLOOD PRESSURE: 74 MMHG | BODY MASS INDEX: 25.01 KG/M2 | TEMPERATURE: 98.8 F | HEART RATE: 79 BPM | OXYGEN SATURATION: 92 % | RESPIRATION RATE: 16 BRPM

## 2024-05-06 DIAGNOSIS — Z00.00 HEALTHCARE MAINTENANCE: Primary | ICD-10-CM

## 2024-05-06 DIAGNOSIS — J84.112 IPF (IDIOPATHIC PULMONARY FIBROSIS) (HCC): ICD-10-CM

## 2024-05-06 DIAGNOSIS — F33.2 SEVERE EPISODE OF RECURRENT MAJOR DEPRESSIVE DISORDER, WITHOUT PSYCHOTIC FEATURES (HCC): ICD-10-CM

## 2024-05-06 DIAGNOSIS — F11.20 OPIOID DEPENDENCE WITH CURRENT USE (HCC): ICD-10-CM

## 2024-05-06 DIAGNOSIS — I10 ESSENTIAL HYPERTENSION: ICD-10-CM

## 2024-05-06 DIAGNOSIS — Z91.81 AT HIGH RISK FOR FALLS: ICD-10-CM

## 2024-05-06 PROBLEM — R51.9 FACIAL PAIN: Status: RESOLVED | Noted: 2024-03-28 | Resolved: 2024-05-06

## 2024-05-06 PROBLEM — I35.0 AORTIC STENOSIS, SEVERE: Status: RESOLVED | Noted: 2024-04-02 | Resolved: 2024-05-06

## 2024-05-06 PROBLEM — E44.0 MODERATE MALNUTRITION (HCC): Status: RESOLVED | Noted: 2024-04-16 | Resolved: 2024-05-06

## 2024-05-06 PROCEDURE — G0439 PPPS, SUBSEQ VISIT: HCPCS | Performed by: FAMILY MEDICINE

## 2024-05-06 PROCEDURE — 1123F ACP DISCUSS/DSCN MKR DOCD: CPT | Performed by: FAMILY MEDICINE

## 2024-05-06 PROCEDURE — 3077F SYST BP >= 140 MM HG: CPT | Performed by: FAMILY MEDICINE

## 2024-05-06 PROCEDURE — 3078F DIAST BP <80 MM HG: CPT | Performed by: FAMILY MEDICINE

## 2024-05-06 RX ORDER — LISINOPRIL 5 MG/1
5 TABLET ORAL DAILY
Qty: 30 TABLET | Refills: 0 | Status: SHIPPED | OUTPATIENT
Start: 2024-05-06

## 2024-05-06 ASSESSMENT — PATIENT HEALTH QUESTIONNAIRE - PHQ9
2. FEELING DOWN, DEPRESSED OR HOPELESS: SEVERAL DAYS
SUM OF ALL RESPONSES TO PHQ QUESTIONS 1-9: 16
5. POOR APPETITE OR OVEREATING: NEARLY EVERY DAY
8. MOVING OR SPEAKING SO SLOWLY THAT OTHER PEOPLE COULD HAVE NOTICED. OR THE OPPOSITE, BEING SO FIGETY OR RESTLESS THAT YOU HAVE BEEN MOVING AROUND A LOT MORE THAN USUAL: SEVERAL DAYS
SUM OF ALL RESPONSES TO PHQ9 QUESTIONS 1 & 2: 2
6. FEELING BAD ABOUT YOURSELF - OR THAT YOU ARE A FAILURE OR HAVE LET YOURSELF OR YOUR FAMILY DOWN: SEVERAL DAYS
SUM OF ALL RESPONSES TO PHQ QUESTIONS 1-9: 16
7. TROUBLE CONCENTRATING ON THINGS, SUCH AS READING THE NEWSPAPER OR WATCHING TELEVISION: NEARLY EVERY DAY
SUM OF ALL RESPONSES TO PHQ QUESTIONS 1-9: 16
3. TROUBLE FALLING OR STAYING ASLEEP: NEARLY EVERY DAY
9. THOUGHTS THAT YOU WOULD BE BETTER OFF DEAD, OR OF HURTING YOURSELF: NOT AT ALL
10. IF YOU CHECKED OFF ANY PROBLEMS, HOW DIFFICULT HAVE THESE PROBLEMS MADE IT FOR YOU TO DO YOUR WORK, TAKE CARE OF THINGS AT HOME, OR GET ALONG WITH OTHER PEOPLE: SOMEWHAT DIFFICULT
SUM OF ALL RESPONSES TO PHQ QUESTIONS 1-9: 16
1. LITTLE INTEREST OR PLEASURE IN DOING THINGS: SEVERAL DAYS
4. FEELING TIRED OR HAVING LITTLE ENERGY: NEARLY EVERY DAY

## 2024-05-06 ASSESSMENT — ENCOUNTER SYMPTOMS
BLOOD IN STOOL: 0
RHINORRHEA: 0
VOMITING: 0
SHORTNESS OF BREATH: 0
COUGH: 1
DIARRHEA: 0
COLOR CHANGE: 0
NAUSEA: 0

## 2024-05-06 NOTE — TELEPHONE ENCOUNTER
Joshua the nurse from her [facility] called today to inform clinic that he had taken Shayla's INR last Wednesday 5/1 after clinic hours. He called but did not leave a message and failed to call again, so we were not aware of this INR. The nurse also proceeded to tell patient to increase her dose on Saturday from 5mg to 7.5mg due to her INR being low- this was NOT dosing recommendation given by clinic or her doctor. I told him at this point leave her on 7.5mg daily and recheck this Wednesday 5/8. He is aware that we close by 3pm on Wednesday. Also patient does have a clinic appt scheduled that day, so Joshua will call patient and se which she prefers.     Balbina Alejo, PharmD 5/6/2024 1:41 PM

## 2024-05-06 NOTE — ASSESSMENT & PLAN NOTE
Overall doing okay. Miralax worked well and her appetite is improved. Other problems addressed today as otherwise noted. Reminded to schedule dental and eye exams when able.

## 2024-05-06 NOTE — PATIENT INSTRUCTIONS
Reminder: Please call to schedule dental and eye exams when able.    The National Suicide Prevention Lifeline is a [facility]-based suicide prevention network of 161 crisis centers that provides a 24/7, toll-free hotline available to anyone in suicidal crisis or emotional distress. The number is: 1-627.369.4376.     [employer] - [place] based resource for finding mental health resources and providers         [place] [phone] option 1     This test result shows that you have osteopenia - which means your bones are becoming weak but not yet to osteoporosis level where they are considered more fragile. At this point, I would recommend just calcium (1200 mg per day - total between diet and can supplement if needed) and vitamin D3 (800 IU per day - also can be diet or supplement as needed) and weight bearing exercises (see below) to prevent falls and/or broken bones. Please let me know if you have any questions or concerns.    Weight-bearing exercises  Assisted (with walker or chair) squats.    Resistance exercises  Use of resistance bands.  Gravity-resistance exercises as above.    All contents © 2021 [employer]. All Rights Reserved. Images and illustrations

## 2024-05-06 NOTE — ASSESSMENT & PLAN NOTE
Poorly controlled. Denied SI/HI today. Provided SI Hotline number as well as [email or web address] to establish care for this once more. Call back/ED precautions discussed.

## 2024-05-06 NOTE — PROGRESS NOTES
Shayla Crespo is a 83 y.o. year old female here for:    Chief Complaint:    Chief Complaint   Patient presents with    Annual Exam     Patient's Sex: female Medicare Member ID: [id number] - (Medicare Managed)    Ethnicity:    Non- / Non   Race:   White (non-)    Provider Name:  Stefany Manzo M.D.  [facility]  [id number] [place] [id number]  Dept: [phone]  Dept Fax: [phone]    Provider ID Type: NPI  Billing Provider ID:  [id number]    Patient Care Team:  Stefany Manzo MD as PCP - General (Family Medicine)  Stefany Manzo MD as PCP - Empaneled Provider  Vidya Garcia MD as Consulting Physician (Otolaryngology)  Nikko Granados MD as Consulting Physician (Gastroenterology)  Elba Deleon APRN - CNP as Nurse Practitioner (Nurse Practitioner)  Storm Liz MD as Consulting Physician (Cardiology)  Aimee Molina APRN - CNP as Nurse Practitioner (Certified Nurse Practitioner)  Bianca Vo RN as Care Transitions Nurse    Current concerns:    Medical History:  Past Medical History:   Diagnosis Date    Acute pulmonary embolism ([facility]) 01/17/2024    Allergic rhinitis     Aortic stenosis, severe 04/02/2024    Arthritis     Cancer ([facility])     squamous cell on nose     Chronic back pain     Claudication ([facility])     Deep vein thrombosis (DVT) of proximal vein of both lower extremities ([facility]) 04/11/2017    Dementia ([facility]) 12/2019    Dental disease     Depression     Dizziness     Facial pain 03/28/2024    GERD (gastroesophageal reflux disease)     H/O blood clots     Headache     Hearing loss     Hiatal hernia     Hyperlipidemia     Hypertension     IPF (idiopathic pulmonary fibrosis) ([facility]) 01/17/2024    Lumbar stenosis with neurogenic claudication     Moderate malnutrition ([facility]) 04/16/2024    Nephrolithiasis     Nonrheumatic aortic valve stenosis 04/02/2024    Osteoarthritis of neck     Scoliosis     Thrombocytopenia, unspecified ([facility]) 04/08/2024

## 2024-05-06 NOTE — ASSESSMENT & PLAN NOTE
Poorly controlled, will resume lisinopril but at lower dose now of 5 mg QD. Scheduled for 2 week BP nursing visit. If BP stable in 2 weeks, needs BMP 2 weeks after that. She was amenable to this plan. Call back/ED precautions discussed.

## 2024-05-06 NOTE — ASSESSMENT & PLAN NOTE
Receiving home PT and OT. Ambulates with a cane. Counseling per note above. Fall/call back/ED precautions discussed.

## 2024-05-07 ENCOUNTER — CARE COORDINATION (OUTPATIENT)
Dept: PRIMARY CARE CLINIC | Age: 84
End: 2024-05-07

## 2024-05-07 ENCOUNTER — CARE COORDINATION (OUTPATIENT)
Dept: CASE MANAGEMENT | Age: 84
End: 2024-05-07

## 2024-05-07 DIAGNOSIS — I10 ESSENTIAL HYPERTENSION: Primary | ICD-10-CM

## 2024-05-07 NOTE — PROGRESS NOTES
Remote Patient Monitoring Treatment Plan    Received request from [facility]/Bianca Bauer RN   to order remote patient monitoring for in home monitoring of HTN; Condition managed by PCP.  and order completed.     Patient will be monitoring blood pressure   pulse ox   weight. Please set alert for ONLY weight gain of 5# in 7 days. Pt has no documented hx of HF.       Patient will engage in Remote Patient Monitoring each day to develop the skills necessary for self management.       RPM Care Team Responsibilities:   Alerts will be reviewed daily and addressed within 2-4 hours during operational hours (Monday -Friday 9 am-4 pm)  Alert response and intervention documented in patient medical record  Alert response escalated to PCP per protocol and documented in patient medical record  Patient monitored over approximately  days  Discharge from program based on self-management readiness    See care coordination encounters for additional details.

## 2024-05-07 NOTE — CARE COORDINATION
RPM Kit Order    Remote Patient Kit Ordering Note      Date/Time:  5/7/2024 12:03 PM      [facility] placed phone call to patient/family today to notify of RPM kit order; patient/family was available; discussed the following topics below and all questions answered.    [x] [facility] confirmed patient shipping address  [x] Patient will receive package over the next 1-3 business days. Someone 21 years or older must be present to sign for UPS delivery.  [x] HRS will contact patient within 24 hours, an HRS  will call the patient directly: If the patient does not answer, HRS will follow up with the clinical team notifying them about the unsuccessful attempt to contact the patient. HRS will make three call attempts to the patient.Provide patient with [facility] Virtual install number is: [phone].  [x] CTN will contact patient once equipment is active to welcome them to the program.                                                         [x] Hours of RPM monitoring - Monday-Friday [id number]; encourage patient to get vitals entered by Noon each day to have the alert addressed same day.  [x][facility] to patient.                      CTN made aware the RPM kit has been ordered.

## 2024-05-07 NOTE — CARE COORDINATION
Care Transitions Follow Up Call    Patient Current Location:  Home: [address] [place] [address]    Care Transition Nurse contacted the patient by telephone to follow up after admission.  Verified name and  with patient as identifiers.    Patient: Shayla Crespo  Patient : 1940   MRN: [id number]  Reason for Admission: PNA of RUL   Discharge Date: 24 RARS: Readmission Risk Score: 16.1      Needs to be reviewed by the provider   Additional needs identified to be addressed with provider: No  none         Method of communication with provider: none.    Patient's daughter, Oralia (HIPAA verified) answered call and verified  . Pleasant and agreeable to transition call. Call given to patient. Stated she is \"feeling better, but not back to normal\". Stated biggest concern is continued fatigue. Taking breaks when needed and continues to recover. Miralax has been helping and patient is no longer constipated and having daily bowel movements.  Still active with home care services. SN is scheduled to check INR tomorrow with nurse \"Joshua\". Last week INR was 2.5. Patient is taking all medications as directed and denied any Warfarin missed doses  Patient was seen by PCP yesterday for TCM visit. Oralia stated that BP medication was started. First dose was yesterday evening. CTN discussed vital sign routine and confirmed that patient weights and vital signs checked daily. CTN discussed RPM benefit and cost/copayment of program. Encouraged to reach out to insurance for coverage ben\[facility] [place] [facility]\"" and offered CPT codes. Patient and Oralia agreeable to enrollment.  Order placed in system.  Denied any acute needs at present time.  Agreeable to f/u calls.  Educated on the use of urgent care or physician’s 24 hr access line if assistance is needed after hours.      Addressed changes since last contact:  none  Discussed follow-up appointments. If no appointment was previously scheduled, appointment scheduling offered: Yes.   Is follow

## 2024-05-08 ENCOUNTER — HOSPITAL ENCOUNTER (OUTPATIENT)
Dept: GENERAL RADIOLOGY | Age: 84
Discharge: HOME | End: 2024-05-08
Payer: MEDICARE

## 2024-05-08 ENCOUNTER — OFFICE VISIT (OUTPATIENT)
Dept: PAIN MANAGEMENT | Age: 84
End: 2024-05-08

## 2024-05-08 VITALS
DIASTOLIC BLOOD PRESSURE: 75 MMHG | SYSTOLIC BLOOD PRESSURE: 159 MMHG | OXYGEN SATURATION: 95 % | TEMPERATURE: 96.8 F | HEART RATE: 67 BPM

## 2024-05-08 DIAGNOSIS — M25.521 ELBOW PAIN, RIGHT: ICD-10-CM

## 2024-05-08 DIAGNOSIS — M51.36 DDD (DEGENERATIVE DISC DISEASE), LUMBAR: ICD-10-CM

## 2024-05-08 DIAGNOSIS — E66.09 CLASS 1 OBESITY DUE TO EXCESS CALORIES IN ADULT, UNSPECIFIED BMI, UNSPECIFIED WHETHER SERIOUS COMORBIDITY PRESENT: ICD-10-CM
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
## 2024-05-08 DIAGNOSIS — M47.816 LUMBAR FACET ARTHROPATHY: ICD-10-CM

## 2024-05-08 DIAGNOSIS — M48.061 SPINAL STENOSIS OF LUMBAR REGION WITHOUT NEUROGENIC CLAUDICATION: ICD-10-CM

## 2024-05-08 DIAGNOSIS — M43.10 DEGENERATIVE SPONDYLOLISTHESIS: ICD-10-CM

## 2024-05-08 DIAGNOSIS — M50.90 CERVICAL DISC DISEASE: ICD-10-CM

## 2024-05-08 DIAGNOSIS — F33.42 RECURRENT MAJOR DEPRESSIVE DISORDER, IN FULL REMISSION (HCC): ICD-10-CM

## 2024-05-08 DIAGNOSIS — M96.1 FAILED BACK SURGICAL SYNDROME: ICD-10-CM

## 2024-05-08 DIAGNOSIS — G89.4 CHRONIC PAIN SYNDROME: ICD-10-CM

## 2024-05-08 DIAGNOSIS — M05.7A RHEUMATOID ARTHRITIS OF OTHER SITE WITH POSITIVE RHEUMATOID FACTOR (HCC): ICD-10-CM

## 2024-05-08 PROCEDURE — 73080 X-RAY EXAM OF ELBOW: CPT

## 2024-05-08 RX ORDER — DULOXETIN HYDROCHLORIDE 60 MG/1
60 CAPSULE, DELAYED RELEASE ORAL DAILY
Qty: 90 CAPSULE | Refills: 0 | Status: SHIPPED | OUTPATIENT
Start: 2024-05-08 | End: 2024-08-06

## 2024-05-08 RX ORDER — HYDROCODONE BITARTRATE AND ACETAMINOPHEN 7.5; 325 MG/1; MG/1
1 TABLET ORAL EVERY 8 HOURS PRN
Qty: 90 TABLET | Refills: 0 | Status: SHIPPED | OUTPATIENT
Start: 2024-05-08 | End: 2024-06-07

## 2024-05-08 NOTE — PROGRESS NOTES
Shayla Crespo  1940  [id number]    HISTORY OF PRESENT ILLNESS: Ms. Crespo is a 83 y.o. female returns for a follow up visit for pain management  She has a diagnosis of   1. Chronic pain syndrome    2. Fall, initial encounter    3. Elbow pain, right    4. Cervical disc disease    5. DDD (degenerative disc disease), lumbar    6. Spinal stenosis of lumbar region without neurogenic claudication    7. Lumbar facet arthropathy    8. Degenerative spondylolisthesis    9. Failed back surgical syndrome    10. Rheumatoid arthritis of other site with positive rheumatoid factor (HCC)    11. Recurrent major depressive disorder, in full remission (HCC)    12. Class 1 obesity due to excess calories in adult, unspecified BMI, unspecified whether serious comorbidity present      As per Information Obtained from the PADT (Patient Assessment and Documentation Tool)    She complains of pain in the  .lower back  She rates the pain 5/10 and describes it as aching, burning, stabbing. Current treatment regimen has helped relieve about 50% of the pain.  She denies any side effects from the current pain regimen. Patient reports that since the last follow up visit the physical functioning is unchanged, family/social relationships are unchanged, mood is unchanged sleep patterns are unchanged, and that the overall functioning is unchanged.  Patient denies misusing/abusing her narcotic pain medications or using any illegal drugs.      Upon obtaining medical history from Ms. Crespo states that pain is somewhat manageable on current pain therapy. Takes the pain medications as prescribed. Reports having fallen after missing a step landing mainly to the right elbow. Denies having had head injury or loss of consciousness.  She is currently in PT/OT, her PCP recommended adjustment  in opioid therapy. Was also treated in the ER for dizziness, daughter reports this was due to dehydration, currently fairing. Mood/anxiety is stable. Sleep is fair

## 2024-05-12 NOTE — PROGRESS NOTES
[facility]  H+P  Consult  OP Visit  FU Visit   CC/HPI   CC Followup visit for cardiac conditions detailed in assessment and plan below.   Intervention TAVR   General Doing well.  No new concerns.  SOB with exertion remains.  Mild LE edema.  Using extraordinary amounts of salt on foods per family.     Cardiac Sx -CP, -dizziness, -syncope, -orthopnea, -pnd, -fatigue, +SOB, +edema   HISTORY/ALLERGY/ROS   M/S/S/F Hx Reviewed in Epic and updated as appropriate   ALLERG Amitriptyline and Imitrex [sumatriptan]   ROS Full ROS obtained and negative except as mentioned in HPI   MEDICATIONS   Cardiac medications reviewed in Epic during visit with patient.   PHYSICAL EXAM   Vitals /70   Pulse 51   Ht 1.524 m (5')   Wt 57.3 kg (126 lb 6.4 oz)   SpO2 96%   BMI 24.69 kg/m²    Gen Alert, coop, no distress Heart  RRR, 1/6   Lung CTA-B, unlabored, no DTP Extrem Edema -Grade 0 (0mm)      COMPLIANCE   Discussed and counseled on diet, exercise, weight loss, smoking, alcohol, drugs.  All questions answered.   CODING   SCI (65626) - 30-39 mins spent reviewing hx/tests/consults, performing exam, counseling/educating, ordering meds/tests/procedures, referring/communicating w/PCP/consultants, documenting in EMR, interpreting results, communicating medical information and plan with family.   SCRIBE ATTESTATION   Nurse I, Heaven Brandon RN, am scribing for and in the presence of Rafy Arias MD. 5/12/2024 3:36 PM EDT   Doctor Heaven Brandon is working as scribe for and in presence of me and may have prepopulated components of note with my historical intellectual property (IP) under my supervision.  Any additions to this IP performed in my presence and at my direction. Content and accuracy of this note reviewed by me (Rafy Arias MD).   NEW MEDICATIONS   Pt verbalizes understanding of the need for treatment and education provided at today's visit.  Additional education provided in AVS.   ASSESSMENT AND PLAN   *AS/MS

## 2024-05-13 ENCOUNTER — OFFICE VISIT (OUTPATIENT)
Dept: CARDIOLOGY CLINIC | Age: 84
End: 2024-05-13

## 2024-05-13 VITALS
DIASTOLIC BLOOD PRESSURE: 70 MMHG | HEART RATE: 51 BPM | SYSTOLIC BLOOD PRESSURE: 138 MMHG | OXYGEN SATURATION: 96 % | WEIGHT: 126.4 LBS | HEIGHT: 60 IN | BODY MASS INDEX: 24.81 KG/M2

## 2024-05-13 DIAGNOSIS — I34.2 NONRHEUMATIC MITRAL VALVE STENOSIS: ICD-10-CM

## 2024-05-13 DIAGNOSIS — Z95.2 S/P TAVR (TRANSCATHETER AORTIC VALVE REPLACEMENT): ICD-10-CM

## 2024-05-13 DIAGNOSIS — I10 ESSENTIAL HYPERTENSION: ICD-10-CM

## 2024-05-13 DIAGNOSIS — E78.00 HYPERCHOLESTEROLEMIA: ICD-10-CM

## 2024-05-13 DIAGNOSIS — I35.0 AORTIC VALVE STENOSIS, NONRHEUMATIC: Primary | ICD-10-CM

## 2024-05-14 ENCOUNTER — CARE COORDINATION (OUTPATIENT)
Dept: CASE MANAGEMENT | Age: 84
End: 2024-05-14

## 2024-05-14 NOTE — CARE COORDINATION
Care Transitions Follow Up Call    Patient Current Location:  Home: [address] [place] [address]    Care Transition Nurse contacted the family by telephone to follow up after admission.  Verified name and  with family as identifiers.    Patient: Shayla Crespo  Patient : 1940   MRN: [id number]  Reason for Admission:  PNA of RUL   Discharge Date: 24 RARS: Readmission Risk Score: 16.1      Needs to be reviewed by the provider   Additional needs identified to be addressed with provider: No  none             Method of communication with provider: none.    Patient's daughter, Oralia (HIPAA verified) answered call and verified . Pleasant and agreeable to transition call  patient is doing well, but continues to be fatigue. Echo was done yesterday morning, then seen by cardiology. Daughter discussed visit and echo results.  Continues to be active with home care services for therapy and SN. INR scheduled weekly and due tomorrow.  Last week, INR was 2.5 and taking coumadin as directed without any missed doses.   RPM equipment has not arrived yet and CTN relayed message from Y[facility] ([facility]) that equipment delivery has been delayed, but should arrive this week. Stated understanding.   Denied any acute needs at present time.  Agreeable to f/u calls.  Educated on the use of urgent care or physician’s 24 hr access line if assistance is needed after hours.    Addressed changes since last contact:  none  Discussed follow-up appointments. If no appointment was previously scheduled, appointment scheduling offered: Yes.   Is follow up appointment scheduled within 7 days of discharge? Yes.    Follow Up  Future Appointments   Date Time Provider Department Center   5/15/2024  1:45 PM [facility] ANTICOAGULATION CLINIC [facility] SHERRI Montiel [facility]   2024 10:00 AM SCHEDULE, [facility]   2024  1:00 PM Aimee Molina, APRN - CNP R BANK PAIN [facility]   2024 11:15 AM Terrell Burkett MD PULM &

## 2024-05-15 ENCOUNTER — TELEPHONE (OUTPATIENT)
Dept: PRIMARY CARE CLINIC | Age: 84
End: 2024-05-15

## 2024-05-15 NOTE — TELEPHONE ENCOUNTER
Pt's home health nurse, Joshua, called stating that pt's blood pressure was more elevated than usual.  Joshua sierra it is 174/75.  Joshua sierra she is not showing any symptoms of distress.  Joshua sierra pt did take her Lisinopril last night (5/14/24).      Relayed message to Dr Manzo and as per discussion with Dr Manzo, advised Joshua we could schedule pt today for a blood pressure check here in the office.  Joshua sierra equipment was just delivered to pt's house today and the daughter would be able to monitor the blood pressure there at the house and can call if needed.      Per Pt's daughter, they decline the blood pressure check appt for now and will monitor the blood pressure at home.  Advised daughter to call our office if pt's blood pressure continues to be high or if they have any other concerns.

## 2024-05-20 ENCOUNTER — NURSE ONLY (OUTPATIENT)
Dept: PRIMARY CARE CLINIC | Age: 84
End: 2024-05-20

## 2024-05-20 ENCOUNTER — CARE COORDINATION (OUTPATIENT)
Dept: CASE MANAGEMENT | Age: 84
End: 2024-05-20

## 2024-05-20 VITALS — SYSTOLIC BLOOD PRESSURE: 138 MMHG | DIASTOLIC BLOOD PRESSURE: 70 MMHG

## 2024-05-20 DIAGNOSIS — I10 ESSENTIAL HYPERTENSION: Primary | ICD-10-CM

## 2024-05-20 NOTE — CARE COORDINATION
Care Transitions Follow Up Call    Patient: Shayla Crespo  Patient : 1940   MRN: [id number]  Reason for Admission: PNA of RUL   Discharge Date: 24 RARS: Readmission Risk Score: 16.1      Call to Oralia, patient's daughter for transition call.  VM left stating purpose of call along with my contact information requesting a return call.      Follow Up  Future Appointments   Date Time Provider Department Center   2024  2:00 PM [facility] ANTICOAGULATION CLINIC [facility]   2024  1:00 PM Aimee Molina APRN - CNP R BANK PAIN [facility]   2024 11:15 AM Terrell Burkett MD PULM & CC [facility]   2024  1:00 PM Elba Deleon APRN - CNP [facility]   2024 10:00 AM Stefany Manzo MD LOVE PC [facility]   2025 10:30 AM Stefany Manzo MD LOVE [facility]      Care Transitions Subsequent and Final Call    Subsequent and Final Calls  Care Transitions Interventions  Other Interventions:             Bianca Vo RN

## 2024-05-21 ENCOUNTER — CARE COORDINATION (OUTPATIENT)
Dept: CASE MANAGEMENT | Age: 84
End: 2024-05-21

## 2024-05-21 DIAGNOSIS — I10 ESSENTIAL HYPERTENSION: Primary | ICD-10-CM

## 2024-05-21 NOTE — CARE COORDINATION
Care Transitions Follow Up Call    Patient Current Location:  Home: [address] [address]    Care Transition Nurse contacted the patient by telephone to follow up after admission.  Verified name and  with patient as identifiers.    Patient: Shayla Crespo  Patient : 1940   MRN: [id number]  Reason for Admission: PNA of RUL   Discharge Date: 24 RARS: Readmission Risk Score: 16.1      Needs to be reviewed by the provider   Additional needs identified to be addressed with provider: No  none         Method of communication with provider: none.    Call to daughterOralia and left message stating purpose of call along with my contact information requesting a return call.    Call to alternate number and reached patient at [phone]. Stated that Oralia is preparing her home to sell and has been busy with moving. Patient is doing well, breathing better, and getting stronger every day. Stated she is doing more and having some back pain d/t increased activity. Not new for her. Patient scheduled to have therapist and nurse scheduled this afternoon.   Confirmed that RPM arrived and equipment was set up over the weekend. Naomie set everything up and nurseMohini is scheduled today to visit at 3pm. Patient's home care nurse is going to review equipment usage with patient today at visit.   Denied any acute needs at present time.  Agreeable to f/u calls.  Educated on the use of urgent care or physician’s 24 hr access line if assistance is needed after hours.      Remote Patient Monitoring Welcome Note Date/Time: 2024 11:54 AM Patient Current Location: Home: [address] [place] [address] Verified patients name and  as identifiers. Completed and confirmed the following: Emergency Contact: Oralia Richardson (daughter) [phone] [x] Patient received all RPM equipment (tablet, scale, blood pressure device and cuff, and pulse oximeter)  Cuff Size: regular (9.05\"-15.74\")  Weight Scale: regular

## 2024-05-22 ENCOUNTER — CARE COORDINATION (OUTPATIENT)
Dept: CASE MANAGEMENT | Age: 84
End: 2024-05-22

## 2024-05-22 NOTE — CARE COORDINATION
Care Transitions Follow Up Call    Patient Current Location:  Home: [address] [place] [address]    Care Transition Nurse contacted the patient by telephone to follow up after admission.  Verified name and  with family as identifiers.    Patient: Shayla Crespo  Patient : 1940   MRN: [id number]  Reason for Admission: PNA of RUL   Discharge Date: 24 RARS: Readmission Risk Score: 16.1      Needs to be reviewed by the provider   Additional needs identified to be addressed with provider: No  none         Method of communication with provider: none.    Call to Oralia, daughter- HIPAA verified and apologized for being so busy. Stated RPM equipment has been delivered and nephew set up for patient over the weekend. Home care nurse visited yesterday, but did not use RPM equipment for vital sign check. Oralia stated she is going to visit patient this evening and would be sure to get tablet turned on and set up. Denied any acute needs at present time.  Agreeable to f/u calls.  Educated on the use of urgent care or physician’s 24 hr access line if assistance is needed after hours.      Addressed changes since last contact:  none  Discussed follow-up appointments. If no appointment was previously scheduled, appointment scheduling offered: Yes.   Is follow up appointment scheduled within 7 days of discharge? Yes.    Follow Up  Future Appointments   Date Time Provider Department Center   2024  2:00 PM [facility] ANTICOAGULATION CLINIC [facility]   2024  1:00 PM Aimee Molina, APRN - CNP R BANK PAIN [facility]   2024 11:15 AM Terrell Burkett MD PULM & CC [facility]   2024  1:00 PM Elba Deleon APRN - JONATHON [facility]   2024 10:00 AM Stefany Manzo MD LOVE PC [facility]   2025 10:30 AM Stefany Manzo MD LOVE PC [facility]     External follow up appointment(s):     Care Transition Nurse reviewed discharge instructions, medical action plan, and red flags with family and discussed any

## 2024-05-23 ENCOUNTER — CARE COORDINATION (OUTPATIENT)
Dept: CASE MANAGEMENT | Age: 84
End: 2024-05-23

## 2024-05-23 NOTE — CARE COORDINATION
Date/Time:  5/23/2024 10:02 AM    LPN attempted to reach patient by telephone regarding red alert in remote patient monitoring program for PO of 91%, and BP of 188/88. Left HIPPA compliant message requesting a return call. Will attempt to reach patient again.     Thao Thorne LPN, [facility]  PH: [phone]

## 2024-05-24 ENCOUNTER — CARE COORDINATION (OUTPATIENT)
Dept: CASE MANAGEMENT | Age: 84
End: 2024-05-24

## 2024-05-24 NOTE — CARE COORDINATION
Date/Time:  5/24/2024 12:00 PM    LPN attempted to reach patient by telephone regarding yellow alert in remote patient monitoring program for BP of 175/87. Unable to leave a HIPPA compliant message requesting a return call. Will attempt to reach patient again.       Thao Thorne LPN, [facility]  PH: [phone]

## 2024-05-24 NOTE — CARE COORDINATION
Care Transitions Follow Up Call    Patient Current Location:  Home: [address] [place] [address]    Care Transition Nurse contacted the patient by telephone to follow up after admission.  Verified name and  with patient and family as identifiers.    Patient: Shayla Crespo  Patient : 1940   MRN: [id number]  Reason for Admission: PNA of RUL   Discharge Date: 24 RARS: Readmission Risk Score: 16.1      Needs to be reviewed by the provider   Additional needs identified to be addressed with provider: No  none         Method of communication with provider: none.    Patient answered call and verified . Patient pleasant and agreeable to transition call. Doing well since last contact and getting stronger. Patient reviewed RPM results and \"feeling good\". Denied any dizziness, CP, or SOB. Stated that daughter Oralia had just left to  a piece for the pool, but when she returns back home they would recheck BP. Patient stated that is what happened yesterday as well.   Discussed transfer to [facility], Tamica Hughes and instructed to continue to monitor vital signs with RPM program. Denied any acute needs at present time.  Agreeable to f/u calls.  Educated on the use of urgent care or physician’s 24 hr access line if assistance is needed after hours.     Addressed changes since last contact:      Discussed follow-up appointments. If no appointment was previously scheduled, appointment scheduling offered: Yes.   Is follow up appointment scheduled within 7 days of discharge? Yes.    Follow Up  Future Appointments   Date Time Provider Department Center   2024  2:00 PM [facility] ANTICOAGULATION CLINIC [facility] [place]   2024  1:00 PM Aimee Molina APRN - CNP R BANK PAIN [facility]   2024 11:15 AM Terrell Burkett MD PULM & CC [facility]   2024  1:00 PM Elba Deleon APRN - CNP [facility]   2024 10:00 AM Stefany Manzo MD LOVE PC [facility]   2025 10:30 AM Stefany Manzo MD

## 2024-05-29 LAB — INR BLD: 2.2

## 2024-05-31 ENCOUNTER — HOSPITAL ENCOUNTER (OUTPATIENT)
Dept: GENERAL RADIOLOGY | Age: 84
Discharge: HOME | End: 2024-05-31
Payer: MEDICARE

## 2024-05-31 ENCOUNTER — ANTI-COAG VISIT (OUTPATIENT)
Dept: PHARMACY | Age: 84
End: 2024-05-31

## 2024-05-31 ENCOUNTER — OFFICE VISIT (OUTPATIENT)
Dept: URGENT CARE | Age: 84
End: 2024-05-31

## 2024-05-31 VITALS
HEIGHT: 60 IN | SYSTOLIC BLOOD PRESSURE: 129 MMHG | OXYGEN SATURATION: 97 % | DIASTOLIC BLOOD PRESSURE: 66 MMHG | WEIGHT: 125 LBS | HEART RATE: 67 BPM | BODY MASS INDEX: 24.54 KG/M2 | TEMPERATURE: 99.3 F

## 2024-05-31 VITALS
SYSTOLIC BLOOD PRESSURE: 152 MMHG | OXYGEN SATURATION: 95 % | WEIGHT: 122.4 LBS | BODY MASS INDEX: 23.9 KG/M2 | DIASTOLIC BLOOD PRESSURE: 83 MMHG | HEART RATE: 66 BPM
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
## 2024-05-31 DIAGNOSIS — S20.211A CONTUSION OF RIB ON RIGHT SIDE, INITIAL ENCOUNTER: Primary | ICD-10-CM

## 2024-05-31 DIAGNOSIS — Z86.718 HISTORY OF DVT OF LOWER EXTREMITY: Primary | ICD-10-CM

## 2024-05-31 DIAGNOSIS — R07.81 RIB PAIN ON RIGHT SIDE: ICD-10-CM

## 2024-05-31 PROCEDURE — 71101 X-RAY EXAM UNILAT RIBS/CHEST: CPT

## 2024-05-31 NOTE — PROGRESS NOTES
family were informed of the results of any tests, a time was given to answer questions, a plan was proposed and they agreed with plan. Reviewed AVS with treatment instructions and answered questions - pt/family expresses understanding and agreement with the discussed treatment plan and AVS instructions.      SUBJECTIVE/OBJECTIVE:  HPI:   83 y.o. female presents with her daughter (care taker) and grandson for complaint of rib pain after a fall x 2 days ago.    States was doing laundry when she fell against a laundry cart after slipping - landed against her right ribs/chest area.     Notes did fall down all the way, denies hitting her head, but notes a mild scratch from sliding against the laundry cart. Denies any LOC, headaches, dizziness, n/v, or increased confusion.    Admits right rib pain of the lower chest, pain with touching the area, discomfort with deep breaths, coughing, or laughing, notes tenderness with rotation and bending forward.  Denies symptoms of bruising, redness, cuts, scrapes over the chest, increased SOB, and other chest pain.    Nothing makes symptoms better. Did attempt a back brace to help with compression over the area, but notes it did not help.    Has taken her usual regimen of Tylenol arthritis and hydrocodone for symptoms without any relief.      History provided by:  Patient   used: No        VITAL SIGNS  Vitals:    05/31/24 1226 05/31/24 1242   BP: (!) 153/72 129/66   Site: Right Upper Arm Right Upper Arm   Position: Sitting Sitting   Cuff Size: Medium Adult Medium Adult   Pulse: 67    Temp: 99.3 °F (37.4 °C)    TempSrc: Oral    SpO2: 97%    Weight: 56.7 kg (125 lb)    Height: 1.524 m (5')        Review of Systems  See HPI for pertinent positives and negatives.    Physical Exam  Vitals reviewed.   Constitutional:       Appearance: Normal appearance.   HENT:      Head: Normocephalic and atraumatic.      Right Ear: External ear normal.      Left Ear: External ear

## 2024-05-31 NOTE — PROGRESS NOTES
Ms. Crespo is here for management of anticoagulation for hx of DVT x 2 separate occasions.    PMH also significant for HTN.   She presents today w/out complaint.  Pt verifies dosing regimen as listed above.  Pt denies s/s bleeding/swelling/SOB.  No missed doses.   No changes in Rx/OTCs/Herbal medications.   Occasional EtOH use and denies tobacco.    Pt has [facility] after recent hospitalization    [facility] called with INR of 2.2. Patient has been taking warfarin 7.5 mg daily    INR 2.2 is within the acceptable therapeutic range of 2-3.  Recommend to continue with 7.5 mg daily  Pt has the 5 mg tablets.  Will continue to monitor and check INR in 2 weeks.   Dosing reminder card given with phone number, appointment date and time.   Return to clinic: [facility] to call ~24    Referring physician: Dr. Liz  Referral date: 3/20/23    Brenden Landa, PharmD  2024 at 3:10 PM      For Pharmacy Admin Tracking Only    Intervention Detail: Adherence Monitorin  Total # of Interventions Recommended: 1  Total # of Interventions Accepted: 1  Time Spent (min): 15

## 2024-05-31 NOTE — PATIENT INSTRUCTIONS
Follow up with the x-ray order of the ribs to rule out any rib fractures.  We will call you with the x-ray results once they return, and will provide you with a treatment plan based on those findings.  Continue with your pain control regiment to help with pain symptoms.  Recommend hugging a pillow when doing tasks that can cause increased rib pain (coughing, bending forward, standing up, laughing).  Recommend using your incentive spirometer to help keep the lungs open during the healing.  Warm or cold compresses over the painful ribs to help with inflammation and discomfort  15-20 minutes on, with 30-60 minutes between applications.  Can trial your lidocaine patches over the area of pain to help decrease discomfort - use as directed.  Do not recommend binding your ribs, this will not help with recovery and can increase lung complications.

## 2024-06-01 DIAGNOSIS — I10 ESSENTIAL HYPERTENSION: ICD-10-CM

## 2024-06-03 ENCOUNTER — TELEPHONE (OUTPATIENT)
Dept: PRIMARY CARE CLINIC | Age: 84
End: 2024-06-03

## 2024-06-03 RX ORDER — LISINOPRIL 5 MG/1
5 TABLET ORAL DAILY
Qty: 90 TABLET | Refills: 0 | Status: SHIPPED | OUTPATIENT
Start: 2024-06-03

## 2024-06-03 NOTE — TELEPHONE ENCOUNTER
Pt's daughter, Oralia, called to let Dr Manzo know that pt slipped and fell in their laundry room on 5/29/24.  Oralia sts she took pt to an urgent care on 5/31/24 and pt was diagnosed with bruised ribs.  Office visit was offered for pt but pt's daughter declined stating that the urgent care told them to give it a couple of weeks.  Oralia sierra pt is still pretty sore from the fall.  Oralia agrees to call office if they change their mind and would like to come in and see Dr Manzo.

## 2024-06-03 NOTE — TELEPHONE ENCOUNTER
----- Message from Stefany Manzo MD sent at 5/21/2024  8:42 AM EDT -----  Please call to remind her to do BMP - not fasting, ordered and in chart. Thank you!    Copied to CM as well.

## 2024-06-03 NOTE — TELEPHONE ENCOUNTER
Pt's daughter, Oralia, called stating she will be taking pt to have the labwork completed on 6/5/24 when they are out running errands.

## 2024-06-05 ENCOUNTER — OFFICE VISIT (OUTPATIENT)
Dept: PAIN MANAGEMENT | Age: 84
End: 2024-06-05

## 2024-06-05 VITALS
WEIGHT: 126 LBS | HEART RATE: 55 BPM | SYSTOLIC BLOOD PRESSURE: 146 MMHG | BODY MASS INDEX: 24.61 KG/M2 | TEMPERATURE: 97 F | OXYGEN SATURATION: 97 % | DIASTOLIC BLOOD PRESSURE: 73 MMHG

## 2024-06-05 DIAGNOSIS — M47.816 LUMBAR FACET ARTHROPATHY: ICD-10-CM

## 2024-06-05 DIAGNOSIS — F33.42 RECURRENT MAJOR DEPRESSIVE DISORDER, IN FULL REMISSION (HCC): ICD-10-CM

## 2024-06-05 DIAGNOSIS — M50.90 CERVICAL DISC DISEASE: ICD-10-CM

## 2024-06-05 DIAGNOSIS — Z51.81 ENCOUNTER FOR THERAPEUTIC DRUG MONITORING: ICD-10-CM

## 2024-06-05 DIAGNOSIS — M05.7A RHEUMATOID ARTHRITIS OF OTHER SITE WITH POSITIVE RHEUMATOID FACTOR (HCC): ICD-10-CM

## 2024-06-05 DIAGNOSIS — M48.061 SPINAL STENOSIS OF LUMBAR REGION WITHOUT NEUROGENIC CLAUDICATION: ICD-10-CM

## 2024-06-05 DIAGNOSIS — M96.1 FAILED BACK SURGICAL SYNDROME: ICD-10-CM

## 2024-06-05 DIAGNOSIS — M51.36 DDD (DEGENERATIVE DISC DISEASE), LUMBAR: ICD-10-CM

## 2024-06-05 DIAGNOSIS — G89.4 CHRONIC PAIN SYNDROME: ICD-10-CM

## 2024-06-05 DIAGNOSIS — E66.09 CLASS 1 OBESITY DUE TO EXCESS CALORIES IN ADULT, UNSPECIFIED BMI, UNSPECIFIED WHETHER SERIOUS COMORBIDITY PRESENT: ICD-10-CM

## 2024-06-05 DIAGNOSIS — M25.521 ELBOW PAIN, RIGHT: ICD-10-CM

## 2024-06-05 DIAGNOSIS — M43.10 DEGENERATIVE SPONDYLOLISTHESIS: ICD-10-CM

## 2024-06-05 PROBLEM — Z00.00 HEALTHCARE MAINTENANCE: Status: RESOLVED | Noted: 2018-12-17 | Resolved: 2024-06-05

## 2024-06-05 RX ORDER — HYDROCODONE BITARTRATE AND ACETAMINOPHEN 7.5; 325 MG/1; MG/1
1 TABLET ORAL EVERY 8 HOURS PRN
Qty: 90 TABLET | Refills: 0 | Status: SHIPPED | OUTPATIENT
Start: 2024-06-05 | End: 2024-07-05

## 2024-06-05 RX ORDER — DULOXETIN HYDROCHLORIDE 60 MG/1
60 CAPSULE, DELAYED RELEASE ORAL DAILY
Qty: 90 CAPSULE | Refills: 0 | Status: SHIPPED | OUTPATIENT
Start: 2024-06-05 | End: 2024-09-03

## 2024-06-05 NOTE — PROGRESS NOTES
Shayla Crespo  1940  [id number]    HISTORY OF PRESENT ILLNESS: Ms. Crespo is a 83 y.o. female returns for a follow up visit for pain management  She has a diagnosis of   1. Chronic pain syndrome    2. Cervical disc disease    3. DDD (degenerative disc disease), lumbar    4. Spinal stenosis of lumbar region without neurogenic claudication    5. Lumbar facet arthropathy    6. Degenerative spondylolisthesis    7. Failed back surgical syndrome    8. Elbow pain, right    9. Rheumatoid arthritis of other site with positive rheumatoid factor (HCC)    10. Recurrent major depressive disorder, in full remission (HCC)    11. Class 1 obesity due to excess calories in adult, unspecified BMI, unspecified whether serious comorbidity present    12. Encounter for therapeutic drug monitoring      As per Information Obtained from the PADT (Patient Assessment and Documentation Tool)    She complains of pain in the  lower back  She rates the pain 5/10 and describes it as aching. Current treatment regimen has helped relieve about 40% of the pain.  She denies any side effects from the current pain regimen. Patient reports that since the last follow up visit the physical functioning is unchanged, family/social relationships are unchanged, mood is better sleep patterns are better, and that the overall functioning is unchanged.  Patient denies misusing/abusing her narcotic pain medications or using any illegal drugs.      Upon obtaining medical history from Ms. Crespo states that pain is manageable on current pain therapy. Takes the pain medications as prescribed. Mood/anxiety is stable. Sleep is fair with an average of 5-6 hours. Denies to having issues of constipation. Tolerating activities/house chores with moderate tenderness to the lower back.     ALLERGIES: Patients list of allergies were reviewed     MEDICATIONS: Ms. Crespo list of medications were reviewed.Her current medications are   Outpatient Medications Prior to Visit

## 2024-06-06 ENCOUNTER — OFFICE VISIT (OUTPATIENT)
Dept: PRIMARY CARE CLINIC | Age: 84
End: 2024-06-06

## 2024-06-06 VITALS
DIASTOLIC BLOOD PRESSURE: 84 MMHG | HEART RATE: 60 BPM | SYSTOLIC BLOOD PRESSURE: 144 MMHG | WEIGHT: 125.4 LBS | TEMPERATURE: 99 F | BODY MASS INDEX: 24.62 KG/M2 | RESPIRATION RATE: 18 BRPM | HEIGHT: 60 IN | OXYGEN SATURATION: 95 %

## 2024-06-06 DIAGNOSIS — I10 ESSENTIAL HYPERTENSION: Primary | ICD-10-CM

## 2024-06-06 DIAGNOSIS — K21.9 GASTROESOPHAGEAL REFLUX DISEASE, UNSPECIFIED WHETHER ESOPHAGITIS PRESENT: ICD-10-CM

## 2024-06-06 PROBLEM — R10.13 EPIGASTRIC ABDOMINAL PAIN: Status: ACTIVE | Noted: 2024-06-06

## 2024-06-06 RX ORDER — PANTOPRAZOLE SODIUM 40 MG/1
40 TABLET, DELAYED RELEASE ORAL DAILY
Qty: 90 TABLET | Refills: 3 | Status: SHIPPED | OUTPATIENT
Start: 2024-06-06

## 2024-06-06 ASSESSMENT — ENCOUNTER SYMPTOMS: COUGH: 0

## 2024-06-06 NOTE — ASSESSMENT & PLAN NOTE
Well controlled for her, balancing risk of falls and BP control. Counseled on correct way to take BP at home, will call to check in 2 weeks from now to ensure it is not above goal below. She was amenable to this plan. For her rib pain, she will also use incentive spirometer TID PRN. Fall/call back/ED precautions discussed.    Blood Pressure Goal:  [ ] < 130/80 (ACC/AHA)  [ ] < 140/90 (JNC-8 for age < 60, or CKD, or DM)  [x] < 150/90 (JNC-8 for age > 60)

## 2024-06-06 NOTE — PATIENT INSTRUCTIONS
Today, we discussed the proper technique for taking a blood pressure at home. To review, it is as follows: back supported, feet flat on ground, leg uncrossed, fists unclenched with bare upper arm at supported at the level of heart, with no moving or talking. It is advised not to consume caffeine prior and to sit at rest for 5 minutes prior to taking blood pressure.     Goal for blood pressure is less than 150/90.

## 2024-06-06 NOTE — PROGRESS NOTES
Right Upper Arm   Position: Sitting Sitting   Cuff Size: Medium Adult Small Adult   Pulse: 60    Resp: 18    Temp: 99 °F (37.2 °C)    TempSrc: Oral    SpO2: 95%    Weight: 56.9 kg (125 lb 6.4 oz)    Height: 1.524 m (5')      Physical Exam  Constitutional:       General: She is not in acute distress.     Appearance: Normal appearance. She is not ill-appearing, toxic-appearing or diaphoretic.   HENT:      Head: Normocephalic and atraumatic.      Right Ear: External ear normal.      Left Ear: External ear normal.   Eyes:      General: No scleral icterus.  Cardiovascular:      Rate and Rhythm: Normal rate and regular rhythm.      Pulses: Normal pulses.      Heart sounds: Normal heart sounds. No murmur heard.     No friction rub. No gallop.   Pulmonary:      Effort: Pulmonary effort is normal. No respiratory distress.      Breath sounds: Normal breath sounds. No stridor. No wheezing, rhonchi or rales.   Chest:      Chest wall: No tenderness.   Abdominal:      Palpations: Abdomen is soft.   Skin:     General: Skin is warm and dry.      Capillary Refill: Capillary refill takes less than 2 seconds.      Comments: Right ribs - normal overlying skin, no bruising, TTP diffuse.   Neurological:      Mental Status: She is alert.       Body mass index is 24.49 kg/m².    Labs:  Recent Results (from the past 672 hour(s))   Echo (TTE) complete (PRN contrast/bubble/strain/3D)    Collection Time: 05/13/24  8:15 AM   Result Value Ref Range    LA Minor Axis 6.6 cm    LA Major Axis 6.7 cm    LA Area 2C 24.3 cm2    LA Area 4C 24.5 cm2    LA Volume MOD A2C 74 (A) 22 - 52 mL    LA Volume MOD A4C 75 (A) 22 - 52 mL    LA Volume BP 74 (A) 22 - 52 mL    LA Diameter 3.7 cm    RA Area 4C 11.3 cm2    RA Volume 22 ml    AR .0 ms    AR Max Velocity PISA 5.0 m/s    AV Peak Velocity 2.5 m/s    AV Peak Gradient 24 mmHg    AV Mean Gradient 14 mmHg    AV VTI 53.9 cm    AV Mean Velocity 1.7 m/s    Aortic Root 2.6 cm    IVSd 1.4 (A) 0.6 - 0.9 cm

## 2024-06-06 NOTE — ASSESSMENT & PLAN NOTE
Poorly controlled GERD. Doing well on PPI. Previously counseled on long-term risks of this medication, including but not limited to, nutrient deficiencies, increase osteoporosis risk, fractures, hepatic and renal impairment, pancreatitis and C, Diff risk and even possibly gastric cancer risk as we are continuing to learn. Refilled medication as noted.

## 2024-06-07 ENCOUNTER — CARE COORDINATION (OUTPATIENT)
Dept: CASE MANAGEMENT | Age: 84
End: 2024-06-07

## 2024-06-07 NOTE — CARE COORDINATION
Remote Patient Monitoring Note      Date/Time:  6/7/2024 4:01 PM  Shayla Crespo , I am a nurse with the remote patient monitoring team;  reaching out to you today to remind you to please take your vitals. Important: Please try to take your vitals each day before 12pm. This ensures the monitoring team will have time to connect with your providers and get back to you with any new orders or instructions. Please enter all of your vitals each day. Please ensure that your tablet is plugged in, turned on and charging. If you are having issues with your equipment, please call: [phone]. We are not receiving your weight vitals.  Be well,    Thao Thorne LPN, [facility]  PH: [phone]

## 2024-06-10 ENCOUNTER — CARE COORDINATION (OUTPATIENT)
Dept: CARE COORDINATION | Age: 84
End: 2024-06-10

## 2024-06-10 ENCOUNTER — CARE COORDINATION (OUTPATIENT)
Dept: CASE MANAGEMENT | Age: 84
End: 2024-06-10

## 2024-06-10 NOTE — CARE COORDINATION
ACM outreach made for CTN handoff. Spoke briefly with patient's daughter, Oralia who states she just ran out to the store. ACM made introductions and explained role, agreeable to further outreaches. States that patient is doing well. Patient's daughter lives with patient. Patiens uses a cane mostly. Eating, drinking and taking meds as directed. No recent falls since the last one 2 weeks ago. Patient enrolled in RPM although sometimes daughter states she misses it and apologizes. VS WNL today. No concern at this time.    Plan   Follow up 1 week  [facility]  Med rec  AD

## 2024-06-10 NOTE — CARE COORDINATION
Remote Patient Monitoring Note      Date/Time:  6/10/2024 4:02 PM  Shayla Crespo , I am a nurse with the remote patient monitoring team;  reaching out to you today to remind you to please take your vitals. Important: Please try to take your vitals each day before 12pm. This ensures the monitoring team will have time to connect with your providers and get back to you with any new orders or instructions. Please enter all of your vitals each day. Please ensure that your tablet is plugged in, turned on and charging. If you are having issues with your equipment, please call: [phone].    Be well,    Thao Thorne LPN, [facility]  PH: [phone]

## 2024-06-11 ENCOUNTER — CARE COORDINATION (OUTPATIENT)
Dept: CARE COORDINATION | Facility: CLINIC | Age: 84
End: 2024-06-11

## 2024-06-11 ENCOUNTER — OFFICE VISIT (OUTPATIENT)
Dept: PULMONOLOGY | Age: 84
End: 2024-06-11

## 2024-06-11 VITALS
DIASTOLIC BLOOD PRESSURE: 80 MMHG | OXYGEN SATURATION: 98 % | HEART RATE: 78 BPM | SYSTOLIC BLOOD PRESSURE: 128 MMHG | WEIGHT: 119 LBS | BODY MASS INDEX: 23.36 KG/M2 | HEIGHT: 60 IN

## 2024-06-11 DIAGNOSIS — M05.79 RHEUMATOID ARTHRITIS INVOLVING MULTIPLE SITES WITH POSITIVE RHEUMATOID FACTOR (HCC): ICD-10-CM

## 2024-06-11 DIAGNOSIS — I26.99 RECURRENT PULMONARY EMBOLISM (HCC): ICD-10-CM

## 2024-06-11 DIAGNOSIS — J84.10 PULMONARY FIBROSIS (HCC): Primary | ICD-10-CM

## 2024-06-11 RX ORDER — ALBUTEROL SULFATE 90 UG/1
2 AEROSOL, METERED RESPIRATORY (INHALATION) 4 TIMES DAILY PRN
Qty: 54 G | Refills: 2 | Status: SHIPPED | OUTPATIENT
Start: 2024-06-11

## 2024-06-11 RX ORDER — FLUTICASONE FUROATE AND VILANTEROL 100; 25 UG/1; UG/1
1 POWDER RESPIRATORY (INHALATION) DAILY
Qty: 3 EACH | Refills: 2 | Status: CANCELLED | OUTPATIENT
Start: 2024-06-11

## 2024-06-11 ASSESSMENT — ENCOUNTER SYMPTOMS
CHOKING: 0
APNEA: 0
SORE THROAT: 0
ABDOMINAL PAIN: 0
BLOOD IN STOOL: 0
SINUS PRESSURE: 0
VOMITING: 0
COLOR CHANGE: 0
WHEEZING: 0
VOICE CHANGE: 0
RHINORRHEA: 0
STRIDOR: 0
CHEST TIGHTNESS: 0
CONSTIPATION: 0
DIARRHEA: 0
COUGH: 1
SHORTNESS OF BREATH: 1
ABDOMINAL DISTENTION: 0

## 2024-06-11 NOTE — CARE COORDINATION
Remote Patient Monitoring Note      Date/Time:  6/11/2024 3:50 PM  Patient Current Location: [place]  LPN attempted to contact patient by telephone regarding yellow alert received for no BP or weight taken > 2 days. Metrics have not been updated x 3 days. Unable to reach pt and unable to leave message.    Background: Pt enrolled in RPM r/t HTN.    Plan/Follow Up: Will continue to review, monitor and address alerts with follow up based on severity of symptoms and risk factors.

## 2024-06-11 NOTE — PROGRESS NOTES
Rate and Rhythm: Normal rate and regular rhythm.      Heart sounds: Normal heart sounds. No murmur heard.     No friction rub.   Pulmonary:      Effort: No respiratory distress.      Breath sounds: Rales present. No wheezing or rhonchi.   Chest:      Chest wall: No tenderness.   Abdominal:      General: There is no distension.      Palpations: There is no mass.      Tenderness: There is no abdominal tenderness. There is no guarding or rebound.   Musculoskeletal:         General: No swelling, tenderness or deformity.   Lymphadenopathy:      Cervical: No cervical adenopathy.   Skin:     Coloration: Skin is not pale.      Findings: No erythema, lesion or rash.   Neurological:      Mental Status: She is alert and oriented to person, place, and time. Mental status is at baseline.      Motor: No abnormal muscle tone.         Health Maintenance   Topic Date Due    Respiratory Syncytial Virus (RSV) Pregnant or age 60 yrs+ (1 - 1-dose 60+ series) Never done    COVID-19 Vaccine (3 - 2023-24 season) 09/01/2023    Lipids  01/10/2025    Depression Monitoring  05/06/2025    DTaP/Tdap/Td vaccine (2 - Td or Tdap) 12/12/2028    DEXA (modify frequency per FRAX score)  Completed    Annual Wellness Visit (Medicare Advantage)  Completed    Flu vaccine  Completed    Shingles vaccine  Completed    Pneumococcal 65+ years Vaccine  Completed    Hepatitis A vaccine  Aged Out    Hepatitis B vaccine  Aged Out    Hib vaccine  Aged Out    Polio vaccine  Aged Out    Meningococcal (ACWY) vaccine  Aged Out    Hepatitis C screen  Discontinued        Assessment/Plan:    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  Severe aortic stenosis status post TAVR on 4/2.  Patient was bridged with Lovenox perioperatively.    PFT from March 2024 shows restriction with FVC of 1.51 L [72% predicted], TLC 61% predicted and DLCO 56% predicted and this is likely related to patient's pulmonary fibrosis.  Noted

## 2024-06-17 ENCOUNTER — CARE COORDINATION (OUTPATIENT)
Dept: CARE COORDINATION | Age: 84
End: 2024-06-17

## 2024-06-17 ENCOUNTER — TELEPHONE (OUTPATIENT)
Dept: PRIMARY CARE CLINIC | Age: 84
End: 2024-06-17

## 2024-06-17 SDOH — SOCIAL STABILITY: SOCIAL NETWORK
DO YOU BELONG TO ANY CLUBS OR ORGANIZATIONS SUCH AS CHURCH GROUPS UNIONS, FRATERNAL OR ATHLETIC GROUPS, OR SCHOOL GROUPS?: NO

## 2024-06-17 SDOH — ECONOMIC STABILITY: INCOME INSECURITY: HOW HARD IS IT FOR YOU TO PAY FOR THE VERY BASICS LIKE FOOD, HOUSING, MEDICAL CARE, AND HEATING?: NOT HARD AT ALL

## 2024-06-17 SDOH — ECONOMIC STABILITY: HOUSING INSECURITY: IN THE LAST 12 MONTHS, HOW MANY PLACES HAVE YOU LIVED?: 1

## 2024-06-17 SDOH — ECONOMIC STABILITY: FOOD INSECURITY: WITHIN THE PAST 12 MONTHS, THE FOOD YOU BOUGHT JUST DIDN'T LAST AND YOU DIDN'T HAVE MONEY TO GET MORE.: NEVER TRUE

## 2024-06-17 SDOH — SOCIAL STABILITY: SOCIAL NETWORK: ARE YOU MARRIED, WIDOWED, DIVORCED, SEPARATED, NEVER MARRIED, OR LIVING WITH A PARTNER?: WIDOWED

## 2024-06-17 SDOH — HEALTH STABILITY: PHYSICAL HEALTH: ON AVERAGE, HOW MANY MINUTES DO YOU ENGAGE IN EXERCISE AT THIS LEVEL?: 30 MIN

## 2024-06-17 SDOH — HEALTH STABILITY: MENTAL HEALTH: HOW OFTEN DO YOU HAVE A DRINK CONTAINING ALCOHOL?: NEVER

## 2024-06-17 SDOH — SOCIAL STABILITY: SOCIAL NETWORK: HOW OFTEN DO YOU ATTEND CHURCH OR RELIGIOUS SERVICES?: NEVER

## 2024-06-17 SDOH — ECONOMIC STABILITY: FOOD INSECURITY: WITHIN THE PAST 12 MONTHS, YOU WORRIED THAT YOUR FOOD WOULD RUN OUT BEFORE YOU GOT MONEY TO BUY MORE.: NEVER TRUE

## 2024-06-17 SDOH — HEALTH STABILITY: MENTAL HEALTH: HOW MANY STANDARD DRINKS CONTAINING ALCOHOL DO YOU HAVE ON A TYPICAL DAY?: PATIENT DOES NOT DRINK

## 2024-06-17 SDOH — ECONOMIC STABILITY: INCOME INSECURITY: IN THE LAST 12 MONTHS, WAS THERE A TIME WHEN YOU WERE NOT ABLE TO PAY THE MORTGAGE OR RENT ON TIME?: NO

## 2024-06-17 SDOH — SOCIAL STABILITY: SOCIAL NETWORK: HOW OFTEN DO YOU ATTENT MEETINGS OF THE CLUB OR ORGANIZATION YOU BELONG TO?: NEVER

## 2024-06-17 SDOH — SOCIAL STABILITY: SOCIAL NETWORK
IN A TYPICAL WEEK, HOW MANY TIMES DO YOU TALK ON THE PHONE WITH FAMILY, FRIENDS, OR NEIGHBORS?: MORE THAN THREE TIMES A WEEK

## 2024-06-17 SDOH — HEALTH STABILITY: PHYSICAL HEALTH: ON AVERAGE, HOW MANY DAYS PER WEEK DO YOU ENGAGE IN MODERATE TO STRENUOUS EXERCISE (LIKE A BRISK WALK)?: 7 DAYS

## 2024-06-17 SDOH — SOCIAL STABILITY: SOCIAL NETWORK: HOW OFTEN DO YOU GET TOGETHER WITH FRIENDS OR RELATIVES?: MORE THAN THREE TIMES A WEEK

## 2024-06-17 NOTE — TELEPHONE ENCOUNTER
Pt's daughter, Oralia, called stating that yesterday morning (6/16/24), when she went to check pt's vital signs, the O2 level was registering at 70%.  Oralia sierra she found an oxygen tank that her father had used and administered oxygen for her mom for 30 minutes to see if it would help.  Oralia [facility] pt did not complain of shortness of breath or any chest pain.  Oralia [facility] pt is just stating she is tired.  Oralia [facility] pt slept most of 6/16/24.  Oralia sierra the rest of the day, pt's oxygen level was between 93-97%.      Advised Oralia that Dr Manzo was not available at this current time but message was relayed to Dr Manzo.  As per discussion with Dr Manzo, confirmed that pt did not complain of any chest pain or shortness of breath.  Oralia confirmed that the equipment used for pt's vital signs are from the hospital and the batteries were new.  Oralia sierra pt always has cold fingers and is just hoping that low reading was a \"fluke\".  As per discussion with Dr Manzo, advised Oralia to ensure pt's fingers are warmed up and to have pt move around.  As per discussion with Dr Manzo, advised Oralia to monitor pt's oxygen levels after 15 minutes of rest from moving around and to call our office if the oxygen level drops below 94% (when pt is at rest)      Oralia amendable to plan and agrees to call office today if pt's oxygen level drops below 94%.  Advised Oralia to call office with any other questions or concerns.

## 2024-06-17 NOTE — CARE COORDINATION
ACM outreach made for follow up with no answer. Left VM to return call. Will attempt at a later date.    Ambulatory Care Coordination Note  2024    Patient Current Location:  Home: [address] [place] [address]     ACM contacted the family by telephone. Verified name and  with family as identifiers. Provided introduction to self, and explanation of the ACM role.     Challenges to be reviewed by the provider   Additional needs identified to be addressed with provider: No  none               Method of communication with provider: none.    ACM: Tamica Hughes, RN    ACM received call back from patient's daughter. States that her mother is doing fine. Had an incident yesterday when O2 sat was in the 70s% but thinks it was a mistake as all of the other times she checked, O2 was normal. Patient denied any symptoms. Patient monitoring with RPM. Vitals stable. Patient eating, drinking and taking all meds as directed. Patient has HCC coming in (RN/OT) and has RN check INR. Patient continues with some SOB, saw pulmonologist recently, no changes made. Should patient not be able to make her own decisions, patient's son, Stefani Serra is POA. SDOH completed.      Advance Care Planning   Healthcare Decision Maker:    Primary Decision Maker: clifton young - Child - [phone]    Primary Decision Maker: Fiorella Sesay - Child - [phone]    Primary Decision Maker: stefani serra - Child - [phone]    Click here to complete Healthcare Decision Makers including selection of the Healthcare Decision Maker Relationship (ie \"Primary\").  Today we documented Decision Maker(s) consistent with Legal Next of Kin hierarchy.       If the relationship to the patient does NOT follow our state's Next of Kin hierarchy, the patient MUST complete an ACP Document to allow him/her to act on the patient's behalf. :       Offered patient enrollment in the Remote Patient Monitoring (RPM) program for in-home monitoring: Yes, patient enrolled;

## 2024-06-21 ENCOUNTER — ANTI-COAG VISIT (OUTPATIENT)
Dept: PHARMACY | Age: 84
End: 2024-06-21

## 2024-06-21 ENCOUNTER — TELEPHONE (OUTPATIENT)
Dept: PRIMARY CARE CLINIC | Age: 84
End: 2024-06-21

## 2024-06-21 DIAGNOSIS — Z86.718 HISTORY OF DVT OF LOWER EXTREMITY: Primary | ICD-10-CM

## 2024-06-21 LAB — INR BLD: 2.6

## 2024-06-21 NOTE — PROGRESS NOTES
Ms. Crespo is here for management of anticoagulation for hx of DVT x 2 separate occasions.    PMH also significant for HTN.   She presents today w/out complaint.  Pt verifies dosing regimen as listed above.  Pt denies s/s bleeding/swelling/SOB.  No missed doses.   No changes in Rx/OTCs/Herbal medications.   Occasional EtOH use and denies tobacco.    Pt has [facility] after recent hospitalization    Joshua with [facility] called with INR .     INR 2.6 is within the acceptable therapeutic range of 2-3.  Recommend to continue with 7.5 mg daily  Pt has the 5 mg tablets.  Will continue to monitor and check INR in 1 week.  Dosing reminder card given with phone number, appointment date and time.   Return to clinic: [facility] to call     Referring physician: Dr. Liz  Referral date: 3/20/23

## 2024-06-21 NOTE — TELEPHONE ENCOUNTER
----- Message from Stefany Manzo MD sent at 6/21/2024  8:50 AM EDT -----  Not sure if I had actually sent this, help? Apologize if duplicate!  ----- Message -----  From: Stefany Manzo MD  Sent: 6/3/2024   7:00 AM EDT  To: Stefany Manzo MD; Robyn Whalen LPN    Please call to remind her to do BMP - not fasting, ordered and in chart. Thank you!    Copied to CM as well.

## 2024-06-25 ENCOUNTER — TELEPHONE (OUTPATIENT)
Dept: PRIMARY CARE CLINIC | Age: 84
End: 2024-06-25

## 2024-06-25 NOTE — TELEPHONE ENCOUNTER
Patients home care nurse called stating the patient is coughing up yellow/green mucous.   She recently had pneumonia and the nurse is concerned she may be getting another infection.      I called the patient to see if she was having additional symptoms and had to leave a VM message.

## 2024-06-26 ENCOUNTER — TELEPHONE (OUTPATIENT)
Dept: PRIMARY CARE CLINIC | Age: 84
End: 2024-06-26

## 2024-06-26 NOTE — TELEPHONE ENCOUNTER
Spoke with patient's daughter 6/25 at around 4 pm, who advised that the patient was NAD, cough at baseline with occasional productivity. Denied fever, sweats, chills, SOB or malaise. Call back/ED precautions discussed.    Routing to DP as FYI!

## 2024-06-28 ENCOUNTER — CARE COORDINATION (OUTPATIENT)
Dept: CASE MANAGEMENT | Age: 84
End: 2024-06-28

## 2024-06-28 ENCOUNTER — CARE COORDINATION (OUTPATIENT)
Dept: CARE COORDINATION | Age: 84
End: 2024-06-28

## 2024-06-28 NOTE — CARE COORDINATION
Date/Time:  6/28/2024 12:49 PM  LPN attempted to reach patient by telephone regarding yellow alert in remote patient monitoring program for BP of 176/82. Left HIPPA compliant message requesting a return call. Will attempt to reach patient again.     Juarez Thorne LPN, PCC, Remote Patient Monitoring    PH: [phone]  Email: [email or web address]

## 2024-06-28 NOTE — CARE COORDINATION
Ambulatory Care Coordination Note     2024 4:33 PM     Patient Current Location:  [place] [address] [place] [address]     ACM contacted the family, daughter Oralia  by telephone. Verified name and  with family as identifiers.         ACM: Tamica Hughes RN     Challenges to be reviewed by the provider   Additional needs identified to be addressed with provider No  none               Method of communication with provider: none.    Care Summary Note: ACM outreach made for red alert from RPM team as patient's BP was elevated. Spoke with patient's daughter, Oralia who states that it came down, 144/74. Patient asymptomatic. No other concerns at this time.    Offered patient enrollment in the Remote Patient Monitoring (RPM) program for in-home monitoring: Yes, patient enrolled; current status is activated and monitoring.     Assessments Completed:   Hypertension - Encounter Level              Medications Reviewed:   Not completed during this call:      Advance Care Planning:   Not reviewed during this call     Care Planning:   Not completed during this call    PCP/Specialist follow up:   Future Appointments         Provider Specialty Dept Phone    2024 1:00 PM Elba Deleon, APRN - CNP Cardiology [phone]    7/3/2024 2:00 PM Aimee Molina, APRN - CNP Pain Management [phone]    2024 10:00 AM Stefany Manzo MD Primary Care [phone]    2024 11:15 AM Terrell Burkett MD Pulmonology [phone]    2025 10:30 AM Stefany Manzo MD Primary Care [phone]            Follow Up:   Plan for next ACM outreach in approximately 1 week to complete:  - disease specific assessments.   patient  is agreeable to this plan.

## 2024-06-28 NOTE — CARE COORDINATION
Date/Time:  6/28/2024 3:20 PM    LPN attempted to reach patient and emergency contact by telephone x 2 regarding yellow alert in remote patient monitoring program for BP of 176/82. Left HIPPA compliant message requesting a return call. Writer will route to AC, per RPM protocol, if unable to reach patient and emergency contact after three attempts. Writer will route to AC, per RPM protocol, if unable to reach patient and emergency contact after three attempts.        Juarez Thorne LPN, PCC, Remote Patient Monitoring    PH: [phone]  Email: [email or web address]

## 2024-07-01 ENCOUNTER — CARE COORDINATION (OUTPATIENT)
Dept: CASE MANAGEMENT | Age: 84
End: 2024-07-01

## 2024-07-01 DIAGNOSIS — I10 ESSENTIAL HYPERTENSION: ICD-10-CM

## 2024-07-01 DIAGNOSIS — K21.9 GASTROESOPHAGEAL REFLUX DISEASE, UNSPECIFIED WHETHER ESOPHAGITIS PRESENT: ICD-10-CM

## 2024-07-01 DIAGNOSIS — E78.2 MIXED HYPERLIPIDEMIA: ICD-10-CM

## 2024-07-01 RX ORDER — PANTOPRAZOLE SODIUM 40 MG/1
TABLET, DELAYED RELEASE ORAL
Qty: 90 TABLET | Refills: 0 | OUTPATIENT
Start: 2024-07-01

## 2024-07-01 RX ORDER — PANTOPRAZOLE SODIUM 40 MG/1
TABLET, DELAYED RELEASE ORAL
Qty: 90 TABLET | Refills: 0 | Status: SHIPPED | OUTPATIENT
Start: 2024-07-01

## 2024-07-01 RX ORDER — PRAVASTATIN SODIUM 40 MG
TABLET ORAL
Qty: 90 TABLET | Refills: 0 | Status: SHIPPED | OUTPATIENT
Start: 2024-07-01

## 2024-07-01 RX ORDER — LISINOPRIL 5 MG/1
TABLET ORAL
Qty: 90 TABLET | Refills: 0 | Status: SHIPPED | OUTPATIENT
Start: 2024-07-01

## 2024-07-01 NOTE — CARE COORDINATION
Date/Time:  7/1/2024 10:57 AM  LPN attempted to reach patient by telephone regarding yellow alert in remote patient monitoring program for BP of 176/68. Left HIPPA compliant message requesting a return call. Will attempt to reach patient again.     Juarez Thorne LPN, PCC, Remote Patient Monitoring    PH: [phone]  Email: [email or web address]

## 2024-07-02 ENCOUNTER — OFFICE VISIT (OUTPATIENT)
Dept: CARDIOLOGY CLINIC | Age: 84
End: 2024-07-02

## 2024-07-02 VITALS
BODY MASS INDEX: 24.94 KG/M2 | HEART RATE: 61 BPM | SYSTOLIC BLOOD PRESSURE: 128 MMHG | HEIGHT: 60 IN | WEIGHT: 127 LBS | DIASTOLIC BLOOD PRESSURE: 64 MMHG | OXYGEN SATURATION: 93 %

## 2024-07-02 DIAGNOSIS — E78.2 MIXED HYPERLIPIDEMIA: ICD-10-CM

## 2024-07-02 DIAGNOSIS — J84.112 IPF (IDIOPATHIC PULMONARY FIBROSIS) (HCC): ICD-10-CM

## 2024-07-02 DIAGNOSIS — Z86.711 HX PULMONARY EMBOLISM: ICD-10-CM

## 2024-07-02 DIAGNOSIS — I34.2 NONRHEUMATIC MITRAL VALVE STENOSIS: ICD-10-CM

## 2024-07-02 DIAGNOSIS — I10 ESSENTIAL HYPERTENSION: ICD-10-CM

## 2024-07-02 DIAGNOSIS — Z86.718 HISTORY OF DVT OF LOWER EXTREMITY: ICD-10-CM

## 2024-07-02 DIAGNOSIS — I51.7 ATRIAL SEPTAL HYPERTROPHY: ICD-10-CM

## 2024-07-02 DIAGNOSIS — I45.10 RBBB: ICD-10-CM

## 2024-07-02 DIAGNOSIS — Z95.2 S/P TAVR (TRANSCATHETER AORTIC VALVE REPLACEMENT): ICD-10-CM

## 2024-07-02 DIAGNOSIS — I35.0 AORTIC VALVE STENOSIS, NONRHEUMATIC: Primary | ICD-10-CM

## 2024-07-02 RX ORDER — FUROSEMIDE 20 MG/1
20 TABLET ORAL DAILY PRN
Qty: 30 TABLET | Refills: 1 | Status: SHIPPED | OUTPATIENT
Start: 2024-07-02

## 2024-07-02 NOTE — PROGRESS NOTES
stenosis.   Aortic valve area calculated at 1.31 cm2 via the continuity equation with a maximum velocity of 4.06 m/s, a maximum pressure gradient of 66 mmHg and a mean pressure gradient of 39 mmHg, consistent with moderate to severe aortic stenosis.   Mild tricuspid regurgitation.   Systolic pulmonary artery pressure (SPAP) is elevated and estimated at 55 mmHg (right atrial pressure 3 mmHg) consistent with moderate pulmonary hypertension .     Stress test 6/22/2023:  Summary   Normal LV function.   There is nearly uniform isotope uptake at stress and rest. There is no clear evidence of significant myocardial ischemia or scar.     ECHO/LUIS: 11/20/19   Summary   Ejection fraction is visually estimated at 55%.   Mild left atrial enlargement.   A bubble study was performed and showed no evidence of shunting.   There is lipomatous hypertrophy of the atrial septum.   There is a prominent Eustachian valve.   There is no evidence of a mass.   Mild tricuspid regurgitation.      Pt is at acceptable, low cv risk, for stem cell injection in back as per   [facility] sv.     Cardiac MRI 9/23/19  Suspected lipomatous hypertrophy of the inter atrial septum   Normal appearance of tricuspid, mitral and aortic valves   Small rounded masslike lesion contiguous with the endocardial surface of the right atrial base. Differential diagnosis is given above.      Echo: 9/3/19  Summary   --There is a prominent echogenic mass on the atrial side of the anterior   tricuspid valve leaflet., suggest LUIS for further evaluation.   -- Left ventricular systolic function is normal with a visually estimated   ejection fraction of 55%. Mild concentric left ventricular hypertrophy. No   regional wall motion abnormalities are noted. Indeterminate diastolic   function.   -- Moderate to severe calcification of the posterior mitral annulus.   -- The left atrium appears mildly enlarged.   -- A bubble study was performed and fails to show evidence of

## 2024-07-02 NOTE — PATIENT INSTRUCTIONS
Start furosemide (Lasix) 20 mg - use as needed for swelling or shortness of breath with low oxygen level  No change in other heart medicines  Follow up with me or Dr. Liz in 6 months (sooner if needed)

## 2024-07-03 ENCOUNTER — OFFICE VISIT (OUTPATIENT)
Dept: PAIN MANAGEMENT | Age: 84
End: 2024-07-03

## 2024-07-03 ENCOUNTER — CARE COORDINATION (OUTPATIENT)
Dept: CARE COORDINATION | Age: 84
End: 2024-07-03

## 2024-07-03 ENCOUNTER — TELEPHONE (OUTPATIENT)
Dept: PRIMARY CARE CLINIC | Age: 84
End: 2024-07-03

## 2024-07-03 VITALS
TEMPERATURE: 96.8 F | OXYGEN SATURATION: 94 % | DIASTOLIC BLOOD PRESSURE: 78 MMHG | HEART RATE: 65 BPM | SYSTOLIC BLOOD PRESSURE: 134 MMHG

## 2024-07-03 DIAGNOSIS — I10 ESSENTIAL HYPERTENSION: ICD-10-CM

## 2024-07-03 DIAGNOSIS — G89.4 CHRONIC PAIN SYNDROME: ICD-10-CM

## 2024-07-03 DIAGNOSIS — Z95.2 S/P TAVR (TRANSCATHETER AORTIC VALVE REPLACEMENT): ICD-10-CM

## 2024-07-03 DIAGNOSIS — M50.90 CERVICAL DISC DISEASE: ICD-10-CM

## 2024-07-03 DIAGNOSIS — M96.1 FAILED BACK SURGICAL SYNDROME: ICD-10-CM

## 2024-07-03 DIAGNOSIS — F33.42 RECURRENT MAJOR DEPRESSIVE DISORDER, IN FULL REMISSION (HCC): ICD-10-CM

## 2024-07-03 DIAGNOSIS — Z79.899 MEDICATION MANAGEMENT: Primary | ICD-10-CM

## 2024-07-03 DIAGNOSIS — M05.7A RHEUMATOID ARTHRITIS OF OTHER SITE WITH POSITIVE RHEUMATOID FACTOR (HCC): ICD-10-CM

## 2024-07-03 DIAGNOSIS — M47.816 LUMBAR FACET ARTHROPATHY: ICD-10-CM

## 2024-07-03 DIAGNOSIS — E66.09 CLASS 1 OBESITY DUE TO EXCESS CALORIES IN ADULT, UNSPECIFIED BMI, UNSPECIFIED WHETHER SERIOUS COMORBIDITY PRESENT: ICD-10-CM

## 2024-07-03 DIAGNOSIS — M43.10 DEGENERATIVE SPONDYLOLISTHESIS: ICD-10-CM

## 2024-07-03 DIAGNOSIS — M48.061 SPINAL STENOSIS OF LUMBAR REGION WITHOUT NEUROGENIC CLAUDICATION: ICD-10-CM

## 2024-07-03 DIAGNOSIS — M51.36 DDD (DEGENERATIVE DISC DISEASE), LUMBAR: ICD-10-CM

## 2024-07-03 DIAGNOSIS — M25.521 ELBOW PAIN, RIGHT: ICD-10-CM

## 2024-07-03 RX ORDER — ERGOCALCIFEROL 1.25 MG/1
50000 CAPSULE ORAL WEEKLY
Qty: 14 CAPSULE | Refills: 0 | Status: SHIPPED | OUTPATIENT
Start: 2024-07-03 | End: 2024-07-03 | Stop reason: CLARIF

## 2024-07-03 NOTE — TELEPHONE ENCOUNTER
Spoke to pt daughter, clifton, per HIPAA. She v/u and blood draw for vitamin D was ordered, they plan to go soon.

## 2024-07-03 NOTE — PROGRESS NOTES
Shayla Crespo  1940  [id number]      HISTORY OF PRESENT ILLNESS: Ms. Crespo is a 83 y.o. female returns for a follow up visit for pain management  She has a diagnosis of   1. Chronic pain syndrome    2. Cervical disc disease    3. DDD (degenerative disc disease), lumbar    4. Spinal stenosis of lumbar region without neurogenic claudication    5. Lumbar facet arthropathy    6. Degenerative spondylolisthesis    7. Failed back surgical syndrome    8. Elbow pain, right    9. Rheumatoid arthritis of other site with positive rheumatoid factor (HCC)    10. Recurrent major depressive disorder, in full remission (HCC)    11. Class 1 obesity due to excess calories in adult, unspecified BMI, unspecified whether serious comorbidity present      As per Information Obtained from the PADT (Patient Assessment and Documentation Tool)    She complains of pain in the  both arms, lower back, both knees  She rates the pain 5/10 and describes it as aching. Current treatment regimen has helped relieve about 40% of the pain.  She denies any side effects from the current pain regimen. Patient reports that since the last follow up visit the physical functioning is unchanged, family/social relationships are unchanged, mood is unchanged sleep patterns are unchanged, and that the overall functioning is unchanged.  Patient denies misusing/abusing her narcotic pain medications or using any illegal drugs.      Upon obtaining medical history from Ms. Crespo states that pain is manageable on current pain therapy. Takes the pain medications as prescribed. Mood/anxiety is stable. Sleep is fair with an average of 5-6 hours. Denies to having issues of constipation. Tolerating activities/house chores with moderate tenderness to the lower back.     ALLERGIES: Patients list of allergies were reviewed     MEDICATIONS: Ms. Crespo list of medications were reviewed.Her current medications are   Outpatient Medications Prior to Visit   Medication Sig

## 2024-07-03 NOTE — CARE COORDINATION
Ambulatory Care Coordination Note     7/3/2024 12:13 PM     Patient Current Location:  [place] [address] [place] [address]     ACM contacted the family, daughter, Oralia  by telephone. Verified name and  with patient as identifiers.         ACM: Tamica Hughes RN     Challenges to be reviewed by the provider   Additional needs identified to be addressed with provider No  none               Method of communication with provider: none.    Care Summary Note: ACM outreach made. Spoke with patient's daughter, Oralia who states that the patient is doing well. Patient BP high in the mornings but levels off throughout the day. States that she takes her BP meds in the evening. Spoke about changing it to the morning and then checking her BP an hour later, agreeable. Will try it out tomorrow. Will route info to RPM team as patient's morning Bps trigger a red alert. States that she will  prescription of lasix once [employer] has it ready. Discussed importance of weighing daily as that's helpful in watching fluid retention. Reminded daughter to call doctor about 3 lbs overnight and 5lbs within a week. Denies SOB, chest pain and falls. Has an appt with pain management today.    Offered patient enrollment in the Remote Patient Monitoring (RPM) program for in-home monitoring: Yes, patient enrolled; current status is activated and monitoring.     Assessments Completed:   Hypertension - Encounter Level    Symptoms: hypertension associated headaches: Pos  Symptom course: stable       and   General Assessment    Do you have any symptoms that are causing concern?: No          Medications Reviewed:   Patient denies any changes with medications and reports taking all medications as prescribed. and Completed during this call    Advance Care Planning:   Not reviewed during this call     Care Planning:   Not completed during this call    PCP/Specialist follow up:   Future Appointments         Provider Specialty Dept Phone    7/3/2024

## 2024-07-03 NOTE — TELEPHONE ENCOUNTER
----- Message from Stefany Manzo MD sent at 7/3/2024  7:46 AM EDT -----  Help with this. It is the one ordered 5/21. Thank you!  ----- Message -----  From: Stefany Manzo MD  Sent: 6/3/2024   7:00 AM EDT  To: Stefany Manzo MD; Robyn Whalen LPN    Please call to remind her to do BMP - not fasting, ordered and in chart. Thank you!    Copied to  as well.

## 2024-07-04 LAB
25(OH)D3 SERPL-MCNC: 46.6 NG/ML
ANION GAP SERPL CALCULATED.3IONS-SCNC: 9 MMOL/L (ref 3–16)
BUN SERPL-MCNC: 22 MG/DL (ref 7–20)
CALCIUM SERPL-MCNC: 9.6 MG/DL (ref 8.3–10.6)
CHLORIDE SERPL-SCNC: 103 MMOL/L (ref 99–110)
CO2 SERPL-SCNC: 29 MMOL/L (ref 21–32)
CREAT SERPL-MCNC: 0.7 MG/DL (ref 0.6–1.2)
GFR SERPLBLD CREATININE-BSD FMLA CKD-EPI: 87 ML/MIN/{1.73_M2}
GLUCOSE SERPL-MCNC: 97 MG/DL (ref 70–99)
POTASSIUM SERPL-SCNC: 4.7 MMOL/L (ref 3.5–5.1)
SODIUM SERPL-SCNC: 141 MMOL/L (ref 136–145)

## 2024-07-05 ENCOUNTER — ANTI-COAG VISIT (OUTPATIENT)
Dept: PHARMACY | Age: 84
End: 2024-07-05

## 2024-07-05 DIAGNOSIS — Z86.718 HISTORY OF DVT OF LOWER EXTREMITY: Primary | ICD-10-CM

## 2024-07-05 LAB — INR BLD: 2.3

## 2024-07-05 NOTE — PROGRESS NOTES
Ms. Crespo is here for management of anticoagulation for hx of DVT x 2 separate occasions.    PMH also significant for HTN.   She presents today w/out complaint.  Pt verifies dosing regimen as listed above.  Pt denies s/s bleeding/swelling/SOB.  No missed doses.   No changes in Rx/OTCs/Herbal medications.   Occasional EtOH use and denies tobacco.    Pt has [facility] after recent hospitalization    Joshua with [facility] called with INR .     INR 2.3 is within the acceptable therapeutic range of 2-3.  Recommend to continue with 7.5 mg daily  Pt has the 5 mg tablets.  Will continue to monitor and check INR in 1 week.  Dosing reminder card given with phone number, appointment date and time.   Return to clinic: [facility] to call     Referring physician: Dr. Liz  Referral date: 3/20/23    Raj Martínez, PharmD 7/5/2024 8:43 AM

## 2024-07-11 ENCOUNTER — CARE COORDINATION (OUTPATIENT)
Dept: CASE MANAGEMENT | Age: 84
End: 2024-07-11

## 2024-07-11 ENCOUNTER — CARE COORDINATION (OUTPATIENT)
Dept: CARE COORDINATION | Age: 84
End: 2024-07-11

## 2024-07-11 NOTE — CARE COORDINATION
-Remote Alert Monitoring Note      Date/Time:  2024 2:55 PM  Patient Current Location: [place]  Verified patients name and  as identifiers.    Rpm alert to be reviewed by the provider   red alert  blood pressure reading (156/105)                     LPN contacted patient by telephone regarding red alert received   Background: pt enrolled for HTN  Refer to 911 immediately if:  Patient unresponsive or unable to provide history  Change in cognition or sudden confusion  Patient unable to respond in complete sentences  Intense chest pain/tightness  Any concern for any clinical emergency  Red Alert: Provider response time of 1 hr required for any red alert requiring intervention  Yellow Alert: Provider response time of 3hr required for any escalated yellow alert  Patient Chief Complaint:  BP Triage  Are you having any Chest Pain? no   Are you having any Shortness of Breath? no   Do you have a headache or have any vision changes? no   Are you having any numbness or tingling? no   Are you having any other health concerns or issues? no     Clinical Interventions:  Spoke w/ pt caregiver, she reports the pt had a few appts this morning and she was moving around before checking. She states the pt is doing well and will recheck BP shortly. Will monitor for repeat BP reading     Plan/Follow Up: Will continue to review, monitor and address alerts with follow up based on severity of symptoms and risk factors.  **For any new or worsening symptoms, please contact your Provider or report to the nearest Emergency Room.**

## 2024-07-11 NOTE — CARE COORDINATION
RPM alert for no metrics X 2 days. [employer] message sent to pt.  Shayla Crespo , I am a nurse with the remote patient monitoring team;  reaching out to you today to remind you to please take your vitals. Important: Please try to take your vitals each day before 12pm. This ensures the monitoring team will have time to connect with your providers and get back to you with any new orders or instructions.    Enrolled in RPM for HTN

## 2024-07-17 ENCOUNTER — CARE COORDINATION (OUTPATIENT)
Dept: CARE COORDINATION | Age: 84
End: 2024-07-17

## 2024-07-17 NOTE — CARE COORDINATION
Ambulatory Care Coordination Note     7/17/2024 3:19 PM     Patient outreach attempt by this ACM today to perform care management follow up . ACM was unable to reach the patient by telephone today; left voice message requesting a return phone call to this ACM.     ACM: Tamica Hughes RN     Care Summary Note:     PCP/Specialist follow up:   Future Appointments         Provider Specialty Dept Phone    7/31/2024 2:40 PM Aimee Molina, LIZZY - CNP Pain Management [phone]    8/20/2024 10:00 AM Stefany Manzo MD Primary Care [phone]    9/13/2024 11:15 AM Terrell Burkett MD Pulmonology [phone]    5/14/2025 10:30 AM Stefany Manzo MD Primary Care [phone]            Follow Up:   Plan for next ACM outreach in approximately 1 week to complete:  - disease specific assessments  - medication review  - RPM.

## 2024-07-19 ENCOUNTER — CARE COORDINATION (OUTPATIENT)
Dept: CASE MANAGEMENT | Age: 84
End: 2024-07-19

## 2024-07-19 NOTE — CARE COORDINATION
-Remote Alert Monitoring Note      Date/Time:  2024 10:57 AM  Patient Current Location: [place]  Verified patients name and  as identifiers.    Rpm alert to be reviewed by the provider              LPN contacted patient by telephone regarding red and yellow  alerts received   Background: RPM for HTN  Refer to 911 immediately if:  Patient unresponsive or unable to provide history  Change in cognition or sudden confusion  Patient unable to respond in complete sentences  Intense chest pain/tightness  Any concern for any clinical emergency  Red Alert: Provider response time of 1 hr required for any red alert requiring intervention  Yellow Alert: Provider response time of 3hr required for any escalated yellow alert  Patient Chief Complaint:  O2 Triage  Are you having any Chest Pain? no   Are you having any Shortness of Breath? no   Swelling in your hands or feet? no     Are you having any other health concerns or issues? no     BP Triage  Are you having any Chest Pain? no   Are you having any Shortness of Breath? no   Do you have a headache or have any vision changes? no   Are you having any numbness or tingling? no   Are you having any other health concerns or issues? no       Clinical Interventions: Reviewed and followed up on alerts and treatments-   LPN contacted pt in regard to RPM red alert for PO of 87%, and yellow alert for  BP of 176/74. Pt denied any worrisome and or worsening symptoms at this time. Dtr rechecked vitals and BP is now, 131/60, and PO is now 92%. Pt with transmission issues and writer advise HRS IT.    Plan/Follow Up: Will continue to review, monitor and address alerts with follow up based on severity of symptoms and risk factors.  **For any new or worsening symptoms or you are concerned in anyway, please contact your Provider or report to the nearest Emergency Room.**        Juarez Thoren LPN, [facility], Remote Patient Monitoring    PH: [phone]  Email: [email or web address]

## 2024-07-22 ENCOUNTER — CARE COORDINATION (OUTPATIENT)
Dept: CARE COORDINATION | Age: 84
End: 2024-07-22

## 2024-07-22 PROBLEM — J18.9 PNEUMONIA OF RIGHT UPPER LOBE DUE TO INFECTIOUS ORGANISM: Status: RESOLVED | Noted: 2024-04-15 | Resolved: 2024-07-22

## 2024-07-22 NOTE — CARE COORDINATION
Terrell Burkett MD Pulmonology [phone]    5/14/2025 10:30 AM Stefany Manzo MD Primary Care [phone]            Follow Up:   Plan for next ACM outreach in approximately 3 weeks to complete:  - disease specific assessments  - medication review   - RPM.   Caregiver is agreeable to this plan.

## 2024-07-25 ENCOUNTER — CARE COORDINATION (OUTPATIENT)
Dept: CARE COORDINATION | Age: 84
End: 2024-07-25

## 2024-07-29 ENCOUNTER — CARE COORDINATION (OUTPATIENT)
Dept: CASE MANAGEMENT | Age: 84
End: 2024-07-29

## 2024-07-29 NOTE — CARE COORDINATION
Remote Patient Monitoring Note      Date/Time:  7/29/2024 10:34 AM  [facility] received for PO heart rate (46 bpm).      Background: PT enrolled for HTN     Clinical Interventions:  Writer noted PO HR of 46 bpm. BP HR taken minutes after checking  PO HR, registering at 68 bpm, WNL based on HRS set parameters for this pt.  Other metrics WNL.     Plan/Follow Up: Will continue to review, monitor and address alerts with follow up based on severity of symptoms and risk factors.        Thao Thorne LPN, P[facility]  PH: [phone]

## 2024-07-31 ENCOUNTER — OFFICE VISIT (OUTPATIENT)
Dept: PAIN MANAGEMENT | Age: 84
End: 2024-07-31

## 2024-07-31 VITALS
WEIGHT: 122 LBS | BODY MASS INDEX: 23.83 KG/M2 | SYSTOLIC BLOOD PRESSURE: 142 MMHG | DIASTOLIC BLOOD PRESSURE: 70 MMHG | HEART RATE: 75 BPM | OXYGEN SATURATION: 97 %

## 2024-07-31 DIAGNOSIS — M43.10 DEGENERATIVE SPONDYLOLISTHESIS: ICD-10-CM

## 2024-07-31 DIAGNOSIS — M05.7A RHEUMATOID ARTHRITIS OF OTHER SITE WITH POSITIVE RHEUMATOID FACTOR (HCC): ICD-10-CM

## 2024-07-31 DIAGNOSIS — M51.36 DDD (DEGENERATIVE DISC DISEASE), LUMBAR: ICD-10-CM

## 2024-07-31 DIAGNOSIS — M96.1 FAILED BACK SURGICAL SYNDROME: ICD-10-CM

## 2024-07-31 DIAGNOSIS — F33.42 RECURRENT MAJOR DEPRESSIVE DISORDER, IN FULL REMISSION (HCC): ICD-10-CM

## 2024-07-31 DIAGNOSIS — M50.90 CERVICAL DISC DISEASE: ICD-10-CM

## 2024-07-31 DIAGNOSIS — M25.521 ELBOW PAIN, RIGHT: ICD-10-CM

## 2024-07-31 DIAGNOSIS — G89.4 CHRONIC PAIN SYNDROME: ICD-10-CM

## 2024-07-31 DIAGNOSIS — M47.816 LUMBAR FACET ARTHROPATHY: ICD-10-CM

## 2024-07-31 DIAGNOSIS — M48.061 SPINAL STENOSIS OF LUMBAR REGION WITHOUT NEUROGENIC CLAUDICATION: ICD-10-CM

## 2024-07-31 RX ORDER — HYDROCODONE BITARTRATE AND ACETAMINOPHEN 7.5; 325 MG/1; MG/1
1 TABLET ORAL EVERY 8 HOURS PRN
Qty: 90 TABLET | Refills: 0 | Status: SHIPPED | OUTPATIENT
Start: 2024-07-31 | End: 2024-08-30

## 2024-07-31 RX ORDER — DULOXETIN HYDROCHLORIDE 60 MG/1
60 CAPSULE, DELAYED RELEASE ORAL DAILY
Qty: 90 CAPSULE | Refills: 0 | Status: SHIPPED | OUTPATIENT
Start: 2024-07-31 | End: 2024-10-29

## 2024-07-31 NOTE — PROGRESS NOTES
Shayla Crespo  1940  [id number]    HISTORY OF PRESENT ILLNESS: Ms. Crespo is a 83 y.o. female returns for a follow up visit for pain management  She has a diagnosis of   1. Chronic pain syndrome    2. Cervical disc disease    3. DDD (degenerative disc disease), lumbar    4. Spinal stenosis of lumbar region without neurogenic claudication    5. Lumbar facet arthropathy    6. Degenerative spondylolisthesis    7. Failed back surgical syndrome    8. Elbow pain, right    9. Rheumatoid arthritis of other site with positive rheumatoid factor (HCC)    10. Recurrent major depressive disorder, in full remission (HCC)      As per Information Obtained from the PADT (Patient Assessment and Documentation Tool)    She complains of pain in the lower back,both knees. She rates the pain 5/10 and describes it as aching, sabbing. Current treatment regimen has helped relieve about 60% of the pain.  She denies any side effects from the current pain regimen. Patient reports that since the last follow up visit the physical functioning is unchanged, family/social relationships are unchanged, mood is unchanged sleep patterns are unchanged, and that the overall functioning is unchanged.  Patient denies misusing/abusing her narcotic pain medications or using any illegal drugs.      Upon obtaining medical history from Ms. Crespo states that pain is manageable on current pain therapy. Takes the pain medications as prescribed. Mood/anxiety is stable. Sleep is fair with an average of 5-6 hours. Denies to having issues of constipation. Tolerating activities/house chores with moderate tenderness to the lower back.     ALLERGIES: Patients list of allergies were reviewed     MEDICATIONS: Ms. Crespo list of medications were reviewed.Her current medications are   Outpatient Medications Prior to Visit   Medication Sig Dispense Refill    vitamin D 25 MCG (1000 UT) CAPS Take 1 capsule by mouth daily      furosemide (LASIX) 20 MG tablet Take 1

## 2024-08-09 DIAGNOSIS — K21.9 GASTROESOPHAGEAL REFLUX DISEASE, UNSPECIFIED WHETHER ESOPHAGITIS PRESENT: ICD-10-CM

## 2024-08-10 RX ORDER — PANTOPRAZOLE SODIUM 40 MG/1
40 TABLET, DELAYED RELEASE ORAL DAILY
Qty: 90 TABLET | Refills: 0 | Status: SHIPPED | OUTPATIENT
Start: 2024-08-10

## 2024-08-12 ENCOUNTER — CARE COORDINATION (OUTPATIENT)
Dept: CARE COORDINATION | Age: 84
End: 2024-08-12

## 2024-08-12 NOTE — CARE COORDINATION
Ambulatory Care Coordination Note     8/12/2024 10:35 AM     Patient outreach attempt by this ACM today to perform care management follow up . ACM was unable to reach the patient by telephone today; left voice message requesting a return phone call to this ACM.     ACM: Robyn Jay     [facility] Summary Note: ACM attempted outreach call to patient and was unsuccessful. ACM to attempt outreach contact at a later time.    RPM Vital Signs:        PCP/Specialist follow up:   Future Appointments         Provider Specialty Dept Phone    9/4/2024 10:00 AM Aimee Molina, LIZZY - CNP Pain Management [phone]    9/13/2024 11:15 AM Terrell Burkett MD Pulmonology [phone]    1/20/2025 9:30 AM Stefany Manzo MD Primary Care [phone]    5/14/2025 10:30 AM Stefany Manzo MD Primary Care [phone]            Follow Up:   Plan for next ACM outreach in approximately 2 weeks to complete:  - CC Protocol assessments  - disease specific assessments  - goal progression  - education   - RPM.

## 2024-08-16 ENCOUNTER — ANTI-COAG VISIT (OUTPATIENT)
Dept: PHARMACY | Age: 84
End: 2024-08-16

## 2024-08-16 DIAGNOSIS — Z86.718 HISTORY OF DVT OF LOWER EXTREMITY: Primary | ICD-10-CM

## 2024-08-16 LAB
INR BLD: 2.8
PROTIME: NORMAL

## 2024-08-16 NOTE — PROGRESS NOTES
Ms. Crespo is here for management of anticoagulation for hx of DVT x 2 separate occasions.    PMH also significant for HTN.   She presents today w/out complaint.  Pt verifies dosing regimen as listed above.  Pt denies s/s bleeding/swelling/SOB.  No missed doses.   No changes in Rx/OTCs/Herbal medications.   Occasional EtOH use and denies tobacco.    Pt has [facility] after recent hospitalization  ANDRA Lewis called with INR       INR 2.8 is within the acceptable therapeutic range of 2-3.  Recommend to continue with 7.5 mg daily  Pt has the 5 mg tablets.  Will continue to monitor and check INR in 1 weeks.  Dosing reminder card given with phone number, appointment date and time.   Return to clinic: [facility] to call in 2 wks    Referring physician: Dr. Liz  Referral date: 3/20/23    Eliza Milligan Pharm. D.  For Pharmacy Admin Tracking Only    Intervention Detail:   Total # of Interventions Recommended: 0  Total # of Interventions Accepted: 0  Time Spent (min): 15

## 2024-08-21 ENCOUNTER — CARE COORDINATION (OUTPATIENT)
Dept: CASE MANAGEMENT | Age: 84
End: 2024-08-21

## 2024-08-26 ENCOUNTER — CARE COORDINATION (OUTPATIENT)
Dept: CASE MANAGEMENT | Age: 84
End: 2024-08-26

## 2024-08-26 ENCOUNTER — CARE COORDINATION (OUTPATIENT)
Dept: CARE COORDINATION | Age: 84
End: 2024-08-26

## 2024-08-26 NOTE — CARE COORDINATION
Ambulatory Care Coordination Note     2024 9:10 AM     Patient Current Location:  [place] [address] [place] [address]     ACM contacted the family by telephone. Verified name and  with family as identifiers.         ACM: Robyn Jay     Challenges to be reviewed by the provider   Additional needs identified to be addressed with provider No  none               Method of communication with provider: none.    Care Summary Note: Brief follow up outreach completed today with the patient's daughter, Oralia. Oralia stated that the patient is doing well and denies all s/s of exacerbation of  HTN. Patient is taking all medications as prescribed without any concerns. Patient's daughter denies any other concerns or needs at this time. At next outreach graduate from [facility] (possible).    Offered patient enrollment in the Remote Patient Monitoring (RPM) program for in-home monitoring: Yes, patient enrolled; current status is activated and monitoring.     [facility] Vital Signs:      Assessments Completed:   Hypertension - Encounter Level    Symptom course: no change      ,   General Assessment    Do you have any symptoms that are causing concern?: No          Medications Reviewed:   Patient denies any changes with medications and reports taking all medications as prescribed.    Advance Care Planning:   Reviewed and current     Care Planning:   Not completed during this call    PCP/Specialist follow up:   Future Appointments         Provider Specialty Dept Phone    2024 10:00 AM Aimee Molina, LIZZY - CNP Pain Management [phone]    2024 11:15 AM Terrell Burkett MD Pulmonology [phone]    2025 9:30 AM Stefany Manzo MD Primary Care [phone]    2025 10:30 AM Stefany Manzo MD Primary Care [phone]            Follow Up:   Plan for next AC outreach in approximately 2 weeks to complete:  - CC Protocol assessments  - disease specific assessments  - RPM  - graduate from [facility] .    Caregiver is agreeable to this plan.

## 2024-08-26 NOTE — ACP (ADVANCE CARE PLANNING)
Advance Care Planning   Healthcare Decision Maker:    Primary Decision Maker: clifton young - Child - [phone]    Primary Decision Maker: Fiorella Sesay - Child - [phone]    Primary Decision Maker: stefani serra - Child - [phone]    Click here to complete Healthcare Decision Makers including selection of the Healthcare Decision Maker Relationship (ie \"Primary\").  Today we documented Decision Maker(s) consistent with ACP documents on file. [facility] requested that updated Advance Directives are sent to primary's office. Current copy  2024.

## 2024-08-26 NOTE — CARE COORDINATION
Remote Patient Monitoring Note      Date/Time:  8/26/2024 4:08 PM  Shayla Crespo , I am a nurse with the remote patient monitoring team;  reaching out to you today to remind you to please take your vitals. Important: Please try to take your vitals each day before 12pm. This ensures the monitoring team will have time to connect with your providers and get back to you with any new orders or instructions. Please enter all of your vitals each day. Please ensure that your tablet is plugged in, turned on and charging. If you are having issues with your equipment, please call: [phone].    Be well,    Thao Thorne LPN, [facility]  PH: [phone]

## 2024-08-27 ENCOUNTER — CARE COORDINATION (OUTPATIENT)
Dept: CARE COORDINATION | Age: 84
End: 2024-08-27

## 2024-08-27 NOTE — CARE COORDINATION
Remote Patient Monitoring Note      Date/Time:  8/27/2024 3:14 PM  Patient Current Location: [place]  LP contacted patient by telephone regarding yellow alert received for activity level (no metrics x3 days). Left HIPAA compliant VM  Plan/Follow Up: Will continue to review, monitor and address alerts with follow up based on severity of symptoms and risk factors.           \

## 2024-08-28 ENCOUNTER — CARE COORDINATION (OUTPATIENT)
Dept: CASE MANAGEMENT | Age: 84
End: 2024-08-28

## 2024-08-28 NOTE — CARE COORDINATION
Date/Time:  8/28/2024 1:04 PM    LPN attempted to reach patient by telephone regarding yellow alert in remote patient monitoring program for BP of, 172/86. Left HIPPA compliant message requesting a return call. Will attempt to reach patient again.     Juarez Thorne LPN, PCC, Remote Patient Monitoring    PH: [phone]  Email: [email or web address]

## 2024-08-28 NOTE — CARE COORDINATION
-Remote Alert Monitoring Note      Date/Time:  2024 3:42 PM  Patient Current Location: [place]  Verified patients name and  as identifiers.          Rpm alert to be reviewed by the provider                 LPN contacted family by telephone regarding red and yellow  alerts received   Background: RPM for HTN  Refer to 911 immediately if:  Patient unresponsive or unable to provide history  Change in cognition or sudden confusion  Patient unable to respond in complete sentences  Intense chest pain/tightness  Any concern for any clinical emergency  Red Alert: Provider response time of 1 hr required for any red alert requiring intervention  Yellow Alert: Provider response time of 3hr required for any escalated yellow alert  Patient Chief Complaint:     BP Triage  Are you having any Chest Pain? no   Are you having any Shortness of Breath? no   Do you have a headache or have any vision changes? no   Are you having any numbness or tingling? no   Are you having any other health concerns or issues? no         Clinical Interventions: Reviewed and followed up on alerts and treatments-   LPN contacted pt in regard to RPM yellow alert for  BP of 172/86. Dr denied that the pt was experiencing any new, worrisome and or worsening symptoms at this time. Dtr unable to recheck pt's BP at this time, as they are not home. Dtr attributes elevated BP to \"rushing around this morning.\" Dtr advised writer of tech issues with tablet and BP Cuff. Writer did push a reboot to the tablet to repair any issues. Pt is compliant with all meds.     Plan/Follow Up: Will continue to review, monitor and address alerts with follow up based on severity of symptoms and risk factors.  **For any new or worsening symptoms or you are concerned in anyway, please contact your Provider or report to the nearest Emergency Room.**        Juarez Thorne LPN, P[facility], Remote Patient Monitoring     PH: [phone]  Email: [email or web address]

## 2024-08-29 ENCOUNTER — CARE COORDINATION (OUTPATIENT)
Dept: CASE MANAGEMENT | Age: 84
End: 2024-08-29

## 2024-08-29 ENCOUNTER — CARE COORDINATION (OUTPATIENT)
Dept: CARE COORDINATION | Age: 84
End: 2024-08-29

## 2024-08-29 NOTE — CARE COORDINATION
Date/Time:  8/29/2024 12:51 PM  LPN attempted to reach patient by telephone regarding yellow alert in remote patient monitoring program for BP of, 172/95. Left HIPPA compliant message requesting a return call. Will attempt to reach patient again.       Juarez Thorne LPN, P[facility], Remote Patient Monitoring    PH: [phone]  Email: [email or web address]

## 2024-08-29 NOTE — CARE COORDINATION
Ambulatory Care Coordination Note     2024 4:00 PM     Patient Current Location:  [place]     ACM contacted the patient by telephone. Verified name and  with patient as identifiers.         ACM: Robyn Jay     Challenges to be reviewed by the provider   Additional needs identified to be addressed with provider No  none               Method of communication with provider: none.    Care Summary Note: This ACM contacted the patient regarding 2 days of yellow alerts in RPM. Patient states that she just returned from vacation and could not return home because her daughter is there sick (possible Covid-19) and she does not want to get sick. She is staying at her son's house and will remain there until tomorrow.  She denies any s/s of exacerbation of HTN. She believes that she is just stressed from not being able to return home. She reports that she is still taking her medications as prescribed. She denies any needs or concerns at this time.     Offered patient enrollment in the Remote Patient Monitoring (RPM) program for in-home monitoring: Yes, patient enrolled; current status is activated and monitoring.     Assessments Completed:   Hypertension - Encounter Level    Symptom course: no change      ,   General Assessment    Do you have any symptoms that are causing concern?: No          Medications Reviewed:   Patient denies any changes with medications and reports taking all medications as prescribed.    Advance Care Planning:   Not reviewed during this call     Care Planning:   Education Documentation  Educate reporting changes in condition, taught by Robyn Jay at 2024  4:00 PM.  Learner: Patient  Readiness: Acceptance  Method: Explanation  Response: Verbalizes Understanding    Educate Patient on When to Call for Symptoms, taught by Robyn Jay at 2024  4:00 PM.  Learner: Patient  Readiness: Acceptance  Method: Explanation  Response: Verbalizes Understanding    Education Comments  No

## 2024-08-29 NOTE — CARE COORDINATION
Date/Time:  8/29/2024 3:23 PM  LPN attempted to reach patient and emergency contact by telephone x 3 regarding yellow alert in remote patient monitoring program for BP of, 172/95. Left HIPPA compliant message requesting a return call. Writer will route to [facility], per RPM protocol, if unable to reach patient and emergency contact after three attempts.       Juarez Thorne LPN, P[facility], Remote Patient Monitoring     PH: [phone]  Email: [email or web address]

## 2024-09-01 DIAGNOSIS — I10 ESSENTIAL HYPERTENSION: ICD-10-CM

## 2024-09-01 DIAGNOSIS — E78.2 MIXED HYPERLIPIDEMIA: ICD-10-CM

## 2024-09-02 RX ORDER — LISINOPRIL 5 MG/1
5 TABLET ORAL DAILY
Qty: 90 TABLET | Refills: 0 | Status: SHIPPED | OUTPATIENT
Start: 2024-09-02

## 2024-09-02 RX ORDER — PRAVASTATIN SODIUM 40 MG
40 TABLET ORAL DAILY
Qty: 90 TABLET | Refills: 0 | Status: SHIPPED | OUTPATIENT
Start: 2024-09-02

## 2024-09-03 ENCOUNTER — CARE COORDINATION (OUTPATIENT)
Dept: CASE MANAGEMENT | Age: 84
End: 2024-09-03

## 2024-09-03 ENCOUNTER — CARE COORDINATION (OUTPATIENT)
Dept: CARE COORDINATION | Age: 84
End: 2024-09-03

## 2024-09-03 PROBLEM — R60.0 PERIPHERAL EDEMA: Status: ACTIVE | Noted: 2024-09-03

## 2024-09-03 NOTE — CARE COORDINATION
-Remote Alert Monitoring Note      Date/Time:  9/3/2024 12:25 PM  Patient Current Location: [place]  Verified patients name and  as identifiers.             Rpm alert to be reviewed by the provider                  LPN contacted family by telephone regarding red and yellow  alerts received   Background: RPM for HTN  Refer to 911 immediately if:  Patient unresponsive or unable to provide history  Change in cognition or sudden confusion  Patient unable to respond in complete sentences  Intense chest pain/tightness  Any concern for any clinical emergency  Red Alert: Provider response time of 1 hr required for any red alert requiring intervention  Yellow Alert: Provider response time of 3hr required for any escalated yellow alert  Patient Chief Complaint:     BP Triage  Are you having any Chest Pain? no   Are you having any Shortness of Breath? no   Do you have a headache or have any vision changes? no   Are you having any numbness or tingling? no   Are you having any other health concerns or issues? no         Clinical Interventions: Reviewed and followed up on alerts and treatments-   LPN contacted pt in regard to RPM yellow alert for  BP of 177/82. Dtr denied that the pt was experiencing any new, worrisome and or worsening symptoms at this time. Dtr unable to recheck pt's BP at this time, as they are not home. Dtr attributes elevated BP to \"rushing around this morning as pt had an MD f/u.\"  Pt does not take BP meds an hour prior to checking BP, despite being asked repeatedly to do this. Dtr will recheck BP when she and pt arrives home today.     Plan/Follow Up: Will continue to review, monitor and address alerts with follow up based on severity of symptoms and risk factors.  **For any new or worsening symptoms or you are concerned in anyway, please contact your Provider or report to the nearest Emergency Room.**        Juarez Thorne LPN, [facility], Remote Patient Monitoring     PH: [phone]  Email:

## 2024-09-03 NOTE — PROGRESS NOTES
2023    Shayla Crespo (:  1940) is a 83 y.o. female,Established patient, here for evaluation of the following chief complaint(s):  Follow-up ( f/u arterial duplex w/alyssa )    ASSESSMENT/PLAN:  1. Peripheral edema  2. Aortic valve stenosis, etiology of cardiac valve disease unspecified  3. Congestive heart failure, unspecified HF chronicity, unspecified heart failure type (HCC)    Arterial evaluation is within normal limits.  In terms of her swelling, she has multiple reasons for this, namely her congestive heart failure and aortic valve stenosis.  Would defer to PCP and cardiology regarding diuresis.  Otherwise, I simply recommend elevation of the legs and light compression as tolerated.  No further evaluation or intervention recommended at this time.  I will be happy to see her on an as-needed basis.    SUBJECTIVE/OBJECTIVE:  Ms. Crespo was referred to me for leg swelling 2 weeks ago.  She was also having some calf cramping and returns today in follow-up of arterial duplex study.  No new changes since I last saw her.  I personally reviewed images of the duplex study which showed that ABIs were nondiagnostic due to noncompressible vessels.  However, there was no evidence of arterial insufficiency of either lower extremity and she had normal multiphasic  waveforms throughout.    Physical Exam  Vitals:    23 1124   BP: 123/76   Pulse: 99   Weight: 60.3 kg (133 lb)     Exam is unchanged.    An electronic signature was used to authenticate this note.    --Kiana Mariano MD

## 2024-09-04 ENCOUNTER — OFFICE VISIT (OUTPATIENT)
Dept: PAIN MANAGEMENT | Age: 84
End: 2024-09-04

## 2024-09-04 VITALS
BODY MASS INDEX: 24.22 KG/M2 | HEART RATE: 56 BPM | WEIGHT: 124 LBS | OXYGEN SATURATION: 98 % | DIASTOLIC BLOOD PRESSURE: 64 MMHG | TEMPERATURE: 98.7 F | SYSTOLIC BLOOD PRESSURE: 158 MMHG

## 2024-09-04 DIAGNOSIS — M50.90 CERVICAL DISC DISEASE: ICD-10-CM

## 2024-09-04 DIAGNOSIS — M48.061 SPINAL STENOSIS OF LUMBAR REGION WITHOUT NEUROGENIC CLAUDICATION: ICD-10-CM

## 2024-09-04 DIAGNOSIS — M96.1 FAILED BACK SURGICAL SYNDROME: ICD-10-CM

## 2024-09-04 DIAGNOSIS — M47.816 LUMBAR FACET ARTHROPATHY: ICD-10-CM

## 2024-09-04 DIAGNOSIS — F33.42 RECURRENT MAJOR DEPRESSIVE DISORDER, IN FULL REMISSION (HCC): ICD-10-CM

## 2024-09-04 DIAGNOSIS — M05.7A RHEUMATOID ARTHRITIS OF OTHER SITE WITH POSITIVE RHEUMATOID FACTOR (HCC): ICD-10-CM

## 2024-09-04 DIAGNOSIS — M51.36 DDD (DEGENERATIVE DISC DISEASE), LUMBAR: ICD-10-CM

## 2024-09-04 DIAGNOSIS — G89.4 CHRONIC PAIN SYNDROME: ICD-10-CM

## 2024-09-04 DIAGNOSIS — M43.10 DEGENERATIVE SPONDYLOLISTHESIS: ICD-10-CM

## 2024-09-04 DIAGNOSIS — M25.521 ELBOW PAIN, RIGHT: ICD-10-CM

## 2024-09-04 RX ORDER — LIDOCAINE 50 MG/G
1 PATCH TOPICAL DAILY
Qty: 30 PATCH | Refills: 0 | Status: SHIPPED | OUTPATIENT
Start: 2024-09-04 | End: 2024-10-04

## 2024-09-04 RX ORDER — HYDROCODONE BITARTRATE AND ACETAMINOPHEN 7.5; 325 MG/1; MG/1
1 TABLET ORAL EVERY 8 HOURS PRN
Qty: 90 TABLET | Refills: 0 | Status: SHIPPED | OUTPATIENT
Start: 2024-09-04 | End: 2024-10-04

## 2024-09-04 RX ORDER — DULOXETIN HYDROCHLORIDE 60 MG/1
60 CAPSULE, DELAYED RELEASE ORAL DAILY
Qty: 90 CAPSULE | Refills: 0 | Status: SHIPPED | OUTPATIENT
Start: 2024-09-04 | End: 2024-12-03

## 2024-09-04 NOTE — PROGRESS NOTES
will be maintained at current dose  -Home exercises/Marii exercises recommended  -Recommended that patient monitor BP, f/u with PCP if it continues to stay elevated or she becomes symptomatic with SOB, CP, syncopy. Advised to maintain compliance with prescribed antihypertensives. she is currently asymptomatic   -CBT techniques- relaxation therapies such as biofeedback, mindfulness based stress reduction, imagery, cognitive restructuring, problem solving discussed with patient   -She was advised weight reduction, diet changes- 800-1200 curt diet, diet diary, exercising, nutritional  consult increased physical activity as tolerated   -Last UDS 6/4/24 consistent  -Return in about 4 weeks (around 10/2/2024).     Analgesic Plan:             Continue present regimen: Yes: Norco 7.5-325 mg tabs orally q8h prn              Adjust dose of present analgesic: No              Switch analgesics: No              Add/Adjust concomitant therapy: No: continue with ZTlido, Cymbalta, Narcan    I will continue her current medication regimen  which is part of the above treatment schedule. It has been helping with Ms. Crespo's chronic  medical problems which for this visit include:   Diagnoses of Chronic pain syndrome, Cervical disc disease, DDD (degenerative disc disease), lumbar, Lumbar facet arthropathy, Degenerative spondylolisthesis, Spinal stenosis of lumbar region without neurogenic claudication, Failed back surgical syndrome, Elbow pain, right, Rheumatoid arthritis of other site with positive rheumatoid factor (HCC), and Recurrent major depressive disorder, in full remission (HCC) were pertinent to this visit.   Risks and benefits of the medications and other alternative treatments  including no treatment were discussed with the patient.The common side effects of these medications were also explained to the patient.  Informed verbal consent was obtained.   Goals of current treatment regimen include improvement in pain,

## 2024-09-09 ENCOUNTER — CARE COORDINATION (OUTPATIENT)
Dept: PRIMARY CARE CLINIC | Age: 84
End: 2024-09-09

## 2024-09-09 ENCOUNTER — CARE COORDINATION (OUTPATIENT)
Dept: CARE COORDINATION | Age: 84
End: 2024-09-09

## 2024-09-09 DIAGNOSIS — I10 ESSENTIAL HYPERTENSION: Primary | ICD-10-CM

## 2024-09-09 SDOH — HEALTH STABILITY: MENTAL HEALTH
STRESS IS WHEN SOMEONE FEELS TENSE, NERVOUS, ANXIOUS, OR CAN'T SLEEP AT NIGHT BECAUSE THEIR MIND IS TROUBLED. HOW STRESSED ARE YOU?: ONLY A LITTLE

## 2024-09-13 ENCOUNTER — OFFICE VISIT (OUTPATIENT)
Dept: PULMONOLOGY | Age: 84
End: 2024-09-13

## 2024-09-13 VITALS
HEIGHT: 60 IN | DIASTOLIC BLOOD PRESSURE: 70 MMHG | WEIGHT: 125 LBS | SYSTOLIC BLOOD PRESSURE: 130 MMHG | BODY MASS INDEX: 24.54 KG/M2 | HEART RATE: 62 BPM | OXYGEN SATURATION: 96 %

## 2024-09-13 DIAGNOSIS — M05.79 RHEUMATOID ARTHRITIS INVOLVING MULTIPLE SITES WITH POSITIVE RHEUMATOID FACTOR (HCC): ICD-10-CM

## 2024-09-13 DIAGNOSIS — J84.9 ILD (INTERSTITIAL LUNG DISEASE) (HCC): Primary | ICD-10-CM

## 2024-09-13 ASSESSMENT — ENCOUNTER SYMPTOMS
STRIDOR: 0
SORE THROAT: 0
SHORTNESS OF BREATH: 1
BACK PAIN: 0
COUGH: 0
COLOR CHANGE: 0
WHEEZING: 0
RHINORRHEA: 0
SINUS PRESSURE: 0
ABDOMINAL PAIN: 0
VOICE CHANGE: 0
CHEST TIGHTNESS: 0
VOMITING: 0
CHOKING: 0
CONSTIPATION: 0
APNEA: 0
DIARRHEA: 0
ABDOMINAL DISTENTION: 0
BLOOD IN STOOL: 0

## 2024-09-23 ENCOUNTER — CARE COORDINATION (OUTPATIENT)
Dept: CARE COORDINATION | Age: 84
End: 2024-09-23

## 2024-09-27 ENCOUNTER — ANTI-COAG VISIT (OUTPATIENT)
Dept: PHARMACY | Age: 84
End: 2024-09-27
Payer: MEDICARE

## 2024-09-27 DIAGNOSIS — Z86.718 HISTORY OF DVT OF LOWER EXTREMITY: Primary | ICD-10-CM

## 2024-09-27 LAB
INTERNATIONAL NORMALIZATION RATIO, POC: 3.2
PROTHROMBIN TIME, POC: 0

## 2024-09-27 PROCEDURE — 85610 PROTHROMBIN TIME: CPT

## 2024-09-27 PROCEDURE — 99211 OFF/OP EST MAY X REQ PHY/QHP: CPT

## 2024-10-02 ENCOUNTER — OFFICE VISIT (OUTPATIENT)
Dept: PAIN MANAGEMENT | Age: 84
End: 2024-10-02

## 2024-10-02 VITALS
SYSTOLIC BLOOD PRESSURE: 149 MMHG | DIASTOLIC BLOOD PRESSURE: 71 MMHG | HEART RATE: 63 BPM | OXYGEN SATURATION: 95 % | TEMPERATURE: 97 F

## 2024-10-02 DIAGNOSIS — M50.90 CERVICAL DISC DISEASE: ICD-10-CM

## 2024-10-02 DIAGNOSIS — K21.9 GASTROESOPHAGEAL REFLUX DISEASE, UNSPECIFIED WHETHER ESOPHAGITIS PRESENT: ICD-10-CM

## 2024-10-02 DIAGNOSIS — E66.09 CLASS 1 OBESITY DUE TO EXCESS CALORIES IN ADULT, UNSPECIFIED BMI, UNSPECIFIED WHETHER SERIOUS COMORBIDITY PRESENT: ICD-10-CM

## 2024-10-02 DIAGNOSIS — M47.816 LUMBAR FACET ARTHROPATHY: ICD-10-CM

## 2024-10-02 DIAGNOSIS — M48.061 SPINAL STENOSIS OF LUMBAR REGION WITHOUT NEUROGENIC CLAUDICATION: ICD-10-CM

## 2024-10-02 DIAGNOSIS — M25.521 ELBOW PAIN, RIGHT: ICD-10-CM

## 2024-10-02 DIAGNOSIS — M51.360 DEGENERATION OF INTERVERTEBRAL DISC OF LUMBAR REGION WITH DISCOGENIC BACK PAIN: ICD-10-CM

## 2024-10-02 DIAGNOSIS — M05.7A RHEUMATOID ARTHRITIS OF OTHER SITE WITH POSITIVE RHEUMATOID FACTOR (HCC): ICD-10-CM

## 2024-10-02 DIAGNOSIS — G89.4 CHRONIC PAIN SYNDROME: ICD-10-CM

## 2024-10-02 DIAGNOSIS — F33.42 RECURRENT MAJOR DEPRESSIVE DISORDER, IN FULL REMISSION (HCC): ICD-10-CM

## 2024-10-02 DIAGNOSIS — E66.811 CLASS 1 OBESITY DUE TO EXCESS CALORIES IN ADULT, UNSPECIFIED BMI, UNSPECIFIED WHETHER SERIOUS COMORBIDITY PRESENT: ICD-10-CM

## 2024-10-02 DIAGNOSIS — I10 ESSENTIAL HYPERTENSION: ICD-10-CM

## 2024-10-02 DIAGNOSIS — M96.1 FAILED BACK SURGICAL SYNDROME: ICD-10-CM

## 2024-10-02 DIAGNOSIS — M43.10 DEGENERATIVE SPONDYLOLISTHESIS: ICD-10-CM

## 2024-10-02 RX ORDER — DULOXETIN HYDROCHLORIDE 60 MG/1
60 CAPSULE, DELAYED RELEASE ORAL DAILY
Qty: 90 CAPSULE | Refills: 0 | Status: SHIPPED | OUTPATIENT
Start: 2024-10-02 | End: 2024-12-31

## 2024-10-02 RX ORDER — LIDOCAINE 50 MG/G
1 PATCH TOPICAL DAILY
Qty: 30 PATCH | Refills: 0 | Status: SHIPPED | OUTPATIENT
Start: 2024-10-02 | End: 2024-11-01

## 2024-10-02 RX ORDER — HYDROCODONE BITARTRATE AND ACETAMINOPHEN 7.5; 325 MG/1; MG/1
1 TABLET ORAL EVERY 8 HOURS PRN
Qty: 90 TABLET | Refills: 0 | Status: SHIPPED | OUTPATIENT
Start: 2024-10-02 | End: 2024-11-01

## 2024-10-02 NOTE — PROGRESS NOTES
Shayla Crespo  1940  [id number]    HISTORY OF PRESENT ILLNESS: Ms. Crespo is a 83 y.o. female returns for a follow up visit for pain management  She has a diagnosis of   1. Chronic pain syndrome    2. Cervical disc disease    3. DDD (degenerative disc disease), lumbar    4. Lumbar facet arthropathy    5. Degenerative spondylolisthesis    6. Spinal stenosis of lumbar region without neurogenic claudication    7. Failed back surgical syndrome    8. Elbow pain, right    9. Rheumatoid arthritis of other site with positive rheumatoid factor (HCC)    10. Recurrent major depressive disorder, in full remission (HCC)    11. Class 1 obesity due to excess calories in adult, unspecified BMI, unspecified whether serious comorbidity present      As per Information Obtained from the PADT (Patient Assessment and Documentation Tool)    She complains of pain in the  lower back  She rates the pain 10/10 and describes it as aching, burning, stabbing. Current treatment regimen has helped relieve about 0% of the pain.  She denies any side effects from the current pain regimen. Patient reports that since the last follow up visit the physical functioning is worse, family/social relationships are worse, mood is worse sleep patterns are worse, and that the overall functioning is worse.  Patient denies misusing/abusing her narcotic pain medications or using any illegal drugs.      Upon obtaining medical history from Ms. Crespo states that pain is somewat manageable on current pain therapy. Takes the pain medications as prescribed.  Reports having increased lumbar pain with activities. Attributes the pain of cleaning her home often. Currently under the care of Dr. Chávez for RA.Mood/anxiety is stable. Sleep is fair with an average of 5-6 hours. Denies to having issues of constipation. Tolerating activities/house chores with moderate tenderness to the lower back.     ALLERGIES: Patients list of allergies were reviewed     MEDICATIONS:

## 2024-10-03 ENCOUNTER — APPOINTMENT (OUTPATIENT)
Dept: GENERAL RADIOLOGY | Age: 84
End: 2024-10-03
Payer: MEDICARE

## 2024-10-03 ENCOUNTER — APPOINTMENT (OUTPATIENT)
Dept: CT IMAGING | Age: 84
End: 2024-10-03
Payer: MEDICARE

## 2024-10-03 ENCOUNTER — HOSPITAL ENCOUNTER (EMERGENCY)
Age: 84
Discharge: HOME | End: 2024-10-03
Payer: MEDICARE

## 2024-10-03 VITALS
TEMPERATURE: 97.8 F | HEART RATE: 77 BPM | DIASTOLIC BLOOD PRESSURE: 71 MMHG | SYSTOLIC BLOOD PRESSURE: 143 MMHG | OXYGEN SATURATION: 94 % | HEIGHT: 60 IN | RESPIRATION RATE: 18 BRPM | BODY MASS INDEX: 24.15 KG/M2 | WEIGHT: 123 LBS
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
## 2024-10-03 DIAGNOSIS — M25.552 PAIN OF LEFT HIP: ICD-10-CM

## 2024-10-03 PROCEDURE — 6370000000 HC RX 637 (ALT 250 FOR IP): Performed by: PHYSICIAN ASSISTANT

## 2024-10-03 PROCEDURE — 70450 CT HEAD/BRAIN W/O DYE: CPT

## 2024-10-03 PROCEDURE — 99284 EMERGENCY DEPT VISIT MOD MDM: CPT

## 2024-10-03 PROCEDURE — 73502 X-RAY EXAM HIP UNI 2-3 VIEWS: CPT

## 2024-10-03 RX ORDER — ACETAMINOPHEN 325 MG/1
650 TABLET ORAL ONCE
Status: COMPLETED | OUTPATIENT
Start: 2024-10-03 | End: 2024-10-03

## 2024-10-03 RX ORDER — PANTOPRAZOLE SODIUM 40 MG/1
40 TABLET, DELAYED RELEASE ORAL DAILY
Qty: 90 TABLET | Refills: 0 | Status: SHIPPED | OUTPATIENT
Start: 2024-10-03

## 2024-10-03 RX ORDER — LISINOPRIL 5 MG/1
5 TABLET ORAL DAILY
Qty: 90 TABLET | Refills: 0 | Status: SHIPPED | OUTPATIENT
Start: 2024-10-03

## 2024-10-03 RX ADMIN — ACETAMINOPHEN 650 MG: 325 TABLET ORAL at 11:00

## 2024-10-03 ASSESSMENT — PAIN DESCRIPTION - ORIENTATION
ORIENTATION: LEFT

## 2024-10-03 ASSESSMENT — PAIN SCALES - GENERAL
PAINLEVEL_OUTOF10: 8
PAINLEVEL_OUTOF10: 8
PAINLEVEL_OUTOF10: 7
PAINLEVEL_OUTOF10: 1

## 2024-10-03 ASSESSMENT — PAIN DESCRIPTION - DESCRIPTORS
DESCRIPTORS: ACHING
DESCRIPTORS: DULL
DESCRIPTORS: ACHING

## 2024-10-03 ASSESSMENT — PAIN DESCRIPTION - LOCATION
LOCATION: HIP
LOCATION: HIP;LEG
LOCATION: BACK;HIP
LOCATION: HIP;LEG

## 2024-10-03 ASSESSMENT — PAIN - FUNCTIONAL ASSESSMENT: PAIN_FUNCTIONAL_ASSESSMENT: 0-10

## 2024-10-03 NOTE — DISCHARGE INSTRUCTIONS
You can use OTC Tylenol every 4-6 hours and Motrin every 8 hours as needed for pain control.  Apply ice to the general area for 20 to 30 minutes at a time 3-4 times a day.  Follow-up with your primary care provider.

## 2024-10-03 NOTE — ED PROVIDER NOTES
[facility] Emergency Department    CHIEF COMPLAINT  Fall (Pt reports she got up to use the bathroom. Didn't have her cane with her. When she stepped back she tripped over shoes in the floor, causing her to fall. Pt c/o pain in left hip post fall. Pt denies hitting head. )      SHARED SERVICE VISIT  Evaluated by AKIL.  My supervising physician was available for consultation.    HISTORY OF PRESENT ILLNESS  Shayla Crespo is a 83 y.o. female who presents to the ED complaining of left hip pain after mechanical fall.  She says she was using the bathroom, stood up and tripped over her shoes on the floor causing her to fall backwards.  She landed on her buttocks but has left hip pain.  Denies hitting her head, however is on blood thinners.  No confusion or headache at this time.  Pain is primarily abated to the left hip and does hurt with ambulation.  Currently rates her pain 8/10 and denies any radiation.  Has not taken medication prior to arrival. Denies any headache, body ache, fevers or chills.  Denies any coughing or sneezing.  Denies any sore throat or congestion.  Denies any vision changes or dizziness.  Denies any chest pain, shortness of breath, or dyspnea on exertion.  Denies any nausea, vomiting, or abdominal pain.  Denies any urinary symptoms.  Denies any diarrhea or bloody stools.  Denies any new onset back pain.  Denies any recent travel or sick contacts.    No other complaints, modifying factors or associated symptoms.     Nursing notes reviewed.   Past Medical History:   Diagnosis Date    Acute pulmonary embolism (HCC) 01/17/2024    Allergic rhinitis     Aortic stenosis, severe 04/02/2024    Arthritis     Cancer (HCC)     squamous cell on nose     Chronic back pain     Claudication (HCC)     Deep vein thrombosis (DVT) of proximal vein of both lower extremities (HCC) 04/11/2017    Dementia (HCC) 12/2019    Dental disease     Depression     Dizziness     Facial pain 03/28/2024     Spouse name: Not on file    Number of children: 3    Years of education: Not on file    Highest education level: Not on file   Occupational History    Not on file   Tobacco Use    Smoking status: Never     Passive exposure: Never    Smokeless tobacco: Never   Vaping Use    Vaping status: Never Used   Substance and Sexual Activity    Alcohol use: No    Drug use: No    Sexual activity: Not Currently   Other Topics Concern    Not on file   Social History Narrative    Not on file     Social Determinants of Health     Financial Resource Strain: Low Risk  (6/17/2024)    Overall Financial Resource Strain (CARDIA)     Difficulty of Paying Living Expenses: Not hard at all   Food Insecurity: No Food Insecurity (6/17/2024)    Hunger Vital Sign     Worried About Running Out of Food in the Last Year: Never true     Ran Out of Food in the Last Year: Never true   Transportation Needs: No Transportation Needs (6/17/2024)    PRAPARE - Transportation     Lack of Transportation (Medical): No     Lack of Transportation (Non-Medical): No   Physical Activity: Sufficiently Active (6/17/2024)    Exercise Vital Sign     Days of Exercise per Week: 7 days     Minutes of Exercise per Session: 30 min   Stress: No Stress Concern Present (9/9/2024)    [employer] [place] [employer] - Occupational Stress Questionnaire     Feeling of Stress : Only a little   Social Connections: Socially Isolated (6/17/2024)    Social Connection and Isolation Panel [NHANES]     Frequency of Communication with Friends and Family: More than three times a week     Frequency of Social Gatherings with Friends and Family: More than three times a week     Attends Worship Services: Never     Active Member of Clubs or Organizations: No     Attends Club or Organization Meetings: Never     Marital Status:    Intimate Partner Violence: Patient Declined (9/22/2023)    Humiliation, Afraid, Rape, and Kick questionnaire     Fear of Current or Ex-Partner: Patient

## 2024-10-04 ENCOUNTER — CARE COORDINATION (OUTPATIENT)
Dept: CARE COORDINATION | Age: 84
End: 2024-10-04

## 2024-10-04 ENCOUNTER — TELEPHONE (OUTPATIENT)
Dept: PRIMARY CARE CLINIC | Age: 84
End: 2024-10-04

## 2024-10-04 NOTE — CARE COORDINATION
call    PCP/Specialist follow up:   Future Appointments         Provider Specialty Dept Phone    10/11/2024 10:15 AM [facility]TICO[facility]ULATION CLIN[facility] Pharmacy [phone]    10/14/2024 2:00 PM Stefany Manzo MD Primary Care [phone]    10/30/2024 1:00 PM Aimee Molina, APRN - CNP Pain Management [phone]    12/10/2024 11:45 AM Terrell Burkett MD Pulmonology [phone]    1/20/2025 9:30 AM Stefany Manzo MD Primary Care [phone]    5/14/2025 10:30 AM Stefany Manzo MD Primary Care [phone]            Follow Up:   Plan for next ACM outreach in approximately 3 weeks to complete:  - CC Protocol assessments  - disease specific assessments  - goal progression  - education   - possible graduation .   Caregiver is agreeable to this plan.

## 2024-10-04 NOTE — TELEPHONE ENCOUNTER
----- Message from Dr. Stefany Manzo MD sent at 10/4/2024 11:15 AM EDT -----  Can you help me call to check in on her, see how she is doing, see if she needs PT/ortho or anything else from us please! Thank you!  ----- Message -----  From: Discharge Provider, Automatic  Sent: 10/4/2024  12:17 AM EDT  To: Stefany Manzo MD

## 2024-10-04 NOTE — TELEPHONE ENCOUNTER
I spoke with the patient’s daughter in accordance with HIPAA guidelines. She reported that the patient is doing fine and that there is no need for physical therapy or orthopedic consultation at this time.

## 2024-10-11 ENCOUNTER — ANTI-COAG VISIT (OUTPATIENT)
Dept: PHARMACY | Age: 84
End: 2024-10-11
Payer: MEDICARE

## 2024-10-11 DIAGNOSIS — Z86.718 HISTORY OF DVT OF LOWER EXTREMITY: Primary | ICD-10-CM

## 2024-10-11 LAB
INTERNATIONAL NORMALIZATION RATIO, POC: 2.8
PROTHROMBIN TIME, POC: 0

## 2024-10-11 PROCEDURE — 99211 OFF/OP EST MAY X REQ PHY/QHP: CPT

## 2024-10-11 PROCEDURE — 85610 PROTHROMBIN TIME: CPT

## 2024-10-11 NOTE — PROGRESS NOTES
Ms. Crespo is here for management of anticoagulation for hx of DVT x 2 separate occasions.    PMH also significant for HTN. She presents today w/out complaint.    Pt verifies dosing regimen as listed above.  Pt denies s/s bleeding/swelling/SOB.  Pt denies missed/extra doses.   Pt denies changes in Rx/OTCs/Herbal medications.   Pt denies changes in diet  Pt reports occasional EtOH use and denies tobacco.  Pt isn't driving currently due to dizziness. Pt's daughter Oralia takes her to her appointments    INR 2.8 is WITHIN the acceptable therapeutic range of 2-3.    Recommend to continue with warfarin 7.5 mg daily  Pt has the 5 mg tablets.  Will continue to monitor and check INR in 4 weeks.  Dosing reminder card given with phone number, appointment date and time.   Return to clinic: 11/8/24    Referring physician: Dr. Liz  Referral date: 4/3/24    Singh Nunes, PharmD 10/11/2024 10:19 AM    For Pharmacy Admin Tracking Only  Intervention Detail:   Total # of Interventions Recommended: 0  Total # of Interventions Accepted: 0  Time Spent (min): 15

## 2024-10-14 ENCOUNTER — OFFICE VISIT (OUTPATIENT)
Dept: PRIMARY CARE CLINIC | Age: 84
End: 2024-10-14
Payer: MEDICARE

## 2024-10-14 ENCOUNTER — TELEPHONE (OUTPATIENT)
Dept: PAIN MANAGEMENT | Age: 84
End: 2024-10-14

## 2024-10-14 VITALS
HEIGHT: 60 IN | DIASTOLIC BLOOD PRESSURE: 70 MMHG | BODY MASS INDEX: 24.11 KG/M2 | SYSTOLIC BLOOD PRESSURE: 120 MMHG | WEIGHT: 122.8 LBS | OXYGEN SATURATION: 96 % | HEART RATE: 66 BPM | TEMPERATURE: 98.1 F | RESPIRATION RATE: 16 BRPM

## 2024-10-14 DIAGNOSIS — G89.4 CHRONIC PAIN SYNDROME: ICD-10-CM

## 2024-10-14 DIAGNOSIS — Z23 IMMUNIZATION DUE: ICD-10-CM

## 2024-10-14 DIAGNOSIS — R29.6 RECURRENT FALLS: Primary | ICD-10-CM

## 2024-10-14 DIAGNOSIS — F33.42 RECURRENT MAJOR DEPRESSIVE DISORDER, IN FULL REMISSION (HCC): ICD-10-CM

## 2024-10-14 DIAGNOSIS — I50.9 CONGESTIVE HEART FAILURE, UNSPECIFIED HF CHRONICITY, UNSPECIFIED HEART FAILURE TYPE (HCC): ICD-10-CM

## 2024-10-14 PROCEDURE — 90653 IIV ADJUVANT VACCINE IM: CPT | Performed by: FAMILY MEDICINE

## 2024-10-14 PROCEDURE — 3078F DIAST BP <80 MM HG: CPT | Performed by: FAMILY MEDICINE

## 2024-10-14 PROCEDURE — 1123F ACP DISCUSS/DSCN MKR DOCD: CPT | Performed by: FAMILY MEDICINE

## 2024-10-14 PROCEDURE — 99214 OFFICE O/P EST MOD 30 MIN: CPT | Performed by: FAMILY MEDICINE

## 2024-10-14 PROCEDURE — G0008 ADMIN INFLUENZA VIRUS VAC: HCPCS | Performed by: FAMILY MEDICINE

## 2024-10-14 PROCEDURE — 3074F SYST BP LT 130 MM HG: CPT | Performed by: FAMILY MEDICINE

## 2024-10-14 RX ORDER — DULOXETIN HYDROCHLORIDE 60 MG/1
60 CAPSULE, DELAYED RELEASE ORAL DAILY
Qty: 90 CAPSULE | Refills: 0 | Status: SHIPPED | OUTPATIENT
Start: 2024-10-14 | End: 2025-01-12

## 2024-10-14 RX ORDER — HYDROXYCHLOROQUINE SULFATE 200 MG/1
TABLET, FILM COATED ORAL
COMMUNITY
Start: 2024-10-04

## 2024-10-14 NOTE — PROGRESS NOTES
Immunization(s) given during visit:     Immunizations Administered       Name Date Dose Route    Influenza, FLUAD, (age 65 y+), IM, Trivalent PF, 0.5mL 10/14/2024 0.5 mL Intramuscular    Site: Deltoid- Left    Lot: [id number]    NDC: 17049-828-98               
hours on, 12 hours off., Disp: 30 patch, Rfl: 0    pravastatin (PRAVACHOL) 40 MG tablet, TAKE 1 TABLET BY MOUTH DAILY, Disp: 90 tablet, Rfl: 0    vitamin D 25 MCG (1000 UT) CAPS, Take 1 capsule by mouth daily, Disp: , Rfl:     furosemide (LASIX) 20 MG tablet, Take 1 tablet by mouth daily as needed (swelling, shortness of breath), Disp: 30 tablet, Rfl: 1    albuterol sulfate HFA (VENTOLIN HFA) 108 (90 Base) MCG/ACT inhaler, Inhale 2 puffs into the lungs 4 times daily as needed for Wheezing, Disp: 54 g, Rfl: 2    polyethylene glycol (GLYCOLAX) 17 g packet, Take 1 packet by mouth daily as needed for Constipation, Disp: , Rfl:     ondansetron (ZOFRAN) 4 MG tablet, Take 1 tablet by mouth every 12 hours as needed for Nausea, Disp: 14 tablet, Rfl: 0    b complex vitamins capsule, Take 1 capsule by mouth daily, Disp: , Rfl:     warfarin (COUMADIN) 5 MG tablet, TAKE 2 TABLETS BY MOUTH  DAILY ON MONDAY AND 1 AND  1/2 TABLETS BY MOUTH DAILY  ON ALL OTHER DAYS, OR  DIRECTED BY COUMADIN CLINIC (Patient taking differently: Take 1.5 tablets by mouth daily), Disp: 143 tablet, Rfl: 3    Acetaminophen (TYLENOL ARTHRITIS EXT RELIEF PO), Take by mouth in the morning and at bedtime, Disp: , Rfl:     Handicap Placard MISC, by Does not apply route, Disp: 1 each, Rfl: 0    cetirizine (ZYRTEC) 10 MG tablet, Take 1 tablet by mouth daily, Disp: , Rfl:     fluticasone (FLONASE) 50 MCG/ACT nasal spray, 2 sprays by Each Nostril route daily (Patient taking differently: 2 sprays by Each Nostril route daily as needed), Disp: 1 Bottle, Rfl: 5    Misc. Devices MISC, Compression stockings/open toe pantyhose with compression 40/50, Disp: 2 each, Rfl: 2    Misc. Devices MISC, George panty hose  Open toe Petite plus beige  2 pairs, Disp: 2 each, Rfl: 1  Allergies   Allergen Reactions    Amitriptyline Anaphylaxis    Imitrex [Sumatriptan] Anaphylaxis and Shortness Of Breath     Swelling in arm of injection cite, and SOB     Review of Systems:  Review of

## 2024-10-14 NOTE — TELEPHONE ENCOUNTER
Rosalind muñiz from [facility] calling requesting a refill be sent for pt duloxetine to [facility] home delivery because it would be cheaper for the patient.  Please advise.

## 2024-10-14 NOTE — ASSESSMENT & PLAN NOTE
Poorly controlled, recurrent. Mechanical but also she is frail and at high risk, especially with warfarin. Encouraged to use walker rather than cane. Referral to PT provided for this. Fall/call back/ED precautions discussed today at length.

## 2024-10-25 ENCOUNTER — CARE COORDINATION (OUTPATIENT)
Dept: CARE COORDINATION | Age: 84
End: 2024-10-25

## 2024-10-25 NOTE — CARE COORDINATION
Ambulatory Care Coordination Note     10/25/2024 9:47 AM     Patient Current Location:  [address] [address]     ACM contacted the patient by telephone. Verified name and  with patient as identifiers.         ACM: Christina Summerlin     Challenges to be reviewed by the provider   Additional needs identified to be addressed with provider FYI  Patient stated that she has some mild chest pain, leg swelling and SOB. She said that she also noticed some redness at her naval and some abdominal pain. She stated that she was not concerned enough to schedule an ov. Thank you.               Method of communication with provider: none.    Has the patient been seen in the ED since your last call? no    Care Summary Note: Follow up outreach completed today with the patient. Patient has some mild SOB, chest pains, and leg swelling. Also, she stated that she noticed some redness at her naval and some abdominal pain. She said that she was not concerned enough to schedule an ov with her PCP at this time. This ACM encouraged her to schedule with her PCP. Patient is taking all medications as prescribed without any concerns.  Patient denies any other concerns or needs at this time. Patient can verbalize understanding of when to seek help for emergent needs.     Offered patient enrollment in the Remote Patient Monitoring (RPM) program for in-home monitoring: Yes, but did not enroll at this time: graduated recently .     Assessments Completed:   Hypertension - Encounter Level          ,   General Assessment    Do you have any symptoms that are causing concern?: Yes  Progression since Onset: Unchanged  Reported Symptoms: Other (Comment: Red area to naval; noticed last night (10/24/24))          Medications Reviewed:   Patient denies any changes with medications and reports taking all medications as prescribed.    Advance Care Planning:   Reviewed during previous call      Care Planning:   Not completed during this

## 2024-10-28 ENCOUNTER — TELEPHONE (OUTPATIENT)
Dept: CARDIOLOGY CLINIC | Age: 84
End: 2024-10-28

## 2024-10-28 NOTE — TELEPHONE ENCOUNTER
Per pt, post TAVR, she is still having chest tightness/CP with activity relieved with rest. Schedule her to see Dr. Arias in [facility] office on 11/7/24 to discuss sx and tx options. VU of all. Alejandro SILVERMAN

## 2024-10-30 ENCOUNTER — OFFICE VISIT (OUTPATIENT)
Dept: PAIN MANAGEMENT | Age: 84
End: 2024-10-30

## 2024-10-30 VITALS
SYSTOLIC BLOOD PRESSURE: 128 MMHG | DIASTOLIC BLOOD PRESSURE: 70 MMHG | HEART RATE: 91 BPM | TEMPERATURE: 96.8 F | OXYGEN SATURATION: 96 %

## 2024-10-30 DIAGNOSIS — M96.1 FAILED BACK SURGICAL SYNDROME: ICD-10-CM

## 2024-10-30 DIAGNOSIS — M05.7A RHEUMATOID ARTHRITIS OF OTHER SITE WITH POSITIVE RHEUMATOID FACTOR (HCC): ICD-10-CM

## 2024-10-30 DIAGNOSIS — M43.10 DEGENERATIVE SPONDYLOLISTHESIS: ICD-10-CM

## 2024-10-30 DIAGNOSIS — F33.42 RECURRENT MAJOR DEPRESSIVE DISORDER, IN FULL REMISSION (HCC): ICD-10-CM

## 2024-10-30 DIAGNOSIS — M25.521 ELBOW PAIN, RIGHT: ICD-10-CM

## 2024-10-30 DIAGNOSIS — M48.061 SPINAL STENOSIS OF LUMBAR REGION WITHOUT NEUROGENIC CLAUDICATION: ICD-10-CM

## 2024-10-30 DIAGNOSIS — M47.816 LUMBAR FACET ARTHROPATHY: ICD-10-CM

## 2024-10-30 DIAGNOSIS — M50.90 CERVICAL DISC DISEASE: ICD-10-CM

## 2024-10-30 DIAGNOSIS — G89.4 CHRONIC PAIN SYNDROME: ICD-10-CM

## 2024-10-30 DIAGNOSIS — M51.360 DEGENERATION OF INTERVERTEBRAL DISC OF LUMBAR REGION WITH DISCOGENIC BACK PAIN: ICD-10-CM

## 2024-10-30 RX ORDER — HYDROCODONE BITARTRATE AND ACETAMINOPHEN 7.5; 325 MG/1; MG/1
1 TABLET ORAL EVERY 8 HOURS PRN
Qty: 90 TABLET | Refills: 0 | Status: SHIPPED | OUTPATIENT
Start: 2024-10-30 | End: 2024-11-29

## 2024-10-30 RX ORDER — DULOXETIN HYDROCHLORIDE 60 MG/1
60 CAPSULE, DELAYED RELEASE ORAL DAILY
Qty: 90 CAPSULE | Refills: 0 | Status: SHIPPED | OUTPATIENT
Start: 2024-10-30 | End: 2025-01-28

## 2024-10-30 RX ORDER — LIDOCAINE 50 MG/G
1 PATCH TOPICAL DAILY
Qty: 30 PATCH | Refills: 0 | Status: SHIPPED | OUTPATIENT
Start: 2024-10-30 | End: 2024-11-29

## 2024-10-30 NOTE — PROGRESS NOTES
Shayla Crespo  1940  [id number]    HISTORY OF PRESENT ILLNESS: Ms. Crespo is a 83 y.o. female returns for a follow up visit for pain management  She has a diagnosis of   1. Chronic pain syndrome    2. Cervical disc disease    3. Degeneration of intervertebral disc of lumbar region with discogenic back pain    4. Lumbar facet arthropathy    5. Degenerative spondylolisthesis    6. Spinal stenosis of lumbar region without neurogenic claudication    7. Failed back surgical syndrome    8. Elbow pain, right    9. Rheumatoid arthritis of other site with positive rheumatoid factor (HCC)    10. Recurrent major depressive disorder, in full remission (HCC)      As per Information Obtained from the PADT (Patient Assessment and Documentation Tool)    She complains of pain in the  mid lower back, stomach  She rates the pain 5/10 and describes it as aching, stabbing. Current treatment regimen has helped relieve about 60% of the pain.  She has things crawling while sleeping  any side effects from the current pain regimen. Patient reports that since the last follow up visit the physical functioning is better, family/social relationships are unchanged, mood is unchanged sleep patterns are worse, and that the overall functioning is better.  Patient denies misusing/abusing her narcotic pain medications or using any illegal drugs.      Upon obtaining medical history from Ms. Crespo states that pain is manageable on current pain therapy. Takes the pain medications as prescribed. Maintains f/u with rheumatology for RA. Mood/anxiety is stable. Sleep is fair with an average of 5-6 hours. Denies to having issues of constipation. Tolerating activities/house chores with moderate tenderness to the lower back.     ALLERGIES: Patients list of allergies were reviewed     MEDICATIONS: Ms. Crespo list of medications were reviewed.Her current medications are   Outpatient Medications Prior to Visit   Medication Sig Dispense Refill

## 2024-11-04 NOTE — PROGRESS NOTES
[facility]  H+P  Consult  OP Visit  FU Visit   CC/HPI   CC Followup visit for cardiac conditions detailed in assessment and plan below.   Intervention TAVR   General Doing fair.  Concerned with intermittent episodes cp.  Cp substernal, pressure, sob, exertional.  Occurs with mild activity.     Cardiac Sx -SOB, -dizziness, -syncope, -edema, -orthopnea, -pnd, -fatigue, -palpitations   HISTORY/ALLERGY/ROS   M/S/S/F Hx Reviewed in Epic and updated as appropriate   ALLERG Amitriptyline and Imitrex [sumatriptan]   ROS Full ROS obtained and negative except as mentioned in HPI   MEDICATIONS   Cardiac medications reviewed in Epic during visit with patient.   PHYSICAL EXAM   Vitals /80 (Site: Left Upper Arm, Position: Sitting, Cuff Size: Small Adult)   Pulse 50   Ht 1.524 m (5')   Wt 54.9 kg (121 lb)   SpO2 95%   BMI 23.63 kg/m²    Gen Alert, coop, no distress Heart  RRR, no MRG   Lung CTA-B, unlabored, no DTP Extrem Edema -Grade 0 (0mm)      COMPLIANCE   Discussed and counseled on diet, exercise, weight loss, smoking, alcohol, drugs.  All questions answered.   CODING   SCI (92884) - 30-39 mins spent reviewing hx/tests/consults, performing exam, counseling/educating, ordering meds/tests/procedures, referring/communicating w/PCP/consultants, documenting in EMR, interpreting results, communicating medical information and plan with family.   SCRIBE ATTESTATION   Nurse I, Heaven Brandon RN, am scribing for and in the presence of Rafy Arias MD. 11/4/2024 12:39 PM EST   Doctor Heaven Brandon is working as scribe for and in presence of me and may have prepopulated components of note with my historical intellectual property (IP) under my supervision.  Any additions to this IP performed in my presence and at my direction. Content and accuracy of this note reviewed by me (Rafy Arias MD).   NEW MEDICATIONS   Pt verbalizes understanding of the need for treatment and education provided at today's visit.

## 2024-11-06 ENCOUNTER — CARE COORDINATION (OUTPATIENT)
Dept: CARE COORDINATION | Age: 84
End: 2024-11-06

## 2024-11-06 NOTE — CARE COORDINATION
Ambulatory Care Coordination Note     11/6/2024 12:25 PM     Patient outreach attempt by this ACM today to perform care management follow up . ACM was unable to reach the patient by telephone today; left voice message requesting a return phone call to this ACM.     ACM: Christina Summerlin     Care Summary Note: First failed attempt    PCP/Specialist follow up:   Future Appointments         Provider Specialty Dept Phone    11/7/2024 11:15 AM Rafy Arias MD Cardiology [phone]    11/8/2024 10:15 AM [facility] ANTICOAGULATION CLINIC Pharmacy [phone]    11/25/2024 11:40 AM Aimee Molina, APRN - CNP Pain Management [phone]    12/10/2024 11:45 AM Terrell Burkett MD Pulmonology [phone]    1/20/2025 9:30 AM Stefany Manzo MD Primary Care [phone]    5/14/2025 10:30 AM Stefany Manzo MD Primary Care [phone]            Follow Up:   Plan for next ACM outreach in approximately 1 week to complete:  - CC Protocol assessments  - disease specific assessments  - goal progression  - education   - follow-up appointment with Cards r/t CORI .

## 2024-11-07 ENCOUNTER — OFFICE VISIT (OUTPATIENT)
Dept: CARDIOLOGY CLINIC | Age: 84
End: 2024-11-07

## 2024-11-07 VITALS
DIASTOLIC BLOOD PRESSURE: 80 MMHG | HEART RATE: 50 BPM | OXYGEN SATURATION: 95 % | BODY MASS INDEX: 23.75 KG/M2 | HEIGHT: 60 IN | SYSTOLIC BLOOD PRESSURE: 138 MMHG | WEIGHT: 121 LBS

## 2024-11-07 DIAGNOSIS — I34.2 NONRHEUMATIC MITRAL VALVE STENOSIS: ICD-10-CM

## 2024-11-07 DIAGNOSIS — Z86.718 HISTORY OF DVT OF LOWER EXTREMITY: ICD-10-CM

## 2024-11-07 DIAGNOSIS — I10 ESSENTIAL HYPERTENSION: ICD-10-CM

## 2024-11-07 DIAGNOSIS — I35.0 AORTIC VALVE STENOSIS, NONRHEUMATIC: Primary | ICD-10-CM

## 2024-11-07 DIAGNOSIS — Z95.2 S/P TAVR (TRANSCATHETER AORTIC VALVE REPLACEMENT): ICD-10-CM

## 2024-11-07 DIAGNOSIS — E78.00 HYPERCHOLESTEROLEMIA: ICD-10-CM

## 2024-11-07 DIAGNOSIS — I35.0 NONRHEUMATIC AORTIC VALVE STENOSIS: ICD-10-CM

## 2024-11-07 RX ORDER — ISOSORBIDE MONONITRATE 30 MG/1
30 TABLET, EXTENDED RELEASE ORAL DAILY
Qty: 30 TABLET | Refills: 3 | Status: SHIPPED | OUTPATIENT
Start: 2024-11-07

## 2024-11-08 ENCOUNTER — TELEPHONE (OUTPATIENT)
Dept: PHARMACY | Age: 84
End: 2024-11-08

## 2024-11-08 DIAGNOSIS — Z86.718 HISTORY OF DVT OF LOWER EXTREMITY: Primary | ICD-10-CM

## 2024-11-08 DIAGNOSIS — Z86.718 HISTORY OF DVT OF LOWER EXTREMITY: ICD-10-CM

## 2024-11-08 NOTE — TELEPHONE ENCOUNTER
Patient's daughter called stating they would not be able to make appointment today due to car issues.    Patient/Patient's daughter stated they could go to the lab in [place] to get INR checked.    PT/INR lab draw ordered for patient    Singh Nunes PharmD 11/8/2024 9:14 AM     -------------------------------------------------------------    Contacted patient/patient's daughter to inform them that the INR has still not resulted. Informed patient that will check on 11/11/24 and contact patient with results/plan    Singh Nunes PharmD 11/8/2024 3:18 PM

## 2024-11-08 NOTE — PROGRESS NOTES
Visit changed to home visit as patient is having transportation issues and is unable to make it to appointment, but is able to go to [facility] near her to have lab draw PT/INR    Ms. Crespo is here for management of anticoagulation for hx of DVT x 2 separate occasions.  PMH also significant for HTN. She presents today w/out complaint.    Pt verifies dosing regimen as listed above.  Pt denies s/sx bleeding/swelling/SOB/CP.  Pt denies missed/extra doses.   Pt denies changes in Rx/OTCs/Herbal medications.   Pt denies changes in diet  Pt reports occasional EtOH use and denies tobacco.  Pt isn't driving currently due to dizziness. Pt's daughter Oralia takes her to her appointments    INR 2.49 is WITHIN the acceptable therapeutic range of 2-3.  Recommend to continue with warfarin 7.5 mg daily  Pt has the 5 mg tablets.  Will continue to monitor and check INR in 4 weeks.  Dosing reminder card given with phone number, appointment date and time.   Return to clinic: 12/9/24 at 10:00    Referring physician: Dr. Liz  Referral date: 4/3/24    Jason HerndonD 11/8/2024 9:25 AM    For Pharmacy Admin Tracking Only  Intervention Detail:   Total # of Interventions Recommended: 0  Total # of Interventions Accepted: 0  Time Spent (min): 15

## 2024-11-09 LAB
INR PPP: 2.49 (ref 0.85–1.15)
PROTHROMBIN TIME: 26.9 SEC (ref 11.9–14.9)

## 2024-11-11 ENCOUNTER — ANTI-COAG VISIT (OUTPATIENT)
Dept: PHARMACY | Age: 84
End: 2024-11-11

## 2024-11-11 DIAGNOSIS — Z86.718 HISTORY OF DVT OF LOWER EXTREMITY: Primary | ICD-10-CM

## 2024-11-13 DIAGNOSIS — E78.2 MIXED HYPERLIPIDEMIA: ICD-10-CM

## 2024-11-13 RX ORDER — PRAVASTATIN SODIUM 40 MG
40 TABLET ORAL DAILY
Qty: 90 TABLET | Refills: 0 | Status: SHIPPED | OUTPATIENT
Start: 2024-11-13

## 2024-11-14 ENCOUNTER — CARE COORDINATION (OUTPATIENT)
Dept: CARE COORDINATION | Age: 84
End: 2024-11-14

## 2024-11-14 NOTE — CARE COORDINATION
Ambulatory Care Coordination Note     2024 10:05 AM     Patient Current Location:  [place]     ACM contacted the family by telephone. Verified name and  with family as identifiers.     Patient graduated from the High Risk Care Management program on 2024.  Family verbalizes confidence in the ability to self-manage at this time. has the ability to self manage at this time..  Care management goals have been completed. No further Ambulatory Care Manager follow up scheduled.      ACM: Christina Summerlin     Challenges to be reviewed by the provider   Additional needs identified to be addressed with provider No  none               Method of communication with provider: none.    Utilization: Patient has not had any utilization since our last call.     Care Summary Note: Graduation call completed with the patient's daughter, Oralia. Oralia reports that the patient has moved in with her brother, Aguila. They are currently in the process of getting a stair lift installed for the patient. Oralia denies any needs or concerns regarding her hypertension. She also denies any other needs or concerns as well. She stated if the patient needed any care management in the future she will inform the PCP. Care gaps discussed with the patient's daughter as well.     Assessments Completed:   Hypertension - Encounter Level          ,   General Assessment    Do you have any symptoms that are causing concern?: No      , No changes since last call    Medications Reviewed:   Patient denies any changes with medications and reports taking all medications as prescribed.    Advance Care Planning:   Reviewed during previous call      Care Planning:   Not completed during this call    PCP/Specialist follow up:   Future Appointments         Provider Specialty Dept Phone    2024 11:40 AM Aimee Molina APRN - CNP Pain Management [phone]    2024 10:00 AM [facility] ANTICOAGULATION CLINIC Pharmacy [phone]    12/10/2024 11:45 AM

## 2024-11-22 DIAGNOSIS — I10 ESSENTIAL HYPERTENSION: ICD-10-CM

## 2024-11-23 RX ORDER — LISINOPRIL 5 MG/1
5 TABLET ORAL DAILY
Qty: 90 TABLET | Refills: 1 | Status: SHIPPED | OUTPATIENT
Start: 2024-11-23

## 2024-11-25 ENCOUNTER — OFFICE VISIT (OUTPATIENT)
Dept: PAIN MANAGEMENT | Age: 84
End: 2024-11-25

## 2024-11-25 VITALS
TEMPERATURE: 97 F | DIASTOLIC BLOOD PRESSURE: 71 MMHG | SYSTOLIC BLOOD PRESSURE: 158 MMHG | OXYGEN SATURATION: 95 % | HEART RATE: 67 BPM

## 2024-11-25 DIAGNOSIS — G89.4 CHRONIC PAIN SYNDROME: ICD-10-CM

## 2024-11-25 DIAGNOSIS — M47.816 LUMBAR FACET ARTHROPATHY: ICD-10-CM

## 2024-11-25 DIAGNOSIS — M43.10 DEGENERATIVE SPONDYLOLISTHESIS: ICD-10-CM

## 2024-11-25 DIAGNOSIS — M05.7A RHEUMATOID ARTHRITIS OF OTHER SITE WITH POSITIVE RHEUMATOID FACTOR (HCC): ICD-10-CM

## 2024-11-25 DIAGNOSIS — K59.03 DRUG-INDUCED CONSTIPATION: ICD-10-CM

## 2024-11-25 DIAGNOSIS — M25.521 ELBOW PAIN, RIGHT: ICD-10-CM

## 2024-11-25 DIAGNOSIS — M50.90 CERVICAL DISC DISEASE: ICD-10-CM

## 2024-11-25 DIAGNOSIS — M96.1 FAILED BACK SURGICAL SYNDROME: ICD-10-CM

## 2024-11-25 DIAGNOSIS — M51.360 DEGENERATION OF INTERVERTEBRAL DISC OF LUMBAR REGION WITH DISCOGENIC BACK PAIN: ICD-10-CM

## 2024-11-25 DIAGNOSIS — M48.061 SPINAL STENOSIS OF LUMBAR REGION WITHOUT NEUROGENIC CLAUDICATION: ICD-10-CM

## 2024-11-25 DIAGNOSIS — F33.42 RECURRENT MAJOR DEPRESSIVE DISORDER, IN FULL REMISSION (HCC): ICD-10-CM

## 2024-11-25 RX ORDER — HYDROCODONE BITARTRATE AND ACETAMINOPHEN 7.5; 325 MG/1; MG/1
1 TABLET ORAL EVERY 8 HOURS PRN
Qty: 90 TABLET | Refills: 0 | Status: SHIPPED | OUTPATIENT
Start: 2024-11-25 | End: 2024-12-25

## 2024-11-25 RX ORDER — WARFARIN SODIUM 5 MG/1
TABLET ORAL
Qty: 150 TABLET | Refills: 2 | Status: SHIPPED | OUTPATIENT
Start: 2024-11-25

## 2024-11-25 RX ORDER — DULOXETIN HYDROCHLORIDE 60 MG/1
60 CAPSULE, DELAYED RELEASE ORAL DAILY
Qty: 90 CAPSULE | Refills: 0 | Status: SHIPPED | OUTPATIENT
Start: 2024-11-25 | End: 2025-02-23

## 2024-11-25 RX ORDER — LIDOCAINE 50 MG/G
1 PATCH TOPICAL DAILY
Qty: 30 PATCH | Refills: 0 | Status: SHIPPED | OUTPATIENT
Start: 2024-11-25 | End: 2024-12-25

## 2024-11-25 NOTE — TELEPHONE ENCOUNTER
11/25- called pt @[phone] [facility] informing pt to call back to schedule f/u appt w J. Pt needs appt around 1/2025.

## 2024-11-25 NOTE — TELEPHONE ENCOUNTER
Front- pt needs appt with srj please & thank you        Last Office Visit: 7/2/2024 Provider: CHRISSIE  Is provider OOT? No      **If no OV, when does pt need to be seen? in 3 month(s)  **Has patient already had 30 day supply? No    LAST LABS:   CBC:   Lab Results   Component Value Date    WBC 4.5 04/25/2024    HGB 11.1 (L) 04/25/2024    HCT 33.1 (L) 04/25/2024    MCV 98.1 04/25/2024     04/25/2024     CMP:    Lab Results   Component Value Date     07/03/2024    K 4.7 07/03/2024     07/03/2024    CO2 29 07/03/2024    BUN 22 (H) 07/03/2024    CREATININE 0.7 07/03/2024    GLUCOSE 97 07/03/2024    CALCIUM 9.6 07/03/2024    BILITOT 0.3 04/25/2024    ALKPHOS 73 04/25/2024    AST 20 04/25/2024    ALT 10 04/25/2024    LABGLOM 87 07/03/2024    GFRAA >60 06/17/2022    AGRATIO 1.2 04/25/2024    GLOB 3.1 09/13/2021          Lipid:   Lab Results   Component Value Date    CHOL 213 (H) 01/10/2024    TRIG 89 01/10/2024    HDL 72 (H) 01/10/2024     (H) 01/10/2024    VLDL 18 01/10/2024    CHOLHDLRATIO 2.6 06/14/2017     *  Is encounter provider correct?   No  Does refill dosage match last filled?   Yes  Changes to script from Tele Encounters or LOV Plan?   no  Did you adjust dispensed amount or refills to reflect last and upcoming OV?  Yes    Requested Prescriptions     Pending Prescriptions Disp Refills    warfarin (COUMADIN) 5 MG tablet [Pharmacy Med Name: Warfarin Sodium 5 MG Oral Tablet] 150 tablet 2     Sig: TAKE 1 AND 1/2 TABLETS BY MOUTH  DAILY

## 2024-11-25 NOTE — PROGRESS NOTES
in full remission (HCC), and Drug-induced constipation were pertinent to this visit.   Risks and benefits of the medications and other alternative treatments  including no treatment were discussed with the patient.The common side effects of these medications were also explained to the patient.  Informed verbal consent was obtained.   Goals of current treatment regimen include improvement in pain, restoration of functioning- with focus on improvement in physical performance, general activity, work or disability,emotional distress, health care utilization and  decreased medication consumption. Will continue to monitor progress towards achieving/maintaining therapeutic goals with special emphasis on  1. Improvement in perceived interfernce  of pain with ADL's. Ability to do home exercises independently. Ability to do household chores indoor and/or outdoor work and social and leisure activities.Improve psychosocial and physical functioning. - she is showing progression towards this treatment goal with the current regimen.     She was advised against drinking alcohol with the narcotic pain medicines, advised against driving or handling machinery while adjusting the dose of medicines or if having cognitive  issues related to the current medications.Risk of overdose and death, if medicines not taken as prescribed, were also discussed. If the patient develops new symptoms or if the symptoms worsen, the patient should call the office.    While transcribing every attempt was made to maintain the accuracy of the note in terms of it's contents,there may have been some errors made inadvertently.  Thank you for allowing me to participate in the care of this patient.    Aimee Molina CNP.    Cc: Stefany Stanley MD

## 2024-12-02 DIAGNOSIS — K21.9 GASTROESOPHAGEAL REFLUX DISEASE, UNSPECIFIED WHETHER ESOPHAGITIS PRESENT: ICD-10-CM

## 2024-12-03 RX ORDER — PANTOPRAZOLE SODIUM 40 MG/1
40 TABLET, DELAYED RELEASE ORAL DAILY
Qty: 90 TABLET | Refills: 3 | Status: SHIPPED | OUTPATIENT
Start: 2024-12-03

## 2024-12-09 ENCOUNTER — ANTI-COAG VISIT (OUTPATIENT)
Dept: PHARMACY | Age: 84
End: 2024-12-09
Payer: MEDICARE

## 2024-12-09 ENCOUNTER — TELEPHONE (OUTPATIENT)
Dept: PRIMARY CARE CLINIC | Age: 84
End: 2024-12-09

## 2024-12-09 DIAGNOSIS — E78.00 HYPERCHOLESTEROLEMIA: ICD-10-CM

## 2024-12-09 DIAGNOSIS — J84.9 ILD (INTERSTITIAL LUNG DISEASE) (HCC): Primary | ICD-10-CM

## 2024-12-09 DIAGNOSIS — M05.79 RHEUMATOID ARTHRITIS INVOLVING MULTIPLE SITES WITH POSITIVE RHEUMATOID FACTOR (HCC): ICD-10-CM

## 2024-12-09 DIAGNOSIS — I10 ESSENTIAL HYPERTENSION: ICD-10-CM

## 2024-12-09 DIAGNOSIS — Z86.718 HISTORY OF DVT OF LOWER EXTREMITY: Primary | ICD-10-CM

## 2024-12-09 DIAGNOSIS — I34.2 NONRHEUMATIC MITRAL VALVE STENOSIS: ICD-10-CM

## 2024-12-09 DIAGNOSIS — I35.0 AORTIC VALVE STENOSIS, NONRHEUMATIC: ICD-10-CM

## 2024-12-09 DIAGNOSIS — Z86.718 HISTORY OF DVT OF LOWER EXTREMITY: ICD-10-CM

## 2024-12-09 DIAGNOSIS — Z95.2 S/P TAVR (TRANSCATHETER AORTIC VALVE REPLACEMENT): ICD-10-CM

## 2024-12-09 LAB
INTERNATIONAL NORMALIZATION RATIO, POC: 2.3
PROTHROMBIN TIME, POC: 0

## 2024-12-09 PROCEDURE — 85610 PROTHROMBIN TIME: CPT

## 2024-12-09 PROCEDURE — 99211 OFF/OP EST MAY X REQ PHY/QHP: CPT

## 2024-12-09 RX ORDER — HYDROXYCHLOROQUINE SULFATE 200 MG/1
TABLET, FILM COATED ORAL
OUTPATIENT
Start: 2024-12-09

## 2024-12-09 RX ORDER — FUROSEMIDE 20 MG/1
20 TABLET ORAL DAILY PRN
Qty: 30 TABLET | Refills: 1 | Status: SHIPPED | OUTPATIENT
Start: 2024-12-09

## 2024-12-09 RX ORDER — ISOSORBIDE MONONITRATE 30 MG/1
30 TABLET, EXTENDED RELEASE ORAL DAILY
Qty: 90 TABLET | Refills: 3 | Status: SHIPPED | OUTPATIENT
Start: 2024-12-09

## 2024-12-09 RX ORDER — ALBUTEROL SULFATE 90 UG/1
2 INHALANT RESPIRATORY (INHALATION) 4 TIMES DAILY PRN
Qty: 54 G | Refills: 2 | Status: SHIPPED | OUTPATIENT
Start: 2024-12-09

## 2024-12-09 NOTE — TELEPHONE ENCOUNTER
Pt's daughter, Fiorella, called stating that she is the primary decision maker and holds the POA for pt.  Fiorella sts that they would like a new pain mgmt med referral.  Pt's daughter is requesting for pt to be referred to Jazz Eaton, with [facility].    Pt's daughter would like us to attempt to schedule the appt and sts that Wednesdays are better.    Pt's daughter sts that if there are any questions to please call her at [phone] and it is ok to leave detailed voice mails.

## 2024-12-09 NOTE — TELEPHONE ENCOUNTER
Called and spoke with pt's daughter, Fiorella, Advised Fiorella that her brother is listed on the POA and not her.  Fiorella sts that her brother is the POA for everything but medical and she is the one that is the medical POA.  Advised Fiorella that we do not have the medical POA in her chart.  Fiorella sts she will have that faxed over to our office ASAP.    Fiorella added pt to the call and I was able to speak with pt.  Pt sts she would like to go to a different pain mgmt doctor and would like to see Jazz Eaton.  Pt requested a referral to be placed and requested for us to help schedule an appt ASAP for her and that Wednesdays are the best days for appts.    While on the phone with pt & pt's daughter, advised that we did send in rx for the Vit D but the hydrosychloroquine would have to be filled by rheum or heme.  Pt sts her pain mgmt doc has been prescribing it and she does not want to go back to the one she has been seeing.    Pt also stated she revoked the permission for her daughter, Oralia, to be on HIPAA.  Advised pt that even though she gave verbal revocation, she will still have to complete a new form.  Advised pt that she does have an upcoming appt with pulm and they can have her complete a new form.    Pt and daughter, Fiorella, amendable to plan

## 2024-12-09 NOTE — PROGRESS NOTES
Ms. Crespo is here for management of anticoagulation for hx of DVT x 2 separate occasions.  PMH also significant for HTN. She presents today w/out complaint.    Patient verifies dosing regimen as listed above.  Patient denies s/sx bleeding/swelling/SOB/CP.  Patient denies missed/extra doses.   Patient denies changes in Rx/OTCs/Herbal medications.   Patient denies changes in diet  Patient reports occasional EtOH use and denies tobacco.    INR 2.3 is WITHIN the acceptable therapeutic range of 2-3.  Recommend to continue with warfarin 7.5 mg daily  Pt has the 5 mg tablets.  Will continue to monitor and check INR in 4 weeks.  Dosing reminder card given with phone number, appointment date and time.   Return to clinic: 1/6/25 at 11/15/24    Referring physician: Dr. Liz  Referral date: 4/3/24    Singh Nunes, PharmD 12/9/2024 10:09 AM    For Pharmacy Admin Tracking Only  Intervention Detail:   Total # of Interventions Recommended: 0  Total # of Interventions Accepted: 0  Time Spent (min): 15

## 2024-12-09 NOTE — TELEPHONE ENCOUNTER
Pt's daughter, Fiorella, called stating pt needs refills on these medications.      LOV 10/14/24  NOV 1/20/25

## 2024-12-09 NOTE — TELEPHONE ENCOUNTER
Last Office Visit: 7/2/2024 Provider: CHRISSIE  Is provider OOT? No    Next Office Visit: 01/09/25 Provider: GERRY    **Has patient already had 30 day supply? No    LAST LABS:   BMP:   Lab Results   Component Value Date/Time     07/03/2024 01:24 PM    K 4.7 07/03/2024 01:24 PM    K 3.8 04/16/2024 06:36 AM     07/03/2024 01:24 PM    CO2 29 07/03/2024 01:24 PM    BUN 22 07/03/2024 01:24 PM    CREATININE 0.7 07/03/2024 01:24 PM    GLUCOSE 97 07/03/2024 01:24 PM    CALCIUM 9.6 07/03/2024 01:24 PM    LABGLOM 87 07/03/2024 01:24 PM    LABGLOM 90 04/25/2024 11:58 AM       Requested Prescriptions     Pending Prescriptions Disp Refills    furosemide (LASIX) 20 MG tablet 30 tablet 1     Sig: Take 1 tablet by mouth daily as needed (swelling, shortness of breath)

## 2024-12-10 DIAGNOSIS — G89.4 PAIN SYNDROME, CHRONIC: ICD-10-CM

## 2024-12-10 DIAGNOSIS — F11.20 OPIOID DEPENDENCE WITH CURRENT USE (HCC): Primary | ICD-10-CM

## 2024-12-19 ENCOUNTER — TELEPHONE (OUTPATIENT)
Dept: PULMONOLOGY | Age: 84
End: 2024-12-19

## 2024-12-19 ENCOUNTER — HOSPITAL ENCOUNTER (OUTPATIENT)
Dept: CT IMAGING | Age: 84
Discharge: HOME | End: 2024-12-19
Attending: INTERNAL MEDICINE
Payer: MEDICARE

## 2024-12-19 ENCOUNTER — HOSPITAL ENCOUNTER (OUTPATIENT)
Dept: PULMONOLOGY | Age: 84
Discharge: HOME | End: 2024-12-19
Attending: INTERNAL MEDICINE
Payer: MEDICARE

## 2024-12-19 VITALS — OXYGEN SATURATION: 95 %

## 2024-12-19 DIAGNOSIS — J84.9 ILD (INTERSTITIAL LUNG DISEASE) (HCC): ICD-10-CM

## 2024-12-19 DIAGNOSIS — M05.79 RHEUMATOID ARTHRITIS INVOLVING MULTIPLE SITES WITH POSITIVE RHEUMATOID FACTOR (HCC): ICD-10-CM

## 2024-12-19 LAB
DLCO %PRED: 99 %
DLCO PRED: NORMAL
DLCO/VA %PRED: NORMAL
DLCO/VA PRED: NORMAL
DLCO/VA: NORMAL
DLCO: NORMAL
EXPIRATORY TIME-POST: NORMAL
EXPIRATORY TIME: NORMAL
FEF 25-75 %CHNG: NORMAL
FEF 25-75 POST %PRED: NORMAL
FEF 25-75% %PRED-PRE: NORMAL
FEF 25-75% PRED: NORMAL
FEF 25-75-POST: NORMAL
FEF 25-75-PRE: NORMAL
FEV1 %PRED-POST: 94 %
FEV1 %PRED-PRE: 90 %
FEV1 PRED: NORMAL
FEV1-POST: NORMAL
FEV1-PRE: NORMAL
FEV1/FVC %PRED-POST: 122 %
FEV1/FVC %PRED-PRE: 119 %
FEV1/FVC PRED: NORMAL
FEV1/FVC-POST: NORMAL
FEV1/FVC-PRE: NORMAL
FVC %PRED-POST: 76 L
FVC %PRED-PRE: 74 %
FVC PRED: NORMAL
FVC-POST: NORMAL
FVC-PRE: NORMAL
GAW %PRED: NORMAL
GAW PRED: NORMAL
GAW: NORMAL
IC PRE %PRED: NORMAL
IC PRED: NORMAL
IC: NORMAL
MEP: NORMAL
MIP: NORMAL
MVV %PRED-PRE: NORMAL
MVV PRED: NORMAL
MVV-PRE: NORMAL
PEF %PRED-POST: NORMAL
PEF %PRED-PRE: NORMAL
PEF PRED: NORMAL
PEF%CHNG: NORMAL
PEF-POST: NORMAL
PEF-PRE: NORMAL
RAW %PRED: NORMAL
RAW PRED: NORMAL
RAW: NORMAL
RV PRE %PRED: NORMAL
RV PRED: NORMAL
RV: NORMAL
SVC %PRED: NORMAL
SVC PRED: NORMAL
SVC: NORMAL
TLC PRE %PRED: 72 %
TLC PRED: NORMAL
TLC: NORMAL
VA %PRED: NORMAL
VA PRED: NORMAL
VA: NORMAL
VTG %PRED: NORMAL
VTG PRED: NORMAL
VTG: NORMAL

## 2024-12-19 PROCEDURE — 94726 PLETHYSMOGRAPHY LUNG VOLUMES: CPT

## 2024-12-19 PROCEDURE — 94729 DIFFUSING CAPACITY: CPT

## 2024-12-19 PROCEDURE — 6370000000 HC RX 637 (ALT 250 FOR IP): Performed by: INTERNAL MEDICINE

## 2024-12-19 PROCEDURE — 71250 CT THORAX DX C-: CPT

## 2024-12-19 PROCEDURE — 94060 EVALUATION OF WHEEZING: CPT

## 2024-12-19 PROCEDURE — 94618 PULMONARY STRESS TESTING: CPT

## 2024-12-19 RX ORDER — ALBUTEROL SULFATE 90 UG/1
4 INHALANT RESPIRATORY (INHALATION) ONCE
Status: COMPLETED | OUTPATIENT
Start: 2024-12-19 | End: 2024-12-19

## 2024-12-19 RX ADMIN — Medication 4 PUFF: at 10:27

## 2024-12-19 ASSESSMENT — PULMONARY FUNCTION TESTS
FVC_PERCENT_PREDICTED_POST: 76
FVC_PERCENT_PREDICTED_PRE: 74
FEV1_PERCENT_PREDICTED_POST: 94
FEV1_PERCENT_PREDICTED_PRE: 90
FEV1/FVC_PERCENT_PREDICTED_POST: 122
FEV1/FVC_PERCENT_PREDICTED_PRE: 119

## 2024-12-19 NOTE — PROCEDURES
PROCEDURE NOTE  Date: 12/19/2024   Name: Shayla Crespo  YOB: 1940    Procedures  [facility] Pulmonary Function Lab - Six Minute Walk    Test Performed on:   Room Air__X____   Oxygen at _____ lpm via N/C- continuous Oxygen at _____ lpm via N/C- OCD  Assist Device Used During Test:  None______ Cane___X___ Walker______    Modified Arina's Scale  0 Nothing at all 5 Strong    0.5 Just noticeable 6 Stronger (Hard)    1 Very Light 7 Very Strong   2 Light 8 Very Very Strong   3 Moderate 9 Extremely Strong   4 Somewhat Strong 10 Maximum All out      Time SpO2 Heart Rate Respiratory Rate Dyspnea-  Modified Arina’s Scale Fatigue- Modified Arina’s Scale Other Symptoms   Baseline                     95% room air@rest 77 17 0.5 0.5      1 minute                     93% 100 18 0.5 0.5    2 minutes                     94% 103 18 1 1      3 minutes                     94% 107 19 2 2    4 minutes                     93% 104 19 3 3    5 minutes                     93% 106 20 3 3    6 minutes                     92% 105 20 4 4    Recovery x 1 minute                     93% 95 19 3 3    Recovery x 2 Minute                     92% 94 18 2 2       Number of Laps____3___ X 120 feet + _________ additional feet = Total Distance _360__feet     Total expected 6 MW distance is __1328_feet. Patient achieved __27__% of expected distance.   Pre Blood Pressure:  Post Blood Pressure:  Other symptoms at the end of exercise: sob, back pain and little lightheadedness

## 2024-12-19 NOTE — TELEPHONE ENCOUNTER
Spoke to patient's daughter and gave results. She said her mother is taking Plaquenil for her medication for the Rheumatoid arthritis.

## 2024-12-19 NOTE — TELEPHONE ENCOUNTER
----- Message from Dr. Terrell Burkett MD sent at 12/19/2024  3:02 PM EST -----  Let pt know that her CT shows stable pulmonary fibrosis, no new findings. Ask her if she has started any treatment for rheumatoid arthritis. ?Drug.

## 2024-12-20 ENCOUNTER — HOSPITAL ENCOUNTER (OUTPATIENT)
Age: 84
Discharge: HOME | End: 2024-12-22
Attending: INTERNAL MEDICINE
Payer: MEDICARE

## 2024-12-20 VITALS
DIASTOLIC BLOOD PRESSURE: 90 MMHG | HEIGHT: 60 IN | WEIGHT: 118 LBS | SYSTOLIC BLOOD PRESSURE: 169 MMHG | BODY MASS INDEX: 23.16 KG/M2

## 2024-12-20 DIAGNOSIS — Z86.718 HISTORY OF DVT OF LOWER EXTREMITY: ICD-10-CM

## 2024-12-20 DIAGNOSIS — I10 ESSENTIAL HYPERTENSION: ICD-10-CM

## 2024-12-20 DIAGNOSIS — I34.2 NONRHEUMATIC MITRAL VALVE STENOSIS: ICD-10-CM

## 2024-12-20 DIAGNOSIS — E78.00 HYPERCHOLESTEROLEMIA: ICD-10-CM

## 2024-12-20 DIAGNOSIS — I35.0 NONRHEUMATIC AORTIC VALVE STENOSIS: ICD-10-CM

## 2024-12-20 DIAGNOSIS — I35.0 AORTIC VALVE STENOSIS, NONRHEUMATIC: ICD-10-CM

## 2024-12-20 DIAGNOSIS — Z95.2 S/P TAVR (TRANSCATHETER AORTIC VALVE REPLACEMENT): ICD-10-CM

## 2024-12-20 PROBLEM — J84.9 ILD (INTERSTITIAL LUNG DISEASE) (HCC): Status: ACTIVE | Noted: 2024-12-20

## 2024-12-20 LAB
ECHO AO ASC DIAM: 3.5 CM
ECHO AO ASCENDING AORTA INDEX: 2.35 CM/M2
ECHO AO ROOT DIAM: 3 CM
ECHO AO ROOT INDEX: 2.01 CM/M2
ECHO AR MAX VEL PISA: 5.1 M/S
ECHO AV AREA PEAK VELOCITY: 2.2 CM2
ECHO AV AREA VTI: 2.2 CM2
ECHO AV AREA/BSA PEAK VELOCITY: 1.5 CM2/M2
ECHO AV AREA/BSA VTI: 1.5 CM2/M2
ECHO AV EFFECTIVE ORIFICE AREA (EOA) INDEX: 1.5 CM2/M2
ECHO AV EFFECTIVE ORIFICE AREA (EOA): 2.2 CM2
ECHO AV MEAN GRADIENT: 13 MMHG
ECHO AV MEAN VELOCITY: 1.7 M/S
ECHO AV PEAK GRADIENT: 24 MMHG
ECHO AV PEAK VELOCITY: 2.4 M/S
ECHO AV REGURGITANT PHT: 457 MS
ECHO AV VELOCITY RATIO: 0.58
ECHO AV VTI: 45.2 CM
ECHO BSA: 1.51 M2
ECHO EST RA PRESSURE: 3 MMHG
ECHO LA AREA 2C: 25.7 CM2
ECHO LA AREA 4C: 21.2 CM2
ECHO LA MAJOR AXIS: 6.5 CM
ECHO LA MINOR AXIS: 6.3 CM
ECHO LA VOL BP: 69 ML (ref 22–52)
ECHO LA VOL MOD A2C: 83 ML (ref 22–52)
ECHO LA VOL MOD A4C: 56 ML (ref 22–52)
ECHO LA VOL/BSA BIPLANE: 46 ML/M2 (ref 16–34)
ECHO LA VOLUME INDEX MOD A2C: 56 ML/M2 (ref 16–34)
ECHO LA VOLUME INDEX MOD A4C: 38 ML/M2 (ref 16–34)
ECHO LV E' LATERAL VELOCITY: 3.49 CM/S
ECHO LV E' SEPTAL VELOCITY: 2.87 CM/S
ECHO LV EDV A2C: 77 ML
ECHO LV EDV A4C: 85 ML
ECHO LV EDV INDEX A4C: 57 ML/M2
ECHO LV EDV NDEX A2C: 52 ML/M2
ECHO LV EJECTION FRACTION A2C: 57 %
ECHO LV EJECTION FRACTION A4C: 52 %
ECHO LV EJECTION FRACTION BIPLANE: 53 % (ref 55–100)
ECHO LV ESV A2C: 33 ML
ECHO LV ESV A4C: 41 ML
ECHO LV ESV INDEX A2C: 22 ML/M2
ECHO LV ESV INDEX A4C: 28 ML/M2
ECHO LV FRACTIONAL SHORTENING: 27 % (ref 28–44)
ECHO LV GLOBAL LONGITUDINAL STRAIN (GLS): -19 %
ECHO LV INTERNAL DIMENSION DIASTOLE INDEX: 2.48 CM/M2
ECHO LV INTERNAL DIMENSION DIASTOLIC: 3.7 CM (ref 3.9–5.3)
ECHO LV INTERNAL DIMENSION SYSTOLIC INDEX: 1.81 CM/M2
ECHO LV INTERNAL DIMENSION SYSTOLIC: 2.7 CM
ECHO LV IVSD: 1.1 CM (ref 0.6–0.9)
ECHO LV MASS 2D: 129.3 G (ref 67–162)
ECHO LV MASS INDEX 2D: 86.8 G/M2 (ref 43–95)
ECHO LV POSTERIOR WALL DIASTOLIC: 1.1 CM (ref 0.6–0.9)
ECHO LV RELATIVE WALL THICKNESS RATIO: 0.59
ECHO LVOT AREA: 3.8 CM2
ECHO LVOT AV VTI INDEX: 0.59
ECHO LVOT DIAM: 2.2 CM
ECHO LVOT MEAN GRADIENT: 4 MMHG
ECHO LVOT MEAN GRADIENT: 4 MMHG
ECHO LVOT PEAK GRADIENT: 8 MMHG
ECHO LVOT PEAK VELOCITY: 1.4 M/S
ECHO LVOT STROKE VOLUME INDEX: 68.1 ML/M2
ECHO LVOT SV: 101.4 ML
ECHO LVOT VTI: 26.7 CM
ECHO MV A VELOCITY: 1.63 M/S
ECHO MV AREA PHT: 1.4 CM2
ECHO MV AREA PISA: 3.5 CM2
ECHO MV AREA VTI: 1.9 CM2
ECHO MV E DECELERATION TIME (DT): 621 MS
ECHO MV E VELOCITY: 1.07 M/S
ECHO MV E/A RATIO: 0.66
ECHO MV E/E' LATERAL: 30.66
ECHO MV E/E' RATIO (AVERAGED): 33.97
ECHO MV E/E' SEPTAL: 37.28
ECHO MV LVOT VTI INDEX: 1.96
ECHO MV MAX VELOCITY: 1.8 M/S
ECHO MV MEAN GRADIENT: 6 MMHG
ECHO MV MEAN VELOCITY: 1.2 M/S
ECHO MV PEAK GRADIENT: 13 MMHG
ECHO MV PRESSURE HALF TIME (PHT): 152 MS
ECHO MV REGURGITANT RADIUS PISA: 1.4 CM
ECHO MV VTI: 52.4 CM
ECHO PV MAX VELOCITY: 1.1 M/S
ECHO PV PEAK GRADIENT: 5 MMHG
ECHO RA AREA 4C: 14.5 CM2
ECHO RA END SYSTOLIC VOLUME APICAL 4 CHAMBER INDEX BSA: 23 ML/M2
ECHO RA VOLUME: 34 ML
ECHO RIGHT VENTRICULAR SYSTOLIC PRESSURE (RVSP): 48 MMHG
ECHO RV BASAL DIMENSION: 5.2 CM
ECHO RV FREE WALL PEAK S': 13.6 CM/S
ECHO RV LONGITUDINAL DIMENSION: 8 CM
ECHO RV MID DIMENSION: 3.9 CM
ECHO RV TAPSE: 2.2 CM (ref 1.7–?)
ECHO TV REGURGITANT MAX VELOCITY: 3.36 M/S
ECHO TV REGURGITANT PEAK GRADIENT: 45 MMHG

## 2024-12-20 PROCEDURE — 93306 TTE W/DOPPLER COMPLETE: CPT

## 2024-12-20 NOTE — PROCEDURES
Pulmonary Function Testing      Patient name:  Shayla Crespo      Unit #:   [id number]   Date of test:  12/19/2024   Date of interpretation:   12/20/2024    Ms. Shayla Crespo is a 84 y.o. year-old non smoker. The spirometry data were acceptable and reproducible.     Spirometry:  Flow volume loops were restricted. The FEV-1/FVC ratio was normal. The FEV-1 was normal. The FVC was decreased. Response to inhaled bronchodilators (albuterol) was not significant.    Lung volumes:  Lung volumes were tested by plethysmography. The total lung capacity was decreased. The residual volume was decreased. The ratio of residual volume to total lung capacity (RV/TLC) was 102%, which was normal.     Diffusion capacity was found to be 99% which is Normal.      Interpretation:  Mild restrictive defect with no significant bronchodilator response.  The diffusion is normal.    Comments: None      Severo Nagy MD, [facility]  [facility] Pulmonary Critical Care

## 2024-12-20 NOTE — PROCEDURES
PROCEDURE NOTE  Date: 12/20/2024   Name: Shayla Crespo  YOB: 1940    6-minute walk test    The patient walked without any assistance throughout the entire walk.  At the time of the test, the patient reported a Arina scale of 0.5 while standing with an oxygen saturation of 95% on room air and heart rate of 77 bpm.  A 6-minute walk test was performed.  The patient went for the entire 6-minute duration.  The patient ambulated for a total distance of 360 ft on RA.  Her dyspnea at the end of the test was found to be 4 on the Arina scale at that time with an oxygen saturation of 92%.  The patient's expected value was 1328 ft for an average healthy female of her age group.  Highest heart rate during the test was 107 bpm.  Lowest oxygenation saturation was 92% at 6 minutes.    Impression: No desaturation throughout the entire 6-minute procedure.  The patient achieved 27% of the expected distance for an average healthy female of her age group.      Severo Nagy MD, [facility] Pulmonary Critical Care

## 2024-12-23 ENCOUNTER — OFFICE VISIT (OUTPATIENT)
Dept: PAIN MANAGEMENT | Age: 84
End: 2024-12-23

## 2024-12-23 VITALS
SYSTOLIC BLOOD PRESSURE: 158 MMHG | HEART RATE: 67 BPM | OXYGEN SATURATION: 95 % | TEMPERATURE: 97 F | DIASTOLIC BLOOD PRESSURE: 70 MMHG

## 2024-12-23 DIAGNOSIS — M43.10 DEGENERATIVE SPONDYLOLISTHESIS: ICD-10-CM

## 2024-12-23 DIAGNOSIS — M51.360 DEGENERATION OF INTERVERTEBRAL DISC OF LUMBAR REGION WITH DISCOGENIC BACK PAIN: ICD-10-CM

## 2024-12-23 DIAGNOSIS — K59.03 DRUG-INDUCED CONSTIPATION: ICD-10-CM

## 2024-12-23 DIAGNOSIS — E66.811 CLASS 1 OBESITY DUE TO EXCESS CALORIES IN ADULT, UNSPECIFIED BMI, UNSPECIFIED WHETHER SERIOUS COMORBIDITY PRESENT: ICD-10-CM

## 2024-12-23 DIAGNOSIS — F33.42 RECURRENT MAJOR DEPRESSIVE DISORDER, IN FULL REMISSION (HCC): ICD-10-CM

## 2024-12-23 DIAGNOSIS — M05.7A RHEUMATOID ARTHRITIS OF OTHER SITE WITH POSITIVE RHEUMATOID FACTOR (HCC): ICD-10-CM

## 2024-12-23 DIAGNOSIS — M50.90 CERVICAL DISC DISEASE: ICD-10-CM

## 2024-12-23 DIAGNOSIS — M25.521 ELBOW PAIN, RIGHT: ICD-10-CM

## 2024-12-23 DIAGNOSIS — G89.4 CHRONIC PAIN SYNDROME: ICD-10-CM

## 2024-12-23 DIAGNOSIS — M48.061 SPINAL STENOSIS OF LUMBAR REGION WITHOUT NEUROGENIC CLAUDICATION: ICD-10-CM

## 2024-12-23 DIAGNOSIS — E66.09 CLASS 1 OBESITY DUE TO EXCESS CALORIES IN ADULT, UNSPECIFIED BMI, UNSPECIFIED WHETHER SERIOUS COMORBIDITY PRESENT: ICD-10-CM

## 2024-12-23 DIAGNOSIS — M96.1 FAILED BACK SURGICAL SYNDROME: ICD-10-CM

## 2024-12-23 DIAGNOSIS — M47.816 LUMBAR FACET ARTHROPATHY: ICD-10-CM

## 2024-12-23 NOTE — PROGRESS NOTES
Shayla Crespo  1940  [id number]    HISTORY OF PRESENT ILLNESS: Ms. Crespo is a 84 y.o. female returns for a follow up visit for pain management  She has a diagnosis of   1. Chronic pain syndrome    2. Cervical disc disease    3. Degeneration of intervertebral disc of lumbar region with discogenic back pain    4. Lumbar facet arthropathy    5. Degenerative spondylolisthesis    6. Spinal stenosis of lumbar region without neurogenic claudication    7. Failed back surgical syndrome    8. Elbow pain, right    9. Rheumatoid arthritis of other site with positive rheumatoid factor (HCC)    10. Recurrent major depressive disorder, in full remission (HCC)    11. Drug-induced constipation    12. Class 1 obesity due to excess calories in adult, unspecified BMI, unspecified whether serious comorbidity present      As per Information Obtained from the PADT (Patient Assessment and Documentation Tool)    She complains of pain in the  mid and lower back  She rates the pain 5/10 and describes it as aching, burning. Current treatment regimen has helped relieve about 50% of the pain.  She has pt states seeing insects  any side effects from the current pain regimen. Patient reports that since the last follow up visit the physical functioning is unchanged, family/social relationships are unchanged, mood is unchanged sleep patterns are unchanged, and that the overall functioning is unchanged.  Patient denies misusing/abusing her narcotic pain medications or using any illegal drugs.      Upon obtaining medical history from Ms. Crespo states that pain is somewhat manageable on current pain therapy. Takes the pain medications as prescribed. Patient reports having taken the medications as needed due to side effects of headaches on Norco. She is still taking Trazodone at bed time. Mood/anxiety is stable. Sleep is fair with an average of 5-6 hours. Denies to having issues of constipation. Tolerating activities/house chores with moderate

## 2024-12-31 ENCOUNTER — TELEPHONE (OUTPATIENT)
Dept: CARDIOLOGY CLINIC | Age: 84
End: 2024-12-31

## 2024-12-31 NOTE — TELEPHONE ENCOUNTER
Dr. Arias reviewed echo. Attempt was made to contact pts daughter Oralia 12/27.  Spoke to pts daughter Fiorella (OK per hIPPA) today. Aware that Dr. Arias suggested a LUIS to further evaluate PVL. Fiorella will discuss with her mother and contact me. Alejandro SILVERMAN

## 2025-01-07 ENCOUNTER — TELEPHONE (OUTPATIENT)
Dept: CARDIOLOGY CLINIC | Age: 85
End: 2025-01-07

## 2025-01-07 NOTE — TELEPHONE ENCOUNTER
LMOR on 1/6/25 of pts daughter, Fiorella, to see if she had discussed LUIS with family. There was a message in mychart from pt asking if LUIS was going to be scheduled and to contact Fiorella.     Kathleen, can you reach out to pts daughter, Fiorella, to see if we can schedule LUIS with Dr. Arias at [facility] or ? Thanks, Alejandro SILVERMAN

## 2025-01-10 DIAGNOSIS — I35.1 NONRHEUMATIC AORTIC VALVE INSUFFICIENCY: Primary | ICD-10-CM

## 2025-01-10 NOTE — TELEPHONE ENCOUNTER
Date of Procedure: Wednesday 1-29-25 @ [facility] w/Dr. Arias    Time of arrival: 9:00 am      Procedure time: 10:00 am      Fiorella (daughter) is agreeable to date and time. Reviewed and placed all instructions  in [employer]. Encouraged to call with any questions or concerns.       Published on [employer], [employer] & emailed [facility] Staff.

## 2025-01-11 ENCOUNTER — APPOINTMENT (OUTPATIENT)
Dept: CT IMAGING | Age: 85
End: 2025-01-11
Payer: MEDICARE

## 2025-01-11 ENCOUNTER — HOSPITAL ENCOUNTER (EMERGENCY)
Age: 85
Discharge: HOME | End: 2025-01-11
Payer: MEDICARE

## 2025-01-11 ENCOUNTER — APPOINTMENT (OUTPATIENT)
Dept: GENERAL RADIOLOGY | Age: 85
End: 2025-01-11
Payer: MEDICARE

## 2025-01-11 VITALS
HEART RATE: 85 BPM | RESPIRATION RATE: 16 BRPM | SYSTOLIC BLOOD PRESSURE: 133 MMHG | DIASTOLIC BLOOD PRESSURE: 73 MMHG | OXYGEN SATURATION: 93 % | TEMPERATURE: 98 F
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
## 2025-01-11 DIAGNOSIS — S09.90XA INJURY OF HEAD, INITIAL ENCOUNTER: ICD-10-CM

## 2025-01-11 PROCEDURE — 99284 EMERGENCY DEPT VISIT MOD MDM: CPT

## 2025-01-11 PROCEDURE — 72125 CT NECK SPINE W/O DYE: CPT

## 2025-01-11 PROCEDURE — 73502 X-RAY EXAM HIP UNI 2-3 VIEWS: CPT

## 2025-01-11 PROCEDURE — 70450 CT HEAD/BRAIN W/O DYE: CPT

## 2025-01-11 ASSESSMENT — PAIN SCALES - GENERAL: PAINLEVEL_OUTOF10: 4

## 2025-01-11 ASSESSMENT — PAIN DESCRIPTION - ORIENTATION: ORIENTATION: RIGHT

## 2025-01-11 ASSESSMENT — PAIN DESCRIPTION - LOCATION: LOCATION: HEAD;LEG

## 2025-01-11 ASSESSMENT — PAIN - FUNCTIONAL ASSESSMENT: PAIN_FUNCTIONAL_ASSESSMENT: 0-10

## 2025-01-12 NOTE — ED PROVIDER NOTES
discharged.    (Please note that portions of this note were completed with a voice recognition program.  Efforts were made to edit the dictations but occasionally words are mis-transcribed).          Cornelius Ahmadi, WALTER  01/12/25 0542

## 2025-01-13 ENCOUNTER — ANTI-COAG VISIT (OUTPATIENT)
Dept: PHARMACY | Age: 85
End: 2025-01-13
Payer: MEDICARE

## 2025-01-13 DIAGNOSIS — Z86.718 HISTORY OF DVT OF LOWER EXTREMITY: Primary | ICD-10-CM

## 2025-01-13 LAB
INTERNATIONAL NORMALIZATION RATIO, POC: 2.5
PROTHROMBIN TIME, POC: 0

## 2025-01-13 PROCEDURE — 99211 OFF/OP EST MAY X REQ PHY/QHP: CPT

## 2025-01-13 PROCEDURE — 85610 PROTHROMBIN TIME: CPT

## 2025-01-13 RX ORDER — ALBUTEROL SULFATE 90 UG/1
2 INHALANT RESPIRATORY (INHALATION) 4 TIMES DAILY PRN
Qty: 54 G | Refills: 0 | Status: SHIPPED | OUTPATIENT
Start: 2025-01-13

## 2025-01-13 NOTE — PROGRESS NOTES
Ms. Crespo is here for management of anticoagulation for hx of DVT x 2 separate occasions.  PMH also significant for HTN. She presents today w/out complaint.    Patient verifies dosing regimen as listed above.  Patient denies s/sx bleeding/swelling/SOB/CP.  Patient denies missed/extra doses.   Patient denies changes in Rx/OTCs/Herbal medications.   Patient denies changes in diet  Patient reports occasional EtOH use and denies tobacco.    INR 2.3 is WITHIN the acceptable therapeutic range of 2-3.  Recommend to continue with warfarin 7.5 mg daily  Patient has the 5 mg tablets.  Will continue to monitor and check INR in 4 weeks.  Dosing reminder card given with phone number, appointment date and time.   Return to clinic: 1/6/25 at 11/15/24    Referring physician: Dr. Liz  Referral date: 4/3/24    Singh Nunes, PharmD 1/13/2025 2:06 PM    For Pharmacy Admin Tracking Only  Intervention Detail:   Total # of Interventions Recommended: 0  Total # of Interventions Accepted: 0  Time Spent (min): 15

## 2025-01-20 ENCOUNTER — OFFICE VISIT (OUTPATIENT)
Dept: PRIMARY CARE CLINIC | Age: 85
End: 2025-01-20
Payer: MEDICARE

## 2025-01-20 VITALS
RESPIRATION RATE: 17 BRPM | HEIGHT: 60 IN | DIASTOLIC BLOOD PRESSURE: 82 MMHG | HEART RATE: 83 BPM | BODY MASS INDEX: 24.15 KG/M2 | OXYGEN SATURATION: 98 % | SYSTOLIC BLOOD PRESSURE: 138 MMHG | TEMPERATURE: 98.2 F | WEIGHT: 123 LBS

## 2025-01-20 DIAGNOSIS — I50.9 CONGESTIVE HEART FAILURE, UNSPECIFIED HF CHRONICITY, UNSPECIFIED HEART FAILURE TYPE (HCC): ICD-10-CM

## 2025-01-20 DIAGNOSIS — J84.112 IPF (IDIOPATHIC PULMONARY FIBROSIS) (HCC): ICD-10-CM

## 2025-01-20 DIAGNOSIS — E78.2 MIXED HYPERLIPIDEMIA: ICD-10-CM

## 2025-01-20 DIAGNOSIS — Z00.00 HEALTHCARE MAINTENANCE: Primary | ICD-10-CM

## 2025-01-20 DIAGNOSIS — I10 ESSENTIAL HYPERTENSION: ICD-10-CM

## 2025-01-20 DIAGNOSIS — F33.2 SEVERE EPISODE OF RECURRENT MAJOR DEPRESSIVE DISORDER, WITHOUT PSYCHOTIC FEATURES (HCC): ICD-10-CM

## 2025-01-20 DIAGNOSIS — D64.9 ANEMIA, UNSPECIFIED TYPE: ICD-10-CM

## 2025-01-20 DIAGNOSIS — M43.10 DEGENERATIVE SPONDYLOLISTHESIS: ICD-10-CM

## 2025-01-20 DIAGNOSIS — Z91.81 AT HIGH RISK FOR FALLS: ICD-10-CM

## 2025-01-20 DIAGNOSIS — M05.7A RHEUMATOID ARTHRITIS OF OTHER SITE WITH POSITIVE RHEUMATOID FACTOR (HCC): ICD-10-CM

## 2025-01-20 PROBLEM — F11.20 OPIOID DEPENDENCE WITH CURRENT USE (HCC): Status: RESOLVED | Noted: 2024-01-24 | Resolved: 2025-01-20

## 2025-01-20 PROCEDURE — G0439 PPPS, SUBSEQ VISIT: HCPCS | Performed by: FAMILY MEDICINE

## 2025-01-20 PROCEDURE — 3075F SYST BP GE 130 - 139MM HG: CPT | Performed by: FAMILY MEDICINE

## 2025-01-20 PROCEDURE — 1159F MED LIST DOCD IN RCRD: CPT | Performed by: FAMILY MEDICINE

## 2025-01-20 PROCEDURE — 1160F RVW MEDS BY RX/DR IN RCRD: CPT | Performed by: FAMILY MEDICINE

## 2025-01-20 PROCEDURE — 1123F ACP DISCUSS/DSCN MKR DOCD: CPT | Performed by: FAMILY MEDICINE

## 2025-01-20 PROCEDURE — 3079F DIAST BP 80-89 MM HG: CPT | Performed by: FAMILY MEDICINE

## 2025-01-20 RX ORDER — FUROSEMIDE 20 MG/1
TABLET ORAL
Qty: 15 TABLET | Refills: 0 | Status: SHIPPED | OUTPATIENT
Start: 2025-01-20

## 2025-01-20 ASSESSMENT — PATIENT HEALTH QUESTIONNAIRE - PHQ9
4. FEELING TIRED OR HAVING LITTLE ENERGY: SEVERAL DAYS
2. FEELING DOWN, DEPRESSED OR HOPELESS: SEVERAL DAYS
3. TROUBLE FALLING OR STAYING ASLEEP: SEVERAL DAYS
3. TROUBLE FALLING OR STAYING ASLEEP: SEVERAL DAYS
7. TROUBLE CONCENTRATING ON THINGS, SUCH AS READING THE NEWSPAPER OR WATCHING TELEVISION: NOT AT ALL
7. TROUBLE CONCENTRATING ON THINGS, SUCH AS READING THE NEWSPAPER OR WATCHING TELEVISION: NOT AT ALL
6. FEELING BAD ABOUT YOURSELF - OR THAT YOU ARE A FAILURE OR HAVE LET YOURSELF OR YOUR FAMILY DOWN: SEVERAL DAYS
SUM OF ALL RESPONSES TO PHQ9 QUESTIONS 1 & 2: 1
SUM OF ALL RESPONSES TO PHQ QUESTIONS 1-9: 5
5. POOR APPETITE OR OVEREATING: SEVERAL DAYS
5. POOR APPETITE OR OVEREATING: SEVERAL DAYS
9. THOUGHTS THAT YOU WOULD BE BETTER OFF DEAD, OR OF HURTING YOURSELF: NOT AT ALL
2. FEELING DOWN, DEPRESSED OR HOPELESS: SEVERAL DAYS
10. IF YOU CHECKED OFF ANY PROBLEMS, HOW DIFFICULT HAVE THESE PROBLEMS MADE IT FOR YOU TO DO YOUR WORK, TAKE CARE OF THINGS AT HOME, OR GET ALONG WITH OTHER PEOPLE: NOT DIFFICULT AT ALL
8. MOVING OR SPEAKING SO SLOWLY THAT OTHER PEOPLE COULD HAVE NOTICED. OR THE OPPOSITE, BEING SO FIGETY OR RESTLESS THAT YOU HAVE BEEN MOVING AROUND A LOT MORE THAN USUAL: NOT AT ALL
1. LITTLE INTEREST OR PLEASURE IN DOING THINGS: NOT AT ALL
SUM OF ALL RESPONSES TO PHQ QUESTIONS 1-9: 5
SUM OF ALL RESPONSES TO PHQ QUESTIONS 1-9: 5
10. IF YOU CHECKED OFF ANY PROBLEMS, HOW DIFFICULT HAVE THESE PROBLEMS MADE IT FOR YOU TO DO YOUR WORK, TAKE CARE OF THINGS AT HOME, OR GET ALONG WITH OTHER PEOPLE: NOT DIFFICULT AT ALL
1. LITTLE INTEREST OR PLEASURE IN DOING THINGS: NOT AT ALL
9. THOUGHTS THAT YOU WOULD BE BETTER OFF DEAD, OR OF HURTING YOURSELF: NOT AT ALL
SUM OF ALL RESPONSES TO PHQ QUESTIONS 1-9: 5
8. MOVING OR SPEAKING SO SLOWLY THAT OTHER PEOPLE COULD HAVE NOTICED. OR THE OPPOSITE - BEING SO FIDGETY OR RESTLESS THAT YOU HAVE BEEN MOVING AROUND A LOT MORE THAN USUAL: NOT AT ALL
6. FEELING BAD ABOUT YOURSELF - OR THAT YOU ARE A FAILURE OR HAVE LET YOURSELF OR YOUR FAMILY DOWN: SEVERAL DAYS
4. FEELING TIRED OR HAVING LITTLE ENERGY: SEVERAL DAYS
SUM OF ALL RESPONSES TO PHQ QUESTIONS 1-9: 5

## 2025-01-20 ASSESSMENT — ENCOUNTER SYMPTOMS
SHORTNESS OF BREATH: 0
BLOOD IN STOOL: 0
COUGH: 0
VOMITING: 0
RHINORRHEA: 1
COLOR CHANGE: 0
ABDOMINAL PAIN: 0
DIARRHEA: 0
NAUSEA: 0

## 2025-01-20 NOTE — ASSESSMENT & PLAN NOTE
Overall doing okay. Miralax worked well, reviewed she will try PRN. Mucinex (just guaifenesin for active ingredient) reviewed for congestion and PRN use.  Reminded to schedule dental and eye exams when able. Other problems addressed today as otherwise noted.

## 2025-01-20 NOTE — PATIENT INSTRUCTIONS
Reminder: Please call to schedule dental and eye exams when able.    Please find our [facility] Lab Location Guide below for your convenience!    CENTRAL LOCATIONS    1) [facility]  [address]  [place], [place] [address]  Phone: [phone]    2) [facility]  [address], [place] [address]  Phone: [phone]    [facility] LOCATIONS    3) [facility]  [address]  [place], [place] [address]  Phone: [phone]    4) [facility]  [address] [place] [address]  Phone: [phone]    5) [facility]  [address] [place] [address]  Phone: [phone]    6) [facility]  [address]  [place] [address]   Phone: [phone]    [place] LOCATIONS    7) [facility]  [address]  [place] [address]  Phone: [phone]    8) [facility]  [address] [place] [address]  Phone: [phone]    9) [facility]  [address] [place] [address]  Phone: [phone]    10) [facility]  [address]  [place] [address]  Phone: [phone]    [place] LOCATIONS    11) [facility]  [address]  [place] [address]  Phone: [phone]    12) [facility]  [address]  [place] [address]  Phone: [phone]    [facility] LOCATIONS    13) [facility]  [address]  [place] [address]  Phone: [phone]    14) [facility]  [address] [place] [address]  Phone: [phone]

## 2025-01-20 NOTE — ASSESSMENT & PLAN NOTE
Well controlled for her, balancing risk of falls and BP control. Asymptomatic. Labs to eval as noted in orders. Fall/call back/ED precautions discussed.    Blood Pressure Goal:  [ ] < 130/80 (ACC/AHA)  [ ] < 140/90 (JNC-8 for age < 60, or CKD, or DM)  [x] < 150/90 (JNC-8 for age > 60)

## 2025-01-20 NOTE — PROGRESS NOTES
Shayla Crespo is a 84 y.o. year old female here for:    Chief Complaint:    Chief Complaint   Patient presents with    Annual Exam     Patient's Sex: female Medicare Member ID: [id number] - (Medicare Managed)    Ethnicity:    Non- / Non   Race:   White (non-)    Provider Name:  Stefany Manzo M.D.  [facility]  [address] [place] [id number]  Dept: [phone]  Dept Fax: [phone]    Provider ID Type: NPI  Billing Provider ID:  [id number]    Patient Care Team:  Stefany Manzo MD as PCP - General (Family Medicine)  Stefany Manzo MD as PCP - Empaneled Provider  Vidya Garcia MD as Consulting Physician (Otolaryngology)  Nikko Granados MD as Consulting Physician (Gastroenterology)  Elba Deleon APRN - CNP as Nurse Practitioner (Nurse Practitioner)  Storm Liz MD as Consulting Physician (Cardiology)  Aimee Molina APRN - CNP as Nurse Practitioner (Certified Nurse Practitioner)    Current concerns:    Medical History:  Past Medical History:   Diagnosis Date    Acute pulmonary embolism ([facility]) 01/17/2024    Allergic rhinitis     Aortic stenosis, severe 04/02/2024    Arthritis     Cancer ([facility])     squamous cell on nose     Chronic back pain     Claudication ([facility])     Clotting disorder ([facility])     Congenital heart disease     Deep vein thrombosis (DVT) of proximal vein of both lower extremities ([facility]) 04/11/2017    Dementia ([facility]) 12/2019    Dental disease     Depression     Dizziness     Facial pain 03/28/2024    GERD (gastroesophageal reflux disease)     H/O blood clots     Headache     Hearing loss     Hiatal hernia     Hyperlipidemia     Hypertension     IPF (idiopathic pulmonary fibrosis) ([facility]) 01/17/2024    Lumbar stenosis with neurogenic claudication     Moderate malnutrition ([facility]) 04/16/2024    Nephrolithiasis     Nonrheumatic aortic valve stenosis 04/02/2024    Opioid dependence with current use ([facility]) 01/24/2024    Osteoarthritis of

## 2025-01-20 NOTE — TELEPHONE ENCOUNTER
Last Office Visit: 7/2/2024 Provider: CHRISSIE  **Is provider OOT? No    Next Office Visit: 2/11/2025 Provider: GERRY    **Has patient already had 30 day supply? No    Lab orders needed? no   Encounter provider correct? Yes If not, change provider  Script changes since last refill? no    LAST LABS:   CBC:   Lab Results   Component Value Date    WBC 4.5 04/25/2024    HGB 11.1 (L) 04/25/2024    HCT 33.1 (L) 04/25/2024    MCV 98.1 04/25/2024     04/25/2024     CMP:   Lab Results   Component Value Date     07/03/2024    K 4.7 07/03/2024     07/03/2024    CO2 29 07/03/2024    BUN 22 (H) 07/03/2024    CREATININE 0.7 07/03/2024    GLUCOSE 97 07/03/2024    CALCIUM 9.6 07/03/2024    BILITOT 0.3 04/25/2024    ALKPHOS 73 04/25/2024    AST 20 04/25/2024    ALT 10 04/25/2024    LABGLOM 87 07/03/2024    GFRAA >60 06/17/2022    AGRATIO 1.2 04/25/2024    GLOB 3.1 09/13/2021

## 2025-01-20 NOTE — ASSESSMENT & PLAN NOTE
Well controlled mostly. Denied SI/HI today. PHQ9 score of 5 today. Previously provided SI Hotline number as well as [email or web address] to establish care for this. Call back/ED precautions discussed.

## 2025-01-20 NOTE — ASSESSMENT & PLAN NOTE
Poorly controlled, frail. Falls are recurring. Ambulates with a cane. PT referral placed today. Fall/call back/ED precautions discussed.

## 2025-01-24 NOTE — H&P
[facility]  H+P  Consult  OP Visit  FU Visit   CC/HPI   CC Followup visit for cardiac conditions detailed in assessment and plan below.   HPI 84 y.o., female presents to the cath lab for LUIS for further evaluation of TAVR valve   Cardiac Hx TAVR   CARDIAC TESTING   Troponin Lab Results   Component Value Date    TROPHS 15 (H) 04/15/2024    TROPHS 15 (H) 04/15/2024    TROPHS 13 2024      HISTORY/ALLERGY/ROS   MEDHx  has a past medical history of Acute pulmonary embolism (HCC), Allergic rhinitis, Aortic stenosis, severe, Arthritis, Cancer (HCC), Chronic back pain, Claudication (HCC), Clotting disorder (HCC), Congenital heart disease, Deep vein thrombosis (DVT) of proximal vein of both lower extremities (HCC), Dementia (HCC), Dental disease, Depression, Dizziness, Facial pain, GERD (gastroesophageal reflux disease), H/O blood clots, Headache, Hearing loss, Hiatal hernia, Hyperlipidemia, Hypertension, IPF (idiopathic pulmonary fibrosis) (HCC), Lumbar stenosis with neurogenic claudication, Moderate malnutrition (HCC), Nephrolithiasis, Nonrheumatic aortic valve stenosis, Opioid dependence with current use (HCC), Osteoarthritis of neck, Pneumonia of right upper lobe due to infectious organism, Scoliosis, Thrombocytopenia, unspecified (HCC), and TMJ dysfunction.   SURGHx  has a past surgical history that includes  section; Lithotripsy; other surgical history; Mohs surgery; embolectomy; laminectomy (2018); epidural steroid injection (Bilateral, 2019); Hysterectomy, total abdominal; epidural steroid injection (Right, 08/15/2019); Cataract extraction (Bilateral); Cardiac surgery (N/A, 2024); Cardiac surgery (N/A, 2024); and Cardiac valve replacement.   SOCHx  reports that she has never smoked. She has never been exposed to tobacco smoke. She has never used smokeless tobacco. She reports that she does not drink alcohol and does not use drugs.   FAMHx family history includes

## 2025-01-29 ENCOUNTER — HOSPITAL ENCOUNTER (OUTPATIENT)
Age: 85
Discharge: HOME | End: 2025-01-31
Attending: INTERNAL MEDICINE
Payer: MEDICARE

## 2025-01-29 VITALS
BODY MASS INDEX: 23.16 KG/M2 | DIASTOLIC BLOOD PRESSURE: 74 MMHG | HEART RATE: 66 BPM | WEIGHT: 118 LBS | OXYGEN SATURATION: 100 % | SYSTOLIC BLOOD PRESSURE: 146 MMHG | HEIGHT: 60 IN | RESPIRATION RATE: 18 BRPM | TEMPERATURE: 98.3 F

## 2025-01-29 DIAGNOSIS — I35.1 NONRHEUMATIC AORTIC VALVE INSUFFICIENCY: ICD-10-CM

## 2025-01-29 LAB
ECHO BSA: 1.51 M2
ECHO MV E DECELERATION TIME (DT): 293 MS
ECHO MV E VELOCITY: 1.39 M/S
ECHO MV MAX VELOCITY: 1.7 M/S
ECHO MV MEAN GRADIENT: 6 MMHG
ECHO MV MEAN VELOCITY: 1.1 M/S
ECHO MV PEAK GRADIENT: 12 MMHG
ECHO MV VTI: 46 CM
INR PPP: 2.19 (ref 0.85–1.15)
PROTHROMBIN TIME: 24.3 SEC (ref 11.9–14.9)

## 2025-01-29 PROCEDURE — 93312 ECHO TRANSESOPHAGEAL: CPT

## 2025-01-29 PROCEDURE — 99153 MOD SED SAME PHYS/QHP EA: CPT | Performed by: INTERNAL MEDICINE

## 2025-01-29 PROCEDURE — 7100000011 HC PHASE II RECOVERY - ADDTL 15 MIN: Performed by: INTERNAL MEDICINE

## 2025-01-29 PROCEDURE — 99152 MOD SED SAME PHYS/QHP 5/>YRS: CPT | Performed by: INTERNAL MEDICINE

## 2025-01-29 PROCEDURE — 7100000010 HC PHASE II RECOVERY - FIRST 15 MIN: Performed by: INTERNAL MEDICINE

## 2025-01-29 PROCEDURE — 85610 PROTHROMBIN TIME: CPT

## 2025-01-29 PROCEDURE — 6360000002 HC RX W HCPCS: Performed by: INTERNAL MEDICINE

## 2025-01-29 PROCEDURE — 6370000000 HC RX 637 (ALT 250 FOR IP): Performed by: INTERNAL MEDICINE

## 2025-01-29 PROCEDURE — 36415 COLL VENOUS BLD VENIPUNCTURE: CPT

## 2025-01-29 RX ORDER — SODIUM CHLORIDE 0.9 % (FLUSH) 0.9 %
5-40 SYRINGE (ML) INJECTION PRN
Status: DISCONTINUED | OUTPATIENT
Start: 2025-02-05 | End: 2025-02-01 | Stop reason: HOSPADM

## 2025-01-29 RX ORDER — LIDOCAINE HYDROCHLORIDE 20 MG/ML
SOLUTION OROPHARYNGEAL PRN
Status: COMPLETED | OUTPATIENT
Start: 2025-01-29 | End: 2025-01-29

## 2025-01-29 RX ORDER — SODIUM CHLORIDE 9 MG/ML
INJECTION, SOLUTION INTRAVENOUS PRN
Status: DISCONTINUED | OUTPATIENT
Start: 2025-02-05 | End: 2025-02-01 | Stop reason: HOSPADM

## 2025-01-29 RX ORDER — SODIUM CHLORIDE 0.9 % (FLUSH) 0.9 %
5-40 SYRINGE (ML) INJECTION EVERY 12 HOURS SCHEDULED
Status: DISCONTINUED | OUTPATIENT
Start: 2025-02-05 | End: 2025-02-01 | Stop reason: HOSPADM

## 2025-01-29 RX ORDER — FENTANYL CITRATE 50 UG/ML
INJECTION, SOLUTION INTRAMUSCULAR; INTRAVENOUS PRN
Status: COMPLETED | OUTPATIENT
Start: 2025-01-29 | End: 2025-01-29

## 2025-01-29 RX ORDER — MIDAZOLAM HYDROCHLORIDE 1 MG/ML
INJECTION, SOLUTION INTRAMUSCULAR; INTRAVENOUS PRN
Status: COMPLETED | OUTPATIENT
Start: 2025-01-29 | End: 2025-01-29

## 2025-01-29 RX ADMIN — LIDOCAINE HYDROCHLORIDE 15 ML: 20 SOLUTION ORAL; TOPICAL at 09:55

## 2025-01-29 RX ADMIN — FENTANYL CITRATE 25 MCG: 50 INJECTION, SOLUTION INTRAMUSCULAR; INTRAVENOUS at 10:06

## 2025-01-29 RX ADMIN — FENTANYL CITRATE 25 MCG: 50 INJECTION, SOLUTION INTRAMUSCULAR; INTRAVENOUS at 10:03

## 2025-01-29 RX ADMIN — MIDAZOLAM 2 MG: 1 INJECTION INTRAMUSCULAR; INTRAVENOUS at 10:03

## 2025-01-29 NOTE — PROGRESS NOTES
Pt awake and alert, then falls back to sleep.  Daughter present, Dr. Arias spoke with pt and daughter.

## 2025-01-29 NOTE — DISCHARGE INSTRUCTIONS
LUIS DISCHARGE INSTRUCTIONS    No driving for 24 hours. We strongly recommend that a responsible adult stay with you for the next 24 hours.    Continue Medications.    Advance diet as tolerated    Contact physician office  for following symptoms:  Fever  Difficulty swallowing  Chest pain  Difficulty breathing  Bleeding

## 2025-02-04 NOTE — PROGRESS NOTES
19    [facility]   CARDIAC EVALUATION NOTE  [phone]      PCP:  Stefany Manzo MD    Reason for Consultation/Chief Complaint: follow up for HTN, HLD, Atrial septa hypertrophy    Subjective   History of Present Illness:  Shayla Crespo is a 84 y.o. patient who presents referred by Dr. Mejía for bradycardia, chest discomfort and systolic murmur. Echo 9/3/19 demonstrates EF 55%, prominent echogenic mass on atrial side of anterior tricuspid valve leaflet. Moderate-severe calcification of posterior mitral annulus, bubble study fails to show evidence of shunting. Frequent ectopy during exam. Her Cardiac MRI from 09/23/19 showed a suspected lipomatous hypertrophy of the inter atrial septum. Normal TV, MV and AV. Small rounded masslike lesion contiguous with the endocardial surgery of the right atrial base. On 09/25/19 she reports she feels pressure in her chest and SOB.  She states this has been occurring x 3 months, everyday and worse with exertion. She states she has been on coumadin x 56 years for blood clots in her legs. She states she has never had any cardiac issues.    Her cardiac monitor from 10/2019 was normal.   OV 11/11/19 she reported she had ongoing SOB. She had a large hiatal hernia which causes SOB as well after she eats. She denied CP, palpitations, dizziness or syncope.    ECHO/LUIS 11/20/19 showed EF 55%. Mild left atrial enlargement. Lipomatous hypertrophy of atrial septum. Prominent Eustachian valve. No evidence of a mass. Mild TR.   OV 1/14/20 with NP Meaghan Deleon. She reported that she continued to have neck /pain but was getting better since injection. Warfarin managed by PCP and coumadin clinic. She denied any complications post LUIS. She denied chest pain, sob, dizziness, syncope and edema.             OV 9/15/2022 she reported SOB and fatigue. Reported left arm had been hurting. She was put on Norco by pain management.              OV 12/1/2022 with NP Elba Deleon, she was

## 2025-02-05 ENCOUNTER — HOSPITAL ENCOUNTER (OUTPATIENT)
Dept: PHYSICAL THERAPY | Age: 85
Setting detail: THERAPIES SERIES
Discharge: HOME | End: 2025-02-05
Attending: FAMILY MEDICINE
Payer: MEDICARE

## 2025-02-05 PROCEDURE — 97530 THERAPEUTIC ACTIVITIES: CPT

## 2025-02-05 PROCEDURE — 97162 PT EVAL MOD COMPLEX 30 MIN: CPT

## 2025-02-05 NOTE — PLAN OF CARE
and observation. Intake form has been scanned into medical record. Patient has been instructed to contact their primary care physician regarding ROS issues if not already being addressed at this time.    [x] Patient history, allergies, meds reviewed. Medical chart reviewed. See intake form.     OBJECTIVE EXAMINATION     ROM/Strength: (Blank cells denote NT)       Right  Left      PROM AROM MMT Notes PROM AROM MMT Notes                 CERVICAL Flexion            Extension            Sidebending            Rotation                           SHOULDER Flexion   (0-180)            Extension (0-45)            Abduction -C5 (0-80)            ER (0-90)              IR (0-70)                           ELBOW Flexion - C6  (0-145)            Extension - C7 (145-0)            Pronation (0-90)            Supination (0-90)                           WRIST Flexion - C7   (0-80)            Extension - C6 (0-70)            Radial Deviation (0-20)            Ulnar Deviation (0-45)                         LUMBAR Flexion            Extension            Sidebending            Rotation                         HIP Flexion            Extension            Abduction            Adduction            External Rotation            Internal Rotation                       KNEE Flexion            Extension                         ANKLE Dorsiflexion            Plantarflexion            Inversion            Eversion                             Observations:  Dermatomes: Not Tested  Reflexes: Not Tested  Palpation: NT  Posture: forward head and rounded shoulders  Bandages/Dressings/Incisions: Not Applicable  Edema: no    Gait:   Dysfunction Noted  Gait Deviations: decreased step length  forward flexed posture  shuffles  Assistive Device Used: Small base quad cane (SBQC) on right  Level of Assistance: Supervision    Balance:     Static Stance: Fair      Functional Tests:  TU.38 sec    Falls Risk Assessment (30 days):   History of Falls? Yes  Falls  Universal Safety Interventions

## 2025-02-09 DIAGNOSIS — E78.2 MIXED HYPERLIPIDEMIA: ICD-10-CM

## 2025-02-10 ENCOUNTER — ANTI-COAG VISIT (OUTPATIENT)
Dept: PHARMACY | Age: 85
End: 2025-02-10
Payer: MEDICARE

## 2025-02-10 ENCOUNTER — TELEPHONE (OUTPATIENT)
Dept: PRIMARY CARE CLINIC | Age: 85
End: 2025-02-10

## 2025-02-10 DIAGNOSIS — Z86.718 HISTORY OF DVT OF LOWER EXTREMITY: Primary | ICD-10-CM

## 2025-02-10 LAB
INTERNATIONAL NORMALIZATION RATIO, POC: 2.6
PROTHROMBIN TIME, POC: 0

## 2025-02-10 PROCEDURE — 85610 PROTHROMBIN TIME: CPT

## 2025-02-10 PROCEDURE — 99211 OFF/OP EST MAY X REQ PHY/QHP: CPT

## 2025-02-10 RX ORDER — PRAVASTATIN SODIUM 40 MG
40 TABLET ORAL DAILY
Qty: 90 TABLET | Refills: 3 | Status: SHIPPED | OUTPATIENT
Start: 2025-02-10

## 2025-02-10 NOTE — TELEPHONE ENCOUNTER
Per PT, the patient needs a separate PT order for her [facility] as they have a different person do this.   Please call patient and let her know when the referral is complete so she can get this scheduled.

## 2025-02-10 NOTE — PROGRESS NOTES
Ms. Shayla Crespo is a 84 y.o. y/o female with history of DVT x 2 separate occasions  who presents today for anticoagulation monitoring and adjustment.    Pertinent PMH: HTN, HLD, GERD, CHF, anemia    Patient Reported Findings:  Yes     No  [x]   []       Patient verifies current dosing regimen as listed  - Warfarin 7.5 mg daily  - Patient has warfarin 5 mg tablets.  []   [x]       S/Sx bleeding/bruising/swelling/SOB/CP  []   [x]       Blood in urine or stool  []   [x]       Procedures scheduled in the future at this time  []   [x]       Missed Dose  []   [x]       Extra Dose  []   [x]       Change in medications  []   [x]       Change in health/diet/appetite  []   [x]       Change in alcohol use  []   [x]       Change in activity  []   [x]       Hospital admission  []   [x]       Emergency department visit  []   [x]       Other complaints    Clinical Outcomes:  Yes     No  []   [x]       Major bleeding event  []   [x]       Thromboembolic event    INR (no units)   Date Value   01/29/2025 2.19 (H)   11/08/2024 2.49 (H)   08/16/2024 2.80   08/07/2024 3.1     INR,(POC) (no units)   Date Value   01/13/2025 2.5   12/09/2024 2.3   10/11/2024 2.8   09/27/2024 3.2     Duration of warfarin Therapy: Indefinite  INR Range:  2.0-3.0      INR 2.6 is WITHIN the acceptable therapeutic range of 2-3.  Recommend to continue with warfarin 7.5 mg daily  Will continue to monitor and check INR in 4 weeks.  Dosing reminder card given with phone number, appointment date and time.   Return to clinic: 3/10/25     Referring physician: Dr. Liz  Referral date: 4/3/24    Singh Nunes, PharmD 2/10/2025 9:07 AM    For Pharmacy Admin Tracking Only  Intervention Detail:   Total # of Interventions Recommended: 0  Total # of Interventions Accepted: 0  Time Spent (min): 15

## 2025-02-11 ENCOUNTER — OFFICE VISIT (OUTPATIENT)
Dept: CARDIOLOGY CLINIC | Age: 85
End: 2025-02-11
Payer: MEDICARE

## 2025-02-11 VITALS
DIASTOLIC BLOOD PRESSURE: 74 MMHG | WEIGHT: 124.5 LBS | BODY MASS INDEX: 24.44 KG/M2 | SYSTOLIC BLOOD PRESSURE: 152 MMHG | OXYGEN SATURATION: 99 % | HEART RATE: 70 BPM | HEIGHT: 60 IN

## 2025-02-11 DIAGNOSIS — Z95.2 S/P TAVR (TRANSCATHETER AORTIC VALVE REPLACEMENT): ICD-10-CM

## 2025-02-11 DIAGNOSIS — I51.7 ATRIAL SEPTAL HYPERTROPHY: ICD-10-CM

## 2025-02-11 DIAGNOSIS — I99.8 VASCULAR INSUFFICIENCY: ICD-10-CM

## 2025-02-11 DIAGNOSIS — D64.9 ANEMIA, UNSPECIFIED TYPE: ICD-10-CM

## 2025-02-11 DIAGNOSIS — E78.2 MIXED HYPERLIPIDEMIA: ICD-10-CM

## 2025-02-11 DIAGNOSIS — I45.10 RBBB: ICD-10-CM

## 2025-02-11 DIAGNOSIS — Z95.2 STATUS POST TRANSCATHETER AORTIC VALVE REPLACEMENT: ICD-10-CM

## 2025-02-11 DIAGNOSIS — I10 ESSENTIAL HYPERTENSION: Primary | ICD-10-CM

## 2025-02-11 DIAGNOSIS — I50.9 CONGESTIVE HEART FAILURE, UNSPECIFIED HF CHRONICITY, UNSPECIFIED HEART FAILURE TYPE (HCC): ICD-10-CM

## 2025-02-11 PROCEDURE — 3077F SYST BP >= 140 MM HG: CPT | Performed by: INTERNAL MEDICINE

## 2025-02-11 PROCEDURE — 1159F MED LIST DOCD IN RCRD: CPT | Performed by: INTERNAL MEDICINE

## 2025-02-11 PROCEDURE — 1123F ACP DISCUSS/DSCN MKR DOCD: CPT | Performed by: INTERNAL MEDICINE

## 2025-02-11 PROCEDURE — 3078F DIAST BP <80 MM HG: CPT | Performed by: INTERNAL MEDICINE

## 2025-02-11 PROCEDURE — 99214 OFFICE O/P EST MOD 30 MIN: CPT | Performed by: INTERNAL MEDICINE

## 2025-02-11 RX ORDER — FUROSEMIDE 20 MG/1
20 TABLET ORAL DAILY
Qty: 30 TABLET | Refills: 5 | Status: SHIPPED | OUTPATIENT
Start: 2025-02-11

## 2025-02-11 NOTE — PATIENT INSTRUCTIONS
Continue current cardiac medications: Lasix 20 mg, imdur 30 mg, lisinopril 5 mg, pravastatin 40 mg, warfarin 5 mg  Medications reviewed. Medications refilled as warranted.   If you have any dental work, you will need antibiotics prior to dental work. Your dentist should prescribe antibiotic, if not call the office for prescription.  Discussed echocardiogram in 1 year; will order at next visit.  Referral to blood specialist for coumadin.  Follow up with me in 1 year or sooner if needed.

## 2025-02-19 ENCOUNTER — OFFICE VISIT (OUTPATIENT)
Dept: PRIMARY CARE CLINIC | Age: 85
End: 2025-02-19

## 2025-02-19 ENCOUNTER — APPOINTMENT (OUTPATIENT)
Dept: PHYSICAL THERAPY | Age: 85
End: 2025-02-19
Attending: FAMILY MEDICINE
Payer: MEDICARE

## 2025-02-19 VITALS — OXYGEN SATURATION: 94 % | TEMPERATURE: 98.1 F | HEART RATE: 87 BPM | RESPIRATION RATE: 19 BRPM

## 2025-02-19 DIAGNOSIS — R05.1 ACUTE COUGH: Primary | ICD-10-CM

## 2025-02-19 PROBLEM — Z00.00 HEALTHCARE MAINTENANCE: Status: RESOLVED | Noted: 2018-12-17 | Resolved: 2025-02-19

## 2025-02-19 LAB
INFLUENZA A ANTIBODY: NEGATIVE
INFLUENZA B ANTIBODY: NEGATIVE

## 2025-02-19 RX ORDER — METHYLPREDNISOLONE 4 MG/1
TABLET ORAL
Qty: 1 KIT | Refills: 0 | Status: SHIPPED | OUTPATIENT
Start: 2025-02-19 | End: 2025-02-25

## 2025-02-19 RX ORDER — AZITHROMYCIN 250 MG/1
TABLET, FILM COATED ORAL
Qty: 6 TABLET | Refills: 0 | Status: SHIPPED | OUTPATIENT
Start: 2025-02-19 | End: 2025-03-01

## 2025-02-19 ASSESSMENT — ENCOUNTER SYMPTOMS
SHORTNESS OF BREATH: 1
SORE THROAT: 1
COLOR CHANGE: 0
DIARRHEA: 0
BLOOD IN STOOL: 0
NAUSEA: 0
CONSTIPATION: 0
VOMITING: 0
ABDOMINAL PAIN: 0
CHEST TIGHTNESS: 1
SINUS PRESSURE: 1
COUGH: 1
WHEEZING: 1
RHINORRHEA: 1

## 2025-02-19 NOTE — PROGRESS NOTES
General: Skin is warm and dry.      Capillary Refill: Capillary refill takes less than 2 seconds.   Neurological:      Mental Status: She is alert.       There is no height or weight on file to calculate BMI.    Labs:  Recent Results (from the past 672 hour(s))   Protime-INR    Collection Time: 01/29/25  9:12 AM   Result Value Ref Range    Protime 24.3 (H) 11.9 - 14.9 sec    INR 2.19 (H) 0.85 - 1.15   LUIS (PRN contrast/bubble/3D)    Collection Time: 01/29/25 10:48 AM   Result Value Ref Range    MV E Wave Deceleration Time 293.0 ms    MV E Velocity 1.39 m/s    MV Mean Velocity 1.1 m/s    MV Mean Gradient 6 mmHg    MV VTI 46.0 cm    MV Max Velocity 1.7 m/s    MV Peak Gradient 12 mmHg    Body Surface Area 1.51 m2   POCT INR    Collection Time: 02/10/25 10:14 AM   Result Value Ref Range    Prothrombin time,(POC) 0.0     INR,(POC) 2.6    POCT Influenza A/B    Collection Time: 02/19/25 10:36 AM   Result Value Ref Range    Influenza A Ab negative     Influenza B Ab negative      Imaging:  No orders to display     ASSESSMENT & PLAN:    Shayla Crespo is a 84 y.o. year old female here for Pharyngitis and Cough. The following is a summary of the plan made at this visit:    1. Acute cough  Assessment & Plan:  Poorly controlled, likely viral illness given time course, HPI and PE.  Discussed can use Flonase Sensimist (rinsing mouth out after to prevent thrush). POC flu test negative in office today. COVID testing collected and sent off. Given pulmonary hx and high risk, will treat with Z-pack and Medrol dose pack for antiinflammatory properties. Also encouraged rest, hydration and honey (1 tsp undiluted). Reminder offered to check in - declined, they reported they would reach out to us. Patient amenable to this plan. Call back/ED precautions discussed.    Orders:  -     POCT Influenza A/B  -     COVID-19; Future  -     methylPREDNISolone (MEDROL DOSEPACK) 4 MG tablet; Take by mouth., Disp-1 kit, R-0Normal  -     azithromycin

## 2025-02-19 NOTE — ASSESSMENT & PLAN NOTE
Poorly controlled, likely viral illness given time course, HPI and PE. Unremarkable HEENT exam, otitis/strep not suspected. No testing for this. Discussed can use Flonase Sensimist (rinsing mouth out after to prevent thrush). POC flu test negative in office today. COVID testing collected and sent off. Given pulmonary hx and high risk, will treat with Z-pack and Medrol dose pack for antiinflammatory properties. Also encouraged rest, hydration and honey (1 tsp undiluted). Reminder offered to check in - declined, they reported they would reach out to us. Patient amenable to this plan. Call back/ED precautions discussed.

## 2025-02-20 LAB — SARS-COV-2 RNA RESP QL NAA+PROBE: DETECTED

## 2025-02-28 DIAGNOSIS — I10 ESSENTIAL HYPERTENSION: ICD-10-CM

## 2025-02-28 RX ORDER — LISINOPRIL 5 MG/1
5 TABLET ORAL DAILY
Qty: 90 TABLET | Refills: 1 | Status: SHIPPED | OUTPATIENT
Start: 2025-02-28

## 2025-03-01 DIAGNOSIS — Z95.2 S/P TAVR (TRANSCATHETER AORTIC VALVE REPLACEMENT): ICD-10-CM

## 2025-03-01 DIAGNOSIS — I10 ESSENTIAL HYPERTENSION: ICD-10-CM

## 2025-03-01 DIAGNOSIS — E78.00 HYPERCHOLESTEROLEMIA: ICD-10-CM

## 2025-03-01 DIAGNOSIS — Z86.718 HISTORY OF DVT OF LOWER EXTREMITY: ICD-10-CM

## 2025-03-01 DIAGNOSIS — I35.0 AORTIC VALVE STENOSIS, NONRHEUMATIC: ICD-10-CM

## 2025-03-01 DIAGNOSIS — I34.2 NONRHEUMATIC MITRAL VALVE STENOSIS: ICD-10-CM

## 2025-03-03 RX ORDER — ISOSORBIDE MONONITRATE 30 MG/1
30 TABLET, EXTENDED RELEASE ORAL DAILY
Qty: 90 TABLET | Refills: 3 | Status: SHIPPED | OUTPATIENT
Start: 2025-03-03

## 2025-03-03 NOTE — TELEPHONE ENCOUNTER
Requested Prescriptions     Pending Prescriptions Disp Refills    isosorbide mononitrate (IMDUR) 30 MG extended release tablet 90 tablet 3     Sig: Take 1 tablet by mouth daily      Last OV:  11/7/2024 D[facility]    Next OV: 05/06/2025 [facility] Recall    Last Labs: 07/03/2024 BMP    Last Filled: 12/09/2024 [facility]

## 2025-03-10 ENCOUNTER — ANTI-COAG VISIT (OUTPATIENT)
Dept: PHARMACY | Age: 85
End: 2025-03-10
Payer: MEDICARE

## 2025-03-10 DIAGNOSIS — Z86.718 HISTORY OF DVT OF LOWER EXTREMITY: Primary | ICD-10-CM

## 2025-03-10 LAB
INTERNATIONAL NORMALIZATION RATIO, POC: 2.9
PROTHROMBIN TIME, POC: 0

## 2025-03-10 PROCEDURE — 99211 OFF/OP EST MAY X REQ PHY/QHP: CPT

## 2025-03-10 PROCEDURE — 85610 PROTHROMBIN TIME: CPT

## 2025-03-10 NOTE — PROGRESS NOTES
Ms. Shayla Crespo is a 84 y.o. y/o female with history of DVT x 2 separate occasions  who presents today for anticoagulation monitoring and adjustment.    Pertinent PMH: HTN, HLD, GERD, CHF, anemia    Patient Reported Findings:  Yes     No  [x]   []       Patient verifies current dosing regimen as listed  - Warfarin 7.5 mg daily  - Patient has warfarin 5 mg tablets.  []   [x]       S/Sx bleeding/bruising/swelling/SOB/CP  []   [x]       Blood in urine or stool  []   [x]       Procedures scheduled in the future at this time  []   [x]       Missed Dose  []   [x]       Extra Dose  []   [x]       Change in medications  []   [x]       Change in health/diet/appetite  []   [x]       Change in alcohol use  []   [x]       Change in activity  []   [x]       Hospital admission  []   [x]       Emergency department visit  []   [x]       Other complaints    Clinical Outcomes:  Yes     No  []   [x]       Major bleeding event  []   [x]       Thromboembolic event    INR (no units)   Date Value   01/29/2025 2.19 (H)   11/08/2024 2.49 (H)   08/16/2024 2.80   08/07/2024 3.1     INR,(POC) (no units)   Date Value   02/10/2025 2.6   01/13/2025 2.5   12/09/2024 2.3   10/11/2024 2.8     Duration of warfarin Therapy: Indefinite  INR Range:  2.0-3.0      INR 2.9 is WITHIN the acceptable therapeutic range of 2-3.  Recommend to continue with warfarin 7.5 mg daily  Will continue to monitor and check INR in 4 weeks.  AVS printed and reviewed with patient  Return to clinic: 3/10/25     Referring physician: Dr. Liz  Referral date: 4/3/24    Singh Nunes, PharmD 3/10/2025 10:33 AM        For Pharmacy Admin Tracking Only  Intervention Detail:   Total # of Interventions Recommended: 0  Total # of Interventions Accepted: 0  Time Spent (min): 15

## 2025-03-29 DIAGNOSIS — E78.2 MIXED HYPERLIPIDEMIA: ICD-10-CM

## 2025-03-31 RX ORDER — PRAVASTATIN SODIUM 40 MG
40 TABLET ORAL DAILY
Qty: 90 TABLET | Refills: 3 | Status: SHIPPED | OUTPATIENT
Start: 2025-03-31

## 2025-04-07 ENCOUNTER — ANTI-COAG VISIT (OUTPATIENT)
Dept: PHARMACY | Age: 85
End: 2025-04-07
Payer: MEDICARE

## 2025-04-07 DIAGNOSIS — Z86.718 HISTORY OF DVT OF LOWER EXTREMITY: Primary | ICD-10-CM

## 2025-04-07 LAB
INTERNATIONAL NORMALIZATION RATIO, POC: 2.5
PROTHROMBIN TIME, POC: 0

## 2025-04-07 PROCEDURE — 85610 PROTHROMBIN TIME: CPT

## 2025-04-07 PROCEDURE — 99211 OFF/OP EST MAY X REQ PHY/QHP: CPT

## 2025-04-07 NOTE — PROGRESS NOTES
Ms. Shayla Crespo is a 84 y.o. y/o female with history of DVT x 2 separate occasions  who presents today for anticoagulation monitoring and adjustment.    Pertinent PMH: HTN, HLD, GERD, CHF, anemia    Patient Reported Findings:  Yes     No  [x]   []       Patient verifies current dosing regimen as listed  - Warfarin 7.5 mg daily  - Patient has warfarin 5 mg tablets.  []   [x]       S/Sx bleeding/bruising/swelling/SOB/CP  []   [x]       Blood in urine or stool  []   [x]       Procedures scheduled in the future at this time  []   [x]       Missed Dose  []   [x]       Extra Dose  []   [x]       Change in medications  []   [x]       Change in health/diet/appetite  []   [x]       Change in alcohol use  []   [x]       Change in activity  []   [x]       Hospital admission  []   [x]       Emergency department visit  []   [x]       Other complaints    Clinical Outcomes:  Yes     No  []   [x]       Major bleeding event  []   [x]       Thromboembolic event    INR (no units)   Date Value   01/29/2025 2.19 (H)   11/08/2024 2.49 (H)   08/16/2024 2.80   08/07/2024 3.1     INR,(POC) (no units)   Date Value   03/10/2025 2.9   02/10/2025 2.6   01/13/2025 2.5   12/09/2024 2.3     Duration of warfarin Therapy: Indefinite  INR Range:  2.0-3.0      INR 2.5 is WITHIN the acceptable therapeutic range of 2-3.  Recommend to continue with warfarin 7.5 mg daily  Will continue to monitor and check INR in 4 weeks.  AVS printed and reviewed with patient  Return to clinic: 5/5/2025    Referring physician: Dr. Liz  Referral date: 4/3/24 (referral faxed)  CPA: Signed    Singh Nunes PharmD 4/7/2025 10:24 AM          For Pharmacy Admin Tracking Only  Intervention Detail:   Total # of Interventions Recommended: 0  Total # of Interventions Accepted: 0  Time Spent (min): 15

## 2025-04-09 ENCOUNTER — TELEPHONE (OUTPATIENT)
Dept: CARDIOLOGY CLINIC | Age: 85
End: 2025-04-09

## 2025-04-09 DIAGNOSIS — Z95.2 HISTORY OF TRANSCATHETER AORTIC VALVE REPLACEMENT (TAVR): ICD-10-CM

## 2025-04-09 DIAGNOSIS — I35.0 NONRHEUMATIC AORTIC VALVE STENOSIS: Primary | ICD-10-CM

## 2025-04-09 NOTE — TELEPHONE ENCOUNTER
Zulma, would it be possible to schedule this patient with Dr. Liz at [facility] before 6-1-25 for a 1yr TAVR fu per ANDRA Mcgowan request? Thank you.

## 2025-04-09 NOTE — TELEPHONE ENCOUNTER
Kathleen, can you schedule pt for a limited echo and OV w Dr. Liz no later than 6/1/25 for her 1 year post TAVR check? Thanks, Alejandro SILVERMAN

## 2025-04-09 NOTE — TELEPHONE ENCOUNTER
Pt is scheduled on 5-27-25 at 2:30 pm with SRJ and Echo at 3:30 pm at [facility].

## 2025-04-10 RX ORDER — ALBUTEROL SULFATE 90 UG/1
INHALANT RESPIRATORY (INHALATION)
Qty: 54 G | Refills: 3 | Status: SHIPPED | OUTPATIENT
Start: 2025-04-10

## 2025-04-27 DIAGNOSIS — F33.42 RECURRENT MAJOR DEPRESSIVE DISORDER, IN FULL REMISSION: ICD-10-CM

## 2025-04-27 DIAGNOSIS — G89.4 CHRONIC PAIN SYNDROME: ICD-10-CM

## 2025-04-30 RX ORDER — DULOXETIN HYDROCHLORIDE 60 MG/1
60 CAPSULE, DELAYED RELEASE ORAL DAILY
Qty: 90 CAPSULE | Refills: 0 | OUTPATIENT
Start: 2025-04-30 | End: 2025-07-29

## 2025-05-05 ENCOUNTER — ANTI-COAG VISIT (OUTPATIENT)
Dept: PHARMACY | Age: 85
End: 2025-05-05
Payer: MEDICARE

## 2025-05-05 DIAGNOSIS — Z86.718 HISTORY OF DVT OF LOWER EXTREMITY: Primary | ICD-10-CM

## 2025-05-05 LAB
INTERNATIONAL NORMALIZATION RATIO, POC: 2.4
PROTHROMBIN TIME, POC: 0

## 2025-05-05 PROCEDURE — 85610 PROTHROMBIN TIME: CPT

## 2025-05-05 PROCEDURE — 99211 OFF/OP EST MAY X REQ PHY/QHP: CPT

## 2025-05-05 NOTE — PROGRESS NOTES
Ms. Shayla Crespo is a 84 y.o. y/o female with history of DVT x 2 separate occasions  who presents today for anticoagulation monitoring and adjustment.    Pertinent PMH: HTN, HLD, GERD, CHF, anemia    Patient Reported Findings:  Yes     No  [x]   []       Patient verifies current dosing regimen as listed  - Warfarin 7.5 mg daily  - Patient has warfarin 5 mg tablets.  []   [x]       S/Sx bleeding/bruising/swelling/SOB/CP  []   [x]       Blood in urine or stool  []   [x]       Procedures scheduled in the future at this time  []   [x]       Missed Dose  []   [x]       Extra Dose  []   [x]       Change in medications  []   [x]       Change in health/diet/appetite  []   [x]       Change in alcohol use  []   [x]       Change in activity  []   [x]       Hospital admission  []   [x]       Emergency department visit  []   [x]       Other complaints    Clinical Outcomes:  Yes     No  []   [x]       Major bleeding event  []   [x]       Thromboembolic event    INR (no units)   Date Value   01/29/2025 2.19 (H)   11/08/2024 2.49 (H)   08/16/2024 2.80   08/07/2024 3.1     INR,(POC) (no units)   Date Value   04/07/2025 2.5   03/10/2025 2.9   02/10/2025 2.6   01/13/2025 2.5     Duration of warfarin Therapy: Indefinite  INR Range:  2.0-3.0      INR 2.4 is WITHIN the acceptable therapeutic range of 2-3.  Recommend to continue with warfarin 7.5 mg daily  Will continue to monitor and check INR in 6 weeks.  Patient preference for 6 weeks given numerous therapeutic INRs  AVS printed and reviewed with patient  Return to clinic: 6/16/2025    Referring physician: Dr. Liz  Referral date: 4/3/24 (referral faxed)  CPA: Signed    Singh Nunes PharmD 5/5/2025 10:46 AM          For Pharmacy Admin Tracking Only  Intervention Detail:   Total # of Interventions Recommended: 0  Total # of Interventions Accepted: 0  Time Spent (min): 15

## 2025-05-07 DIAGNOSIS — I10 ESSENTIAL HYPERTENSION: ICD-10-CM

## 2025-05-08 RX ORDER — LISINOPRIL 5 MG/1
5 TABLET ORAL DAILY
Qty: 90 TABLET | Refills: 0 | Status: SHIPPED | OUTPATIENT
Start: 2025-05-08

## 2025-05-14 ENCOUNTER — OFFICE VISIT (OUTPATIENT)
Dept: PRIMARY CARE CLINIC | Age: 85
End: 2025-05-14

## 2025-05-14 VITALS
TEMPERATURE: 99.2 F | DIASTOLIC BLOOD PRESSURE: 72 MMHG | RESPIRATION RATE: 17 BRPM | OXYGEN SATURATION: 97 % | WEIGHT: 129 LBS | BODY MASS INDEX: 25.32 KG/M2 | HEART RATE: 88 BPM | SYSTOLIC BLOOD PRESSURE: 136 MMHG | HEIGHT: 60 IN

## 2025-05-14 DIAGNOSIS — I10 ESSENTIAL HYPERTENSION: Primary | ICD-10-CM

## 2025-05-14 DIAGNOSIS — L67.9 HAIR CHANGES: ICD-10-CM

## 2025-05-14 PROBLEM — R05.1 ACUTE COUGH: Status: RESOLVED | Noted: 2025-02-19 | Resolved: 2025-05-14

## 2025-05-14 PROBLEM — R11.0 NAUSEA: Status: RESOLVED | Noted: 2024-04-26 | Resolved: 2025-05-14

## 2025-05-14 RX ORDER — LIDOCAINE 50 MG/G
PATCH TOPICAL
COMMUNITY
Start: 2025-03-26

## 2025-05-14 ASSESSMENT — ENCOUNTER SYMPTOMS
DIARRHEA: 0
RHINORRHEA: 0
NAUSEA: 0
COUGH: 0
VOMITING: 0
COLOR CHANGE: 0
ABDOMINAL PAIN: 0
SHORTNESS OF BREATH: 0
BLOOD IN STOOL: 0

## 2025-05-14 NOTE — PATIENT INSTRUCTIONS
Please find our [facility] Location Guide below for your convenience!    CENTRAL LOCATIONS    1) [facility]  [address], [address]  [place], [place] [address]  Phone: [phone]    2) [facility]  [address], [place] [address]  Phone: [phone]    [facility] LOCATIONS    3) [facility]  [address], [address]  [place], [place] [address]  Phone: [phone]    4) [facility]  [address] [place] [address]  Phone: [phone]    5) [facility]  [address] [place] [address]  Phone: [phone]    6) [facility]  [address]  [place] [address]   Phone: [phone]    [facility] LOCATIONS    7) [facility]  [address], [address]  [place] [address]  Phone: [phone]    8) [facility]  [address] [place] [address]  Phone: [phone]    9) [facility]  [address] [place] [address]  Phone: [phone]    10) [facility]  [address]  [place] [address]  Phone: [phone]    [place] LOCATIONS    11) [facility]  [address]  [place] [address]  Phone: [phone]    12) [facility]  [address]  [place] [address]  Phone: [phone]    [facility] LOCATIONS    13) [facility]  [address], [address]  [place] [address]  Phone: [phone]    14) [facility]  [address] [place] [address]  Phone: [phone]

## 2025-05-14 NOTE — ASSESSMENT & PLAN NOTE
Well controlled for her, balancing risk of falls and BP control. Asymptomatic mostly with some dizziness but also with recent URI. Re-printed labs and encouraged her to do so. She will also follow with her cardiologist. Fall/call back/ED precautions discussed.    Blood Pressure Goal:  [ ] < 130/80 (ACC/AHA)  [ ] < 140/90 (JNC-8 for age < 60, or CKD, or DM)  [x] < 150/90 (JNC-8 for age > 60)

## 2025-05-14 NOTE — PROGRESS NOTES
Shayla Crespo is a 84 y.o. year old female here for:    Chief Complaint:    Chief Complaint   Patient presents with    Hypertension     Subjective:    Today, her current concerns include:    HPI:  #HTN  - Onset: Years  - Quality: Asymptomatic  - Timing/Duration: Constant and daily  - Progression: Stable  - Modifiers: On lisinopril and lasix daily - reports adherence  - Associated Symptoms: Per ROS as otherwise stated in this note. Occasional dizziness.    Past Medical History:   Diagnosis Date    Acute pulmonary embolism ([facility]) 01/17/2024    Allergic rhinitis     Aortic stenosis, severe 04/02/2024    Arthritis     Cancer ([facility])     squamous cell on nose     Chronic back pain     Claudication     Clotting disorder     Congenital heart disease     Deep vein thrombosis (DVT) of proximal vein of both lower extremities ([facility]) 04/11/2017    Dementia ([facility]) 12/2019    Dental disease     Depression     Dizziness     Facial pain 03/28/2024    GERD (gastroesophageal reflux disease)     H/O blood clots     Headache     Hearing loss     Hiatal hernia     Hyperlipidemia     Hypertension     IPF (idiopathic pulmonary fibrosis) ([facility]) 01/17/2024    Lumbar stenosis with neurogenic claudication     Moderate malnutrition 04/16/2024    Nephrolithiasis     Nonrheumatic aortic valve stenosis 04/02/2024    Opioid dependence with current use ([facility]) 01/24/2024    Osteoarthritis of neck     Pneumonia of right upper lobe due to infectious organism 04/15/2024    Scoliosis     Thrombocytopenia, unspecified 04/08/2024    TMJ dysfunction      Social History     Tobacco Use    Smoking status: Never     Passive exposure: Never    Smokeless tobacco: Never   Vaping Use    Vaping status: Never Used   Substance Use Topics    Alcohol use: No    Drug use: No     Family History   Problem Relation Age of Onset    Heart Disease Mother     Other Father         murdered     Colon Cancer Brother     Breast Cancer Maternal Aunt     Colon Cancer Maternal

## 2025-05-14 NOTE — ASSESSMENT & PLAN NOTE
Chronic, dry hair and skin and worsening. Also on diuretics and low water intake so possibly 2/2 this. Will check TSH to eval.

## 2025-05-27 ENCOUNTER — OFFICE VISIT (OUTPATIENT)
Dept: CARDIOLOGY CLINIC | Age: 85
End: 2025-05-27

## 2025-05-27 ENCOUNTER — HOSPITAL ENCOUNTER (OUTPATIENT)
Dept: CARDIOLOGY | Age: 85
Discharge: HOME | End: 2025-05-29
Attending: INTERNAL MEDICINE
Payer: MEDICARE

## 2025-05-27 VITALS
HEART RATE: 86 BPM | DIASTOLIC BLOOD PRESSURE: 86 MMHG | OXYGEN SATURATION: 93 % | WEIGHT: 130 LBS | BODY MASS INDEX: 25.52 KG/M2 | SYSTOLIC BLOOD PRESSURE: 128 MMHG | HEIGHT: 60 IN

## 2025-05-27 DIAGNOSIS — R60.9 EDEMA, UNSPECIFIED TYPE: ICD-10-CM

## 2025-05-27 DIAGNOSIS — M79.604 PAIN IN BOTH LOWER EXTREMITIES: ICD-10-CM

## 2025-05-27 DIAGNOSIS — Z86.718 HISTORY OF DVT OF LOWER EXTREMITY: ICD-10-CM

## 2025-05-27 DIAGNOSIS — E78.2 MIXED HYPERLIPIDEMIA: ICD-10-CM

## 2025-05-27 DIAGNOSIS — I51.7 ATRIAL SEPTAL HYPERTROPHY: ICD-10-CM

## 2025-05-27 DIAGNOSIS — M79.605 PAIN IN BOTH LOWER EXTREMITIES: ICD-10-CM

## 2025-05-27 DIAGNOSIS — Z95.2 HISTORY OF TRANSCATHETER AORTIC VALVE REPLACEMENT (TAVR): ICD-10-CM

## 2025-05-27 DIAGNOSIS — I50.9 CONGESTIVE HEART FAILURE, UNSPECIFIED HF CHRONICITY, UNSPECIFIED HEART FAILURE TYPE (HCC): ICD-10-CM

## 2025-05-27 DIAGNOSIS — I99.8 VASCULAR INSUFFICIENCY: ICD-10-CM

## 2025-05-27 DIAGNOSIS — I49.9 IRREGULAR HEART RATE: ICD-10-CM

## 2025-05-27 DIAGNOSIS — I35.0 NONRHEUMATIC AORTIC VALVE STENOSIS: ICD-10-CM

## 2025-05-27 DIAGNOSIS — I10 ESSENTIAL HYPERTENSION: ICD-10-CM

## 2025-05-27 DIAGNOSIS — I45.10 RBBB: ICD-10-CM

## 2025-05-27 DIAGNOSIS — Z95.2 S/P TAVR (TRANSCATHETER AORTIC VALVE REPLACEMENT): Primary | ICD-10-CM

## 2025-05-27 LAB
ECHO AO ASC DIAM: 3.2 CM
ECHO AO ASCENDING AORTA INDEX: 2.06 CM/M2
ECHO AO ROOT DIAM: 2.2 CM
ECHO AO ROOT INDEX: 1.42 CM/M2
ECHO AV AREA PEAK VELOCITY: 1.7 CM2
ECHO AV AREA VTI: 1.6 CM2
ECHO AV AREA/BSA PEAK VELOCITY: 1.1 CM2/M2
ECHO AV AREA/BSA VTI: 1 CM2/M2
ECHO AV MEAN GRADIENT: 13 MMHG
ECHO AV MEAN VELOCITY: 1.7 M/S
ECHO AV PEAK GRADIENT: 24 MMHG
ECHO AV PEAK VELOCITY: 2.5 M/S
ECHO AV VELOCITY RATIO: 0.52
ECHO AV VTI: 45.7 CM
ECHO BSA: 1.58 M2
ECHO LA AREA 2C: 21.4 CM2
ECHO LA AREA 4C: 19.5 CM2
ECHO LA DIAMETER INDEX: 2.13 CM/M2
ECHO LA DIAMETER: 3.3 CM
ECHO LA MAJOR AXIS: 6.6 CM
ECHO LA MINOR AXIS: 5.9 CM
ECHO LA TO AORTIC ROOT RATIO: 1.5
ECHO LA VOL BP: 58 ML (ref 22–52)
ECHO LA VOL MOD A2C: 65 ML (ref 22–52)
ECHO LA VOL MOD A4C: 46 ML (ref 22–52)
ECHO LA VOL/BSA BIPLANE: 37 ML/M2 (ref 16–34)
ECHO LA VOLUME INDEX MOD A2C: 42 ML/M2 (ref 16–34)
ECHO LA VOLUME INDEX MOD A4C: 30 ML/M2 (ref 16–34)
ECHO LV EF PHYSICIAN: 60 %
ECHO LV FRACTIONAL SHORTENING: 24 % (ref 28–44)
ECHO LV GLOBAL LONGITUDINAL STRAIN (GLS): -19.6 %
ECHO LV GLOBAL LONGITUDINAL STRAIN (GLS): -21.3 %
ECHO LV GLOBAL LONGITUDINAL STRAIN (GLS): -21.5 %
ECHO LV GLOBAL LONGITUDINAL STRAIN (GLS): -22.7 %
ECHO LV INTERNAL DIMENSION DIASTOLE INDEX: 2.13 CM/M2
ECHO LV INTERNAL DIMENSION DIASTOLIC: 3.3 CM (ref 3.9–5.3)
ECHO LV INTERNAL DIMENSION SYSTOLIC INDEX: 1.61 CM/M2
ECHO LV INTERNAL DIMENSION SYSTOLIC: 2.5 CM
ECHO LV IVSD: 1.1 CM (ref 0.6–0.9)
ECHO LV MASS 2D: 109.1 G (ref 67–162)
ECHO LV MASS INDEX 2D: 70.4 G/M2 (ref 43–95)
ECHO LV POSTERIOR WALL DIASTOLIC: 1.1 CM (ref 0.6–0.9)
ECHO LV RELATIVE WALL THICKNESS RATIO: 0.67
ECHO LVOT AREA: 3.1 CM2
ECHO LVOT AV VTI INDEX: 0.52
ECHO LVOT DIAM: 2 CM
ECHO LVOT MEAN GRADIENT: 4 MMHG
ECHO LVOT PEAK GRADIENT: 7 MMHG
ECHO LVOT PEAK VELOCITY: 1.3 M/S
ECHO LVOT STROKE VOLUME INDEX: 48.4 ML/M2
ECHO LVOT SV: 75 ML
ECHO LVOT VTI: 23.9 CM
ECHO MV A VELOCITY: 1.97 M/S
ECHO MV AREA VTI: 1.4 CM2
ECHO MV E VELOCITY: 1.27 M/S
ECHO MV E/A RATIO: 0.64
ECHO MV LVOT VTI INDEX: 2.28
ECHO MV MAX VELOCITY: 2.2 M/S
ECHO MV MEAN GRADIENT: 10 MMHG
ECHO MV MEAN VELOCITY: 1.4 M/S
ECHO MV PEAK GRADIENT: 19 MMHG
ECHO MV VTI: 54.6 CM
ECHO RA AREA 4C: 10.5 CM2
ECHO RA END SYSTOLIC VOLUME APICAL 4 CHAMBER INDEX BSA: 13 ML/M2
ECHO RA VOLUME: 20 ML
ECHO RV TAPSE: 1.9 CM (ref 1.7–?)

## 2025-05-27 PROCEDURE — 93321 DOPPLER ECHO F-UP/LMTD STD: CPT

## 2025-05-27 PROCEDURE — 93306 TTE W/DOPPLER COMPLETE: CPT | Performed by: INTERNAL MEDICINE

## 2025-05-27 NOTE — PROGRESS NOTES
19    [facility]   CARDIAC EVALUATION NOTE  [phone]      PCP:  Stefany Manzo MD    Reason for Consultation/Chief Complaint: follow up for HTN, HLD, Atrial septa hypertrophy    Subjective   History of Present Illness:  Shayla Crespo is a 84 y.o. patient who presents referred by Dr. Mejía for bradycardia, chest discomfort and systolic murmur. Echo 9/3/19 demonstrates EF 55%, prominent echogenic mass on atrial side of anterior tricuspid valve leaflet. Moderate-severe calcification of posterior mitral annulus, bubble study fails to show evidence of shunting. Frequent ectopy during exam. Her Cardiac MRI from 09/23/19 showed a suspected lipomatous hypertrophy of the inter atrial septum. Normal TV, MV and AV. Small rounded masslike lesion contiguous with the endocardial surgery of the right atrial base. On 09/25/19 she reports she feels pressure in her chest and SOB.  She states this has been occurring x 3 months, everyday and worse with exertion. She states she has been on coumadin x 56 years for blood clots in her legs. She states she has never had any cardiac issues.    Her cardiac monitor from 10/2019 was normal.   OV 11/11/19 she reported she had ongoing SOB. She had a large hiatal hernia which causes SOB as well after she eats. She denied CP, palpitations, dizziness or syncope.    ECHO/LUIS 11/20/19 showed EF 55%. Mild left atrial enlargement. Lipomatous hypertrophy of atrial septum. Prominent Eustachian valve. No evidence of a mass. Mild TR.   OV 1/14/20 with NP Meaghan Deleon. She reported that she continued to have neck /pain but was getting better since injection. Warfarin managed by PCP and coumadin clinic. She denied any complications post LUIS. She denied chest pain, sob, dizziness, syncope and edema.             OV 9/15/2022 she reported SOB and fatigue. Reported left arm had been hurting. She was put on Norco by pain management.              OV 12/1/2022 with NP Elba Deleon, she was

## 2025-05-27 NOTE — PATIENT INSTRUCTIONS
Continue current cardiac medications Lasix 20 mg, Imdur 30 mg, lisinopril 5 mg, pravastatin 40 mg, coumadin 5 mg  Medications reviewed. Medications refilled as warranted.   EKG done-reviewed.  Bilateral lower extremity venous dopplers to further assess groin pain and calf pain.  You will need an antibiotic for future dental work; call the office if your dentist does not prescribe these.  Follow up with me in 1 year or sooner.           Your provider has ordered testing for further evaluation.  An order/prescription has been included in your paper work.   To schedule outpatient testing, contact Central Scheduling by calling [address] [place] [address] ([phone]).

## 2025-05-28 ENCOUNTER — RESULTS FOLLOW-UP (OUTPATIENT)
Dept: CARDIOLOGY | Age: 85
End: 2025-05-28

## 2025-05-28 DIAGNOSIS — I50.9 CONGESTIVE HEART FAILURE, UNSPECIFIED HF CHRONICITY, UNSPECIFIED HEART FAILURE TYPE (HCC): Primary | ICD-10-CM

## 2025-05-28 DIAGNOSIS — I99.8 VASCULAR INSUFFICIENCY: ICD-10-CM

## 2025-05-29 ENCOUNTER — HOSPITAL ENCOUNTER (OUTPATIENT)
Dept: VASCULAR LAB | Age: 85
Discharge: HOME | End: 2025-05-31
Payer: MEDICARE

## 2025-05-29 DIAGNOSIS — M79.604 PAIN IN BOTH LOWER EXTREMITIES: ICD-10-CM

## 2025-05-29 DIAGNOSIS — I99.8 VASCULAR INSUFFICIENCY: ICD-10-CM

## 2025-05-29 DIAGNOSIS — M79.605 PAIN IN BOTH LOWER EXTREMITIES: ICD-10-CM

## 2025-05-29 DIAGNOSIS — R60.9 EDEMA, UNSPECIFIED TYPE: ICD-10-CM

## 2025-05-29 PROCEDURE — 93970 EXTREMITY STUDY: CPT

## 2025-05-30 ENCOUNTER — RESULTS FOLLOW-UP (OUTPATIENT)
Dept: CARDIOLOGY | Age: 85
End: 2025-05-30

## 2025-05-30 NOTE — RESULT ENCOUNTER NOTE
Spoke with patient and son Aguila. Relayed results. Patient V/U. Appt made with Dr.Raskins Sheehan VU to time,date,and location of appt.

## 2025-06-10 NOTE — PROGRESS NOTES
Ms. Shayla Crespo is a 84 y.o. y/o female with history of DVT x 2 separate occasions  who presents today for anticoagulation monitoring and adjustment.    Pertinent PMH: HTN, HLD, GERD, CHF, anemia    Patient Reported Findings:  Yes     No  [x]   []       Patient verifies current dosing regimen as listed  - Warfarin 7.5 mg daily  - Patient has warfarin 5 mg tablets.  []   [x]       S/Sx bleeding/bruising/swelling/SOB/CP  []   [x]       Blood in urine or stool  []   [x]       Procedures scheduled in the future at this time  []   [x]       Missed Dose  []   [x]       Extra Dose  []   [x]       Change in medications  []   [x]       Change in health/diet/appetite  []   [x]       Change in alcohol use  []   [x]       Change in activity  []   [x]       Hospital admission  []   [x]       Emergency department visit  []   [x]       Other complaints      Clinical Outcomes:  Yes     No  []   [x]       Major bleeding event  []   [x]       Thromboembolic event    INR (no units)   Date Value   01/29/2025 2.19 (H)   11/08/2024 2.49 (H)   08/16/2024 2.80   08/07/2024 3.1     INR,(POC) (no units)   Date Value   05/05/2025 2.4   04/07/2025 2.5   03/10/2025 2.9   02/10/2025 2.6     Duration of warfarin Therapy: Indefinite  INR Range:  2.0-3.0      INR 2.9 is WITHIN the acceptable therapeutic range of 2-3.  Recommend to continue with warfarin 7.5 mg daily  Will continue to monitor and check INR in 6 weeks.  Patient preference for 6 weeks given numerous therapeutic INRs  AVS printed and reviewed with patient  Return to clinic: 7/28/2025    Referring physician: Dr. Liz  Referral date: 6/16/2025  CPA: Signed    Singh Nunes PharmD 6/10/2025 7:58 AM          For Pharmacy Admin Tracking Only  Intervention Detail:   Total # of Interventions Recommended: 0  Total # of Interventions Accepted: 0  Time Spent (min): 15   78

## 2025-06-16 ENCOUNTER — ANTI-COAG VISIT (OUTPATIENT)
Dept: PHARMACY | Age: 85
End: 2025-06-16
Payer: MEDICARE

## 2025-06-16 DIAGNOSIS — Z86.718 HISTORY OF DVT OF LOWER EXTREMITY: Primary | ICD-10-CM

## 2025-06-16 LAB
INTERNATIONAL NORMALIZATION RATIO, POC: 2.9
PROTHROMBIN TIME, POC: 0

## 2025-06-16 PROCEDURE — 85610 PROTHROMBIN TIME: CPT

## 2025-06-16 PROCEDURE — 99211 OFF/OP EST MAY X REQ PHY/QHP: CPT

## 2025-06-24 ENCOUNTER — HOSPITAL ENCOUNTER (OUTPATIENT)
Dept: MRI IMAGING | Age: 85
Discharge: HOME | End: 2025-06-24
Payer: MEDICARE

## 2025-06-24 DIAGNOSIS — I50.9 CONGESTIVE HEART FAILURE, UNSPECIFIED HF CHRONICITY, UNSPECIFIED HEART FAILURE TYPE (HCC): ICD-10-CM

## 2025-06-24 DIAGNOSIS — I99.8 VASCULAR INSUFFICIENCY: ICD-10-CM

## 2025-06-24 PROCEDURE — 6360000004 HC RX CONTRAST MEDICATION: Performed by: INTERNAL MEDICINE

## 2025-06-24 PROCEDURE — A9585 GADOBUTROL INJECTION: HCPCS | Performed by: INTERNAL MEDICINE

## 2025-06-24 PROCEDURE — 75561 CARDIAC MRI FOR MORPH W/DYE: CPT

## 2025-06-24 PROCEDURE — 75561 CARDIAC MRI FOR MORPH W/DYE: CPT | Performed by: INTERNAL MEDICINE

## 2025-06-24 RX ORDER — GADOBUTROL 604.72 MG/ML
10 INJECTION INTRAVENOUS
Status: COMPLETED | OUTPATIENT
Start: 2025-06-24 | End: 2025-06-24

## 2025-06-24 RX ADMIN — GADOBUTROL 10 ML: 604.72 INJECTION INTRAVENOUS at 14:06

## 2025-06-25 ENCOUNTER — RESULTS FOLLOW-UP (OUTPATIENT)
Dept: CARDIOLOGY | Age: 85
End: 2025-06-25

## 2025-07-06 DIAGNOSIS — I10 ESSENTIAL HYPERTENSION: ICD-10-CM

## 2025-07-07 RX ORDER — LISINOPRIL 5 MG/1
5 TABLET ORAL DAILY
Qty: 30 TABLET | Refills: 0 | Status: SHIPPED | OUTPATIENT
Start: 2025-07-07

## 2025-07-21 DIAGNOSIS — L67.9 HAIR CHANGES: ICD-10-CM

## 2025-07-22 LAB — TSH SERPL DL<=0.005 MIU/L-ACNC: 1.01 UIU/ML (ref 0.27–4.2)

## 2025-07-24 NOTE — PROGRESS NOTES
Ms. Shayla Crespo is a 84 y.o. y/o female with history of DVT x 2 separate occasions  who presents today for anticoagulation monitoring and adjustment.    Pertinent PMH: HTN, HLD, GERD, CHF, anemia    Patient Reported Findings:  Yes     No  [x]   []       Patient verifies current dosing regimen as listed  - Warfarin 7.5 mg daily  - Patient has warfarin 5 mg tablets.  []   [x]       S/Sx bleeding/bruising/swelling/SOB/CP  []   [x]       Blood in urine or stool  []   [x]       Procedures scheduled in the future at this time  []   [x]       Missed Dose  []   [x]       Extra Dose  [x]   []       Change in medications  - Vitamin D recently increased  []   [x]       Change in health/diet/appetite  []   [x]       Change in alcohol use  []   [x]       Change in activity  []   [x]       Hospital admission  []   [x]       Emergency department visit  []   [x]       Other complaints      Clinical Outcomes:  Yes     No  []   [x]       Major bleeding event  []   [x]       Thromboembolic event    INR (no units)   Date Value   2025 2.19 (H)   2024 2.49 (H)   2024 2.80   2024 3.1     INR,(POC) (no units)   Date Value   2025 2.9   2025 2.4   2025 2.5   03/10/2025 2.9     Duration of warfarin Therapy: Indefinite  INR Range:  2.0-3.0      INR 3.6 is ABOVE the acceptable therapeutic range of 2-3.  Recommend to HOLD dose today then continue with warfarin 7.5 mg daily  Will continue to monitor and check INR in 1-2 weeks.  AVS printed and reviewed with patient  Return to clinic: 2025    Referring physician: Dr. Liz  Referral date: 2025  CPA: Signed    Jason HerndonD         For Pharmacy Admin Tracking Only  Intervention Detail: Adherence Monitorin  Total # of Interventions Recommended: 1  Total # of Interventions Accepted: 1  Time Spent (min): 15

## 2025-07-28 ENCOUNTER — ANTI-COAG VISIT (OUTPATIENT)
Dept: PHARMACY | Age: 85
End: 2025-07-28
Payer: MEDICARE

## 2025-07-28 DIAGNOSIS — D64.9 ANEMIA, UNSPECIFIED TYPE: ICD-10-CM

## 2025-07-28 DIAGNOSIS — I10 ESSENTIAL HYPERTENSION: ICD-10-CM

## 2025-07-28 DIAGNOSIS — Z86.718 HISTORY OF DVT OF LOWER EXTREMITY: Primary | ICD-10-CM

## 2025-07-28 DIAGNOSIS — E78.2 MIXED HYPERLIPIDEMIA: ICD-10-CM

## 2025-07-28 LAB
ANION GAP SERPL CALCULATED.3IONS-SCNC: 10 MMOL/L (ref 3–16)
BUN SERPL-MCNC: 18 MG/DL (ref 7–20)
CALCIUM SERPL-MCNC: 9.3 MG/DL (ref 8.3–10.6)
CHLORIDE SERPL-SCNC: 105 MMOL/L (ref 99–110)
CHOLEST SERPL-MCNC: 150 MG/DL (ref 0–199)
CO2 SERPL-SCNC: 30 MMOL/L (ref 21–32)
CREAT SERPL-MCNC: 0.6 MG/DL (ref 0.6–1.2)
DEPRECATED RDW RBC AUTO: 13.9 % (ref 12.4–15.4)
FERRITIN SERPL IA-MCNC: 58.7 NG/ML (ref 15–150)
FOLATE SERPL-MCNC: 32.8 NG/ML (ref 4.78–24.2)
GFR SERPLBLD CREATININE-BSD FMLA CKD-EPI: 89 ML/MIN/{1.73_M2}
GLUCOSE SERPL-MCNC: 83 MG/DL (ref 70–99)
HCT VFR BLD AUTO: 36.4 % (ref 36–48)
HDLC SERPL-MCNC: 73 MG/DL (ref 40–60)
HGB BLD-MCNC: 12.5 G/DL (ref 12–16)
INTERNATIONAL NORMALIZATION RATIO, POC: 3.6
IRON SATN MFR SERPL: 26 % (ref 15–50)
IRON SERPL-MCNC: 80 UG/DL (ref 37–145)
LDLC SERPL CALC-MCNC: 65 MG/DL
MAGNESIUM SERPL-MCNC: 1.77 MG/DL (ref 1.8–2.4)
MCH RBC QN AUTO: 33.2 PG (ref 26–34)
MCHC RBC AUTO-ENTMCNC: 34.4 G/DL (ref 31–36)
MCV RBC AUTO: 96.4 FL (ref 80–100)
PHOSPHATE SERPL-MCNC: 2.8 MG/DL (ref 2.5–4.9)
PLATELET # BLD AUTO: 134 K/UL (ref 135–450)
PMV BLD AUTO: 10 FL (ref 5–10.5)
POTASSIUM SERPL-SCNC: 3.9 MMOL/L (ref 3.5–5.1)
PROTHROMBIN TIME, POC: 0
RBC # BLD AUTO: 3.78 M/UL (ref 4–5.2)
SODIUM SERPL-SCNC: 145 MMOL/L (ref 136–145)
TIBC SERPL-MCNC: 303 UG/DL (ref 260–445)
TRIGL SERPL-MCNC: 59 MG/DL (ref 0–150)
VIT B12 SERPL-MCNC: 964 PG/ML (ref 211–911)
VLDLC SERPL CALC-MCNC: 12 MG/DL
WBC # BLD AUTO: 4.1 K/UL (ref 4–11)

## 2025-07-28 PROCEDURE — 99211 OFF/OP EST MAY X REQ PHY/QHP: CPT

## 2025-07-28 PROCEDURE — 85610 PROTHROMBIN TIME: CPT

## 2025-07-30 DIAGNOSIS — I10 ESSENTIAL HYPERTENSION: ICD-10-CM

## 2025-07-30 RX ORDER — LISINOPRIL 5 MG/1
5 TABLET ORAL DAILY
Qty: 90 TABLET | Refills: 1 | Status: SHIPPED | OUTPATIENT
Start: 2025-07-30

## 2025-08-04 RX ORDER — WARFARIN SODIUM 5 MG/1
7.5 TABLET ORAL DAILY
Qty: 150 TABLET | Refills: 2 | Status: SHIPPED | OUTPATIENT
Start: 2025-08-04

## 2025-08-05 ENCOUNTER — OFFICE VISIT (OUTPATIENT)
Dept: CARDIOLOGY CLINIC | Age: 85
End: 2025-08-05

## 2025-08-05 VITALS
SYSTOLIC BLOOD PRESSURE: 134 MMHG | DIASTOLIC BLOOD PRESSURE: 66 MMHG | WEIGHT: 129 LBS | HEART RATE: 81 BPM | OXYGEN SATURATION: 96 % | BODY MASS INDEX: 25.32 KG/M2 | HEIGHT: 60 IN

## 2025-08-05 DIAGNOSIS — Z86.718 HISTORY OF DVT OF LOWER EXTREMITY: Primary | ICD-10-CM

## 2025-08-05 DIAGNOSIS — I87.2 VENOUS INSUFFICIENCY: ICD-10-CM

## 2025-08-11 ENCOUNTER — ANTI-COAG VISIT (OUTPATIENT)
Dept: PHARMACY | Age: 85
End: 2025-08-11
Payer: MEDICARE

## 2025-08-11 DIAGNOSIS — Z86.718 HISTORY OF DVT OF LOWER EXTREMITY: Primary | ICD-10-CM

## 2025-08-11 LAB
INTERNATIONAL NORMALIZATION RATIO, POC: 2.7
PROTHROMBIN TIME, POC: NORMAL

## 2025-08-11 PROCEDURE — 85610 PROTHROMBIN TIME: CPT

## 2025-08-11 PROCEDURE — 99211 OFF/OP EST MAY X REQ PHY/QHP: CPT

## (undated) DEVICE — GAUZE,SPONGE,4"X4",16PLY,STRL,LF,10/TRAY: Brand: MEDLINE

## (undated) DEVICE — ALCOHOL RUBBING 16OZ 70% ISO

## (undated) DEVICE — UNIVERSAL BLOCK TRAY: Brand: MEDLINE INDUSTRIES, INC.

## (undated) DEVICE — TOWEL OR BLUEE 16X26IN ST 8 PACK ORB08 16X26ORTWL

## (undated) DEVICE — NEEDLE HYPO 18GA L1.5IN THN WALL PIVOTING SHLD BVL ORIENTED

## (undated) DEVICE — STERILE POLYISOPRENE POWDER-FREE SURGICAL GLOVES: Brand: PROTEXIS

## (undated) DEVICE — APPLICATOR PREP 26ML 0.7% IOD POVACRYLEX 74% ISO ALC ST

## (undated) DEVICE — EXTENSION SET, 2 INJECTION SITES, MALE LUER LOCK ADAPTER WITH RETRACTABLE COLLAR: Brand: INTERLINK

## (undated) DEVICE — OPTIFOAM GENTLE LIQUITRAP, SACRUM, 7"X7": Brand: MEDLINE